# Patient Record
Sex: MALE | Race: WHITE | Employment: OTHER | URBAN - METROPOLITAN AREA
[De-identification: names, ages, dates, MRNs, and addresses within clinical notes are randomized per-mention and may not be internally consistent; named-entity substitution may affect disease eponyms.]

---

## 2018-10-10 LAB — DIABETIC RETINOPATHY: NEGATIVE

## 2019-01-09 LAB
LEFT VENTRICULAR EJECTION FRACTION MODE: NORMAL
LV EF: 35 %

## 2019-01-22 ENCOUNTER — TELEPHONE (OUTPATIENT)
Dept: CARDIOLOGY CLINIC | Age: 69
End: 2019-01-22

## 2019-01-24 RX ORDER — MONTELUKAST SODIUM 10 MG/1
10 TABLET ORAL DAILY
COMMUNITY
End: 2019-09-16 | Stop reason: SDUPTHER

## 2019-01-24 RX ORDER — GLYBURIDE 5 MG/1
5 TABLET ORAL
COMMUNITY
End: 2019-06-14 | Stop reason: SDUPTHER

## 2019-01-24 RX ORDER — IPRATROPIUM BROMIDE AND ALBUTEROL SULFATE 2.5; .5 MG/3ML; MG/3ML
1 SOLUTION RESPIRATORY (INHALATION) EVERY 6 HOURS PRN
COMMUNITY
End: 2019-12-30 | Stop reason: ALTCHOICE

## 2019-01-24 RX ORDER — SPIRONOLACTONE 25 MG/1
25 TABLET ORAL DAILY
COMMUNITY
End: 2019-03-06 | Stop reason: SDUPTHER

## 2019-01-24 RX ORDER — FUROSEMIDE 20 MG/1
20 TABLET ORAL 2 TIMES DAILY
COMMUNITY
End: 2019-02-12 | Stop reason: SDUPTHER

## 2019-01-25 ENCOUNTER — OFFICE VISIT (OUTPATIENT)
Dept: CARDIOLOGY CLINIC | Age: 69
End: 2019-01-25
Payer: MEDICARE

## 2019-01-25 VITALS
OXYGEN SATURATION: 98 % | WEIGHT: 232.6 LBS | BODY MASS INDEX: 33.3 KG/M2 | HEART RATE: 87 BPM | HEIGHT: 70 IN | SYSTOLIC BLOOD PRESSURE: 116 MMHG | RESPIRATION RATE: 22 BRPM | DIASTOLIC BLOOD PRESSURE: 70 MMHG

## 2019-01-25 DIAGNOSIS — D50.8 OTHER IRON DEFICIENCY ANEMIA: ICD-10-CM

## 2019-01-25 DIAGNOSIS — E11.8 CONTROLLED TYPE 2 DIABETES MELLITUS WITH COMPLICATION, WITHOUT LONG-TERM CURRENT USE OF INSULIN (HCC): ICD-10-CM

## 2019-01-25 DIAGNOSIS — I27.20 PULMONARY HTN (HCC): ICD-10-CM

## 2019-01-25 DIAGNOSIS — I34.0 NON-RHEUMATIC MITRAL REGURGITATION: ICD-10-CM

## 2019-01-25 DIAGNOSIS — I42.0 DILATED CARDIOMYOPATHY (HCC): ICD-10-CM

## 2019-01-25 DIAGNOSIS — R06.02 SOBOE (SHORTNESS OF BREATH ON EXERTION): ICD-10-CM

## 2019-01-25 DIAGNOSIS — I50.9 CONGESTIVE HEART FAILURE, UNSPECIFIED HF CHRONICITY, UNSPECIFIED HEART FAILURE TYPE (HCC): Primary | ICD-10-CM

## 2019-01-25 PROBLEM — D64.9 ABSOLUTE ANEMIA: Status: ACTIVE | Noted: 2019-01-25

## 2019-01-25 PROCEDURE — 4040F PNEUMOC VAC/ADMIN/RCVD: CPT | Performed by: INTERNAL MEDICINE

## 2019-01-25 PROCEDURE — G8484 FLU IMMUNIZE NO ADMIN: HCPCS | Performed by: INTERNAL MEDICINE

## 2019-01-25 PROCEDURE — G8417 CALC BMI ABV UP PARAM F/U: HCPCS | Performed by: INTERNAL MEDICINE

## 2019-01-25 PROCEDURE — 1036F TOBACCO NON-USER: CPT | Performed by: INTERNAL MEDICINE

## 2019-01-25 PROCEDURE — 93000 ELECTROCARDIOGRAM COMPLETE: CPT | Performed by: INTERNAL MEDICINE

## 2019-01-25 PROCEDURE — 1123F ACP DISCUSS/DSCN MKR DOCD: CPT | Performed by: INTERNAL MEDICINE

## 2019-01-25 PROCEDURE — 3017F COLORECTAL CA SCREEN DOC REV: CPT | Performed by: INTERNAL MEDICINE

## 2019-01-25 PROCEDURE — 1101F PT FALLS ASSESS-DOCD LE1/YR: CPT | Performed by: INTERNAL MEDICINE

## 2019-01-25 PROCEDURE — G8427 DOCREV CUR MEDS BY ELIG CLIN: HCPCS | Performed by: INTERNAL MEDICINE

## 2019-01-25 PROCEDURE — 2022F DILAT RTA XM EVC RTNOPTHY: CPT | Performed by: INTERNAL MEDICINE

## 2019-01-25 PROCEDURE — 99204 OFFICE O/P NEW MOD 45 MIN: CPT | Performed by: INTERNAL MEDICINE

## 2019-01-25 PROCEDURE — 3046F HEMOGLOBIN A1C LEVEL >9.0%: CPT | Performed by: INTERNAL MEDICINE

## 2019-01-25 RX ORDER — METOPROLOL SUCCINATE 25 MG/1
25 TABLET, EXTENDED RELEASE ORAL DAILY
Qty: 30 TABLET | Refills: 3 | Status: SHIPPED | OUTPATIENT
Start: 2019-01-25 | End: 2019-03-06 | Stop reason: SDUPTHER

## 2019-02-12 ENCOUNTER — OFFICE VISIT (OUTPATIENT)
Dept: CARDIOLOGY CLINIC | Age: 69
End: 2019-02-12
Payer: MEDICARE

## 2019-02-12 VITALS
HEART RATE: 80 BPM | HEIGHT: 70 IN | BODY MASS INDEX: 34.56 KG/M2 | SYSTOLIC BLOOD PRESSURE: 98 MMHG | DIASTOLIC BLOOD PRESSURE: 52 MMHG | OXYGEN SATURATION: 97 % | RESPIRATION RATE: 16 BRPM | WEIGHT: 241.4 LBS

## 2019-02-12 DIAGNOSIS — I38 VHD (VALVULAR HEART DISEASE): ICD-10-CM

## 2019-02-12 DIAGNOSIS — I50.22 CHRONIC SYSTOLIC HEART FAILURE (HCC): Primary | ICD-10-CM

## 2019-02-12 DIAGNOSIS — I42.8 OTHER CARDIOMYOPATHY (HCC): ICD-10-CM

## 2019-02-12 PROCEDURE — 1036F TOBACCO NON-USER: CPT | Performed by: NURSE PRACTITIONER

## 2019-02-12 PROCEDURE — 1123F ACP DISCUSS/DSCN MKR DOCD: CPT | Performed by: NURSE PRACTITIONER

## 2019-02-12 PROCEDURE — G8427 DOCREV CUR MEDS BY ELIG CLIN: HCPCS | Performed by: NURSE PRACTITIONER

## 2019-02-12 PROCEDURE — G8484 FLU IMMUNIZE NO ADMIN: HCPCS | Performed by: NURSE PRACTITIONER

## 2019-02-12 PROCEDURE — 3017F COLORECTAL CA SCREEN DOC REV: CPT | Performed by: NURSE PRACTITIONER

## 2019-02-12 PROCEDURE — 1101F PT FALLS ASSESS-DOCD LE1/YR: CPT | Performed by: NURSE PRACTITIONER

## 2019-02-12 PROCEDURE — G8417 CALC BMI ABV UP PARAM F/U: HCPCS | Performed by: NURSE PRACTITIONER

## 2019-02-12 PROCEDURE — 99214 OFFICE O/P EST MOD 30 MIN: CPT | Performed by: NURSE PRACTITIONER

## 2019-02-12 PROCEDURE — 4040F PNEUMOC VAC/ADMIN/RCVD: CPT | Performed by: NURSE PRACTITIONER

## 2019-02-12 RX ORDER — POTASSIUM CHLORIDE 750 MG/1
10 TABLET, EXTENDED RELEASE ORAL DAILY
COMMUNITY
End: 2019-06-14 | Stop reason: ALTCHOICE

## 2019-02-12 RX ORDER — FUROSEMIDE 40 MG/1
40 TABLET ORAL 2 TIMES DAILY
Qty: 60 TABLET | Refills: 2 | Status: SHIPPED | OUTPATIENT
Start: 2019-02-12 | End: 2019-03-06 | Stop reason: DRUGHIGH

## 2019-02-27 DIAGNOSIS — I50.22 CHRONIC SYSTOLIC HEART FAILURE (HCC): ICD-10-CM

## 2019-02-27 DIAGNOSIS — I42.8 OTHER CARDIOMYOPATHY (HCC): ICD-10-CM

## 2019-03-06 ENCOUNTER — OFFICE VISIT (OUTPATIENT)
Dept: CARDIOLOGY CLINIC | Age: 69
End: 2019-03-06
Payer: MEDICARE

## 2019-03-06 ENCOUNTER — HOSPITAL ENCOUNTER (OUTPATIENT)
Dept: CARDIOLOGY | Age: 69
Discharge: HOME | End: 2019-03-06
Payer: MEDICARE

## 2019-03-06 VITALS
OXYGEN SATURATION: 98 % | RESPIRATION RATE: 18 BRPM | HEIGHT: 70 IN | HEART RATE: 88 BPM | WEIGHT: 218 LBS | BODY MASS INDEX: 31.21 KG/M2 | DIASTOLIC BLOOD PRESSURE: 54 MMHG | SYSTOLIC BLOOD PRESSURE: 100 MMHG

## 2019-03-06 VITALS
DIASTOLIC BLOOD PRESSURE: 60 MMHG | BODY MASS INDEX: 30.06 KG/M2 | SYSTOLIC BLOOD PRESSURE: 84 MMHG | WEIGHT: 210 LBS | HEIGHT: 70 IN | HEART RATE: 82 BPM

## 2019-03-06 DIAGNOSIS — I27.20 PULMONARY HTN (HCC): ICD-10-CM

## 2019-03-06 DIAGNOSIS — R06.02 SOBOE (SHORTNESS OF BREATH ON EXERTION): ICD-10-CM

## 2019-03-06 DIAGNOSIS — I42.8 OTHER CARDIOMYOPATHY (HCC): ICD-10-CM

## 2019-03-06 DIAGNOSIS — I38 VHD (VALVULAR HEART DISEASE): Primary | ICD-10-CM

## 2019-03-06 DIAGNOSIS — I34.0 NON-RHEUMATIC MITRAL REGURGITATION: ICD-10-CM

## 2019-03-06 DIAGNOSIS — I50.22 CHRONIC SYSTOLIC HEART FAILURE (HCC): ICD-10-CM

## 2019-03-06 DIAGNOSIS — I42.0 DILATED CARDIOMYOPATHY (HCC): ICD-10-CM

## 2019-03-06 PROCEDURE — 93018 CV STRESS TEST I&R ONLY: CPT | Performed by: INTERNAL MEDICINE

## 2019-03-06 PROCEDURE — 78452 HT MUSCLE IMAGE SPECT MULT: CPT

## 2019-03-06 PROCEDURE — 1123F ACP DISCUSS/DSCN MKR DOCD: CPT | Performed by: INTERNAL MEDICINE

## 2019-03-06 PROCEDURE — 2580000003 HC RX 258: Performed by: INTERNAL MEDICINE

## 2019-03-06 PROCEDURE — 1101F PT FALLS ASSESS-DOCD LE1/YR: CPT | Performed by: INTERNAL MEDICINE

## 2019-03-06 PROCEDURE — 99214 OFFICE O/P EST MOD 30 MIN: CPT | Performed by: INTERNAL MEDICINE

## 2019-03-06 PROCEDURE — 6360000002 HC RX W HCPCS: Performed by: INTERNAL MEDICINE

## 2019-03-06 PROCEDURE — 1036F TOBACCO NON-USER: CPT | Performed by: INTERNAL MEDICINE

## 2019-03-06 PROCEDURE — G8484 FLU IMMUNIZE NO ADMIN: HCPCS | Performed by: INTERNAL MEDICINE

## 2019-03-06 PROCEDURE — G8427 DOCREV CUR MEDS BY ELIG CLIN: HCPCS | Performed by: INTERNAL MEDICINE

## 2019-03-06 PROCEDURE — 3017F COLORECTAL CA SCREEN DOC REV: CPT | Performed by: INTERNAL MEDICINE

## 2019-03-06 PROCEDURE — 93016 CV STRESS TEST SUPVJ ONLY: CPT | Performed by: INTERNAL MEDICINE

## 2019-03-06 PROCEDURE — G8417 CALC BMI ABV UP PARAM F/U: HCPCS | Performed by: INTERNAL MEDICINE

## 2019-03-06 PROCEDURE — 4040F PNEUMOC VAC/ADMIN/RCVD: CPT | Performed by: INTERNAL MEDICINE

## 2019-03-06 PROCEDURE — 93017 CV STRESS TEST TRACING ONLY: CPT

## 2019-03-06 PROCEDURE — A9502 TC99M TETROFOSMIN: HCPCS | Performed by: INTERNAL MEDICINE

## 2019-03-06 PROCEDURE — 3430000000 HC RX DIAGNOSTIC RADIOPHARMACEUTICAL: Performed by: INTERNAL MEDICINE

## 2019-03-06 RX ORDER — SODIUM CHLORIDE 0.9 % (FLUSH) 0.9 %
10 SYRINGE (ML) INJECTION PRN
Status: DISCONTINUED | OUTPATIENT
Start: 2019-03-06 | End: 2019-03-07 | Stop reason: HOSPADM

## 2019-03-06 RX ORDER — SPIRONOLACTONE 25 MG/1
25 TABLET ORAL DAILY
Qty: 30 TABLET | Refills: 11 | Status: SHIPPED
Start: 2019-03-06 | End: 2020-02-24

## 2019-03-06 RX ORDER — LISINOPRIL 2.5 MG/1
2.5 TABLET ORAL DAILY
Qty: 30 TABLET | Refills: 5 | Status: SHIPPED | OUTPATIENT
Start: 2019-03-06 | End: 2019-08-29 | Stop reason: SDUPTHER

## 2019-03-06 RX ORDER — FUROSEMIDE 40 MG/1
40 TABLET ORAL DAILY
Qty: 30 TABLET | Refills: 6 | Status: SHIPPED | OUTPATIENT
Start: 2019-03-06 | End: 2019-08-28 | Stop reason: SDUPTHER

## 2019-03-06 RX ORDER — METOPROLOL SUCCINATE 25 MG/1
25 TABLET, EXTENDED RELEASE ORAL DAILY
Qty: 30 TABLET | Refills: 11 | Status: SHIPPED
Start: 2019-03-06 | End: 2020-02-26

## 2019-03-06 RX ADMIN — TETROFOSMIN 25.6 MILLICURIE: 0.23 INJECTION, POWDER, LYOPHILIZED, FOR SOLUTION INTRAVENOUS at 10:17

## 2019-03-06 RX ADMIN — TETROFOSMIN 8.5 MILLICURIE: 0.23 INJECTION, POWDER, LYOPHILIZED, FOR SOLUTION INTRAVENOUS at 08:57

## 2019-03-06 RX ADMIN — Medication 10 ML: at 10:17

## 2019-03-06 RX ADMIN — Medication 10 ML: at 08:58

## 2019-03-06 RX ADMIN — REGADENOSON 0.4 MG: 0.08 INJECTION, SOLUTION INTRAVENOUS at 10:17

## 2019-03-06 RX ADMIN — Medication 10 ML: at 10:15

## 2019-03-26 ENCOUNTER — TELEPHONE (OUTPATIENT)
Dept: CARDIOLOGY CLINIC | Age: 69
End: 2019-03-26

## 2019-03-26 DIAGNOSIS — I38 VHD (VALVULAR HEART DISEASE): ICD-10-CM

## 2019-03-26 DIAGNOSIS — I42.8 OTHER CARDIOMYOPATHY (HCC): ICD-10-CM

## 2019-03-26 DIAGNOSIS — I50.22 CHRONIC SYSTOLIC HEART FAILURE (HCC): ICD-10-CM

## 2019-03-26 DIAGNOSIS — I27.20 PULMONARY HTN (HCC): ICD-10-CM

## 2019-03-26 PROCEDURE — 36415 COLL VENOUS BLD VENIPUNCTURE: CPT | Performed by: INTERNAL MEDICINE

## 2019-04-04 ENCOUNTER — OFFICE VISIT (OUTPATIENT)
Dept: CARDIOLOGY CLINIC | Age: 69
End: 2019-04-04
Payer: MEDICARE

## 2019-04-04 VITALS
BODY MASS INDEX: 31.38 KG/M2 | SYSTOLIC BLOOD PRESSURE: 94 MMHG | WEIGHT: 219.2 LBS | HEIGHT: 70 IN | HEART RATE: 73 BPM | OXYGEN SATURATION: 99 % | DIASTOLIC BLOOD PRESSURE: 50 MMHG | RESPIRATION RATE: 18 BRPM

## 2019-04-04 DIAGNOSIS — D50.8 IRON DEFICIENCY ANEMIA SECONDARY TO INADEQUATE DIETARY IRON INTAKE: ICD-10-CM

## 2019-04-04 DIAGNOSIS — I27.20 PULMONARY HTN (HCC): ICD-10-CM

## 2019-04-04 DIAGNOSIS — I42.8 OTHER CARDIOMYOPATHY (HCC): ICD-10-CM

## 2019-04-04 DIAGNOSIS — R06.02 SOBOE (SHORTNESS OF BREATH ON EXERTION): ICD-10-CM

## 2019-04-04 DIAGNOSIS — E11.8 CONTROLLED TYPE 2 DIABETES MELLITUS WITH COMPLICATION, WITHOUT LONG-TERM CURRENT USE OF INSULIN (HCC): ICD-10-CM

## 2019-04-04 DIAGNOSIS — I34.0 NON-RHEUMATIC MITRAL REGURGITATION: Primary | ICD-10-CM

## 2019-04-04 DIAGNOSIS — I42.0 DILATED CARDIOMYOPATHY (HCC): ICD-10-CM

## 2019-04-04 PROCEDURE — 1036F TOBACCO NON-USER: CPT | Performed by: INTERNAL MEDICINE

## 2019-04-04 PROCEDURE — 3046F HEMOGLOBIN A1C LEVEL >9.0%: CPT | Performed by: INTERNAL MEDICINE

## 2019-04-04 PROCEDURE — 2022F DILAT RTA XM EVC RTNOPTHY: CPT | Performed by: INTERNAL MEDICINE

## 2019-04-04 PROCEDURE — 4040F PNEUMOC VAC/ADMIN/RCVD: CPT | Performed by: INTERNAL MEDICINE

## 2019-04-04 PROCEDURE — 93000 ELECTROCARDIOGRAM COMPLETE: CPT | Performed by: INTERNAL MEDICINE

## 2019-04-04 PROCEDURE — 99214 OFFICE O/P EST MOD 30 MIN: CPT | Performed by: INTERNAL MEDICINE

## 2019-04-04 PROCEDURE — 1123F ACP DISCUSS/DSCN MKR DOCD: CPT | Performed by: INTERNAL MEDICINE

## 2019-04-04 PROCEDURE — G8417 CALC BMI ABV UP PARAM F/U: HCPCS | Performed by: INTERNAL MEDICINE

## 2019-04-04 PROCEDURE — G8427 DOCREV CUR MEDS BY ELIG CLIN: HCPCS | Performed by: INTERNAL MEDICINE

## 2019-04-04 PROCEDURE — 3017F COLORECTAL CA SCREEN DOC REV: CPT | Performed by: INTERNAL MEDICINE

## 2019-04-04 NOTE — PATIENT INSTRUCTIONS
Patient Education        A Healthy Heart: Care Instructions  Your Care Instructions    Heart disease occurs when a substance called plaque builds up in the vessels that supply oxygen-rich blood to your heart. This can narrow the blood vessels and reduce blood flow. A heart attack happens when blood flow is completely blocked. A high-fat diet, smoking, and other factors increase the risk of heart disease. Your doctor has found that you have a chance of having heart disease. You can do lots of things to keep your heart healthy. It may not be easy, but you can change your diet, exercise more, and quit smoking. These steps really work to lower your chance of heart disease. Follow-up care is a key part of your treatment and safety. Be sure to make and go to all appointments, and call your doctor if you are having problems. It's also a good idea to know your test results and keep a list of the medicines you take. How can you care for yourself at home? Diet    · Use less salt when you cook and eat. This helps lower your blood pressure. Taste food before salting. Add only a little salt when you think you need it. With time, your taste buds will adjust to less salt.     · Eat fewer snack items, fast foods, canned soups, and other high-salt, high-fat, processed foods.     · Read food labels and try to avoid saturated and trans fats. They increase your risk of heart disease by raising cholesterol levels.     · Limit the amount of solid fat-butter, margarine, and shortening-you eat. Use olive, peanut, or canola oil when you cook. Bake, broil, and steam foods instead of frying them.     · Eating fish can lower your risk for heart disease. Eat at least 2 servings of fish a week. [place], mackerel, herring, sardines, and chunk light tuna are very good choices. These fish contain omega-3 fatty acids.     · Eat a variety of fruit and vegetables every day.  Dark green, deep orange, red, or yellow fruits and vegetables are

## 2019-04-04 NOTE — PROGRESS NOTES
OFFICE VISIT     PRIMARY CARE PHYSICIAN:      Adolfo Hernandez DO       ALLERGIES / SENSITIVITIES:        Allergies   Allergen Reactions    Penicillins Hives    Seasonal      Mold cats dogs ragweed          REVIEWED MEDICATIONS:        Current Outpatient Medications:     furosemide (LASIX) 40 MG tablet, Take 1 tablet by mouth daily, Disp: 30 tablet, Rfl: 6    lisinopril (PRINIVIL;ZESTRIL) 2.5 MG tablet, Take 1 tablet by mouth daily, Disp: 30 tablet, Rfl: 5    metoprolol succinate (TOPROL XL) 25 MG extended release tablet, Take 1 tablet by mouth daily, Disp: 30 tablet, Rfl: 11    spironolactone (ALDACTONE) 25 MG tablet, Take 1 tablet by mouth daily, Disp: 30 tablet, Rfl: 11    potassium chloride (KLOR-CON M) 10 MEQ extended release tablet, Take 10 mEq by mouth daily, Disp: , Rfl:     montelukast (SINGULAIR) 10 MG tablet, Take 10 mg by mouth daily, Disp: , Rfl:     glyBURIDE (DIABETA) 5 MG tablet, Take 5 mg by mouth daily (with breakfast), Disp: , Rfl:     ipratropium-albuterol (DUONEB) 0.5-2.5 (3) MG/3ML SOLN nebulizer solution, Inhale 1 vial into the lungs every 6 hours as needed for Shortness of Breath, Disp: , Rfl:     Carboxymethylcellul-Glycerin (REFRESH OPTIVE) 0.5-0.9 % SOLN, Apply to eye as needed, Disp: , Rfl:       S: REASON FOR VISIT:       Chief Complaint   Patient presents with    Results     here to review stress pt wearing lifevest          History of Present Illness:    Office Visit for follow up of CMP, VHD     No hospitalizations or surgeries since last visit   Given iron infusion a month ago   Wearing Life vest, wanted to return due to cost.   Jesse any exertional chest pain    C/p mild sohort of breath   No palpitations, dizzy or syncope.    Active at home   No orthopnea   Try to watch diet   Compliant with all medications       Past Medical History:   Diagnosis Date    Cardiomyopathy ([address]     [facility] [address]     DM (diabetes mellitus) ([facility] [address]     Enlarged prostate     Thrombocytopenia (HCC)     VHD (valvular heart disease)             Past Surgical History:   Procedure Laterality Date    CYST REMOVAL      from left heel    HAMMER TOE SURGERY      REFRACTIVE SURGERY Bilateral           Family History   Problem Relation Age of Onset    Alcohol Abuse Father     No Known Problems Sister     Diabetes Brother           Social History     Tobacco Use    Smoking status: Former Smoker     Packs/day: 1.00     Years: 20.00     Pack years: 20.00     Types: Cigarettes     Last attempt to quit: 1993     Years since quittin.2    Smokeless tobacco: Former User     Types: Chew     Quit date: 1970   Substance Use Topics    Alcohol use: Yes     Comment: Occ LiveBid         Review of Systems:  HEENT: negative for acute visual symptoms or auditory problems, no dysphagia  Constitutional: negative for fever and chills, or significant weight loss  Respiratory: negative for cough, wheezing, or hemoptysis  Cardiovascular: negative for chest pain, palpitations, and dyspnea  Gastrointestinal: negative for abdominal pain, diarrhea, nausea and vomiting  Endocrine: Negative for polyuria and polydyspsia  Genitourinary:negative for dysuria and hematuria  Derm: negative for rash and skin lesion(s)  Neurological: negative for tingling, numbness, weakness, seizures and tremors  Endocrine: negative for polydipsia and polyuria  Musculoskeletal: negative for pain or tenderness  Psychiatric: negative for anxiety, depression, or suicidal ideations         O:  COMPLETE PHYSICAL EXAM:       BP (!) 94/50   Pulse 73   Resp 18   Ht 5' 10\" (1.778 m)   Wt 219 lb 3.2 oz (99.4 kg)   SpO2 99%   BMI 31.45 kg/m²       General:   Patient alert, comfortable, no distress. Appears stated age. HEENT:    Pupils equal, no icterus, no nasal drainage, tongue moist.   Neck:              No masses, Thyroid not palpable. Chest:   Normal configuration, non tender.    Lungs:   Clear to auscultation bilaterally, few scattered rhonchi. Cardiovascular:  Regular rhythm, 1/6 systolic murmur, No S3, no palpable thrills, No elevated JVD, No carotid bruit. Abdomen:  Soft, Non tender, Bowel sounds normal, no pulsatile abdominal aorta, no palpable masses. Extremities:  + edema. Distal pulses palpable. No cyanosis, no clubbing. Skin:   Good turgor, warm and dry, no cyanosis. Musculoskeletal: No joint swelling or deformity. Neuro:   Cranial nerves grossly intact; No focal neurologic deficit. Psych:   Alert, good mood and effect. REVIEW OF DIAGNOSTIC TESTS:        Electrocardiogram: NSR                A/P:   ASSESSMENT / PLAN:    Romi Vicente was seen today for results. Diagnoses and all orders for this visit:    Non-rheumatic mitral regurgitation  -     EKG 12 Lead  -     Echo limited; Future    Dilated cardiomyopathy ([facility] [address] - EF 35% in Jan 2019 - EF 20% by stress test 3/6/2019 - On Maximal tolearable doses of BB, Aldactone,ACE-I; Monitor renal Fn, Continue  Life vest - Repeat limited Echo  days from 3/6/19 ( last medication titration), if EF <35% EP referral  -     Echo limited; Future    Chronic systolic heart failure (HCC) - Lost 23 lbs since 2/12/19; Decrease Lasix      VHD (valvular heart disease) - MR, TR     Pulmonary HTN (HCC)    Iron deficiency anemia secondary to inadequate dietary iron intake    Preventive Cardiology: Low cholesterol diet, regular exercise as tolerate, and gradual weight loss discussed. Monitor BP and heart rates. D/w his Son-Ok to take Iron pills-He cant afford Live Vest   All questions answered about cardiac diagnoses and cardiac medications. Continue current medications. Compliance with medications and f/u with all physicians discussed. Risk factor modification based on risk profile discussed. Call if any exertional chest pain, short of breath, dizzy or palpitations   Follow up in 3 months or earlier if needed.          [facility]  [address], [address] [facility], [address] [facility]  [phone]

## 2019-05-18 ENCOUNTER — TELEPHONE (OUTPATIENT)
Dept: FAMILY MEDICINE CLINIC | Age: 69
End: 2019-05-18

## 2019-05-18 RX ORDER — DOXYCYCLINE HYCLATE 100 MG/1
CAPSULE ORAL
Refills: 0 | COMMUNITY
Start: 2019-04-30 | End: 2019-06-14 | Stop reason: ALTCHOICE

## 2019-05-18 NOTE — TELEPHONE ENCOUNTER
Pt informed. He took the last dose of Doxy yesterday and he did not think it helped much. Pt thought that Mucinex was helpful previously.      Does pt need a Rx for an antibiotic sent in?

## 2019-05-22 DIAGNOSIS — J32.9 CHRONIC SINUSITIS, UNSPECIFIED LOCATION: Primary | ICD-10-CM

## 2019-06-14 ENCOUNTER — OFFICE VISIT (OUTPATIENT)
Dept: FAMILY MEDICINE CLINIC | Age: 69
End: 2019-06-14
Payer: MEDICARE

## 2019-06-14 DIAGNOSIS — I42.0 DILATED CARDIOMYOPATHY (HCC): Primary | ICD-10-CM

## 2019-06-14 DIAGNOSIS — E11.9 DIABETES MELLITUS WITHOUT COMPLICATION (HCC): ICD-10-CM

## 2019-06-14 DIAGNOSIS — E11.9 TYPE 2 DIABETES MELLITUS WITHOUT COMPLICATION, WITHOUT LONG-TERM CURRENT USE OF INSULIN (HCC): ICD-10-CM

## 2019-06-14 PROCEDURE — 99214 OFFICE O/P EST MOD 30 MIN: CPT | Performed by: INTERNAL MEDICINE

## 2019-06-14 RX ORDER — GLYBURIDE 5 MG/1
5 TABLET ORAL
Qty: 90 TABLET | Refills: 1 | Status: SHIPPED | OUTPATIENT
Start: 2019-06-14 | End: 2019-12-30 | Stop reason: SDUPTHER

## 2019-06-14 ASSESSMENT — PATIENT HEALTH QUESTIONNAIRE - PHQ9
SUM OF ALL RESPONSES TO PHQ QUESTIONS 1-9: 0
1. LITTLE INTEREST OR PLEASURE IN DOING THINGS: 0
SUM OF ALL RESPONSES TO PHQ QUESTIONS 1-9: 0
2. FEELING DOWN, DEPRESSED OR HOPELESS: 0
SUM OF ALL RESPONSES TO PHQ9 QUESTIONS 1 & 2: 0

## 2019-06-14 NOTE — PROGRESS NOTES
2019     Conrad Qureshi (:  1950) is a 70 y.o. male has a follow-up. He is wondering if he should continue the Lasix which has been given to him by cardiology. He did finally see ENT- Dr Fausto Zepeda, yesterday. He is being scheduled for CT and additional laboratory for allergy testing had been performed. He was seen by hematology for the pancytopenia with the last note being March. The pancytopenia had improved and was now just anemia with the use of the Venofer IV. He will be following up in 3 months with her  He has seen cardiology in April. He could not afford the LifeVest.      Prior to Visit Medications    Medication Sig Taking?  Authorizing Provider   glyBURIDE (DIABETA) 5 MG tablet Take 1 tablet by mouth daily (with breakfast) Yes Asher Whelan DO   MUCINEX 600 MG extended release tablet take 5 milliliters by mouth every 6 hours if needed for SINUS Yes Historical Provider, MD   furosemide (LASIX) 40 MG tablet Take 1 tablet by mouth daily Yes Bettye Villatoro MD   lisinopril (PRINIVIL;ZESTRIL) 2.5 MG tablet Take 1 tablet by mouth daily Yes Bettye Villatoro MD   metoprolol succinate (TOPROL XL) 25 MG extended release tablet Take 1 tablet by mouth daily Yes Bettye Villatoro MD   spironolactone (ALDACTONE) 25 MG tablet Take 1 tablet by mouth daily Yes Bettye Villatoro MD   montelukast (SINGULAIR) 10 MG tablet Take 10 mg by mouth daily Yes Historical Provider, MD   ipratropium-albuterol (DUONEB) 0.5-2.5 (3) MG/3ML SOLN nebulizer solution Inhale 1 vial into the lungs every 6 hours as needed for Shortness of Breath Yes Historical Provider, MD   Carboxymethylcellul-Glycerin (REFRESH OPTIVE) 0.5-0.9 % SOLN Apply to eye as needed Yes Historical Provider, MD      Allergies   Allergen Reactions    Penicillins Hives    Seasonal      Mold cats dogs ragweed       Past Medical History:   Diagnosis Date    Cardiomyopathy ([facility] [address]     CHF [facility]congestive heart failure) ([facility] [address]     DM (diabetes mellitus) ([facility] [address]     Enlarged prostate     Thrombocytopenia (HCC)     VHD (valvular heart disease)      Past Surgical History:   Procedure Laterality Date    CYST REMOVAL      from left heel    HAMMER TOE SURGERY      REFRACTIVE SURGERY Bilateral       Social History     Tobacco Use    Smoking status: Former Smoker     Packs/day: 1.00     Years: 20.00     Pack years: 20.00     Types: Cigarettes     Last attempt to quit: 1993     Years since quittin.4    Smokeless tobacco: Former User     Types: Chew     Quit date: 1970   Substance Use Topics    Alcohol use: Yes     Comment: Occ beer        There were no vitals filed for this visit. Estimated body mass index is 31.45 kg/m² as calculated from the following:    Height as of 19: 5' 10\" (1.778 m). Weight as of 19: 219 lb 3.2 oz (99.4 kg). Physical Exam   Heart is regular rate and rhythm without gallops or clicks. Lungs clear to auscultation without wheezes rales rhonchi. Posterior pharynx still shows thick yellow-green type drainage. TMs are bilaterally intact without injection. No cervical lymphadenopathy. No facial edema noted. He is alert and oriented x3. Ambulates in the hallway without difficulty and can go from a seated to standing position and onto the exam table without difficulty.  strength bilaterally strong and intact. No leg edema is noted today bilaterally. Skin is otherwise intact. ASSESSMENT/PLAN:    ICD-10-CM    1. Dilated cardiomyopathy ([facility] [address] I42.0    2. Diabetes mellitus without complication ([facility] [address] K22.0 HEMOGLOBIN A1C     Renal Panel   3. Type 2 diabetes mellitus without complication, without long-term current use of insulin (HCC) E11.9       I have ordered laboratory of a kidney panel and hemoglobin A1c to be performed with the lab work he will have done next week for hematology. His blood sugars are not adequately controlled as he is ranging between 50 and 300.   Refill of medications

## 2019-06-18 LAB
AVERAGE GLUCOSE: NORMAL
HBA1C MFR BLD: 8.5 %

## 2019-06-30 DIAGNOSIS — E11.9 DIABETES MELLITUS WITHOUT COMPLICATION (HCC): ICD-10-CM

## 2019-07-02 ENCOUNTER — TELEPHONE (OUTPATIENT)
Dept: FAMILY MEDICINE CLINIC | Age: 69
End: 2019-07-02

## 2019-07-11 ENCOUNTER — HOSPITAL ENCOUNTER (OUTPATIENT)
Dept: CARDIOLOGY | Age: 69
Discharge: HOME | End: 2019-07-11
Payer: MEDICARE

## 2019-07-11 DIAGNOSIS — I42.8 OTHER CARDIOMYOPATHY (HCC): ICD-10-CM

## 2019-07-11 DIAGNOSIS — I34.0 NON-RHEUMATIC MITRAL REGURGITATION: ICD-10-CM

## 2019-07-11 LAB
LEFT VENTRICULAR EJECTION FRACTION MODE: NORMAL
LV EF: 45 %

## 2019-07-11 PROCEDURE — 93308 TTE F-UP OR LMTD: CPT

## 2019-08-01 ENCOUNTER — TELEPHONE (OUTPATIENT)
Dept: CARDIOLOGY CLINIC | Age: 69
End: 2019-08-01

## 2019-08-01 NOTE — TELEPHONE ENCOUNTER
Patient seen in the office on 4/4/2019, had Echo done 7-     Summary   Limited Echo for LV function.      Left ventricular size is normal.   Apical dyskinesis, mid to distal septal hypokinesis.   Overall ejection fraction mildly decreased, EF estimated about 45%.   No recent study found in the [employer].     We are to call with results, please advise, Frdedie Chaudhary

## 2019-08-28 ENCOUNTER — OFFICE VISIT (OUTPATIENT)
Dept: CARDIOLOGY CLINIC | Age: 69
End: 2019-08-28
Payer: MEDICARE

## 2019-08-28 VITALS
RESPIRATION RATE: 16 BRPM | SYSTOLIC BLOOD PRESSURE: 100 MMHG | BODY MASS INDEX: 31.78 KG/M2 | HEART RATE: 72 BPM | DIASTOLIC BLOOD PRESSURE: 52 MMHG | WEIGHT: 222 LBS | HEIGHT: 70 IN

## 2019-08-28 DIAGNOSIS — I38 VHD (VALVULAR HEART DISEASE): ICD-10-CM

## 2019-08-28 DIAGNOSIS — I50.22 CHRONIC SYSTOLIC HEART FAILURE (HCC): ICD-10-CM

## 2019-08-28 DIAGNOSIS — R06.02 SOB (SHORTNESS OF BREATH): ICD-10-CM

## 2019-08-28 DIAGNOSIS — I42.8 OTHER CARDIOMYOPATHY (HCC): ICD-10-CM

## 2019-08-28 DIAGNOSIS — I42.0 DILATED CARDIOMYOPATHY (HCC): Primary | ICD-10-CM

## 2019-08-28 PROCEDURE — 1036F TOBACCO NON-USER: CPT | Performed by: INTERNAL MEDICINE

## 2019-08-28 PROCEDURE — G8427 DOCREV CUR MEDS BY ELIG CLIN: HCPCS | Performed by: INTERNAL MEDICINE

## 2019-08-28 PROCEDURE — 3017F COLORECTAL CA SCREEN DOC REV: CPT | Performed by: INTERNAL MEDICINE

## 2019-08-28 PROCEDURE — 99214 OFFICE O/P EST MOD 30 MIN: CPT | Performed by: INTERNAL MEDICINE

## 2019-08-28 PROCEDURE — G8417 CALC BMI ABV UP PARAM F/U: HCPCS | Performed by: INTERNAL MEDICINE

## 2019-08-28 PROCEDURE — 4040F PNEUMOC VAC/ADMIN/RCVD: CPT | Performed by: INTERNAL MEDICINE

## 2019-08-28 PROCEDURE — 93000 ELECTROCARDIOGRAM COMPLETE: CPT | Performed by: INTERNAL MEDICINE

## 2019-08-28 PROCEDURE — 1123F ACP DISCUSS/DSCN MKR DOCD: CPT | Performed by: INTERNAL MEDICINE

## 2019-08-28 RX ORDER — FUROSEMIDE 40 MG/1
40 TABLET ORAL DAILY
Qty: 30 TABLET | Refills: 9 | Status: SHIPPED | OUTPATIENT
Start: 2019-08-28 | End: 2019-08-28 | Stop reason: SDUPTHER

## 2019-08-28 RX ORDER — DIPHENHYDRAMINE HCL 25 MG
50 TABLET ORAL NIGHTLY PRN
COMMUNITY
End: 2021-12-07

## 2019-08-28 RX ORDER — FUROSEMIDE 40 MG/1
40 TABLET ORAL DAILY
Qty: 30 TABLET | Refills: 9 | Status: SHIPPED
Start: 2019-08-28 | End: 2020-06-18 | Stop reason: SDUPTHER

## 2019-08-28 RX ORDER — FERROUS SULFATE 325(65) MG
325 TABLET ORAL
COMMUNITY
End: 2021-04-19 | Stop reason: ALTCHOICE

## 2019-08-28 NOTE — PROGRESS NOTES
OFFICE VISIT     PRIMARY CARE PHYSICIAN:      Awais Martins DO       ALLERGIES / SENSITIVITIES:        Allergies   Allergen Reactions    Penicillins Hives    Seasonal      Mold cats dogs ragweed          REVIEWED MEDICATIONS:        Current Outpatient Medications:     ferrous sulfate 325 (65 Fe) MG tablet, Take 325 mg by mouth daily (with breakfast), Disp: , Rfl:     diphenhydrAMINE (ALLERGY RELIEF) 25 MG tablet, Take 50 mg by mouth nightly as needed for Itching, Disp: , Rfl:     furosemide (LASIX) 40 MG tablet, Take 1 tablet by mouth daily, Disp: 30 tablet, Rfl: 9    glyBURIDE (DIABETA) 5 MG tablet, Take 1 tablet by mouth daily (with breakfast), Disp: 90 tablet, Rfl: 1    lisinopril (PRINIVIL;ZESTRIL) 2.5 MG tablet, Take 1 tablet by mouth daily, Disp: 30 tablet, Rfl: 5    metoprolol succinate (TOPROL XL) 25 MG extended release tablet, Take 1 tablet by mouth daily, Disp: 30 tablet, Rfl: 11    spironolactone (ALDACTONE) 25 MG tablet, Take 1 tablet by mouth daily, Disp: 30 tablet, Rfl: 11    montelukast (SINGULAIR) 10 MG tablet, Take 10 mg by mouth daily, Disp: , Rfl:     ipratropium-albuterol (DUONEB) 0.5-2.5 (3) MG/3ML SOLN nebulizer solution, Inhale 1 vial into the lungs every 6 hours as needed for Shortness of Breath, Disp: , Rfl:       S: REASON FOR VISIT:       Chief Complaint   Patient presents with    Results     5 month ov / review results of echo done on 7/11/19; he denies any significant cardiac sx; says he sent back LifeVest couple of mos ago d/t inaffordability; also says he ran out of Lasix about 1 month ago and never refilled script          History of Present Illness:       Office Visit for follow up of CMP and VHD   Echo showed 45% , Life vest returned     No hospitalizations or surgeries since last visit   C/o mild MALLORY-No pND or Orthopnea   Jesse any exertional chest pain   No palpitations, dizzy or syncope.    Active at home   No orthopnea   Try to watch diet   Compliant with all palpitations   Follow up in 6 months or earlier if needed.          [facility]  [address] [place], [address]  [phone]

## 2019-08-29 RX ORDER — LISINOPRIL 2.5 MG/1
2.5 TABLET ORAL DAILY
Qty: 30 TABLET | Refills: 5 | Status: SHIPPED
Start: 2019-08-29 | End: 2020-02-24

## 2019-09-16 ENCOUNTER — OFFICE VISIT (OUTPATIENT)
Dept: FAMILY MEDICINE CLINIC | Age: 69
End: 2019-09-16
Payer: MEDICARE

## 2019-09-16 VITALS
DIASTOLIC BLOOD PRESSURE: 66 MMHG | OXYGEN SATURATION: 98 % | HEART RATE: 79 BPM | TEMPERATURE: 98 F | SYSTOLIC BLOOD PRESSURE: 118 MMHG

## 2019-09-16 DIAGNOSIS — D50.8 IRON DEFICIENCY ANEMIA SECONDARY TO INADEQUATE DIETARY IRON INTAKE: ICD-10-CM

## 2019-09-16 DIAGNOSIS — I25.10 CORONARY ARTERY DISEASE INVOLVING NATIVE HEART WITHOUT ANGINA PECTORIS, UNSPECIFIED VESSEL OR LESION TYPE: ICD-10-CM

## 2019-09-16 DIAGNOSIS — E11.9 DIABETES MELLITUS WITHOUT COMPLICATION (HCC): Primary | ICD-10-CM

## 2019-09-16 PROCEDURE — G8417 CALC BMI ABV UP PARAM F/U: HCPCS | Performed by: INTERNAL MEDICINE

## 2019-09-16 PROCEDURE — 4040F PNEUMOC VAC/ADMIN/RCVD: CPT | Performed by: INTERNAL MEDICINE

## 2019-09-16 PROCEDURE — 2022F DILAT RTA XM EVC RTNOPTHY: CPT | Performed by: INTERNAL MEDICINE

## 2019-09-16 PROCEDURE — G8599 NO ASA/ANTIPLAT THER USE RNG: HCPCS | Performed by: INTERNAL MEDICINE

## 2019-09-16 PROCEDURE — 3017F COLORECTAL CA SCREEN DOC REV: CPT | Performed by: INTERNAL MEDICINE

## 2019-09-16 PROCEDURE — 3045F PR MOST RECENT HEMOGLOBIN A1C LEVEL 7.0-9.0%: CPT | Performed by: INTERNAL MEDICINE

## 2019-09-16 PROCEDURE — 99214 OFFICE O/P EST MOD 30 MIN: CPT | Performed by: INTERNAL MEDICINE

## 2019-09-16 PROCEDURE — 1036F TOBACCO NON-USER: CPT | Performed by: INTERNAL MEDICINE

## 2019-09-16 PROCEDURE — 1123F ACP DISCUSS/DSCN MKR DOCD: CPT | Performed by: INTERNAL MEDICINE

## 2019-09-16 PROCEDURE — G8427 DOCREV CUR MEDS BY ELIG CLIN: HCPCS | Performed by: INTERNAL MEDICINE

## 2019-09-16 RX ORDER — MONTELUKAST SODIUM 10 MG/1
10 TABLET ORAL DAILY
Qty: 90 TABLET | Refills: 1 | Status: SHIPPED | OUTPATIENT
Start: 2019-09-16 | End: 2019-12-30 | Stop reason: SDUPTHER

## 2019-09-16 NOTE — PROGRESS NOTES
HEMOGLOBIN A1C     Lipid Panel     Hepatic Function Panel     TSH   2. Coronary artery disease involving native heart without angina pectoris, unspecified vessel or lesion type I25.10 Lipid Panel     TSH   3. Iron deficiency anemia secondary to inadequate dietary iron intake D50.8 CBC Auto Differential      Ordered additional laboratory to be performed at his next lab draw for hematology which is in a few weeks. No medication changes were made. And I insisted that the patient continue on the current dose of Lasix 40 mg. Just because his edema of the lower extremities has resolved I told him we are not going to discontinue this. No follow-ups on file. An electronic signature was used to authenticate this note.     --Cory Yu,  on 9/18/2019 at 8:47 PM

## 2019-09-17 LAB
CHOLESTEROL, TOTAL: NORMAL
CHOLESTEROL/HDL RATIO: NORMAL
HDLC SERPL-MCNC: NORMAL MG/DL
LDL CHOLESTEROL CALCULATED: NORMAL
TRIGL SERPL-MCNC: NORMAL MG/DL
TSH SERPL DL<=0.05 MIU/L-ACNC: NORMAL M[IU]/L
VLDLC SERPL CALC-MCNC: NORMAL MG/DL

## 2019-09-18 LAB
AVERAGE GLUCOSE: NORMAL
HBA1C MFR BLD: 8.7 %

## 2019-12-30 ENCOUNTER — OFFICE VISIT (OUTPATIENT)
Dept: FAMILY MEDICINE CLINIC | Age: 69
End: 2019-12-30
Payer: MEDICARE

## 2019-12-30 VITALS
HEART RATE: 70 BPM | BODY MASS INDEX: 32.95 KG/M2 | DIASTOLIC BLOOD PRESSURE: 60 MMHG | TEMPERATURE: 97.3 F | WEIGHT: 217.4 LBS | HEIGHT: 68 IN | SYSTOLIC BLOOD PRESSURE: 102 MMHG | OXYGEN SATURATION: 98 %

## 2019-12-30 DIAGNOSIS — I25.10 CORONARY ARTERY DISEASE INVOLVING NATIVE HEART WITHOUT ANGINA PECTORIS, UNSPECIFIED VESSEL OR LESION TYPE: ICD-10-CM

## 2019-12-30 DIAGNOSIS — I42.0 DILATED CARDIOMYOPATHY (HCC): ICD-10-CM

## 2019-12-30 DIAGNOSIS — E11.9 DIABETES MELLITUS WITHOUT COMPLICATION (HCC): Primary | ICD-10-CM

## 2019-12-30 PROCEDURE — G8484 FLU IMMUNIZE NO ADMIN: HCPCS | Performed by: INTERNAL MEDICINE

## 2019-12-30 PROCEDURE — G8417 CALC BMI ABV UP PARAM F/U: HCPCS | Performed by: INTERNAL MEDICINE

## 2019-12-30 PROCEDURE — 3045F PR MOST RECENT HEMOGLOBIN A1C LEVEL 7.0-9.0%: CPT | Performed by: INTERNAL MEDICINE

## 2019-12-30 PROCEDURE — 1036F TOBACCO NON-USER: CPT | Performed by: INTERNAL MEDICINE

## 2019-12-30 PROCEDURE — 3017F COLORECTAL CA SCREEN DOC REV: CPT | Performed by: INTERNAL MEDICINE

## 2019-12-30 PROCEDURE — 4040F PNEUMOC VAC/ADMIN/RCVD: CPT | Performed by: INTERNAL MEDICINE

## 2019-12-30 PROCEDURE — 1123F ACP DISCUSS/DSCN MKR DOCD: CPT | Performed by: INTERNAL MEDICINE

## 2019-12-30 PROCEDURE — G8427 DOCREV CUR MEDS BY ELIG CLIN: HCPCS | Performed by: INTERNAL MEDICINE

## 2019-12-30 PROCEDURE — 99214 OFFICE O/P EST MOD 30 MIN: CPT | Performed by: INTERNAL MEDICINE

## 2019-12-30 PROCEDURE — G8599 NO ASA/ANTIPLAT THER USE RNG: HCPCS | Performed by: INTERNAL MEDICINE

## 2019-12-30 PROCEDURE — 2022F DILAT RTA XM EVC RTNOPTHY: CPT | Performed by: INTERNAL MEDICINE

## 2019-12-30 RX ORDER — GLYBURIDE 5 MG/1
5 TABLET ORAL
Qty: 90 TABLET | Refills: 1 | Status: SHIPPED
Start: 2019-12-30 | End: 2020-06-18 | Stop reason: SDUPTHER

## 2019-12-30 RX ORDER — MONTELUKAST SODIUM 10 MG/1
10 TABLET ORAL DAILY
Qty: 90 TABLET | Refills: 1 | Status: SHIPPED
Start: 2019-12-30 | End: 2020-06-18 | Stop reason: SDUPTHER

## 2019-12-30 RX ORDER — SITAGLIPTIN 50 MG/1
50 TABLET, FILM COATED ORAL DAILY
Qty: 30 TABLET | Refills: 3
Start: 2019-12-30 | End: 2021-04-19

## 2019-12-31 ENCOUNTER — TELEPHONE (OUTPATIENT)
Dept: FAMILY MEDICINE CLINIC | Age: 69
End: 2019-12-31

## 2020-02-24 RX ORDER — SPIRONOLACTONE 25 MG/1
TABLET ORAL
Qty: 90 TABLET | Refills: 3 | Status: SHIPPED
Start: 2020-02-24 | End: 2020-02-25 | Stop reason: SDUPTHER

## 2020-02-24 RX ORDER — LISINOPRIL 2.5 MG/1
TABLET ORAL
Qty: 90 TABLET | Refills: 3 | Status: SHIPPED
Start: 2020-02-24 | End: 2020-02-25 | Stop reason: SDUPTHER

## 2020-02-26 RX ORDER — SPIRONOLACTONE 25 MG/1
25 TABLET ORAL DAILY
Qty: 90 TABLET | Refills: 3 | Status: SHIPPED
Start: 2020-02-26 | End: 2020-12-21 | Stop reason: SDUPTHER

## 2020-02-26 RX ORDER — LISINOPRIL 2.5 MG/1
2.5 TABLET ORAL DAILY
Qty: 90 TABLET | Refills: 3 | Status: SHIPPED
Start: 2020-02-26 | End: 2020-12-21 | Stop reason: SDUPTHER

## 2020-02-26 RX ORDER — METOPROLOL SUCCINATE 25 MG/1
TABLET, EXTENDED RELEASE ORAL
Qty: 30 TABLET | Refills: 11 | Status: SHIPPED
Start: 2020-02-26 | End: 2020-12-21 | Stop reason: SDUPTHER

## 2020-02-26 RX ORDER — METOPROLOL SUCCINATE 25 MG/1
25 TABLET, EXTENDED RELEASE ORAL DAILY
Qty: 90 TABLET | Refills: 3 | Status: SHIPPED
Start: 2020-02-26 | End: 2020-12-21 | Stop reason: SDUPTHER

## 2020-06-18 ENCOUNTER — OFFICE VISIT (OUTPATIENT)
Dept: FAMILY MEDICINE CLINIC | Age: 70
End: 2020-06-18
Payer: MEDICARE

## 2020-06-18 ENCOUNTER — HOSPITAL ENCOUNTER (OUTPATIENT)
Age: 70
Discharge: HOME | End: 2020-06-20
Payer: MEDICARE

## 2020-06-18 VITALS
TEMPERATURE: 97.9 F | HEIGHT: 72 IN | SYSTOLIC BLOOD PRESSURE: 104 MMHG | OXYGEN SATURATION: 98 % | WEIGHT: 220 LBS | HEART RATE: 86 BPM | DIASTOLIC BLOOD PRESSURE: 60 MMHG | BODY MASS INDEX: 29.8 KG/M2

## 2020-06-18 LAB
ALBUMIN SERPL-MCNC: 3.4 G/DL (ref 3.5–5.2)
ALP BLD-CCNC: 114 U/L (ref 40–129)
ALT SERPL-CCNC: 31 U/L (ref 0–40)
ANION GAP SERPL CALCULATED.3IONS-SCNC: 14 MMOL/L (ref 7–16)
AST SERPL-CCNC: 52 U/L (ref 0–39)
BASOPHILS ABSOLUTE: 0.09 E9/L (ref 0–0.2)
BASOPHILS RELATIVE PERCENT: 1.7 % (ref 0–2)
BILIRUB SERPL-MCNC: 1.4 MG/DL (ref 0–1.2)
BILIRUBIN DIRECT: 0.4 MG/DL (ref 0–0.3)
BILIRUBIN, INDIRECT: 1 MG/DL (ref 0–1)
BUN BLDV-MCNC: 16 MG/DL (ref 8–23)
CALCIUM SERPL-MCNC: 9.5 MG/DL (ref 8.6–10.2)
CHLORIDE BLD-SCNC: 101 MMOL/L (ref 98–107)
CHOLESTEROL, TOTAL: 133 MG/DL (ref 0–199)
CO2: 23 MMOL/L (ref 22–29)
CREAT SERPL-MCNC: 1 MG/DL (ref 0.7–1.2)
EOSINOPHILS ABSOLUTE: 0.45 E9/L (ref 0.05–0.5)
EOSINOPHILS RELATIVE PERCENT: 8.4 % (ref 0–6)
GFR AFRICAN AMERICAN: >60
GFR NON-AFRICAN AMERICAN: >60 ML/MIN/1.73
GLUCOSE BLD-MCNC: 157 MG/DL (ref 74–99)
HBA1C MFR BLD: 7.1 % (ref 4–5.6)
HCT VFR BLD CALC: 41.5 % (ref 37–54)
HDLC SERPL-MCNC: 30 MG/DL
HEMOGLOBIN: 14 G/DL (ref 12.5–16.5)
IMMATURE GRANULOCYTES #: 0.03 E9/L
IMMATURE GRANULOCYTES %: 0.6 % (ref 0–5)
IRON SATURATION: 97 % (ref 20–55)
IRON: 206 MCG/DL (ref 59–158)
LDL CHOLESTEROL CALCULATED: 76 MG/DL (ref 0–99)
LYMPHOCYTES ABSOLUTE: 1.95 E9/L (ref 1.5–4)
LYMPHOCYTES RELATIVE PERCENT: 36.5 % (ref 20–42)
MCH RBC QN AUTO: 31.7 PG (ref 26–35)
MCHC RBC AUTO-ENTMCNC: 33.7 % (ref 32–34.5)
MCV RBC AUTO: 93.9 FL (ref 80–99.9)
MONOCYTES ABSOLUTE: 0.61 E9/L (ref 0.1–0.95)
MONOCYTES RELATIVE PERCENT: 11.4 % (ref 2–12)
NEUTROPHILS ABSOLUTE: 2.21 E9/L (ref 1.8–7.3)
NEUTROPHILS RELATIVE PERCENT: 41.4 % (ref 43–80)
PDW BLD-RTO: 14.4 FL (ref 11.5–15)
PHOSPHORUS: 2.8 MG/DL (ref 2.5–4.5)
PLATELET # BLD: 89 E9/L (ref 130–450)
PLATELET CONFIRMATION: NORMAL
PMV BLD AUTO: 12.7 FL (ref 7–12)
POTASSIUM SERPL-SCNC: 4.1 MMOL/L (ref 3.5–5)
RBC # BLD: 4.42 E12/L (ref 3.8–5.8)
SODIUM BLD-SCNC: 138 MMOL/L (ref 132–146)
TOTAL IRON BINDING CAPACITY: 212 MCG/DL (ref 250–450)
TOTAL PROTEIN: 8.3 G/DL (ref 6.4–8.3)
TRIGL SERPL-MCNC: 135 MG/DL (ref 0–149)
TSH SERPL DL<=0.05 MIU/L-ACNC: 2.24 UIU/ML (ref 0.27–4.2)
VLDLC SERPL CALC-MCNC: 27 MG/DL
WBC # BLD: 5.3 E9/L (ref 4.5–11.5)

## 2020-06-18 PROCEDURE — 83540 ASSAY OF IRON: CPT

## 2020-06-18 PROCEDURE — G8417 CALC BMI ABV UP PARAM F/U: HCPCS | Performed by: INTERNAL MEDICINE

## 2020-06-18 PROCEDURE — 84075 ASSAY ALKALINE PHOSPHATASE: CPT

## 2020-06-18 PROCEDURE — G8427 DOCREV CUR MEDS BY ELIG CLIN: HCPCS | Performed by: INTERNAL MEDICINE

## 2020-06-18 PROCEDURE — 82247 BILIRUBIN TOTAL: CPT

## 2020-06-18 PROCEDURE — 36415 COLL VENOUS BLD VENIPUNCTURE: CPT

## 2020-06-18 PROCEDURE — 3046F HEMOGLOBIN A1C LEVEL >9.0%: CPT | Performed by: INTERNAL MEDICINE

## 2020-06-18 PROCEDURE — 85025 COMPLETE CBC W/AUTO DIFF WBC: CPT

## 2020-06-18 PROCEDURE — 84460 ALANINE AMINO (ALT) (SGPT): CPT

## 2020-06-18 PROCEDURE — 82248 BILIRUBIN DIRECT: CPT

## 2020-06-18 PROCEDURE — 99214 OFFICE O/P EST MOD 30 MIN: CPT | Performed by: INTERNAL MEDICINE

## 2020-06-18 PROCEDURE — 1036F TOBACCO NON-USER: CPT | Performed by: INTERNAL MEDICINE

## 2020-06-18 PROCEDURE — 84450 TRANSFERASE (AST) (SGOT): CPT

## 2020-06-18 PROCEDURE — 84155 ASSAY OF PROTEIN SERUM: CPT

## 2020-06-18 PROCEDURE — 80061 LIPID PANEL: CPT

## 2020-06-18 PROCEDURE — 83036 HEMOGLOBIN GLYCOSYLATED A1C: CPT

## 2020-06-18 PROCEDURE — 80069 RENAL FUNCTION PANEL: CPT

## 2020-06-18 PROCEDURE — 83550 IRON BINDING TEST: CPT

## 2020-06-18 PROCEDURE — 84443 ASSAY THYROID STIM HORMONE: CPT

## 2020-06-18 PROCEDURE — 1123F ACP DISCUSS/DSCN MKR DOCD: CPT | Performed by: INTERNAL MEDICINE

## 2020-06-18 PROCEDURE — 4040F PNEUMOC VAC/ADMIN/RCVD: CPT | Performed by: INTERNAL MEDICINE

## 2020-06-18 PROCEDURE — 2022F DILAT RTA XM EVC RTNOPTHY: CPT | Performed by: INTERNAL MEDICINE

## 2020-06-18 PROCEDURE — 3017F COLORECTAL CA SCREEN DOC REV: CPT | Performed by: INTERNAL MEDICINE

## 2020-06-18 RX ORDER — GLYBURIDE 5 MG/1
5 TABLET ORAL
Qty: 90 TABLET | Refills: 1 | Status: SHIPPED
Start: 2020-06-18 | End: 2020-06-24

## 2020-06-18 RX ORDER — AZELASTINE 1 MG/ML
1 SPRAY, METERED NASAL 2 TIMES DAILY
Qty: 1 BOTTLE | Refills: 2 | Status: SHIPPED
Start: 2020-06-18 | End: 2021-07-06

## 2020-06-18 RX ORDER — MONTELUKAST SODIUM 10 MG/1
10 TABLET ORAL DAILY
Qty: 90 TABLET | Refills: 1 | Status: SHIPPED
Start: 2020-06-18 | End: 2021-04-19

## 2020-06-18 RX ORDER — FUROSEMIDE 40 MG/1
40 TABLET ORAL DAILY
Qty: 90 TABLET | Refills: 1 | Status: SHIPPED
Start: 2020-06-18 | End: 2020-12-14

## 2020-06-18 RX ORDER — CETIRIZINE HYDROCHLORIDE 10 MG/1
10 TABLET ORAL DAILY
Qty: 30 TABLET | Refills: 5 | Status: SHIPPED | OUTPATIENT
Start: 2020-06-18 | End: 2020-07-18

## 2020-06-18 NOTE — PROGRESS NOTES
(ZYRTEC) 10 MG tablet; Take 1 tablet by mouth daily  -     azelastine (ASTELIN) 0.1 % nasal spray; 1 spray by Nasal route 2 times daily Use in each nostril as directed    Likely will need to see the patient again in 6 months. He is to keep a log of his blood sugars and bring these with him. I told him fasting sugars should be around 100-110 and his postprandial sugars can be up to 180 and still be normal.  No changes are necessary otherwise in his medicine. He is past due for laboratory but this was postponed when the [employer] hit. The patient has been remaining isolated at home. An electronic signature was used to authenticate this note.     --Wong Ho,  on 6/18/2020 at 2:22 PM

## 2020-06-19 NOTE — RESULT ENCOUNTER NOTE
Called an notified pt of results and that he should stop taking his iron supplements. Pt confirmed to stop taking them until hematology.

## 2020-06-24 RX ORDER — GLYBURIDE 5 MG/1
TABLET ORAL
Qty: 90 TABLET | Refills: 1 | Status: SHIPPED
Start: 2020-06-24 | End: 2021-04-19 | Stop reason: SDUPTHER

## 2020-12-14 RX ORDER — FUROSEMIDE 40 MG/1
TABLET ORAL
Qty: 30 TABLET | Refills: 0 | Status: SHIPPED
Start: 2020-12-14 | End: 2020-12-21 | Stop reason: SDUPTHER

## 2020-12-14 NOTE — TELEPHONE ENCOUNTER
Spoke with pt and he has an appt with Dr. Abundio Scruggs on 1/4/20. He was advised a 30 day supply will be sent and future refills will need to be given by Dr. Abundio Scruggs. 148

## 2020-12-21 ENCOUNTER — OFFICE VISIT (OUTPATIENT)
Dept: CARDIOLOGY CLINIC | Age: 70
End: 2020-12-21
Payer: MEDICARE

## 2020-12-21 VITALS
DIASTOLIC BLOOD PRESSURE: 50 MMHG | HEART RATE: 56 BPM | RESPIRATION RATE: 16 BRPM | SYSTOLIC BLOOD PRESSURE: 98 MMHG | HEIGHT: 70 IN | BODY MASS INDEX: 32.73 KG/M2 | OXYGEN SATURATION: 96 % | WEIGHT: 228.6 LBS

## 2020-12-21 PROCEDURE — 1036F TOBACCO NON-USER: CPT | Performed by: INTERNAL MEDICINE

## 2020-12-21 PROCEDURE — 93000 ELECTROCARDIOGRAM COMPLETE: CPT | Performed by: INTERNAL MEDICINE

## 2020-12-21 PROCEDURE — 1123F ACP DISCUSS/DSCN MKR DOCD: CPT | Performed by: INTERNAL MEDICINE

## 2020-12-21 PROCEDURE — G8484 FLU IMMUNIZE NO ADMIN: HCPCS | Performed by: INTERNAL MEDICINE

## 2020-12-21 PROCEDURE — 4040F PNEUMOC VAC/ADMIN/RCVD: CPT | Performed by: INTERNAL MEDICINE

## 2020-12-21 PROCEDURE — G8427 DOCREV CUR MEDS BY ELIG CLIN: HCPCS | Performed by: INTERNAL MEDICINE

## 2020-12-21 PROCEDURE — G8417 CALC BMI ABV UP PARAM F/U: HCPCS | Performed by: INTERNAL MEDICINE

## 2020-12-21 PROCEDURE — 99214 OFFICE O/P EST MOD 30 MIN: CPT | Performed by: INTERNAL MEDICINE

## 2020-12-21 PROCEDURE — 3017F COLORECTAL CA SCREEN DOC REV: CPT | Performed by: INTERNAL MEDICINE

## 2020-12-21 RX ORDER — METOPROLOL SUCCINATE 25 MG/1
25 TABLET, EXTENDED RELEASE ORAL DAILY
Qty: 90 TABLET | Refills: 3 | Status: SHIPPED
Start: 2020-12-21 | End: 2021-08-20

## 2020-12-21 RX ORDER — MV-MIN/FA/VIT K/LUTEIN/ZEAXANT 200MCG-5MG
1 CAPSULE ORAL 2 TIMES DAILY
COMMUNITY

## 2020-12-21 RX ORDER — LISINOPRIL 2.5 MG/1
2.5 TABLET ORAL DAILY
Qty: 90 TABLET | Refills: 3 | Status: SHIPPED
Start: 2020-12-21 | End: 2021-08-20

## 2020-12-21 RX ORDER — SPIRONOLACTONE 25 MG/1
25 TABLET ORAL DAILY
Qty: 90 TABLET | Refills: 3 | Status: SHIPPED
Start: 2020-12-21 | End: 2021-08-05

## 2020-12-21 RX ORDER — FUROSEMIDE 40 MG/1
40 TABLET ORAL DAILY
Qty: 90 TABLET | Refills: 3 | Status: ON HOLD
Start: 2020-12-21 | End: 2021-08-28 | Stop reason: HOSPADM

## 2020-12-21 SDOH — HEALTH STABILITY: MENTAL HEALTH: HOW OFTEN DO YOU HAVE A DRINK CONTAINING ALCOHOL?: MONTHLY OR LESS

## 2020-12-21 SDOH — HEALTH STABILITY: MENTAL HEALTH: HOW MANY STANDARD DRINKS CONTAINING ALCOHOL DO YOU HAVE ON A TYPICAL DAY?: 1 OR 2

## 2020-12-21 NOTE — PROGRESS NOTES
OFFICE VISIT     PRIMARY CARE PHYSICIAN:      Pilar Peraza DO       ALLERGIES / SENSITIVITIES:        Allergies   Allergen Reactions    Penicillins Hives    Eggs Or Egg-Derived Products     Milk-Related Compounds     Peanut-Containing Drug Products     Seasonal      Mold cats dogs ragweed          REVIEWED MEDICATIONS:        Current Outpatient Medications:     Multiple Vitamins-Minerals (PRESERVISION AREDS 2+MULTI VIT) CAPS, Take 1 capsule by mouth 2 times daily, Disp: , Rfl:     loratadine (RA ALLERGY RELIEF) 10 MG dissolvable tablet, Take 10 mg by mouth daily, Disp: , Rfl:     spironolactone (ALDACTONE) 25 MG tablet, Take 1 tablet by mouth daily, Disp: 90 tablet, Rfl: 3    lisinopril (PRINIVIL;ZESTRIL) 2.5 MG tablet, Take 1 tablet by mouth daily, Disp: 90 tablet, Rfl: 3    metoprolol succinate (TOPROL XL) 25 MG extended release tablet, Take 1 tablet by mouth daily, Disp: 90 tablet, Rfl: 3    furosemide (LASIX) 40 MG tablet, Take 1 tablet by mouth daily, Disp: 90 tablet, Rfl: 3    glyBURIDE (DIABETA) 5 MG tablet, take 1 tablet by mouth once daily with BREAKFAST, Disp: 90 tablet, Rfl: 1    montelukast (SINGULAIR) 10 MG tablet, Take 1 tablet by mouth daily, Disp: 90 tablet, Rfl: 1    diphenhydrAMINE (ALLERGY RELIEF) 25 MG tablet, Take 50 mg by mouth nightly as needed for Itching, Disp: , Rfl:     azelastine (ASTELIN) 0.1 % nasal spray, 1 spray by Nasal route 2 times daily Use in each nostril as directed (Patient not taking: Reported on 12/21/2020), Disp: 1 Bottle, Rfl: 2    JANUVIA 50 MG tablet, Take 1 tablet by mouth daily (Patient not taking: Reported on 6/18/2020), Disp: 30 tablet, Rfl: 3    ferrous sulfate 325 (65 Fe) MG tablet, Take 325 mg by mouth daily (with breakfast), Disp: , Rfl:       S: REASON FOR VISIT:       Chief Complaint   Patient presents with    Congestive Heart Failure 16 month ov. Denies ED/Hosp admits. Labs 20. Is c/o some SOBOE & occ LE edema. No other complaints.  Cardiomyopathy    Cardiac Valve Problem    Shortness of Breath    Edema          History of Present Illness:       Office Visit for follow up of NICMP, VHD   69 yr with history of NICMP, VHD, came for f/u visit with his wife   Have not seen any physician for several months   No hospitalizations or surgeries since last visit   Patient is compliant with all medications   Jesse any exertional chest pain or short of breath   No palpitations, dizzy or syncope.    Active at home   No orthopnea   Try to watch diet          Past Medical History:   Diagnosis Date    Allergic rhinitis     Seasonal    Cardiomyopathy ([address]     CHF (congestive heart failure) (HCC)     [facility]     Enlarged prostate     Thrombocytopenia (HCC)     Type 2 diabetes mellitus without complication ([facility]     VHD (valvular heart disease)             Past Surgical History:   Procedure Laterality Date    CATARACT REMOVAL Bilateral     CYST REMOVAL      from left heel    HAMMER TOE SURGERY Bilateral     REFRACTIVE SURGERY Bilateral           Family History   Problem Relation Age of Onset    Alcohol Abuse Father     No Known Problems Sister     Diabetes Brother           Social History     Tobacco Use    Smoking status: Former Smoker     Packs/day: 1.00     Years: 20.00     Pack years: 20.00     Types: Cigarettes     Quit date: 1993     Years since quittin.9    Smokeless tobacco: Former User     Types: Chew     Quit date: 1970   Substance Use Topics    Alcohol use: Yes     Frequency: Monthly or less     Drinks per session: 1 or 2     Binge frequency: Never     Comment: Occ beer         Review of Systems:  Constitutional: negative for fever and chills, or significant weight loss  HEENT: negative for acute visual symptoms or auditory problems, no dysphagia Respiratory: negative for cough, wheezing, or hemoptysis  Cardiovascular: negative for chest pain, palpitations, and dyspnea  Gastrointestinal: negative for abdominal pain, diarrhea, nausea and vomiting  Endocrine: Negative for polyuria and polydyspsia  Genitourinary:negative for dysuria and hematuria  Derm: negative for rash and skin lesion(s)  Neurological: negative for tingling, numbness, weakness, seizures and tremors  Endocrine: negative for polydipsia and polyuria  Musculoskeletal: negative for pain or tenderness  Psychiatric: negative for anxiety, depression, or suicidal ideations         O:  COMPLETE PHYSICAL EXAM:       BP (!) 98/50 (Site: Right Upper Arm, Position: Sitting, Cuff Size: Medium Adult)   Pulse 56   Resp 16   Ht 5' 10\" (1.778 m)   Wt 228 lb 9.6 oz (103.7 kg)   SpO2 96%   BMI 32.80 kg/m²       General:   Patient alert, comfortable, no distress. Appears stated age. HEENT:    Pupils equal, no icterus, no nasal drainage, tongue moist.   Neck:              No masses, Thyroid not palpable. No elevated JVD, No carotid bruit. Chest:   Normal configuration, non tender. Lungs:   Clear to auscultation bilaterally, few scattered rhonchi. Cardiovascular:  Regular rhythm, 1/6 systolic murmur, No S3, no palpable thrills,    Abdomen:  Soft, Non tender, Bowel sounds normal, no pulsatile abdominal aorta. Extremities:  + edema. Distal pulses palpable. No cyanosis, no clubbing. Skin:   Good turgor, warm and dry, no cyanosis. Musculoskeletal: No joint swelling or deformity. Neuro:   Cranial nerves grossly intact; No focal neurologic deficit. Psych:   Alert, good mood and effect. REVIEW OF DIAGNOSTIC TESTS:        Electrocardiogram: NSR, sinus gena   Labs:  6/20020 - Reviewed                 A/P:   ASSESSMENT / PLAN:    Yusef Lopez was seen today for congestive heart failure, cardiomyopathy, cardiac valve problem, shortness of breath and edema.     Diagnoses and all orders for this visit: Chronic HFrEF (heart failure with reduced ejection fraction) ([facility])  -     EKG 12 Lead     Dilated cardiomyopathy (HCC) - EF 35% in Jan 2019, EF 20% by stress test 3/6/2019 - On Maximal tolearable doses of BB, Aldactone,ACE-I; Monitor renal Fn,  LV EF 45% by Echo 7/1/2019   -     EKG 12 Lead      VHD (valvular heart disease) - MR, TR     Pulmonary HTN (HCC)    Sinus bradycardia - Asymptomatic, monitor HR    Other orders  -     EKG 12 Lead  -     spironolactone (ALDACTONE) 25 MG tablet; Take 1 tablet by mouth daily  -     lisinopril (PRINIVIL;ZESTRIL) 2.5 MG tablet; Take 1 tablet by mouth daily  -     metoprolol succinate (TOPROL XL) 25 MG extended release tablet; Take 1 tablet by mouth daily    Preventive Cardiology: Low cholesterol diet, regular exercise as tolerate, and gradual weight loss discussed. Monitor BP and heart rates. Above recommendations discussed with him and his sister-in-law who works at [address] [employer] him have labs done next month with Dr Mehreen Bey   All questions answered about cardiac diagnoses and cardiac medications. Continue current medications. Compliance with medications and f/u with all physicians discussed. Risk factor modification based on risk profile discussed. Call if any exertional chest pain, short of breath, dizzy or palpitations   Follow up in 6 months or earlier if needed.          [facility]  [address], [place], [address] [facility]  [phone]

## 2021-01-08 ENCOUNTER — TELEPHONE (OUTPATIENT)
Dept: CARDIOLOGY CLINIC | Age: 71
End: 2021-01-08

## 2021-01-08 NOTE — TELEPHONE ENCOUNTER
His after visit summary says you changed Lasix to Lasix 40 mg daily. Says that is what he has been on. So do you want to change or continue on Lasix 40 mg daily. Thanks.   [phone]

## 2021-04-19 ENCOUNTER — OFFICE VISIT (OUTPATIENT)
Dept: FAMILY MEDICINE CLINIC | Age: 71
End: 2021-04-19
Payer: MEDICARE

## 2021-04-19 VITALS
BODY MASS INDEX: 30.62 KG/M2 | HEIGHT: 68 IN | DIASTOLIC BLOOD PRESSURE: 44 MMHG | HEART RATE: 76 BPM | TEMPERATURE: 97 F | WEIGHT: 202 LBS | SYSTOLIC BLOOD PRESSURE: 112 MMHG | OXYGEN SATURATION: 94 %

## 2021-04-19 DIAGNOSIS — R05.9 COUGH: ICD-10-CM

## 2021-04-19 DIAGNOSIS — I42.0 DILATED CARDIOMYOPATHY (HCC): Primary | ICD-10-CM

## 2021-04-19 DIAGNOSIS — I10 ESSENTIAL (PRIMARY) HYPERTENSION: ICD-10-CM

## 2021-04-19 DIAGNOSIS — R06.02 SOBOE (SHORTNESS OF BREATH ON EXERTION): ICD-10-CM

## 2021-04-19 DIAGNOSIS — D50.8 IRON DEFICIENCY ANEMIA SECONDARY TO INADEQUATE DIETARY IRON INTAKE: ICD-10-CM

## 2021-04-19 DIAGNOSIS — D61.818 PANCYTOPENIA (HCC): ICD-10-CM

## 2021-04-19 DIAGNOSIS — E11.8 CONTROLLED TYPE 2 DIABETES MELLITUS WITH COMPLICATION, WITHOUT LONG-TERM CURRENT USE OF INSULIN (HCC): ICD-10-CM

## 2021-04-19 DIAGNOSIS — I27.20 PULMONARY HTN (HCC): ICD-10-CM

## 2021-04-19 PROBLEM — E11.9 DIABETES MELLITUS WITHOUT COMPLICATION (HCC): Status: RESOLVED | Noted: 2019-06-14 | Resolved: 2021-04-19

## 2021-04-19 LAB
ALBUMIN SERPL-MCNC: 2.6 G/DL (ref 3.5–5.2)
ALP BLD-CCNC: 167 U/L (ref 40–129)
ALT SERPL-CCNC: 30 U/L (ref 0–40)
ANION GAP SERPL CALCULATED.3IONS-SCNC: 12 MMOL/L (ref 7–16)
AST SERPL-CCNC: 47 U/L (ref 0–39)
BASOPHILS ABSOLUTE: 0.09 E9/L (ref 0–0.2)
BASOPHILS RELATIVE PERCENT: 1.2 % (ref 0–2)
BILIRUB SERPL-MCNC: 1.6 MG/DL (ref 0–1.2)
BUN BLDV-MCNC: 18 MG/DL (ref 8–23)
CALCIUM SERPL-MCNC: 9.3 MG/DL (ref 8.6–10.2)
CHLORIDE BLD-SCNC: 94 MMOL/L (ref 98–107)
CO2: 24 MMOL/L (ref 22–29)
CREAT SERPL-MCNC: 1.2 MG/DL (ref 0.7–1.2)
EOSINOPHILS ABSOLUTE: 0.23 E9/L (ref 0.05–0.5)
EOSINOPHILS RELATIVE PERCENT: 3 % (ref 0–6)
FERRITIN: 1272 NG/ML
GFR AFRICAN AMERICAN: >60
GFR NON-AFRICAN AMERICAN: 60 ML/MIN/1.73
GLUCOSE BLD-MCNC: 330 MG/DL (ref 74–99)
HBA1C MFR BLD: 9.6 % (ref 4–5.6)
HCT VFR BLD CALC: 40.8 % (ref 37–54)
HEMOGLOBIN: 14.2 G/DL (ref 12.5–16.5)
IMMATURE GRANULOCYTES #: 0.05 E9/L
IMMATURE GRANULOCYTES %: 0.7 % (ref 0–5)
IRON SATURATION: 107 % (ref 20–55)
IRON: 163 MCG/DL (ref 59–158)
LYMPHOCYTES ABSOLUTE: 2.34 E9/L (ref 1.5–4)
LYMPHOCYTES RELATIVE PERCENT: 30.7 % (ref 20–42)
MCH RBC QN AUTO: 32.3 PG (ref 26–35)
MCHC RBC AUTO-ENTMCNC: 34.8 % (ref 32–34.5)
MCV RBC AUTO: 92.9 FL (ref 80–99.9)
MONOCYTES ABSOLUTE: 0.77 E9/L (ref 0.1–0.95)
MONOCYTES RELATIVE PERCENT: 10.1 % (ref 2–12)
NEUTROPHILS ABSOLUTE: 4.14 E9/L (ref 1.8–7.3)
NEUTROPHILS RELATIVE PERCENT: 54.3 % (ref 43–80)
PDW BLD-RTO: 14.5 FL (ref 11.5–15)
PLATELET # BLD: 163 E9/L (ref 130–450)
PMV BLD AUTO: 11.8 FL (ref 7–12)
POTASSIUM SERPL-SCNC: 4.9 MMOL/L (ref 3.5–5)
RBC # BLD: 4.39 E12/L (ref 3.8–5.8)
SODIUM BLD-SCNC: 130 MMOL/L (ref 132–146)
TOTAL IRON BINDING CAPACITY: 152 MCG/DL (ref 250–450)
TOTAL PROTEIN: 7.9 G/DL (ref 6.4–8.3)
WBC # BLD: 7.6 E9/L (ref 4.5–11.5)

## 2021-04-19 PROCEDURE — 4040F PNEUMOC VAC/ADMIN/RCVD: CPT | Performed by: FAMILY MEDICINE

## 2021-04-19 PROCEDURE — 3046F HEMOGLOBIN A1C LEVEL >9.0%: CPT | Performed by: FAMILY MEDICINE

## 2021-04-19 PROCEDURE — G8417 CALC BMI ABV UP PARAM F/U: HCPCS | Performed by: FAMILY MEDICINE

## 2021-04-19 PROCEDURE — G8427 DOCREV CUR MEDS BY ELIG CLIN: HCPCS | Performed by: FAMILY MEDICINE

## 2021-04-19 PROCEDURE — 1036F TOBACCO NON-USER: CPT | Performed by: FAMILY MEDICINE

## 2021-04-19 PROCEDURE — 1123F ACP DISCUSS/DSCN MKR DOCD: CPT | Performed by: FAMILY MEDICINE

## 2021-04-19 PROCEDURE — 2022F DILAT RTA XM EVC RTNOPTHY: CPT | Performed by: FAMILY MEDICINE

## 2021-04-19 PROCEDURE — 99214 OFFICE O/P EST MOD 30 MIN: CPT | Performed by: FAMILY MEDICINE

## 2021-04-19 PROCEDURE — 3017F COLORECTAL CA SCREEN DOC REV: CPT | Performed by: FAMILY MEDICINE

## 2021-04-19 RX ORDER — GLYBURIDE 5 MG/1
TABLET ORAL
Qty: 90 TABLET | Refills: 1 | Status: SHIPPED
Start: 2021-04-19 | End: 2021-08-20

## 2021-04-19 RX ORDER — GLYCERIN/LANOLIN/MINERAL OIL
CREAM (GRAM) TOPICAL DAILY PRN
COMMUNITY
End: 2022-07-05

## 2021-04-19 ASSESSMENT — PATIENT HEALTH QUESTIONNAIRE - PHQ9
SUM OF ALL RESPONSES TO PHQ QUESTIONS 1-9: 0
2. FEELING DOWN, DEPRESSED OR HOPELESS: 0
SUM OF ALL RESPONSES TO PHQ QUESTIONS 1-9: 0
SUM OF ALL RESPONSES TO PHQ9 QUESTIONS 1 & 2: 0

## 2021-04-19 ASSESSMENT — ENCOUNTER SYMPTOMS
NAUSEA: 0
VOMITING: 0

## 2021-04-19 NOTE — PROGRESS NOTES
Nasir presents to the office today for   Chief Complaint   Patient presents with    Diabetes     Cough  Past 3 weeks  No fever  Starting to feel better  He feels typical flare for him    Diabetes  Taking meds as Rx  Due for A1C  Sugars have been high  He ran out of Glyburide 3 months ago    Dilated cardiomyopathy  Seeing Dr. Esther Schafer with his diuretics  Weight is down quit a bit    Pancytopenia  He was supposed to f/u with Dr. Cosme Velazquez but he has declined due to [facility] concerns  He has had transfusion and iron infusions    Review of Systems   Constitutional: Negative for chills and fever. Cardiovascular: Negative for chest pain and palpitations. Gastrointestinal: Negative for nausea and vomiting. Genitourinary: Negative for dysuria, hematuria and urgency. Skin: Negative for rash. Neurological: Negative for dizziness and light-headedness. BP (!) 112/44   Pulse 76   Temp 97 °F (36.1 °C) (Temporal)   Ht 5' 8\" (1.727 m)   Wt 202 lb (91.6 kg)   SpO2 94%   BMI 30.71 kg/m²   Physical Exam  Constitutional:       Appearance: Normal appearance. HENT:      Head: Normocephalic and atraumatic. Eyes:      Extraocular Movements: Extraocular movements intact. Conjunctiva/sclera: Conjunctivae normal.   Cardiovascular:      Rate and Rhythm: Normal rate. Pulmonary:      Effort: Pulmonary effort is normal.      Breath sounds: Normal breath sounds. Skin:     General: Skin is warm. Neurological:      Mental Status: He is alert and oriented to person, place, and time.    Psychiatric:         Mood and Affect: Mood normal.         Behavior: Behavior normal.            Current Outpatient Medications:     Phenylephrine-DM-GG-APAP 5--325 MG/10ML LIQD, , Disp: , Rfl:     glyBURIDE (DIABETA) 5 MG tablet, take 1 tablet by mouth once daily with BREAKFAST, Disp: 90 tablet, Rfl: 1    Multiple Vitamins-Minerals (PRESERVISION AREDS 2+MULTI VIT) CAPS,

## 2021-04-21 DIAGNOSIS — R79.89 ELEVATED LFTS: Primary | ICD-10-CM

## 2021-06-07 DIAGNOSIS — R79.89 ELEVATED LFTS: Primary | ICD-10-CM

## 2021-06-07 DIAGNOSIS — K80.20 GALLSTONES: ICD-10-CM

## 2021-06-07 DIAGNOSIS — K76.6 PORTAL HYPERTENSION (HCC): ICD-10-CM

## 2021-06-22 ENCOUNTER — OFFICE VISIT (OUTPATIENT)
Dept: SURGERY | Age: 71
End: 2021-06-22
Payer: MEDICARE

## 2021-06-22 ENCOUNTER — TELEPHONE (OUTPATIENT)
Dept: HEMATOLOGY | Age: 71
End: 2021-06-22

## 2021-06-22 VITALS
HEART RATE: 76 BPM | TEMPERATURE: 97 F | BODY MASS INDEX: 30.62 KG/M2 | HEIGHT: 68 IN | SYSTOLIC BLOOD PRESSURE: 112 MMHG | DIASTOLIC BLOOD PRESSURE: 44 MMHG | WEIGHT: 202 LBS | OXYGEN SATURATION: 94 %

## 2021-06-22 DIAGNOSIS — K74.60 CIRRHOSIS OF LIVER WITHOUT ASCITES, UNSPECIFIED HEPATIC CIRRHOSIS TYPE (HCC): Primary | ICD-10-CM

## 2021-06-22 DIAGNOSIS — K74.60 CIRRHOSIS OF LIVER WITHOUT ASCITES, UNSPECIFIED HEPATIC CIRRHOSIS TYPE (HCC): ICD-10-CM

## 2021-06-22 DIAGNOSIS — R17 ELEVATED BILIRUBIN: ICD-10-CM

## 2021-06-22 LAB
ALBUMIN SERPL-MCNC: 3 G/DL (ref 3.5–5.2)
ALP BLD-CCNC: 123 U/L (ref 40–129)
ALT SERPL-CCNC: 34 U/L (ref 0–40)
ANION GAP SERPL CALCULATED.3IONS-SCNC: 10 MMOL/L (ref 7–16)
AST SERPL-CCNC: 50 U/L (ref 0–39)
BILIRUB SERPL-MCNC: 0.9 MG/DL (ref 0–1.2)
BILIRUBIN DIRECT: 0.4 MG/DL (ref 0–0.3)
BILIRUBIN, INDIRECT: 0.5 MG/DL (ref 0–1)
BUN BLDV-MCNC: 12 MG/DL (ref 6–23)
CALCIUM SERPL-MCNC: 9.1 MG/DL (ref 8.6–10.2)
CHLORIDE BLD-SCNC: 105 MMOL/L (ref 98–107)
CO2: 21 MMOL/L (ref 22–29)
CREAT SERPL-MCNC: 1 MG/DL (ref 0.7–1.2)
GFR AFRICAN AMERICAN: >60
GFR NON-AFRICAN AMERICAN: >60 ML/MIN/1.73
GLUCOSE BLD-MCNC: 149 MG/DL (ref 74–99)
HCT VFR BLD CALC: 39.4 % (ref 37–54)
HEMOGLOBIN: 13.2 G/DL (ref 12.5–16.5)
INR BLD: 1.4
MCH RBC QN AUTO: 31.9 PG (ref 26–35)
MCHC RBC AUTO-ENTMCNC: 33.5 % (ref 32–34.5)
MCV RBC AUTO: 95.2 FL (ref 80–99.9)
PDW BLD-RTO: 14.1 FL (ref 11.5–15)
PLATELET # BLD: 68 E9/L (ref 130–450)
PLATELET CONFIRMATION: NORMAL
PMV BLD AUTO: 13 FL (ref 7–12)
POTASSIUM SERPL-SCNC: 4.1 MMOL/L (ref 3.5–5)
PROTHROMBIN TIME: 15.1 SEC (ref 9.3–12.4)
RBC # BLD: 4.14 E12/L (ref 3.8–5.8)
SODIUM BLD-SCNC: 136 MMOL/L (ref 132–146)
TOTAL PROTEIN: 7.4 G/DL (ref 6.4–8.3)
WBC # BLD: 5.3 E9/L (ref 4.5–11.5)

## 2021-06-22 PROCEDURE — G8427 DOCREV CUR MEDS BY ELIG CLIN: HCPCS | Performed by: TRANSPLANT SURGERY

## 2021-06-22 PROCEDURE — 1036F TOBACCO NON-USER: CPT | Performed by: TRANSPLANT SURGERY

## 2021-06-22 PROCEDURE — 3017F COLORECTAL CA SCREEN DOC REV: CPT | Performed by: TRANSPLANT SURGERY

## 2021-06-22 PROCEDURE — 99204 OFFICE O/P NEW MOD 45 MIN: CPT | Performed by: TRANSPLANT SURGERY

## 2021-06-22 PROCEDURE — G8417 CALC BMI ABV UP PARAM F/U: HCPCS | Performed by: TRANSPLANT SURGERY

## 2021-06-22 PROCEDURE — 1123F ACP DISCUSS/DSCN MKR DOCD: CPT | Performed by: TRANSPLANT SURGERY

## 2021-06-22 PROCEDURE — 4040F PNEUMOC VAC/ADMIN/RCVD: CPT | Performed by: TRANSPLANT SURGERY

## 2021-06-22 ASSESSMENT — ENCOUNTER SYMPTOMS
ABDOMINAL PAIN: 0
SHORTNESS OF BREATH: 0
DIARRHEA: 0
CONSTIPATION: 0
BACK PAIN: 0
EYE PAIN: 0
VOMITING: 0
NAUSEA: 0
EYE DISCHARGE: 0
BLOOD IN STOOL: 0
PHOTOPHOBIA: 0

## 2021-06-22 NOTE — PROGRESS NOTES
Hepatobiliary and Pancreatic Surgery Attending History and Physical    Patient's Name/Date of Birth: Rodney Leyva /1950 (40 y.o.)    Date: June 22, 2021     CC: abnormal liver function tests    HPI:  Patient is a very pleasant 69year old male whom states that Dr. Sherine Paniagua found elevated liver enzymes. He states that he has had gallstones for years. He denies any abdominal pain. He was found to have Bilirubin is 1.6, Alk phos 167, AST 47, plt count is 163, Albumin 2.6, creatitnine 1.2, Ferritin 1272, Sodium 130 from April. He had an HbA1c = 9.6. He also had had diabetes since 1996. He used to weigh 400lbs and he had an US liver which showed cirrhosis and choelithiasis and portal hypertension. He sees Dr. Zayra Gongora at [place]. He states that his legs are swollen but that is because of his congestive heart failure.       Past Medical History:   Diagnosis Date    Allergic rhinitis     Seasonal    [facility]     CHF (congestive heart failure) (HCC)     DM [facility]diabetes mellitus) [facility]     Enlarged prostate     Thrombocytopenia (HCC)     Type 2 diabetes mellitus without complication ([facility]     VHD (valvular heart disease)        Past Surgical History:   Procedure Laterality Date    CATARACT REMOVAL Bilateral 2017    CYST REMOVAL      from left heel    HAMMER TOE SURGERY Bilateral 1979    REFRACTIVE SURGERY Bilateral        Current Outpatient Medications   Medication Sig Dispense Refill    Zinc Oxide (AQUAPHOR 3 IN 1 DIAPER RASH) 15 % CREA       Phenylephrine-DM-GG-APAP 5--325 MG/10ML LIQD       glyBURIDE (DIABETA) 5 MG tablet take 1 tablet by mouth once daily with BREAKFAST 90 tablet 1    Multiple Vitamins-Minerals (PRESERVISION AREDS 2+MULTI VIT) CAPS Take 1 capsule by mouth 2 times daily      loratadine (RA ALLERGY RELIEF) 10 MG dissolvable tablet Take 10 mg by mouth daily      spironolactone (ALDACTONE) 25 MG tablet Take 1 tablet by mouth daily 90 tablet 3    lisinopril (PRINIVIL;ZESTRIL) 2.5 MG tablet Take 1 tablet by mouth daily 90 tablet 3    metoprolol succinate (TOPROL XL) 25 MG extended release tablet Take 1 tablet by mouth daily 90 tablet 3    furosemide (LASIX) 40 MG tablet Take 1 tablet by mouth daily 90 tablet 3    azelastine (ASTELIN) 0.1 % nasal spray 1 spray by Nasal route 2 times daily Use in each nostril as directed 1 Bottle 2    diphenhydrAMINE (ALLERGY RELIEF) 25 MG tablet Take 50 mg by mouth nightly as needed for Itching       No current facility-administered medications for this visit. Allergies   Allergen Reactions    Penicillins Hives    Eggs Or Egg-Derived Products     Milk-Related Compounds     Peanut-Containing Drug Products     Seasonal      Mold cats dogs ragweed       Family History   Problem Relation Age of Onset    Alcohol Abuse Father     No Known Problems Sister     Diabetes Brother        Social History     Socioeconomic History    Marital status:      Spouse name: Not on file    Number of children: Not on file    Years of education: Not on file    Highest education level: Not on file   Occupational History    Occupation: retired   Tobacco Use    Smoking status: Former Smoker     Packs/day: 1.00     Years: 20.00     Pack years: 20.00     Types: Cigarettes     Quit date: 1993     Years since quittin.4    Smokeless tobacco: Former User     Types: Chew     Quit date: 1970   Vaping Use    Vaping Use: Never used   Substance and Sexual Activity    Alcohol use: Yes     Comment: Occ beer    Drug use: Never    Sexual activity: Not on file   Other Topics Concern    Not on file   Social History Narrative    Drinks decaf coffee & occ pop.       Social Determinants of Health     Financial Resource Strain:     Difficulty of Paying Living Expenses:    Food Insecurity:     Worried About Running Out of Food in the Last Year:     [address] in the Last Year:    Transportation Needs:     Lack of Transportation (Medical):  Lack of Transportation (Non-Medical):    Physical Activity:     Days of Exercise per Week:     Minutes of Exercise per Session:    Stress:     Feeling of Stress :    Social Connections:     Frequency of Communication with Friends and Family:     Frequency of Social Gatherings with Friends and Family:     Attends Muslim Services:     Active Member of Clubs or Organizations:     Attends Club or Organization Meetings:     Marital Status:    Intimate Partner Violence:     Fear of Current or Ex-Partner:     Emotionally Abused:     Physically Abused:     Sexually Abused:        ROS:   Review of Systems   Constitutional: Negative for chills, diaphoresis and fever. HENT: Negative for congestion, ear discharge, ear pain, hearing loss, nosebleeds and tinnitus. Eyes: Negative for photophobia, pain and discharge. Respiratory: Negative for shortness of breath. Cardiovascular: Positive for leg swelling. Negative for palpitations. Gastrointestinal: Negative for abdominal pain, blood in stool, constipation, diarrhea, nausea and vomiting. Endocrine: Negative for polydipsia. Genitourinary: Negative for frequency, hematuria and urgency. Musculoskeletal: Negative for back pain and neck pain. Skin: Negative for rash. Allergic/Immunologic: Negative for environmental allergies. Neurological: Negative for tremors and seizures. Psychiatric/Behavioral: Negative for hallucinations and suicidal ideas. The patient is not nervous/anxious. Physical Exam:  Vitals:    06/22/21 0907   BP: (!) 112/44   Pulse: 76   Temp: 97 °F (36.1 °C)   SpO2: 94%       PSYCH: mood and affect normal, alert and oriented x 3: No apparent distress, comfortable  EYES: Sclera white, pupils equal round and reactive to light  ENMT:  Hearing normal, trachea midline, ears externally intact  LYMPH: no obvious lympadenopathy in neck.    RESP: Respiratory effort was normal with no retractions or use of accessory muscles. CV:  No pedal edema  GI/ Abdomen: Soft, nondistended, nontender, no guarding, no peritoneal signs  MSK: no clubbing/ no cyanosis/ gaitnormal       Assessment/Plan:  Concerns for cirrhosis of the liver with elevated bilirubin and cholelithiasis  - recheck labs today along with hepatitis panel  - will plan for a MRI w/o contrast and MRCP  - will also check a fibroscan    45 Minutes of which greater than 50% was spent counseling or coordinating his care. Thank you for the consultation allowing me to take part in Mr. Prado's care.      Leonides Robles M.D.  6/22/2021  9:15 AM

## 2021-06-22 NOTE — TELEPHONE ENCOUNTER
While the patient was in our office today I scheduled him for his MRI and fibroscan. We also went over all times, dates and locations for the above. I explained to the patient that the fibroscans are only done in [place] which he did confirm. He was also given his follow up appt time and date while in the office.    Electronically signed by Inés Arnold MA on 6/22/2021 at 12:44 PM

## 2021-06-23 LAB
HAV IGM SER IA-ACNC: NORMAL
HEPATITIS B CORE IGM ANTIBODY: NORMAL
HEPATITIS B SURFACE ANTIGEN INTERPRETATION: NORMAL
HEPATITIS C ANTIBODY INTERPRETATION: NORMAL

## 2021-06-29 ENCOUNTER — HOSPITAL ENCOUNTER (OUTPATIENT)
Dept: MRI IMAGING | Age: 71
Discharge: HOME | End: 2021-07-01
Payer: MEDICARE

## 2021-06-29 DIAGNOSIS — K74.60 CIRRHOSIS OF LIVER WITHOUT ASCITES, UNSPECIFIED HEPATIC CIRRHOSIS TYPE (HCC): ICD-10-CM

## 2021-06-29 DIAGNOSIS — R17 ELEVATED BILIRUBIN: ICD-10-CM

## 2021-06-29 PROCEDURE — 74181 MRI ABDOMEN W/O CONTRAST: CPT

## 2021-07-06 ENCOUNTER — OFFICE VISIT (OUTPATIENT)
Dept: CARDIOLOGY CLINIC | Age: 71
End: 2021-07-06
Payer: MEDICARE

## 2021-07-06 VITALS
SYSTOLIC BLOOD PRESSURE: 112 MMHG | WEIGHT: 211 LBS | HEART RATE: 66 BPM | DIASTOLIC BLOOD PRESSURE: 56 MMHG | HEIGHT: 71 IN | RESPIRATION RATE: 18 BRPM | BODY MASS INDEX: 29.54 KG/M2

## 2021-07-06 DIAGNOSIS — I42.0 DILATED CARDIOMYOPATHY (HCC): ICD-10-CM

## 2021-07-06 DIAGNOSIS — R00.1 SINUS BRADYCARDIA: ICD-10-CM

## 2021-07-06 DIAGNOSIS — I50.22 CHRONIC HFREF (HEART FAILURE WITH REDUCED EJECTION FRACTION) (HCC): ICD-10-CM

## 2021-07-06 DIAGNOSIS — I27.20 PULMONARY HTN (HCC): Primary | ICD-10-CM

## 2021-07-06 DIAGNOSIS — I38 VHD (VALVULAR HEART DISEASE): ICD-10-CM

## 2021-07-06 PROCEDURE — 99214 OFFICE O/P EST MOD 30 MIN: CPT | Performed by: INTERNAL MEDICINE

## 2021-07-06 PROCEDURE — G8417 CALC BMI ABV UP PARAM F/U: HCPCS | Performed by: INTERNAL MEDICINE

## 2021-07-06 PROCEDURE — 3017F COLORECTAL CA SCREEN DOC REV: CPT | Performed by: INTERNAL MEDICINE

## 2021-07-06 PROCEDURE — 93000 ELECTROCARDIOGRAM COMPLETE: CPT | Performed by: INTERNAL MEDICINE

## 2021-07-06 PROCEDURE — 1123F ACP DISCUSS/DSCN MKR DOCD: CPT | Performed by: INTERNAL MEDICINE

## 2021-07-06 PROCEDURE — G8427 DOCREV CUR MEDS BY ELIG CLIN: HCPCS | Performed by: INTERNAL MEDICINE

## 2021-07-06 PROCEDURE — 1036F TOBACCO NON-USER: CPT | Performed by: INTERNAL MEDICINE

## 2021-07-06 PROCEDURE — 4040F PNEUMOC VAC/ADMIN/RCVD: CPT | Performed by: INTERNAL MEDICINE

## 2021-07-06 NOTE — PROGRESS NOTES
OFFICE VISIT     PRIMARY CARE PHYSICIAN:      Godwin Pizarro MD       ALLERGIES / SENSITIVITIES:        Allergies   Allergen Reactions    Penicillins Hives    Eggs Or Egg-Derived Products     Milk-Related Compounds     Peanut-Containing Drug Products     Seasonal      Mold cats dogs ragweed          REVIEWED MEDICATIONS:        Current Outpatient Medications:     Zinc Oxide (AQUAPHOR 3 IN 1 DIAPER RASH) 15 % CREA, , Disp: , Rfl:     glyBURIDE (DIABETA) 5 MG tablet, take 1 tablet by mouth once daily with BREAKFAST, Disp: 90 tablet, Rfl: 1    Multiple Vitamins-Minerals (PRESERVISION AREDS 2+MULTI VIT) CAPS, Take 1 capsule by mouth 2 times daily, Disp: , Rfl:     loratadine (RA ALLERGY RELIEF) 10 MG dissolvable tablet, Take 10 mg by mouth daily, Disp: , Rfl:     spironolactone (ALDACTONE) 25 MG tablet, Take 1 tablet by mouth daily, Disp: 90 tablet, Rfl: 3    lisinopril (PRINIVIL;ZESTRIL) 2.5 MG tablet, Take 1 tablet by mouth daily, Disp: 90 tablet, Rfl: 3    metoprolol succinate (TOPROL XL) 25 MG extended release tablet, Take 1 tablet by mouth daily, Disp: 90 tablet, Rfl: 3    furosemide (LASIX) 40 MG tablet, Take 1 tablet by mouth daily, Disp: 90 tablet, Rfl: 3    diphenhydrAMINE (ALLERGY RELIEF) 25 MG tablet, Take 50 mg by mouth nightly as needed for Itching, Disp: , Rfl:       S: REASON FOR VISIT:       Chief Complaint   Patient presents with    6 Month Follow-Up     No cardiac complaints, last OV 12-          History of Present Illness:       Office Visit for follow up of CMP, VHD   69 yr with history of NICMP, VHD, came for f/u visit with his family   No hospitalizations or surgeries since last visit   Patient is compliant with all medications   Jesse any exertional chest pain or short of breath   No palpitations, dizzy or syncope.    Active at home   No orthopnea   Try to watch diet          Past Medical History:   Diagnosis Date    Allergic rhinitis     Seasonal    Cardiomyopathy ([address]  CHF (congestive heart failure) (HCC)     [facility]     Enlarged prostate     Thrombocytopenia (HCC)     Type 2 diabetes mellitus without complication ([facility]     VHD (valvular heart disease)             Past Surgical History:   Procedure Laterality Date    CATARACT REMOVAL Bilateral 2017    CYST REMOVAL      from left heel    HAMMER TOE SURGERY Bilateral     REFRACTIVE SURGERY Bilateral           Family History   Problem Relation Age of Onset    Alcohol Abuse Father     No Known Problems Sister     Diabetes Brother           Social History     Tobacco Use    Smoking status: Former Smoker     Packs/day: 1.00     Years: 20.00     Pack years: 20.00     Types: Cigarettes     Quit date: 1993     Years since quittin.4    Smokeless tobacco: Former User     Types: Chew     Quit date: 1970   Substance Use Topics    Alcohol use: Yes     Comment: Occ beer         Review of Systems:  Constitutional: negative for fever and chills, or significant weight loss  HEENT: negative for acute visual symptoms or auditory problems, no dysphagia  Respiratory: negative for cough, wheezing, or hemoptysis  Cardiovascular: negative for chest pain, palpitations, and dyspnea  Gastrointestinal: negative for abdominal pain, diarrhea, nausea and vomiting  Endocrine: Negative for polyuria and polydyspsia  Genitourinary:negative for dysuria and hematuria  Derm: negative for rash and skin lesion(s)  Neurological: negative for tingling, numbness, weakness, seizures and tremors  Endocrine: negative for polydipsia and polyuria  Musculoskeletal: negative for pain or tenderness  Psychiatric: negative for anxiety, depression, or suicidal ideations         O:  COMPLETE PHYSICAL EXAM:       BP (!) 112/56   Pulse 66   Resp 18   Ht 5' 11\" (1.803 m)   Wt 211 lb (95.7 kg)   BMI 29.43 kg/m²       General:   Patient alert, comfortable, no distress. Appears stated age.    HEENT:    Pupils equal, no icterus, no nasal drainage, tongue moist.   Neck:              No masses, Thyroid not palpable. No elevated JVD, No carotid bruit. Chest:   Normal configuration, non tender. Lungs:   Clear to auscultation bilaterally, few scattered rhonchi. Cardiovascular:  Regular rhythm, 2/6 systolic murmur, No S3, PMI at 5th ICS, no palpable thrills    Abdomen:  Soft, Non tender, Bowel sounds normal, no pulsatile abdominal aorta, no palpable masses. Extremities:  No edema. Distal pulses palpable. No cyanosis, no clubbing. Skin:   Good turgor, warm and dry, no cyanosis. Musculoskeletal: No joint swelling or deformity. Neuro:   Cranial nerves grossly intact; No focal neurologic deficit. Psych:   Alert, good mood and effect. REVIEW OF DIAGNOSTIC TESTS:        Electrocardiogram: Reviewed                A/P:   ASSESSMENT / PLAN:    Chris Ball was seen today for 6 month follow-up. Diagnoses and all orders for this visit:      Chronic HFrEF (heart failure with reduced ejection fraction) ([facility] [address]   -     EKG 12 lead    Dilated cardiomyopathy (HCC) - EF 35% in Jan 2019, EF 20% by stress test 3/6/2019 - On Maximal tolearable doses of BB, Aldactone,ACE-I; Monitor renal Fn,  LV EF 45% by Echo 7/1/2019   -     EKG 12 Lead      VHD (valvular heart disease) - MR, TR     Pulmonary HTN (HCC)     Hx of Sinus bradycardia - Asymptomatic, monitor HR    Preventive Cardiology: Low cholesterol diet, regular exercise as tolerate, and gradual weight loss discussed. Monitor BP and heart rates. Above recommendations discussed with him and his family   All questions answered about cardiac diagnoses and cardiac medications. Continue current medications. Compliance with medications and f/u with all physicians discussed. Risk factor modification based on risk profile discussed. Call if any exertional chest pain, short of breath, dizzy or palpitations   Follow up in 4 months or earlier if needed.          [facility]  [address] [place] [address], [address] [facility]  [phone]

## 2021-07-08 ENCOUNTER — HOSPITAL ENCOUNTER (OUTPATIENT)
Dept: ULTRASOUND IMAGING | Age: 71
Discharge: HOME | End: 2021-07-10
Payer: MEDICARE

## 2021-07-08 DIAGNOSIS — K74.60 CIRRHOSIS OF LIVER WITHOUT ASCITES, UNSPECIFIED HEPATIC CIRRHOSIS TYPE (HCC): ICD-10-CM

## 2021-07-08 PROCEDURE — 91200 LIVER ELASTOGRAPHY: CPT

## 2021-07-12 ENCOUNTER — OFFICE VISIT (OUTPATIENT)
Dept: FAMILY MEDICINE CLINIC | Age: 71
End: 2021-07-12
Payer: MEDICARE

## 2021-07-12 VITALS
DIASTOLIC BLOOD PRESSURE: 60 MMHG | TEMPERATURE: 97 F | HEART RATE: 66 BPM | BODY MASS INDEX: 29.12 KG/M2 | WEIGHT: 208 LBS | SYSTOLIC BLOOD PRESSURE: 104 MMHG | HEIGHT: 71 IN | OXYGEN SATURATION: 98 %

## 2021-07-12 DIAGNOSIS — R79.89 ELEVATED LFTS: Primary | ICD-10-CM

## 2021-07-12 DIAGNOSIS — R93.2 ABNORMAL FINDINGS ON DIAGNOSTIC IMAGING OF LIVER AND BILIARY TRACT: ICD-10-CM

## 2021-07-12 DIAGNOSIS — D61.818 PANCYTOPENIA (HCC): ICD-10-CM

## 2021-07-12 DIAGNOSIS — R79.89 ELEVATED LFTS: ICD-10-CM

## 2021-07-12 DIAGNOSIS — K76.6 PORTAL HYPERTENSION (HCC): ICD-10-CM

## 2021-07-12 DIAGNOSIS — E11.8 CONTROLLED TYPE 2 DIABETES MELLITUS WITH COMPLICATION, WITHOUT LONG-TERM CURRENT USE OF INSULIN (HCC): ICD-10-CM

## 2021-07-12 DIAGNOSIS — I42.0 DILATED CARDIOMYOPATHY (HCC): ICD-10-CM

## 2021-07-12 DIAGNOSIS — I10 ESSENTIAL (PRIMARY) HYPERTENSION: ICD-10-CM

## 2021-07-12 DIAGNOSIS — D68.9 COAGULATION DEFECT (HCC): ICD-10-CM

## 2021-07-12 LAB
ALBUMIN SERPL-MCNC: 3.1 G/DL (ref 3.5–5.2)
ALP BLD-CCNC: 119 U/L (ref 40–129)
ALT SERPL-CCNC: 34 U/L (ref 0–40)
ANION GAP SERPL CALCULATED.3IONS-SCNC: 10 MMOL/L (ref 7–16)
AST SERPL-CCNC: 54 U/L (ref 0–39)
BASOPHILS ABSOLUTE: 0.15 E9/L (ref 0–0.2)
BASOPHILS RELATIVE PERCENT: 2.5 % (ref 0–2)
BILIRUB SERPL-MCNC: 1.5 MG/DL (ref 0–1.2)
BUN BLDV-MCNC: 13 MG/DL (ref 6–23)
CALCIUM SERPL-MCNC: 8.9 MG/DL (ref 8.6–10.2)
CEA: 7 NG/ML (ref 0–5.2)
CHLORIDE BLD-SCNC: 103 MMOL/L (ref 98–107)
CO2: 24 MMOL/L (ref 22–29)
CREAT SERPL-MCNC: 1.2 MG/DL (ref 0.7–1.2)
EOSINOPHILS ABSOLUTE: 1.16 E9/L (ref 0.05–0.5)
EOSINOPHILS RELATIVE PERCENT: 19.6 % (ref 0–6)
GFR AFRICAN AMERICAN: >60
GFR NON-AFRICAN AMERICAN: 60 ML/MIN/1.73
GLUCOSE BLD-MCNC: 102 MG/DL (ref 74–99)
HBA1C MFR BLD: 6.7 % (ref 4–5.6)
HCT VFR BLD CALC: 43 % (ref 37–54)
HEMOGLOBIN: 14.4 G/DL (ref 12.5–16.5)
IMMATURE GRANULOCYTES #: 0.01 E9/L
IMMATURE GRANULOCYTES %: 0.2 % (ref 0–5)
LYMPHOCYTES ABSOLUTE: 2.16 E9/L (ref 1.5–4)
LYMPHOCYTES RELATIVE PERCENT: 36.5 % (ref 20–42)
MCH RBC QN AUTO: 31.6 PG (ref 26–35)
MCHC RBC AUTO-ENTMCNC: 33.5 % (ref 32–34.5)
MCV RBC AUTO: 94.5 FL (ref 80–99.9)
MONOCYTES ABSOLUTE: 0.62 E9/L (ref 0.1–0.95)
MONOCYTES RELATIVE PERCENT: 10.5 % (ref 2–12)
NEUTROPHILS ABSOLUTE: 1.81 E9/L (ref 1.8–7.3)
NEUTROPHILS RELATIVE PERCENT: 30.7 % (ref 43–80)
PDW BLD-RTO: 14.6 FL (ref 11.5–15)
PLATELET # BLD: 84 E9/L (ref 130–450)
PLATELET CONFIRMATION: NORMAL
PMV BLD AUTO: 12.9 FL (ref 7–12)
POTASSIUM SERPL-SCNC: 4.8 MMOL/L (ref 3.5–5)
RBC # BLD: 4.55 E12/L (ref 3.8–5.8)
SODIUM BLD-SCNC: 137 MMOL/L (ref 132–146)
TOTAL PROTEIN: 7.8 G/DL (ref 6.4–8.3)
WBC # BLD: 5.9 E9/L (ref 4.5–11.5)

## 2021-07-12 PROCEDURE — 4040F PNEUMOC VAC/ADMIN/RCVD: CPT | Performed by: FAMILY MEDICINE

## 2021-07-12 PROCEDURE — 2022F DILAT RTA XM EVC RTNOPTHY: CPT | Performed by: FAMILY MEDICINE

## 2021-07-12 PROCEDURE — 99214 OFFICE O/P EST MOD 30 MIN: CPT | Performed by: FAMILY MEDICINE

## 2021-07-12 PROCEDURE — G8417 CALC BMI ABV UP PARAM F/U: HCPCS | Performed by: FAMILY MEDICINE

## 2021-07-12 PROCEDURE — 1123F ACP DISCUSS/DSCN MKR DOCD: CPT | Performed by: FAMILY MEDICINE

## 2021-07-12 PROCEDURE — 3017F COLORECTAL CA SCREEN DOC REV: CPT | Performed by: FAMILY MEDICINE

## 2021-07-12 PROCEDURE — 1036F TOBACCO NON-USER: CPT | Performed by: FAMILY MEDICINE

## 2021-07-12 PROCEDURE — 3046F HEMOGLOBIN A1C LEVEL >9.0%: CPT | Performed by: FAMILY MEDICINE

## 2021-07-12 PROCEDURE — G8427 DOCREV CUR MEDS BY ELIG CLIN: HCPCS | Performed by: FAMILY MEDICINE

## 2021-07-12 NOTE — PROGRESS NOTES
Nasir presents to the office today for   Chief Complaint   Patient presents with    Diabetes     Pancytopenia  Follows with Dr. Alanna Monzon  Needs labs    Diabetes  Taking meds as Rx  He is back on Glyburide  Glucose readings improved  Low 100s in AM     Dilated cardiomyopathy  Seeing Dr. Gudelia Arechiga with his diuretics  Weight is down quit a bit    Elevated LFTs/Cirrhosis  Following with Dr. Isidoro Lemus - appt next week  MRI liver and fibroscan done    Review of Systems   Constitutional: Negative for chills and fever. Genitourinary: Negative for dysuria, hematuria and urgency. Skin: Negative for rash. Neurological: Negative for dizziness and light-headedness. /60   Pulse 66   Temp 97 °F (36.1 °C) (Temporal)   Ht 5' 11\" (1.803 m)   Wt 208 lb (94.3 kg)   SpO2 98%   BMI 29.01 kg/m²   Physical Exam  Constitutional:       Appearance: Normal appearance. HENT:      Head: Normocephalic and atraumatic. Eyes:      Extraocular Movements: Extraocular movements intact. Conjunctiva/sclera: Conjunctivae normal.   Cardiovascular:      Rate and Rhythm: Normal rate. Heart sounds: Normal heart sounds. Pulmonary:      Effort: Pulmonary effort is normal.      Breath sounds: Normal breath sounds. Skin:     General: Skin is warm. Neurological:      Mental Status: He is alert and oriented to person, place, and time.    Psychiatric:         Mood and Affect: Mood normal.         Behavior: Behavior normal.            Current Outpatient Medications:     Zinc Oxide (AQUAPHOR 3 IN 1 DIAPER RASH) 15 % CREA, , Disp: , Rfl:     glyBURIDE (DIABETA) 5 MG tablet, take 1 tablet by mouth once daily with BREAKFAST, Disp: 90 tablet, Rfl: 1    Multiple Vitamins-Minerals (PRESERVISION AREDS 2+MULTI VIT) CAPS, Take 1 capsule by mouth 2 times daily, Disp: , Rfl:     loratadine (RA ALLERGY RELIEF) 10 MG dissolvable tablet, Take 10 mg by mouth daily, Disp: , Rfl:    spironolactone (ALDACTONE) 25 MG tablet, Take 1 tablet by mouth daily, Disp: 90 tablet, Rfl: 3    lisinopril (PRINIVIL;ZESTRIL) 2.5 MG tablet, Take 1 tablet by mouth daily, Disp: 90 tablet, Rfl: 3    metoprolol succinate (TOPROL XL) 25 MG extended release tablet, Take 1 tablet by mouth daily, Disp: 90 tablet, Rfl: 3    furosemide (LASIX) 40 MG tablet, Take 1 tablet by mouth daily, Disp: 90 tablet, Rfl: 3    diphenhydrAMINE (ALLERGY RELIEF) 25 MG tablet, Take 50 mg by mouth nightly as needed for Itching, Disp: , Rfl:      Past Medical History:   Diagnosis Date    Allergic rhinitis     Seasonal    Cardiomyopathy ([facility]     CHF (congestive heart failure) (HCC)     DM (diabetes mellitus) ([facility]     Enlarged prostate     Thrombocytopenia (HCC)     Type 2 diabetes mellitus without complication ([facility]     VHD (valvular heart disease)        Charity Lucero was seen today for diabetes. Diagnoses and all orders for this visit:    Elevated LFTs  -     AFP TUMOR MARKER; Future  -     CEA; Future  -     CANCER ANTIGEN 19-9; Future    Portal hypertension (HCC)  -     AFP TUMOR MARKER; Future  -     CEA; Future  -     CANCER ANTIGEN 19-9; Future  -     CBC Auto Differential; Future    Coagulation defect (HCC)    Abnormal findings on diagnostic imaging of liver and biliary tract   -     AFP TUMOR MARKER; Future    Controlled type 2 diabetes mellitus with complication, without long-term current use of insulin ([facility]  -     Comprehensive Metabolic Panel;  Future  -     Hemoglobin A1C; Future    Dilated cardiomyopathy (HCC)    Essential (primary) hypertension    Pancytopenia (HCC)  -     CBC Auto Differential; Future       F/U with all specialists as planned  Labs today  Recheck 3 months    Thee Gupta MD

## 2021-07-14 LAB — AFP-TUMOR MARKER: 11 NG/ML (ref 0–9)

## 2021-07-15 LAB — CA 19-9: 53 U/ML (ref 0–37)

## 2021-07-19 NOTE — PROGRESS NOTES
Hepatobiliary and Pancreatic Surgery Attending History and Physical    Patient's Name/Date of Birth: Rosa Velasquez /1950 (49 y.o.)    Date: July 20, 2021    CC: abnormal liver function tests    HPI:  Patient is a very pleasant 69year old male whom states that Dr. Sukhdeep Cruz found elevated liver enzymes. He states that he has had gallstones for years. He denies any abdominal pain. He was found to have Bilirubin is 1.6, Alk phos 167, AST 47, plt count is 163, Albumin 2.6, creatitnine 1.2, Ferritin 1272, Sodium 130 from April. He had an HbA1c = 9.6 one month prior 6/21. He also had had diabetes since 1996. He used to weigh 400lbs and he had an US liver which showed cirrhosis and choelithiasis and portal hypertension. He sees Dr. Ezequiel Arauz at [facility]. He states that his legs are swollen but that is because of his congestive heart failure. Ca 19-9 is 53 and CEAis 7 and AFP is 11. His most recent labs 7/21 platelet count is 84 and bilirubin is 15 with albumin 3.1. He had a fibroscan and MRI performed and is seeing me in follow up. He states that he needs a colonoscopy but is getting it next year and does not want to get it sooner. He states that his sugars are better. Physical Exam:  /60   Pulse 66   Temp 97 °F (36.1 °C)   Ht 5' 10\" (1.778 m)   Wt 211 lb (95.7 kg)   SpO2 98%   BMI 30.28 kg/m²     PSYCH: mood and affect normal, alert and oriented x 3: No apparent distress, comfortable  EYES: Sclera white, pupils equal round and reactive to light  ENMT:  Hearing normal, trachea midline, ears externally intact  LYMPH: no obvious lympadenopathy in neck. RESP: Respiratory effort was normal with no retractions or use of accessory muscles.   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 Patricia Paz for an EUS to further evaluate the tail of his pancreas to determine.  - mild elevations in his tumor markers - however there is no identifiable mass lesion, will follow closely    20 Minutes of which greater than 50% was spent counseling or coordinating his care. Thank you for the consultation allowing me to take part in Mr. Prado's care.      Please send a copy of my note to Dr. Etienne Bronson    Electronically signed by Aury Courtney MD on 7/20/2021 at 9:40 AM

## 2021-07-20 ENCOUNTER — OFFICE VISIT (OUTPATIENT)
Dept: SURGERY | Age: 71
End: 2021-07-20
Payer: MEDICARE

## 2021-07-20 VITALS
SYSTOLIC BLOOD PRESSURE: 104 MMHG | HEIGHT: 70 IN | BODY MASS INDEX: 30.21 KG/M2 | WEIGHT: 211 LBS | TEMPERATURE: 97 F | OXYGEN SATURATION: 98 % | HEART RATE: 66 BPM | DIASTOLIC BLOOD PRESSURE: 60 MMHG

## 2021-07-20 DIAGNOSIS — K74.60 CIRRHOSIS OF LIVER WITHOUT ASCITES, UNSPECIFIED HEPATIC CIRRHOSIS TYPE (HCC): ICD-10-CM

## 2021-07-20 DIAGNOSIS — K86.2 PANCREATIC CYST: Primary | ICD-10-CM

## 2021-07-20 DIAGNOSIS — K86.89 DILATED PANCREATIC DUCT: ICD-10-CM

## 2021-07-20 PROCEDURE — 1123F ACP DISCUSS/DSCN MKR DOCD: CPT | Performed by: TRANSPLANT SURGERY

## 2021-07-20 PROCEDURE — G8427 DOCREV CUR MEDS BY ELIG CLIN: HCPCS | Performed by: TRANSPLANT SURGERY

## 2021-07-20 PROCEDURE — 99213 OFFICE O/P EST LOW 20 MIN: CPT | Performed by: TRANSPLANT SURGERY

## 2021-07-20 PROCEDURE — G8417 CALC BMI ABV UP PARAM F/U: HCPCS | Performed by: TRANSPLANT SURGERY

## 2021-07-20 PROCEDURE — 1036F TOBACCO NON-USER: CPT | Performed by: TRANSPLANT SURGERY

## 2021-07-20 PROCEDURE — 3017F COLORECTAL CA SCREEN DOC REV: CPT | Performed by: TRANSPLANT SURGERY

## 2021-07-20 PROCEDURE — 4040F PNEUMOC VAC/ADMIN/RCVD: CPT | Performed by: TRANSPLANT SURGERY

## 2021-07-23 ENCOUNTER — TELEPHONE (OUTPATIENT)
Dept: SURGERY | Age: 71
End: 2021-07-23

## 2021-07-23 NOTE — TELEPHONE ENCOUNTER
Per the order of Dr. Seth Martino, patient has been scheduled for EUS possible biospy on 8.6.2021. Patient provided with verbal instruction over the phone and informed that written information and patient education will also be mailed to him. Patient instructed to please contact our office with any questions. Patient to follow up with Dr. Cass Khalil. Surgery scheduling form faxed to [facility] surgery scheduling and fax confirmation received. Dr. Seth Martino to enter orders.     Electronically signed by Hector Estrella on 7/23/21 at 10:36 AM EDT

## 2021-07-23 NOTE — TELEPHONE ENCOUNTER
Prior Authorization Form:      DEMOGRAPHICS:                     Patient Name:  Gay Mayen  Patient :  1950            Insurance:  Payor: MEDICARE / Plan: MEDICARE PART A AND B / Product Type: *No Product type* /   Insurance ID Number:    Payor/Plan Subscr  Sex Relation Sub. Ins. ID Effective Group Num   1.  [address] Blake Cornejo  1950 Male Self [id number]                                    [address]          DIAGNOSIS & PROCEDURE:                       Procedure/Operation: EUS            CPT Code: 33322    Diagnosis:  Pancreatic Cyst    ICD10 Code: K86.2    Location:  [place] [address]    Surgeon:  Augusto Chance    SCHEDULING INFORMATION:                          Date: 2021    Time: TBD              Anesthesia:  MAC/TIVA                                                       Status:  Outpatient        Special Comments:         Electronically signed by Renetta Amin on 2021 at 10:36 AM

## 2021-07-29 NOTE — PROGRESS NOTES
Patient agreed to COVID test on 8-2 at the  [address] [place] [address] between the hours of 6 am- 10 am located at  [address] [place] [address]. Patient instructed to bring ID. Patient instructed to self isolate until day of surgery.

## 2021-08-02 ENCOUNTER — HOSPITAL ENCOUNTER (OUTPATIENT)
Age: 71
Discharge: HOME | End: 2021-08-04
Payer: MEDICARE

## 2021-08-02 DIAGNOSIS — U07.1 COVID-19: ICD-10-CM

## 2021-08-02 PROCEDURE — U0003 INFECTIOUS AGENT DETECTION BY NUCLEIC ACID (DNA OR RNA); SEVERE ACUTE RESPIRATORY SYNDROME CORONAVIRUS 2 (SARS-COV-2) (CORONAVIRUS DISEASE [COVID-19]), AMPLIFIED PROBE TECHNIQUE, MAKING USE OF HIGH THROUGHPUT TECHNOLOGIES AS DESCRIBED BY CMS-2020-01-R: HCPCS

## 2021-08-02 PROCEDURE — U0005 INFEC AGEN DETEC AMPLI PROBE: HCPCS

## 2021-08-03 LAB — SARS-COV-2, PCR: NOT DETECTED

## 2021-08-05 RX ORDER — SPIRONOLACTONE 25 MG/1
25 TABLET ORAL NIGHTLY
COMMUNITY
End: 2022-03-07

## 2021-08-05 RX ORDER — CETIRIZINE HYDROCHLORIDE 10 MG/1
10 TABLET ORAL DAILY
COMMUNITY

## 2021-08-05 NOTE — PROGRESS NOTES
[address] PRE-ADMISSION TESTING GENERAL INSTRUCTIONS- [facility]-phone number:[phone]    GENERAL INSTRUCTIONS  [x] Antibacterial Soap shower Night before and/or AM of Surgery    [x] Nothing by mouth after midnight, including gum, candy, mints, or water. [x] You may brush your teeth, gargle, but do NOT swallow water. [x]No smoking, chewing tobacco, illegal drugs, or alcohol within 24 hours of your surgery. [x] Jewelry, valuables or body piercing's should not be brought to the hospital. All body and/or tongue piercing's must be removed prior to arriving to hospital.  ALL hair pins must be removed. [x] Do not wear makeup, lotions, powders, deodorant. Nail polish as directed by the nurse. [x] Arrange transportation with a responsible adult  to and from the hospital. If you do not have a responsible adult  to transport you, you will need to make arrangements with a medical transportation company (i.e. [employer]. A Uber/taxi/bus is not appropriate unless you are accompanied by a responsible adult ). Arrange for someone to be with you for the remainder of the day and for 24 hours after your procedure due to having had anesthesia. Who will be your  for transportation? Josselin Connelly, sister-in-law   Who will be staying with you for 24 hrs after your procedure? Gerson Guthrie, son  [x] [employer] card and photo ID. [x] Bring copy of living will or healthcare power of  papers to be placed in your electronic record. PARKING INSTRUCTIONS:   [x] Arrival Time: 9 am, you and your  will need to wear a mask. · [x] Parking lot '\"I\"  is located on [facility] (the corner of [facility] and [facility]). To enter, press the button and the gate will lift. A free token will be provided to exit the lot. One car per patient is allowed to park in this lot. All other cars are to park on [address] [place] [address] either in the parking garage or the handicap lot. Walk up the front walk to the [facility], the door will be locked an employee will greet you and let you in. EDUCATION INSTRUCTIONS:          [x]Pain: Post-op pain is normal and to be expected. You will be asked to rate your pain from 0-10 (a zero is not acceptable-education is needed). Your post-op pain goal is:   [x] Ask your nurse for your pain medication. MEDICATION INSTRUCTIONS:  [x]Bring a complete list of your medications, please write the last time you took the medicine, give this list to the nurse.  [] Take the following medications the morning of surgery with 1-2 ounces of water:   None  [x] Stop herbal supplements, ibuprofen (Nsaids) and vitamins 5 days before your surgery. [x] DO NOT take any diabetic medicine the morning of surgery. Follow instructions for insulin the day before surgery. Patient is not currently prescribed insulin. [x] If you are diabetic and your blood sugar is low or you feel symptomatic, you may drink 1-2 ounces of apple juice or take a glucose tablet. The morning of your procedure, you may call the pre-op area if you have concerns about your blood sugar [phone]. [x] Follow physician instructions regarding any blood thinners you may be taking. WHAT TO EXPECT:  [x] The day of surgery you will be greeted and checked in by the [employer]. Please bring your photo ID and insurance card. A nurse will greet you in accordance to the time you are needed in the pre-op area to prepare you for surgery. Please do not be discouraged if you are not greeted in the order you arrive as there are many variables that are involved in patient preparation. Your patience is greatly appreciated as you wait for your nurse. Please bring in items such as: books, magazines, newspapers, electronics, or any other items  to occupy your time in the waiting area.     [x]  Delays may occur with surgery and staff will make a sincere effort to keep you informed of delays. If any delays occur with your procedure, we apologize ahead of time for your inconvenience as we recognize the value of your time.

## 2021-08-06 ENCOUNTER — ANESTHESIA (OUTPATIENT)
Dept: ENDOSCOPY | Age: 71
End: 2021-08-06
Payer: MEDICARE

## 2021-08-06 ENCOUNTER — ANESTHESIA EVENT (OUTPATIENT)
Dept: ENDOSCOPY | Age: 71
End: 2021-08-06
Payer: MEDICARE

## 2021-08-06 ENCOUNTER — HOSPITAL ENCOUNTER (OUTPATIENT)
Age: 71
Setting detail: OUTPATIENT SURGERY
Discharge: HOME | End: 2021-08-06
Attending: SURGERY | Admitting: SURGERY
Payer: MEDICARE

## 2021-08-06 VITALS
HEIGHT: 70 IN | DIASTOLIC BLOOD PRESSURE: 56 MMHG | TEMPERATURE: 97.3 F | SYSTOLIC BLOOD PRESSURE: 109 MMHG | HEART RATE: 85 BPM | BODY MASS INDEX: 30.21 KG/M2 | RESPIRATION RATE: 16 BRPM | OXYGEN SATURATION: 98 % | WEIGHT: 211 LBS

## 2021-08-06 VITALS — SYSTOLIC BLOOD PRESSURE: 111 MMHG | OXYGEN SATURATION: 100 % | DIASTOLIC BLOOD PRESSURE: 59 MMHG

## 2021-08-06 DIAGNOSIS — U07.1 COVID-19: Primary | ICD-10-CM

## 2021-08-06 DIAGNOSIS — Z01.818 PRE-OP TESTING: ICD-10-CM

## 2021-08-06 LAB — METER GLUCOSE: 163 MG/DL (ref 74–99)

## 2021-08-06 PROCEDURE — 2720000010 HC SURG SUPPLY STERILE: Performed by: SURGERY

## 2021-08-06 PROCEDURE — 88305 TISSUE EXAM BY PATHOLOGIST: CPT

## 2021-08-06 PROCEDURE — 3700000001 HC ADD 15 MINUTES (ANESTHESIA): Performed by: SURGERY

## 2021-08-06 PROCEDURE — 7100000011 HC PHASE II RECOVERY - ADDTL 15 MIN: Performed by: SURGERY

## 2021-08-06 PROCEDURE — 43242 EGD US FINE NEEDLE BX/ASPIR: CPT | Performed by: SURGERY

## 2021-08-06 PROCEDURE — C1753 CATH, INTRAVAS ULTRASOUND: HCPCS | Performed by: SURGERY

## 2021-08-06 PROCEDURE — 3609020800 HC EGD W/EUS FNA: Performed by: SURGERY

## 2021-08-06 PROCEDURE — 3700000000 HC ANESTHESIA ATTENDED CARE: Performed by: SURGERY

## 2021-08-06 PROCEDURE — 88173 CYTOPATH EVAL FNA REPORT: CPT

## 2021-08-06 PROCEDURE — 6360000002 HC RX W HCPCS

## 2021-08-06 PROCEDURE — 2580000003 HC RX 258: Performed by: SURGERY

## 2021-08-06 PROCEDURE — 2709999900 HC NON-CHARGEABLE SUPPLY: Performed by: SURGERY

## 2021-08-06 PROCEDURE — 2580000003 HC RX 258

## 2021-08-06 PROCEDURE — 7100000010 HC PHASE II RECOVERY - FIRST 15 MIN: Performed by: SURGERY

## 2021-08-06 PROCEDURE — 82962 GLUCOSE BLOOD TEST: CPT

## 2021-08-06 PROCEDURE — 99213 OFFICE O/P EST LOW 20 MIN: CPT | Performed by: SURGERY

## 2021-08-06 RX ORDER — SODIUM CHLORIDE 0.9 % (FLUSH) 0.9 %
5-40 SYRINGE (ML) INJECTION PRN
Status: DISCONTINUED | OUTPATIENT
Start: 2021-08-06 | End: 2021-08-06 | Stop reason: HOSPADM

## 2021-08-06 RX ORDER — PROPOFOL 10 MG/ML
INJECTION, EMULSION INTRAVENOUS PRN
Status: DISCONTINUED | OUTPATIENT
Start: 2021-08-06 | End: 2021-08-06 | Stop reason: SDUPTHER

## 2021-08-06 RX ORDER — SODIUM CHLORIDE 0.9 % (FLUSH) 0.9 %
5-40 SYRINGE (ML) INJECTION EVERY 12 HOURS SCHEDULED
Status: DISCONTINUED | OUTPATIENT
Start: 2021-08-06 | End: 2021-08-06 | Stop reason: HOSPADM

## 2021-08-06 RX ORDER — SODIUM CHLORIDE 9 MG/ML
INJECTION, SOLUTION INTRAVENOUS CONTINUOUS PRN
Status: DISCONTINUED | OUTPATIENT
Start: 2021-08-06 | End: 2021-08-06 | Stop reason: SDUPTHER

## 2021-08-06 RX ORDER — SODIUM CHLORIDE 9 MG/ML
25 INJECTION, SOLUTION INTRAVENOUS PRN
Status: DISCONTINUED | OUTPATIENT
Start: 2021-08-06 | End: 2021-08-06 | Stop reason: HOSPADM

## 2021-08-06 RX ORDER — LIDOCAINE HYDROCHLORIDE 20 MG/ML
INJECTION, SOLUTION INTRAVENOUS PRN
Status: DISCONTINUED | OUTPATIENT
Start: 2021-08-06 | End: 2021-08-06 | Stop reason: SDUPTHER

## 2021-08-06 RX ADMIN — PROPOFOL 250 MG: 10 INJECTION, EMULSION INTRAVENOUS at 10:18

## 2021-08-06 RX ADMIN — SODIUM CHLORIDE: 9 INJECTION, SOLUTION INTRAVENOUS at 10:13

## 2021-08-06 RX ADMIN — LIDOCAINE HYDROCHLORIDE 20 MG: 20 INJECTION, SOLUTION INTRAVENOUS at 10:18

## 2021-08-06 RX ADMIN — SODIUM CHLORIDE 25 ML: 9 INJECTION, SOLUTION INTRAVENOUS at 10:05

## 2021-08-06 ASSESSMENT — PULMONARY FUNCTION TESTS
PIF_VALUE: 1
PIF_VALUE: 0
PIF_VALUE: 1

## 2021-08-06 ASSESSMENT — ENCOUNTER SYMPTOMS: SHORTNESS OF BREATH: 1

## 2021-08-06 ASSESSMENT — PAIN - FUNCTIONAL ASSESSMENT: PAIN_FUNCTIONAL_ASSESSMENT: 0-10

## 2021-08-06 NOTE — PROGRESS NOTES
Notified  (Dr. Micaela Henderson) regarding patient's continuous cough. Patient reports this is his \"norm\" from allergies. Warm tea given. Starting to ease up some.

## 2021-08-06 NOTE — PROGRESS NOTES
Covid Test done: 08/02/2021    Results: Not detected    Self-quarantine guidelines followed since tested? Yes    Any unusual S/S or concerns expressed or observed?  No

## 2021-08-06 NOTE — ANESTHESIA PRE PROCEDURE
PRN Asif Milton MD        0.9 % sodium chloride infusion  25 mL Intravenous PRN Asif Milton MD           Allergies:     Allergies   Allergen Reactions    Penicillins Hives    Eggs Or Egg-Derived Products     Grass Pollen(K-O-R-T-Swt Oscar)     Milk-Related Compounds     Mixed Ragweed     Peanut-Containing Drug Products     Seasonal      Mold cats dogs ragweed       Problem List:    Patient Active Problem List   Diagnosis Code    Dilated cardiomyopathy ([facility] I42.0    Non-rheumatic mitral regurgitation I34.0    Absolute anemia D64.9    SOBOE (shortness of breath on exertion) R06.02    Pulmonary HTN (HCC) I27.20    Controlled type 2 diabetes mellitus with complication, without long-term current use of insulin (HCC) E11.8    Essential (primary) hypertension I10    Pancytopenia (HCC) D61.818    Coagulation defect ([facility] D68.9       Past Medical History:        Diagnosis Date    Allergic rhinitis     Seasonal    Cardiomyopathy ([facility]     CHF (congestive heart failure) (HCC)     DM (diabetes mellitus) (HCC)     Enlarged prostate     Thrombocytopenia (HCC)     Type 2 diabetes mellitus without complication (HCC)     VHD (valvular heart disease)        Past Surgical History:        Procedure Laterality Date    CATARACT REMOVAL Bilateral 2017    CYST REMOVAL      from left heel    HAMMER TOE SURGERY Bilateral     REFRACTIVE SURGERY Bilateral        Social History:    Social History     Tobacco Use    Smoking status: Former Smoker     Packs/day: 1.00     Years: 20.00     Pack years: 20.00     Types: Cigarettes     Quit date: 1993     Years since quittin.5    Smokeless tobacco: Former User     Types: Chew     Quit date: 1970   Substance Use Topics    Alcohol use: Yes     Comment: Occ beer                                Counseling given: Not Answered      Vital Signs (Current):   Vitals:    21 1353 21 0930   BP:  124/60   Pulse:  79   Resp:  18   Temp:  97.1 °F (36.2 °C)   TempSrc:  Temporal   SpO2:  99%   Weight: 211 lb (95.7 kg) 211 lb (95.7 kg)   Height: 5' 10\" (1.778 m) 5' 10\" (1.778 m)                                              BP Readings from Last 3 Encounters:   08/06/21 124/60   07/20/21 104/60   07/12/21 104/60       NPO Status: Time of last liquid consumption: 2300                        Time of last solid consumption: 2100                        Date of last liquid consumption: 08/11/21                        Date of last solid food consumption: 08/05/21    BMI:   Wt Readings from Last 3 Encounters:   08/06/21 211 lb (95.7 kg)   07/20/21 211 lb (95.7 kg)   07/12/21 208 lb (94.3 kg)     Body mass index is 30.28 kg/m². CBC:   Lab Results   Component Value Date    WBC 5.9 07/12/2021    RBC 4.55 07/12/2021    HGB 14.4 07/12/2021    HCT 43.0 07/12/2021    MCV 94.5 07/12/2021    RDW 14.6 07/12/2021    PLT 84 07/12/2021       CMP:   Lab Results   Component Value Date     07/12/2021    K 4.8 07/12/2021     07/12/2021    CO2 24 07/12/2021    BUN 13 07/12/2021    CREATININE 1.2 07/12/2021    GFRAA >60 07/12/2021    LABGLOM 60 07/12/2021    GLUCOSE 102 07/12/2021    GLUCOSE 157 04/27/2011    PROT 7.8 07/12/2021    CALCIUM 8.9 07/12/2021    BILITOT 1.5 07/12/2021    ALKPHOS 119 07/12/2021    AST 54 07/12/2021    ALT 34 07/12/2021       POC Tests: No results for input(s): POCGLU, POCNA, POCK, POCCL, POCBUN, POCHEMO, POCHCT in the last 72 hours.     Coags:   Lab Results   Component Value Date    PROTIME 15.1 06/22/2021    INR 1.4 06/22/2021       HCG (If Applicable): No results found for: PREGTESTUR, PREGSERUM, HCG, HCGQUANT     ABGs: No results found for: PHART, PO2ART, HPW9CNP, EEF8CPH, BEART, [id number]     Type & Screen (If Applicable):  No results found for: LABABO, LABRH    Drug/Infectious Status (If Applicable):  No results found for: HIV, HEPCAB    COVID-19 Screening (If Applicable):   Lab Results   Component Value Date    COVID19 Not Detected benefits with patient/legal guardian and family as available. Concerns and questions addressed. Consent verbalized to proceed.   Anesthesia plan, options and intraoperative/postoperative concerns discussed with care team.    Kendra Hernandez MD, MD  8/6/2021  9:49 AM          Kendra Hernandez MD   8/6/2021

## 2021-08-06 NOTE — OP NOTE
Endoscopic Ultrasound Procedure Note    Date of Procedure: 8/6/2021    Pre-procedure Diagnosis: Pancreatic cyst    Post-procedure Diagnosis: Pancreatic tail cystic mass    Physician: Augusto Chance MD    Assistant: None    Estimated Blood Loss: Estimated amount of blood loss is 2ml. Anesthesia: LMAC     Complications: None    Indications and History:  The patient is a 69 y.o. male. The risks, benefits, complications, treatment options and expected outcomes were discussed with the patient. The possibilities of reaction to medication, pulmonary aspiration, perforation of the gastrointestinal tract, bleeding requiring transfusion or operation, respiratory failure requiring placement on a ventilator and failure to diagnose a condition were discussed with the patient who freely signed the consent. Description of Procedure: The patient was taken to the endoscopy suite, identified as Gay Mayen and the procedure verified as Endoscopic Ultrasound (EUS). A Time Out was held and the above information confirmed. The patient was positioned in the left lateral position with an oral bite block and anesthesia was provided for sedation and comfort. The echoendoscope was passed to the duodenal bulb. EGD/EUS findings:   Esophagus: normal   Stomach: normal   Duodenum: normal   Pancreas: 22 mm complex cystic lesion in the pancreatic tail adjacent to the pancreatic duct. The cystic lesion is abutting the splenic vein and artery. The cystic mass was biopsied using a 22-gauge FNA needle making 2 passes. There was no associated dilation of the adjacent pancreatic duct. Smaller cystic lesion seen parallel to the pancreatic duct consistent with IPMN's. Bile Duct: normal   Gallbladder: normal      Specimens:  1. FNA pancreatic tail cyst    The Patient was taken to the Endoscopy Recovery area in satisfactory condition.       Electronically signed by Augusto Chance MD on 8/6/2021 at 12:50 PM

## 2021-08-06 NOTE — H&P
General Surgery History and Physical  [facility]    Patient's Name/Date of Birth: Rosa Velasquez / 1950    Date: August 6, 2021     Surgeon: Rafael Jain MD    PCP: Irais Valle MD     Chief Complaint: pancreatic cust    HPI:   Rosa Velasquez is a 69 y.o. male who presents for evaluation of pancreatic cyst. He had MRI performed to evaluate elevated LFTs in setting of cirrhosis. This revealed pancreatic cyst with duct dilation. He has no history of pancreatic cancer. He denies nausea, vomiting, constipation, diarrhea, headache, chest pain, shortness of breath, fevers, chills. Patient Active Problem List   Diagnosis    Dilated cardiomyopathy ([address]    Non-rheumatic mitral regurgitation    Absolute anemia    SOBOE (shortness of breath on exertion)    Pulmonary HTN (HCC)    Controlled type 2 diabetes mellitus with complication, without long-term current use of insulin ([address]    Essential (primary) hypertension    Pancytopenia ([address]    Coagulation defect ([address]       Past Medical History:   Diagnosis Date    Allergic rhinitis     Seasonal    Cardiomyopathy ([address]     CHF (congestive heart failure) (HCC)     DM (diabetes mellitus) ([address]     Enlarged prostate     Thrombocytopenia (HCC)     Type 2 diabetes mellitus without complication ([address]     VHD (valvular heart disease)        Past Surgical History:   Procedure Laterality Date    CATARACT REMOVAL Bilateral 2017    CYST REMOVAL      from left heel    HAMMER TOE SURGERY Bilateral 1979    REFRACTIVE SURGERY Bilateral        Allergies   Allergen Reactions    Penicillins Hives    Eggs Or Egg-Derived Products     Grass Pollen(K-O-R-T-Swt Oscar)     Milk-Related Compounds     Mixed Ragweed     Peanut-Containing Drug Products     Seasonal      Mold cats dogs ragweed       The patient has a family history that is negative for severe cardiovascular or respiratory issues, negative for reaction to anesthesia.      Time spent reviewing past medical, surgical, social and family history, vitals, nursing assessment and images. No changes from above documented history. Social History     Socioeconomic History    Marital status:      Spouse name: Not on file    Number of children: Not on file    Years of education: Not on file    Highest education level: Not on file   Occupational History    Occupation: retired   Tobacco Use    Smoking status: Former Smoker     Packs/day: 1.00     Years: 20.00     Pack years: 20.00     Types: Cigarettes     Quit date: 1993     Years since quittin.5    Smokeless tobacco: Former User     Types: Chew     Quit date: 1970   Vaping Use    Vaping Use: Never used   Substance and Sexual Activity    Alcohol use: Yes     Comment: Occ beer    Drug use: Never    Sexual activity: Not on file   Other Topics Concern    Not on file   Social History Narrative    Drinks decaf coffee & occ pop. Social Determinants of Health     Financial Resource Strain:     Difficulty of Paying Living Expenses:    Food Insecurity:     Worried About Running Out of Food in the Last Year:     [address] in the Last Year:    Transportation Needs:     Lack of Transportation (Medical):      Lack of Transportation (Non-Medical):    Physical Activity:     Days of Exercise per Week:     Minutes of Exercise per Session:    Stress:     Feeling of Stress :    Social Connections:     Frequency of Communication with Friends and Family:     Frequency of Social Gatherings with Friends and Family:     Attends [employer] Services:     Active Member of Clubs or Organizations:     Attends Club or Organization Meetings:     Marital Status:    Intimate Partner Violence:     Fear of Current or Ex-Partner:     Emotionally Abused:     Physically Abused:     Sexually Abused:        I have reviewed relevant labs from this admission and interpretation is included in my assessment and plan    Review of Systems    A complete 10 system review was performed and are otherwise negative unless mentioned in the above HPI. Specific negatives are listed below but may not include all those reviewed. General ROS: negative obtundation, AMS  ENT ROS: negative rhinorrhea, epistaxis  Allergy and Immunology ROS: negative itchy/watery eyes or nasal congestion  Hematological and Lymphatic ROS: negative spontaneous bleeding or bruising  Endocrine ROS: negative  lethargy, mood swings, palpitations or polydipsia/polyuria  Respiratory ROS: negative sputum changes, stridor, tachypnea or wheezing  Cardiovascular ROS: negative for - loss of consciousness, murmur or orthopnea  Gastrointestinal ROS: negative for - hematochezia or hematemesis  Genito-Urinary ROS: negative for -  genital discharge or hematuria  Musculoskeletal ROS: negative for - focal weakness, gangrene  Psych/Neuro ROS: negative for - visual or auditory hallucinations, suicidal ideation    Physical exam:   /60   Pulse 79   Temp 97.1 °F (36.2 °C) (Temporal)   Resp 18   Ht 5' 10\" (1.778 m)   Wt 211 lb (95.7 kg)   SpO2 99%   BMI 30.28 kg/m²   General appearance:  NAD, appears stated age  Head: NCAT, PERRLA, EOMI, red conjunctiva  Neck: supple, no masses, trachea midline  Lungs: Equal chest rise bilateral, no retractions, no wheezing  Heart: Reg rate  Abdomen: soft, nondistended  Skin; warm and dry, no cyanosis  Gu: no cva tenderness  Extremities: atraumatic, no focal motor deficits, no open wounds  Psych: No tremor, visual hallucinations      Radiology: I reviewed relevant abdominal imaging from this admission and that available in the EMR including MRI abdomen from 6/29/21.  My assessment is pancreatic cyst    Assessment:  Amira Saldivar is a 69 y.o. male with pancreatic cyst  Patient Active Problem List   Diagnosis    Dilated [facility]    Non-rheumatic mitral regurgitation    Absolute anemia    SOBOE (shortness of breath on exertion)    Pulmonary HTN ([address]    Controlled type 2 diabetes mellitus with complication, without long-term current use of insulin ([address]    Essential (primary) hypertension    Pancytopenia (HCC)    Coagulation defect ([address]         Plan:  EUS with possible biopsy  -The procedure, risks, benefits and alternatives were discussed with patient. he   agrees to proceed.           Little Mancia MD  9:42 AM  8/6/2021

## 2021-08-19 NOTE — ANESTHESIA POSTPROCEDURE EVALUATION
Department of Anesthesiology  Postprocedure Note    Patient: Marya Opitz  MRN: [id number]  YOB: 1950  Date of evaluation: 8/19/2021  Time:  9:20 AM     Procedure Summary     Date: 08/06/21 Room / Location: [facility] / [facility]    Anesthesia Start: 1013 Anesthesia Stop: 6070    Procedure: EGD W/EUS FNA (N/A ) Diagnosis: (PANCREATIC CYST)    Surgeons: Margie Lennon MD Responsible Provider: Kiran Mota MD    Anesthesia Type: MAC ASA Status: 3          Anesthesia Type: MAC    Zaida Phase I: Zaida Score: 10    Zaida Phase II: Zaida Score: 10    Last vitals: Reviewed and per EMR flowsheets.        Anesthesia Post Evaluation    Patient location during evaluation: PACU  Patient participation: complete - patient participated  Level of consciousness: awake and alert  Airway patency: patent  Nausea & Vomiting: no nausea and no vomiting  Complications: no  Cardiovascular status: hemodynamically stable and blood pressure returned to baseline  Respiratory status: acceptable  Hydration status: euvolemic

## 2021-08-20 ENCOUNTER — APPOINTMENT (OUTPATIENT)
Dept: CT IMAGING | Age: 71
DRG: 177 | End: 2021-08-20
Payer: MEDICARE

## 2021-08-20 ENCOUNTER — APPOINTMENT (OUTPATIENT)
Dept: GENERAL RADIOLOGY | Age: 71
DRG: 177 | End: 2021-08-20
Payer: MEDICARE

## 2021-08-20 ENCOUNTER — HOSPITAL ENCOUNTER (INPATIENT)
Age: 71
LOS: 9 days | Discharge: HOME | DRG: 177 | End: 2021-08-29
Attending: EMERGENCY MEDICINE | Admitting: INTERNAL MEDICINE
Payer: MEDICARE

## 2021-08-20 DIAGNOSIS — R77.8 ELEVATED TROPONIN: ICD-10-CM

## 2021-08-20 DIAGNOSIS — J18.0 BRONCHOPNEUMONIA: Primary | ICD-10-CM

## 2021-08-20 PROBLEM — J15.9 COMMUNITY ACQUIRED BACTERIAL PNEUMONIA: Status: ACTIVE | Noted: 2021-08-20

## 2021-08-20 LAB
ADENOVIRUS BY PCR: NOT DETECTED
ANION GAP SERPL CALCULATED.3IONS-SCNC: 8 MMOL/L (ref 7–16)
ANISOCYTOSIS: ABNORMAL
ATYPICAL LYMPHOCYTE RELATIVE PERCENT: 0.9 % (ref 0–4)
BASOPHILS ABSOLUTE: 0.09 E9/L (ref 0–0.2)
BASOPHILS RELATIVE PERCENT: 1.7 % (ref 0–2)
BORDETELLA PARAPERTUSSIS BY PCR: NOT DETECTED
BORDETELLA PERTUSSIS BY PCR: NOT DETECTED
BUN BLDV-MCNC: 17 MG/DL (ref 6–23)
CALCIUM SERPL-MCNC: 8.5 MG/DL (ref 8.6–10.2)
CHLAMYDOPHILIA PNEUMONIAE BY PCR: NOT DETECTED
CHLORIDE BLD-SCNC: 103 MMOL/L (ref 98–107)
CO2: 23 MMOL/L (ref 22–29)
CORONAVIRUS 229E BY PCR: NOT DETECTED
CORONAVIRUS HKU1 BY PCR: NOT DETECTED
CORONAVIRUS NL63 BY PCR: NOT DETECTED
CORONAVIRUS OC43 BY PCR: NOT DETECTED
CREAT SERPL-MCNC: 1.1 MG/DL (ref 0.7–1.2)
EKG ATRIAL RATE: 81 BPM
EKG P AXIS: 43 DEGREES
EKG P-R INTERVAL: 170 MS
EKG Q-T INTERVAL: 414 MS
EKG QRS DURATION: 112 MS
EKG QTC CALCULATION (BAZETT): 480 MS
EKG R AXIS: -25 DEGREES
EKG T AXIS: 100 DEGREES
EKG VENTRICULAR RATE: 81 BPM
EOSINOPHILS ABSOLUTE: 0.48 E9/L (ref 0.05–0.5)
EOSINOPHILS RELATIVE PERCENT: 8.7 % (ref 0–6)
GFR AFRICAN AMERICAN: >60
GFR NON-AFRICAN AMERICAN: >60 ML/MIN/1.73
GLUCOSE BLD-MCNC: 224 MG/DL (ref 74–99)
HCT VFR BLD CALC: 39.2 % (ref 37–54)
HEMOGLOBIN: 13.8 G/DL (ref 12.5–16.5)
HUMAN METAPNEUMOVIRUS BY PCR: NOT DETECTED
HUMAN RHINOVIRUS/ENTEROVIRUS BY PCR: NOT DETECTED
INFLUENZA A BY PCR: NOT DETECTED
INFLUENZA B BY PCR: NOT DETECTED
LYMPHOCYTES ABSOLUTE: 0.94 E9/L (ref 1.5–4)
LYMPHOCYTES RELATIVE PERCENT: 15.7 % (ref 20–42)
MCH RBC QN AUTO: 31.6 PG (ref 26–35)
MCHC RBC AUTO-ENTMCNC: 35.2 % (ref 32–34.5)
MCV RBC AUTO: 89.7 FL (ref 80–99.9)
METER GLUCOSE: 254 MG/DL (ref 74–99)
MONOCYTES ABSOLUTE: 0.38 E9/L (ref 0.1–0.95)
MONOCYTES RELATIVE PERCENT: 7 % (ref 2–12)
MYCOPLASMA PNEUMONIAE BY PCR: NOT DETECTED
MYELOCYTE PERCENT: 0.9 % (ref 0–0)
NEUTROPHILS ABSOLUTE: 3.63 E9/L (ref 1.8–7.3)
NEUTROPHILS RELATIVE PERCENT: 65.2 % (ref 43–80)
PARAINFLUENZA VIRUS 1 BY PCR: NOT DETECTED
PARAINFLUENZA VIRUS 2 BY PCR: NOT DETECTED
PARAINFLUENZA VIRUS 3 BY PCR: NOT DETECTED
PARAINFLUENZA VIRUS 4 BY PCR: NOT DETECTED
PDW BLD-RTO: 14.6 FL (ref 11.5–15)
PLATELET # BLD: 68 E9/L (ref 130–450)
PLATELET CONFIRMATION: NORMAL
PMV BLD AUTO: 11.9 FL (ref 7–12)
POIKILOCYTES: ABNORMAL
POTASSIUM REFLEX MAGNESIUM: 4.2 MMOL/L (ref 3.5–5)
PRO-BNP: 537 PG/ML (ref 0–125)
PROCALCITONIN: 0.07 NG/ML (ref 0–0.08)
RBC # BLD: 4.37 E12/L (ref 3.8–5.8)
RESPIRATORY SYNCYTIAL VIRUS BY PCR: NOT DETECTED
SARS-COV-2, NAAT: NOT DETECTED
SARS-COV-2, PCR: NOT DETECTED
SODIUM BLD-SCNC: 134 MMOL/L (ref 132–146)
SPHEROCYTES: ABNORMAL
TROPONIN, HIGH SENSITIVITY: 128 NG/L (ref 0–11)
TROPONIN, HIGH SENSITIVITY: 198 NG/L (ref 0–11)
TROPONIN, HIGH SENSITIVITY: 353 NG/L (ref 0–11)
WBC # BLD: 5.5 E9/L (ref 4.5–11.5)

## 2021-08-20 PROCEDURE — 84145 PROCALCITONIN (PCT): CPT

## 2021-08-20 PROCEDURE — 6370000000 HC RX 637 (ALT 250 FOR IP): Performed by: EMERGENCY MEDICINE

## 2021-08-20 PROCEDURE — 85025 COMPLETE CBC W/AUTO DIFF WBC: CPT

## 2021-08-20 PROCEDURE — 83880 ASSAY OF NATRIURETIC PEPTIDE: CPT

## 2021-08-20 PROCEDURE — 87635 SARS-COV-2 COVID-19 AMP PRB: CPT

## 2021-08-20 PROCEDURE — 6370000000 HC RX 637 (ALT 250 FOR IP): Performed by: INTERNAL MEDICINE

## 2021-08-20 PROCEDURE — 6360000004 HC RX CONTRAST MEDICATION: Performed by: RADIOLOGY

## 2021-08-20 PROCEDURE — 6360000002 HC RX W HCPCS: Performed by: EMERGENCY MEDICINE

## 2021-08-20 PROCEDURE — 2580000003 HC RX 258: Performed by: INTERNAL MEDICINE

## 2021-08-20 PROCEDURE — 94640 AIRWAY INHALATION TREATMENT: CPT

## 2021-08-20 PROCEDURE — 93010 ELECTROCARDIOGRAM REPORT: CPT | Performed by: INTERNAL MEDICINE

## 2021-08-20 PROCEDURE — 71275 CT ANGIOGRAPHY CHEST: CPT

## 2021-08-20 PROCEDURE — 71045 X-RAY EXAM CHEST 1 VIEW: CPT

## 2021-08-20 PROCEDURE — 93005 ELECTROCARDIOGRAM TRACING: CPT | Performed by: EMERGENCY MEDICINE

## 2021-08-20 PROCEDURE — 6360000002 HC RX W HCPCS: Performed by: INTERNAL MEDICINE

## 2021-08-20 PROCEDURE — 84484 ASSAY OF TROPONIN QUANT: CPT

## 2021-08-20 PROCEDURE — 2060000000 HC ICU INTERMEDIATE R&B

## 2021-08-20 PROCEDURE — 0202U NFCT DS 22 TRGT SARS-COV-2: CPT

## 2021-08-20 PROCEDURE — 94664 DEMO&/EVAL PT USE INHALER: CPT

## 2021-08-20 PROCEDURE — 82962 GLUCOSE BLOOD TEST: CPT

## 2021-08-20 PROCEDURE — 80048 BASIC METABOLIC PNL TOTAL CA: CPT

## 2021-08-20 PROCEDURE — 99285 EMERGENCY DEPT VISIT HI MDM: CPT

## 2021-08-20 PROCEDURE — 2580000003 HC RX 258: Performed by: EMERGENCY MEDICINE

## 2021-08-20 RX ORDER — ONDANSETRON 4 MG/1
4 TABLET, ORALLY DISINTEGRATING ORAL EVERY 8 HOURS PRN
Status: DISCONTINUED | OUTPATIENT
Start: 2021-08-20 | End: 2021-08-29 | Stop reason: HOSPADM

## 2021-08-20 RX ORDER — METOPROLOL SUCCINATE 25 MG/1
25 TABLET, EXTENDED RELEASE ORAL NIGHTLY
COMMUNITY
End: 2022-01-28

## 2021-08-20 RX ORDER — DIPHENHYDRAMINE HCL 25 MG
50 TABLET ORAL NIGHTLY PRN
Status: DISCONTINUED | OUTPATIENT
Start: 2021-08-20 | End: 2021-08-29 | Stop reason: HOSPADM

## 2021-08-20 RX ORDER — LISINOPRIL 5 MG/1
2.5 TABLET ORAL NIGHTLY
Status: DISCONTINUED | OUTPATIENT
Start: 2021-08-21 | End: 2021-08-29 | Stop reason: HOSPADM

## 2021-08-20 RX ORDER — POLYETHYLENE GLYCOL 3350 17 G/17G
17 POWDER, FOR SOLUTION ORAL DAILY PRN
Status: DISCONTINUED | OUTPATIENT
Start: 2021-08-20 | End: 2021-08-29 | Stop reason: HOSPADM

## 2021-08-20 RX ORDER — ACETAMINOPHEN 325 MG/1
650 TABLET ORAL EVERY 6 HOURS PRN
Status: DISCONTINUED | OUTPATIENT
Start: 2021-08-20 | End: 2021-08-29 | Stop reason: HOSPADM

## 2021-08-20 RX ORDER — SODIUM CHLORIDE 9 MG/ML
25 INJECTION, SOLUTION INTRAVENOUS PRN
Status: DISCONTINUED | OUTPATIENT
Start: 2021-08-20 | End: 2021-08-20 | Stop reason: SDUPTHER

## 2021-08-20 RX ORDER — SODIUM CHLORIDE 0.9 % (FLUSH) 0.9 %
5-40 SYRINGE (ML) INJECTION PRN
Status: DISCONTINUED | OUTPATIENT
Start: 2021-08-20 | End: 2021-08-29 | Stop reason: HOSPADM

## 2021-08-20 RX ORDER — ACETAMINOPHEN 650 MG/1
650 SUPPOSITORY RECTAL EVERY 6 HOURS PRN
Status: DISCONTINUED | OUTPATIENT
Start: 2021-08-20 | End: 2021-08-29 | Stop reason: HOSPADM

## 2021-08-20 RX ORDER — IPRATROPIUM BROMIDE AND ALBUTEROL SULFATE 2.5; .5 MG/3ML; MG/3ML
1 SOLUTION RESPIRATORY (INHALATION) ONCE
Status: COMPLETED | OUTPATIENT
Start: 2021-08-20 | End: 2021-08-20

## 2021-08-20 RX ORDER — SODIUM CHLORIDE 0.9 % (FLUSH) 0.9 %
10 SYRINGE (ML) INJECTION PRN
Status: DISCONTINUED | OUTPATIENT
Start: 2021-08-20 | End: 2021-08-20 | Stop reason: SDUPTHER

## 2021-08-20 RX ORDER — AZITHROMYCIN 250 MG/1
500 TABLET, FILM COATED ORAL DAILY
Status: DISCONTINUED | OUTPATIENT
Start: 2021-08-21 | End: 2021-08-23

## 2021-08-20 RX ORDER — FUROSEMIDE 20 MG/1
40 TABLET ORAL DAILY
Status: DISCONTINUED | OUTPATIENT
Start: 2021-08-20 | End: 2021-08-27

## 2021-08-20 RX ORDER — SODIUM CHLORIDE 9 MG/ML
25 INJECTION, SOLUTION INTRAVENOUS PRN
Status: DISCONTINUED | OUTPATIENT
Start: 2021-08-20 | End: 2021-08-29 | Stop reason: HOSPADM

## 2021-08-20 RX ORDER — CETIRIZINE HYDROCHLORIDE 10 MG/1
10 TABLET ORAL DAILY
Status: DISCONTINUED | OUTPATIENT
Start: 2021-08-21 | End: 2021-08-29 | Stop reason: HOSPADM

## 2021-08-20 RX ORDER — LISINOPRIL 2.5 MG/1
2.5 TABLET ORAL NIGHTLY
COMMUNITY
End: 2022-03-07

## 2021-08-20 RX ORDER — METOPROLOL SUCCINATE 25 MG/1
25 TABLET, EXTENDED RELEASE ORAL NIGHTLY
Status: DISCONTINUED | OUTPATIENT
Start: 2021-08-20 | End: 2021-08-29 | Stop reason: HOSPADM

## 2021-08-20 RX ORDER — GLYBURIDE 5 MG/1
5 TABLET ORAL
COMMUNITY
End: 2021-10-08

## 2021-08-20 RX ORDER — SODIUM CHLORIDE 0.9 % (FLUSH) 0.9 %
5-40 SYRINGE (ML) INJECTION EVERY 12 HOURS SCHEDULED
Status: DISCONTINUED | OUTPATIENT
Start: 2021-08-20 | End: 2021-08-29 | Stop reason: HOSPADM

## 2021-08-20 RX ORDER — SPIRONOLACTONE 25 MG/1
25 TABLET ORAL NIGHTLY
Status: DISCONTINUED | OUTPATIENT
Start: 2021-08-20 | End: 2021-08-29 | Stop reason: HOSPADM

## 2021-08-20 RX ORDER — ASPIRIN 81 MG/1
324 TABLET, CHEWABLE ORAL ONCE
Status: COMPLETED | OUTPATIENT
Start: 2021-08-20 | End: 2021-08-20

## 2021-08-20 RX ORDER — ONDANSETRON 2 MG/ML
4 INJECTION INTRAMUSCULAR; INTRAVENOUS EVERY 6 HOURS PRN
Status: DISCONTINUED | OUTPATIENT
Start: 2021-08-20 | End: 2021-08-29 | Stop reason: HOSPADM

## 2021-08-20 RX ADMIN — SPIRONOLACTONE 25 MG: 25 TABLET ORAL at 21:12

## 2021-08-20 RX ADMIN — INSULIN LISPRO 2 UNITS: 100 INJECTION, SOLUTION INTRAVENOUS; SUBCUTANEOUS at 21:11

## 2021-08-20 RX ADMIN — METOPROLOL SUCCINATE 25 MG: 25 TABLET, EXTENDED RELEASE ORAL at 21:12

## 2021-08-20 RX ADMIN — IPRATROPIUM BROMIDE AND ALBUTEROL SULFATE 1 AMPULE: .5; 2.5 SOLUTION RESPIRATORY (INHALATION) at 11:50

## 2021-08-20 RX ADMIN — IOPAMIDOL 75 ML: 755 INJECTION, SOLUTION INTRAVENOUS at 14:10

## 2021-08-20 RX ADMIN — ASPIRIN 324 MG: 81 TABLET, CHEWABLE ORAL at 17:16

## 2021-08-20 RX ADMIN — AZITHROMYCIN MONOHYDRATE 500 MG: 500 INJECTION, POWDER, LYOPHILIZED, FOR SOLUTION INTRAVENOUS at 18:26

## 2021-08-20 RX ADMIN — ENOXAPARIN SODIUM 40 MG: 40 INJECTION SUBCUTANEOUS at 21:12

## 2021-08-20 RX ADMIN — CEFTRIAXONE 1000 MG: 1 INJECTION, POWDER, FOR SOLUTION INTRAMUSCULAR; INTRAVENOUS at 17:15

## 2021-08-20 RX ADMIN — Medication 10 ML: at 21:16

## 2021-08-20 ASSESSMENT — PAIN SCALES - GENERAL: PAINLEVEL_OUTOF10: 0

## 2021-08-20 NOTE — ED PROVIDER NOTES
Department of Emergency Medicine   ED  Provider Note  Admit Date/RoomTime: 8/20/2021 10:15 AM  ED Room: [address] [place] [address]          History of Present Illness:  8/20/21, Time: 10:16 AM EDT  Chief Complaint   Patient presents with    Shortness of Breath     SOB intermittant x1 week                Jace Martines is a 69 y.o. male presenting to the ED for shortness of breath, beginning 7 years ago. The complaint has been intermittent, mild in severity, and worsened by nothing. Patient states that over the past 7 years he has been having intermittent short of breath with congestion. He states that today he felt more short of breath and summoned EMS. EMS states that patient was 90% on room air and placed on nasal cannula. He had some wheezing on lung sounds and was given albuterol. Patient denies any history of COPD, does have cardiac history. However he denies any chest pain or discomfort. No recent fevers, patient has had a cough. He denies any abdominal pain, body aches, nausea/vomiting/diarrhea. Review of Systems:   Pertinent positives and negatives are stated within HPI, all other systems reviewed and are negative.        --------------------------------------------- PAST HISTORY ---------------------------------------------  Past Medical History:  has a past medical history of Allergic rhinitis, Cardiomyopathy ([facility] CHF (congestive heart failure) ([facility] DM (diabetes mellitus) ([facility] Enlarged prostate, Thrombocytopenia ([facility] Type 2 diabetes mellitus without complication ([facility] and VHD (valvular heart disease). Past Surgical History:  has a past surgical history that includes Refractive surgery (Bilateral); Hammer toe surgery (Bilateral, 1979); cyst removal; Cataract removal (Bilateral, 2017); and Upper gastrointestinal endoscopy (N/A, 8/6/2021). Social History:  reports that he quit smoking about 28 years ago. His smoking use included cigarettes. He has a 20.00 pack-year smoking history.  He quit smokeless tobacco use about 51 years ago. His smokeless tobacco use included chew. He reports current alcohol use. He reports that he does not use drugs. Family History: family history includes Alcohol Abuse in his father; Diabetes in his brother; No Known Problems in his sister. . Unless otherwise noted, family history is non contributory    The patients home medications have been reviewed. Allergies: Penicillins, Eggs or egg-derived products, Grass pollen(k-o-r-t-swt arielle), Milk-related compounds, Mixed ragweed, Peanut-containing drug products, and Seasonal        ---------------------------------------------------PHYSICAL EXAM--------------------------------------    Constitutional/General: Alert and oriented x3  Head: Normocephalic and atraumatic  Eyes: PERRL, EOMI, sclera non icteric  Mouth: Oropharynx clear, handling secretions, no trismus, no asymmetry of the posterior oropharynx or uvular edema  Neck: Supple, full ROM, no stridor, no meningeal signs  Respiratory: Lungs clear to auscultation bilaterally, no wheezes, rales, or rhonchi. Not in respiratory distress  Cardiovascular:  Regular rate. Regular rhythm. No murmurs, no aortic murmurs, no gallops, or rubs. 2+ distal pulses. Equal extremity pulses. Chest: No chest wall tenderness  GI:  Abdomen Soft, Non tender, Non distended. No rebound, guarding, or rigidity. No pulsatile masses. Musculoskeletal: Moves all extremities x 4. Warm and well perfused, no clubbing, cyanosis, or edema. Capillary refill <3 seconds  Integument: skin warm and dry. No rashes. Neurologic: GCS 15, no focal deficits, symmetric strength 5/5 in the upper and lower extremities bilaterally  Psychiatric: Normal Affect          -------------------------------------------------- RESULTS -------------------------------------------------  I have personally reviewed all laboratory and imaging results for this patient. Results are listed below.      LABS: (Lab results interpreted by me)  Results for orders placed or performed during the hospital encounter of 08/20/21   COVID-19, Rapid    Specimen: Nasopharyngeal Swab   Result Value Ref Range    SARS-CoV-2, NAAT Not Detected Not Detected   CBC Auto Differential   Result Value Ref Range    WBC 5.5 4.5 - 11.5 E9/L    RBC 4.37 3.80 - 5.80 E12/L    Hemoglobin 13.8 12.5 - 16.5 g/dL    Hematocrit 39.2 37.0 - 54.0 %    MCV 89.7 80.0 - 99.9 fL    MCH 31.6 26.0 - 35.0 pg    MCHC 35.2 (H) 32.0 - 34.5 %    RDW 14.6 11.5 - 15.0 fL    Platelets 68 (L) 636 - 450 E9/L    MPV 11.9 7.0 - 12.0 fL    Neutrophils % 65.2 43.0 - 80.0 %    Lymphocytes % 15.7 (L) 20.0 - 42.0 %    Monocytes % 7.0 2.0 - 12.0 %    Eosinophils % 8.7 (H) 0.0 - 6.0 %    Basophils % 1.7 0.0 - 2.0 %    Neutrophils Absolute 3.63 1.80 - 7.30 E9/L    Lymphocytes Absolute 0.94 (L) 1.50 - 4.00 E9/L    Monocytes Absolute 0.38 0.10 - 0.95 E9/L    Eosinophils Absolute 0.48 0.05 - 0.50 E9/L    Basophils Absolute 0.09 0.00 - 0.20 E9/L    Atypical Lymphocytes Relative 0.9 0.0 - 4.0 %    Myelocyte Percent 0.9 0 - 0 %    Anisocytosis 1+     Poikilocytes 1+     Spherocytes 1+    Basic Metabolic Panel w/ Reflex to MG   Result Value Ref Range    Sodium 134 132 - 146 mmol/L    Potassium reflex Magnesium 4.2 3.5 - 5.0 mmol/L    Chloride 103 98 - 107 mmol/L    CO2 23 22 - 29 mmol/L    Anion Gap 8 7 - 16 mmol/L    Glucose 224 (H) 74 - 99 mg/dL    BUN 17 6 - 23 mg/dL    CREATININE 1.1 0.7 - 1.2 mg/dL    GFR Non-African American >60 >=60 mL/min/1.73    GFR African American >60     Calcium 8.5 (L) 8.6 - 10.2 mg/dL   Troponin   Result Value Ref Range    Troponin, High Sensitivity 128 (H) 0 - 11 ng/L   Brain Natriuretic Peptide   Result Value Ref Range    Pro- (H) 0 - 125 pg/mL   Platelet Confirmation   Result Value Ref Range    Platelet Confirmation CONFIRMED    Troponin   Result Value Ref Range    Troponin, High Sensitivity 198 (H) 0 - 11 ng/L   EKG 12 Lead   Result Value Ref Range    Ventricular Rate 81 BPM Atrial Rate 81 BPM    P-R Interval 170 ms    QRS Duration 112 ms    Q-T Interval 414 ms    QTc Calculation (Bazett) 480 ms    P Axis 43 degrees    R Axis -25 degrees    T Axis 100 degrees   ,       RADIOLOGY:  Interpreted by Radiologist unless otherwise specified  CTA PULMONARY W CONTRAST   Final Result   1. No indication for acute pulmonary emboli. 2.  Extensive endobronchial process in the bronchial system both lower lobes   with the retained secretions or aspiration anterior with the discrete patchy   bronchopneumonia in the left lower lobe, minimal in the right lower lobe. 3.  No aneurysm formation or dissection of the thoracic aorta. 4.  Calcifications of all coronary arteries. Please correlate clinically. 5.  Findings for chronic calcified pancreatitis. There is a fullness in the   tail of the pancreas which requires multiphase IV contrast MRI in better   advantage than CT for proper characterization. XR CHEST PORTABLE   Final Result   No acute process               EKG Interpretation  Interpreted by emergency department physician, Dr. Mary Anne Rhodes    Date of EK21  Time: 1029    Rhythm: normal sinus   Rate: 81  Axis: normal  Conduction: normal  ST Segments: flattening in  lateral leads  T Waves: inversion in  lateral leads    Clinical Impression: T wave inversion in the lateral leads with no change in morphology compared to previous EKGs  Comparison to prior EKG: stable as compared to patient's most recent EKG      ------------------------- NURSING NOTES AND VITALS REVIEWED ---------------------------   The nursing notes within the ED encounter and vital signs as below have been reviewed by myself  BP (!) 94/56   Pulse 77   Temp 96.6 °F (35.9 °C)   Resp 18   Ht 5' 10\" (1.778 m)   Wt 211 lb (95.7 kg)   SpO2 95%   BMI 30.28 kg/m²     Oxygen Saturation Interpretation: Normal    The patients available past medical records and past encounters were reviewed. ------------------------------ ED COURSE/MEDICAL DECISION MAKING----------------------  Medications   sodium chloride flush 0.9 % injection 10 mL (has no administration in time range)   0.9 % sodium chloride infusion (has no administration in time range)   azithromycin (ZITHROMAX) tablet 500 mg (has no administration in time range)   cefTRIAXone (ROCEPHIN) 1,000 mg in sterile water 10 mL IV syringe (has no administration in time range)   cetirizine (ZYRTEC) tablet 10 mg (has no administration in time range)   diphenhydrAMINE (BENADRYL) tablet 50 mg (has no administration in time range)   furosemide (LASIX) tablet 40 mg (has no administration in time range)   lisinopril (PRINIVIL;ZESTRIL) tablet 2.5 mg (has no administration in time range)   metoprolol succinate (TOPROL XL) extended release tablet 25 mg (has no administration in time range)   spironolactone (ALDACTONE) tablet 25 mg (has no administration in time range)   ipratropium-albuterol (DUONEB) nebulizer solution 1 ampule (1 ampule Inhalation Given 8/20/21 1150)   iopamidol (ISOVUE-370) 76 % injection 60 mL (75 mLs Intravenous Given 8/20/21 1410)   cefTRIAXone (ROCEPHIN) 1,000 mg in sterile water 10 mL IV syringe (0 mg Intravenous Stopped 8/20/21 1717)   azithromycin (ZITHROMAX) 500 mg in D5W 250ml Vial Mate (500 mg Intravenous New Bag 8/20/21 1826)   aspirin chewable tablet 324 mg (324 mg Oral Given 8/20/21 1716)           The cardiac monitor revealed sinus rhythm with a heart rate in the 80s as interpreted by me. The cardiac monitor was ordered secondary to the patient's chest pain and to monitor for patient for dysrhythmia. [facility] [id number]           Medical Decision Making:     I, Dr. Shanda Malin, am the primary provider of record    41-year-old male presenting with shortness of breath and congestion that he has had episodically over the past several years. He does have history of CHF.   He arrives with no increased work of breathing or hypoxia, ---------------------------------    IMPRESSION  1. Bronchopneumonia    2. Elevated troponin        DISPOSITION  Disposition: Admit to telemetry  Patient condition is stable        NOTE: This report was transcribed using voice recognition software.  Every effort was made to ensure accuracy; however, inadvertent computerized transcription errors may be present        Chad Marshall DO  08/20/21 1944

## 2021-08-20 NOTE — PROGRESS NOTES
Cardiology consult placed via perfect serve to Dr Anisha Smith.   Dr Morales Pen taking new consults

## 2021-08-20 NOTE — H&P
Hospital Medicine History & Physical      PCP: Homero Scales MD    Date of Admission: 8/20/2021    Date of Service: Pt seen/examined on 8/20/21 and Admitted to Inpatient with expected LOS greater than two midnights due to medical therapy. Chief Complaint:  Shortness of breath     History Of Present Illness:     69 y.o. male who presented to [facility] with cough and shortness of breath. He has a hx of DM2 for which he is on oral meds, valvular heart disease, dilated cardiomyopathy (followed with Dr. Mary Ellen Corona), liver cirrhosis, pancytopenia, recent FNA of the pancreatic tail cyst who presented to the ED for shortness of breath and cough. Cough of think secretions has been going on for 7 years but have worsened over the last several months but pts shortness of breath was more significant today so EMS was called. It is associated with congestion. EMS report pt was hypoxic to 90% but in the ED he was not found to be hypoxic. Sputum was productive of thick purulent secretions. CTA pulmonary suggest bronchopulmonary pneumonia so pt was given azithromycin and rocephin. Pt also reported chest pain that was sharp in the center of his chest with b/l arm pain this am; EKG did not show acute changes but pt initial troponin was 128 and it increased to 198 - so he was given aspirin. Admitted to our service for further evaluation and treatment.      Past Medical History:          Diagnosis Date    Allergic rhinitis     Seasonal    Cardiomyopathy ([address]     CHF (congestive heart failure) (HCC)     DM (diabetes mellitus) ([address]     Enlarged prostate     Thrombocytopenia (HCC)     Type 2 diabetes mellitus without complication ([address]     VHD (valvular heart disease)        Past Surgical History:          Procedure Laterality Date    CATARACT REMOVAL Bilateral 2017    CYST REMOVAL      from left heel    HAMMER TOE SURGERY Bilateral 1979    REFRACTIVE SURGERY Bilateral     UPPER GASTROINTESTINAL ENDOSCOPY N/A 8/6/2021    EGD W/EUS FNA performed by Margie Lennon MD at [facility]       Medications Prior to Admission:      Prior to Admission medications    Medication Sig Start Date End Date Taking? Authorizing Provider   glyBURIDE (DIABETA) 5 MG tablet Take 5 mg by mouth daily (with breakfast)   Yes Historical Provider, MD   lisinopril (PRINIVIL;ZESTRIL) 2.5 MG tablet Take 2.5 mg by mouth nightly   Yes Historical Provider, MD   metoprolol succinate (TOPROL XL) 25 MG extended release tablet Take 25 mg by mouth nightly   Yes Historical Provider, MD   cetirizine (ZYRTEC ALLERGY) 10 MG tablet Take 10 mg by mouth daily   Yes Historical Provider, MD   spironolactone (ALDACTONE) 25 MG tablet Take 25 mg by mouth nightly   Yes Historical Provider, MD   Zinc Oxide (AQUAPHOR 3 IN 1 DIAPER RASH) 15 % CREA Apply topically daily as needed (apply to both bilateral lower legs)    Yes Historical Provider, MD   Multiple Vitamins-Minerals (PRESERVISION AREDS 2+MULTI VIT) CAPS Take 1 capsule by mouth 2 times daily   Yes Historical Provider, MD   furosemide (LASIX) 40 MG tablet Take 1 tablet by mouth daily 12/21/20  Yes Nolan Musa MD   diphenhydrAMINE (ALLERGY RELIEF) 25 MG tablet Take 50 mg by mouth nightly as needed for Itching   Yes Historical Provider, MD       Allergies:  Penicillins, Eggs or egg-derived products, Grass pollen(k-o-r-t-swt arielle), Milk-related compounds, Mixed ragweed, Peanut-containing drug products, and Seasonal    Social History:      The patient currently lives at home alone. He does all ADL's independently. No recent falls. TOBACCO:   reports that he quit smoking about 28 years ago. His smoking use included cigarettes. He has a 20.00 pack-year smoking history. He quit smokeless tobacco use about 51 years ago. His smokeless tobacco use included chew. ETOH:   reports current alcohol use. Family History:      Reviewed in detail and negative for DM, CAD, Cancer, CVA.  Positive as follows:        Problem Relation Age of Onset    Alcohol Abuse Father     No Known Problems Sister     Diabetes Brother        REVIEW OF SYSTEMS:   Pertinent positives as noted in the HPI. All other systems reviewed and negative. PHYSICAL EXAM:    BP (!) 95/57   Pulse 87   Temp 96.6 °F (35.9 °C)   Resp 16   Ht 5' 10\" (1.778 m)   Wt 211 lb (95.7 kg)   SpO2 98%   BMI 30.28 kg/m²     General appearance:  No apparent distress, appears stated age and cooperative. HEENT:  Normal cephalic, atraumatic without obvious deformity. Pupils equal, round, and reactive to light. Extra ocular muscles intact. Conjunctivae/corneas clear. Neck: Supple, with full range of motion. No jugular venous distention. Trachea midline. Respiratory:  Normal respiratory effort. Clear to auscultation, bilaterally without Rales/Wheezes/Rhonchi. Cardiovascular:  Regular rate and rhythm with normal S1/S2 without murmurs, rubs or gallops. Abdomen: Soft, non-tender, non-distended with normal bowel sounds. Musculoskeletal:  No clubbing, cyanosis or edema bilaterally. Full range of motion without deformity. Skin: Skin color, texture, turgor normal.  No rashes or lesions. Neurologic:  No focal deficits   Psychiatric:  Alert and oriented, thought content appropriate, normal insight    CTA PULMONARY W CONTRAST   Final Result   1. No indication for acute pulmonary emboli. 2.  Extensive endobronchial process in the bronchial system both lower lobes   with the retained secretions or aspiration anterior with the discrete patchy   bronchopneumonia in the left lower lobe, minimal in the right lower lobe. 3.  No aneurysm formation or dissection of the thoracic aorta. 4.  Calcifications of all coronary arteries. Please correlate clinically. 5.  Findings for chronic calcified pancreatitis.   There is a fullness in the   tail of the pancreas which requires multiphase IV contrast MRI in better   advantage than CT for proper characterization. XR CHEST PORTABLE   Final Result   No acute process               Labs:     Recent Labs     08/20/21  1038   WBC 5.5   HGB 13.8   HCT 39.2   PLT 68*     Recent Labs     08/20/21  1038      K 4.2      CO2 23   BUN 17   CREATININE 1.1   CALCIUM 8.5*     No results for input(s): AST, ALT, BILIDIR, BILITOT, ALKPHOS in the last 72 hours. No results for input(s): INR in the last 72 hours. No results for input(s): Reyne Italian in the last 72 hours. Urinalysis:    No results found for: NITRU, [address], BACTERIA, RBCUA, BLOODU, SPECGRAV, GLUCOSEU      ASSESSMENT:  Community acquired bronchopneumonia  Elevated troponin with atypical chest pain   Dilated cardiomyopathy  DM2  Liver cirrhosis  Pancytopenia    PLAN:  Oxygen support to maintain saturation > 90%  Cardiology consult given no recent ischemic workup and elevated troponin, will repeat level now  Strict I/O, monitor renal function  Continue rocephin, azithromycin; respiratory culture ordered   Glycemic control - ssi ordered  Repeat labs in am  Continue home meds as ordered    DVT Prophylaxis: lovenox  Diet: No diet orders on file  Code Status: Prior    PT/OT Eval Status: ordered    Dispo - home w [facility] at [facility]    Thank you Kaylee Dong MD for the opportunity to be involved in this patient's care. If you have any questions or concerns please feel free to contact me through [facility].

## 2021-08-21 LAB
ALBUMIN SERPL-MCNC: 2.8 G/DL (ref 3.5–5.2)
ALP BLD-CCNC: 110 U/L (ref 40–129)
ALT SERPL-CCNC: 28 U/L (ref 0–40)
ANION GAP SERPL CALCULATED.3IONS-SCNC: 6 MMOL/L (ref 7–16)
APTT: 41.1 SEC (ref 24.5–35.1)
APTT: 65.9 SEC (ref 24.5–35.1)
AST SERPL-CCNC: 43 U/L (ref 0–39)
BILIRUB SERPL-MCNC: 1.6 MG/DL (ref 0–1.2)
BILIRUBIN DIRECT: 0.6 MG/DL (ref 0–0.3)
BILIRUBIN, INDIRECT: 1 MG/DL (ref 0–1)
BUN BLDV-MCNC: 18 MG/DL (ref 6–23)
CALCIUM SERPL-MCNC: 8.2 MG/DL (ref 8.6–10.2)
CHLORIDE BLD-SCNC: 104 MMOL/L (ref 98–107)
CHOLESTEROL, TOTAL: 142 MG/DL (ref 0–199)
CO2: 25 MMOL/L (ref 22–29)
CREAT SERPL-MCNC: 1 MG/DL (ref 0.7–1.2)
GFR AFRICAN AMERICAN: >60
GFR NON-AFRICAN AMERICAN: >60 ML/MIN/1.73
GLUCOSE BLD-MCNC: 102 MG/DL (ref 74–99)
HCT VFR BLD CALC: 37.1 % (ref 37–54)
HCT VFR BLD CALC: 38.6 % (ref 37–54)
HDLC SERPL-MCNC: 41 MG/DL
HEMOGLOBIN: 13.2 G/DL (ref 12.5–16.5)
HEMOGLOBIN: 13.7 G/DL (ref 12.5–16.5)
LDL CHOLESTEROL CALCULATED: 84 MG/DL (ref 0–99)
MCH RBC QN AUTO: 31.4 PG (ref 26–35)
MCH RBC QN AUTO: 31.6 PG (ref 26–35)
MCHC RBC AUTO-ENTMCNC: 35.5 % (ref 32–34.5)
MCHC RBC AUTO-ENTMCNC: 35.6 % (ref 32–34.5)
MCV RBC AUTO: 88.3 FL (ref 80–99.9)
MCV RBC AUTO: 88.8 FL (ref 80–99.9)
METER GLUCOSE: 108 MG/DL (ref 74–99)
METER GLUCOSE: 119 MG/DL (ref 74–99)
METER GLUCOSE: 213 MG/DL (ref 74–99)
METER GLUCOSE: 219 MG/DL (ref 74–99)
PDW BLD-RTO: 14.5 FL (ref 11.5–15)
PDW BLD-RTO: 14.6 FL (ref 11.5–15)
PLATELET # BLD: 73 E9/L (ref 130–450)
PLATELET # BLD: 78 E9/L (ref 130–450)
PLATELET CONFIRMATION: NORMAL
PLATELET CONFIRMATION: NORMAL
PMV BLD AUTO: 11.8 FL (ref 7–12)
PMV BLD AUTO: 12.4 FL (ref 7–12)
POTASSIUM REFLEX MAGNESIUM: 4.2 MMOL/L (ref 3.5–5)
RBC # BLD: 4.18 E12/L (ref 3.8–5.8)
RBC # BLD: 4.37 E12/L (ref 3.8–5.8)
SODIUM BLD-SCNC: 135 MMOL/L (ref 132–146)
TOTAL PROTEIN: 7.2 G/DL (ref 6.4–8.3)
TRIGL SERPL-MCNC: 84 MG/DL (ref 0–149)
VLDLC SERPL CALC-MCNC: 17 MG/DL
WBC # BLD: 6.8 E9/L (ref 4.5–11.5)
WBC # BLD: 7 E9/L (ref 4.5–11.5)

## 2021-08-21 PROCEDURE — 2580000003 HC RX 258: Performed by: INTERNAL MEDICINE

## 2021-08-21 PROCEDURE — 82962 GLUCOSE BLOOD TEST: CPT

## 2021-08-21 PROCEDURE — 99222 1ST HOSP IP/OBS MODERATE 55: CPT | Performed by: TRANSPLANT SURGERY

## 2021-08-21 PROCEDURE — 36415 COLL VENOUS BLD VENIPUNCTURE: CPT

## 2021-08-21 PROCEDURE — 2060000000 HC ICU INTERMEDIATE R&B

## 2021-08-21 PROCEDURE — 6360000002 HC RX W HCPCS: Performed by: NURSE PRACTITIONER

## 2021-08-21 PROCEDURE — 6370000000 HC RX 637 (ALT 250 FOR IP): Performed by: NURSE PRACTITIONER

## 2021-08-21 PROCEDURE — 80076 HEPATIC FUNCTION PANEL: CPT

## 2021-08-21 PROCEDURE — 94640 AIRWAY INHALATION TREATMENT: CPT

## 2021-08-21 PROCEDURE — 6370000000 HC RX 637 (ALT 250 FOR IP): Performed by: INTERNAL MEDICINE

## 2021-08-21 PROCEDURE — 85027 COMPLETE CBC AUTOMATED: CPT

## 2021-08-21 PROCEDURE — 80048 BASIC METABOLIC PNL TOTAL CA: CPT

## 2021-08-21 PROCEDURE — 6360000002 HC RX W HCPCS: Performed by: INTERNAL MEDICINE

## 2021-08-21 PROCEDURE — 85730 THROMBOPLASTIN TIME PARTIAL: CPT

## 2021-08-21 PROCEDURE — 80061 LIPID PANEL: CPT

## 2021-08-21 PROCEDURE — 99223 1ST HOSP IP/OBS HIGH 75: CPT | Performed by: INTERNAL MEDICINE

## 2021-08-21 RX ORDER — IPRATROPIUM BROMIDE AND ALBUTEROL SULFATE 2.5; .5 MG/3ML; MG/3ML
1 SOLUTION RESPIRATORY (INHALATION) EVERY 4 HOURS
Status: DISCONTINUED | OUTPATIENT
Start: 2021-08-21 | End: 2021-08-29 | Stop reason: HOSPADM

## 2021-08-21 RX ORDER — PREDNISONE 20 MG/1
40 TABLET ORAL DAILY
Status: COMPLETED | OUTPATIENT
Start: 2021-08-21 | End: 2021-08-25

## 2021-08-21 RX ORDER — HEPARIN SODIUM 1000 [USP'U]/ML
4000 INJECTION, SOLUTION INTRAVENOUS; SUBCUTANEOUS PRN
Status: DISCONTINUED | OUTPATIENT
Start: 2021-08-21 | End: 2021-08-25

## 2021-08-21 RX ORDER — HEPARIN SODIUM 1000 [USP'U]/ML
2000 INJECTION, SOLUTION INTRAVENOUS; SUBCUTANEOUS PRN
Status: DISCONTINUED | OUTPATIENT
Start: 2021-08-21 | End: 2021-08-25

## 2021-08-21 RX ORDER — ASPIRIN 81 MG/1
81 TABLET ORAL DAILY
Status: DISCONTINUED | OUTPATIENT
Start: 2021-08-21 | End: 2021-08-29 | Stop reason: HOSPADM

## 2021-08-21 RX ORDER — HEPARIN SODIUM 1000 [USP'U]/ML
4000 INJECTION, SOLUTION INTRAVENOUS; SUBCUTANEOUS ONCE
Status: COMPLETED | OUTPATIENT
Start: 2021-08-21 | End: 2021-08-21

## 2021-08-21 RX ORDER — HEPARIN SODIUM 10000 [USP'U]/100ML
5-30 INJECTION, SOLUTION INTRAVENOUS CONTINUOUS
Status: DISCONTINUED | OUTPATIENT
Start: 2021-08-21 | End: 2021-08-23

## 2021-08-21 RX ORDER — ATORVASTATIN CALCIUM 40 MG/1
40 TABLET, FILM COATED ORAL NIGHTLY
Status: DISCONTINUED | OUTPATIENT
Start: 2021-08-21 | End: 2021-08-29 | Stop reason: HOSPADM

## 2021-08-21 RX ADMIN — INSULIN LISPRO 1 UNITS: 100 INJECTION, SOLUTION INTRAVENOUS; SUBCUTANEOUS at 22:16

## 2021-08-21 RX ADMIN — INSULIN LISPRO 2 UNITS: 100 INJECTION, SOLUTION INTRAVENOUS; SUBCUTANEOUS at 11:43

## 2021-08-21 RX ADMIN — Medication 10 ML: at 09:01

## 2021-08-21 RX ADMIN — LISINOPRIL 2.5 MG: 5 TABLET ORAL at 22:04

## 2021-08-21 RX ADMIN — CETIRIZINE HYDROCHLORIDE 10 MG: 10 TABLET, FILM COATED ORAL at 09:00

## 2021-08-21 RX ADMIN — WATER 1000 MG: 1 INJECTION INTRAMUSCULAR; INTRAVENOUS; SUBCUTANEOUS at 09:00

## 2021-08-21 RX ADMIN — AZITHROMYCIN 500 MG: 250 TABLET, FILM COATED ORAL at 08:59

## 2021-08-21 RX ADMIN — ATORVASTATIN CALCIUM 40 MG: 40 TABLET, FILM COATED ORAL at 22:04

## 2021-08-21 RX ADMIN — IPRATROPIUM BROMIDE AND ALBUTEROL SULFATE 1 AMPULE: .5; 2.5 SOLUTION RESPIRATORY (INHALATION) at 16:52

## 2021-08-21 RX ADMIN — HEPARIN SODIUM 4000 UNITS: 1000 INJECTION INTRAVENOUS; SUBCUTANEOUS at 11:43

## 2021-08-21 RX ADMIN — FUROSEMIDE 40 MG: 20 TABLET ORAL at 09:00

## 2021-08-21 RX ADMIN — HEPARIN SODIUM 10 UNITS/KG/HR: 10000 INJECTION, SOLUTION INTRAVENOUS at 11:43

## 2021-08-21 RX ADMIN — ENOXAPARIN SODIUM 40 MG: 40 INJECTION SUBCUTANEOUS at 09:00

## 2021-08-21 RX ADMIN — Medication 10 ML: at 22:06

## 2021-08-21 RX ADMIN — PREDNISONE 40 MG: 20 TABLET ORAL at 18:26

## 2021-08-21 RX ADMIN — METOPROLOL SUCCINATE 25 MG: 25 TABLET, EXTENDED RELEASE ORAL at 22:14

## 2021-08-21 RX ADMIN — ASPIRIN 81 MG: 81 TABLET, COATED ORAL at 11:31

## 2021-08-21 RX ADMIN — SPIRONOLACTONE 25 MG: 25 TABLET ORAL at 22:04

## 2021-08-21 ASSESSMENT — PAIN SCALES - GENERAL
PAINLEVEL_OUTOF10: 0

## 2021-08-21 NOTE — CONSULTS
Inpatient Cardiology Consultation      Reason for Consult: Elevated troponin    Consulting Physician: Dr. Chana Carrasco    Requesting Physician:  Dr. Melanie Esqueda    Date of Consultation: 8/21/2021    HISTORY OF PRESENT ILLNESS:     This 34-year-old male is known to [facility] cardiology and is followed by Aggie Garcia. He has a history of dilated cardiomyopathy, congestive heart failure, valvular heart disease, pulmonary hypertension and sinus bradycardia. He was evaluated as an outpatient in our office on July 6 of this year and was stable from a cardiac standpoint. On August 6 he underwent endoscopy/ultrasound for pancreatic cyst.  A biopsy was done. On August 19 he underwent EGD with EUS FNA. He received his second Covid vaccine last week. He has chronic \"mucus in his throat \"and it got worse over the last few days. He had a cough but no fever. He denies chest pain but got discomfort in the bilateral arms that lasted about 15 minutes yesterday. He was diaphoretic. He suddenly became short of breath and called 911. Paramedics found his O2 saturation to be 90% on room air and provided nasal cannula. He was wheezing and given albuterol. He expectorated a small blood clot. Blood pressure on admission was 101/62 and he was afebrile and O2 saturation 98% on nasal cannula. Blood pressure has dropped to 94/57.     Potassium was 4.2 with a BUN of 17 and a creatinine of 1.1,  and troponin I  and now 353, WBCs 5.5 with a hemoglobin 13.8 and hematocrit 39.2 but platelets are 68 ,, negative for Covid,     Chest x-ray showed no acute process and CTA of the chest no indication for acute PE but they were extensive Endo bronchial process in the bronchial system with retained secretions or aspiration anterior with discrete patchy bronchopneumonia left lower lobe but no aneurysm formation or dissection of the thoracic aorta and there was calcification of all the coronary artery and chronic calcified pancreatitis. EKG showed sinus rhythm with low QRS voltage and nonspecific ST-T wave changes, lateral ischemia    He was treated with antibiotics and given aspirin and DuoNeb. Past medical history  1. Obesity  2. Former smoker with a 20-pack-year history and quit in 1993 and a history of chewing tobacco  3. Diabetes since 1996, non-insulin-requiring  4. Enlarged prostate  5. Allergic rhinitis  6. Cirrhosis per ultrasound of the liver secondary to fatty liver disease with pancreatic body cyst and dilated pancreatic duct, portal hypertension, status post endoscopy/ultrasound with a biopsy done on August 6, 2021, and on August 19 EGD with EUS FNA  7. Gallstones  8. Cataract removal  9. Valvular heart disease with mitral regurgitation and tricuspid regurgitation and pulmonary hypertension, not well documented  10. Dilated cardiomyopathy and congestive heart failure with an EF of 45 by 2D echocardiogram January 9, 2019  11. Sinus bradycardia  12. Lexiscan stress test March 6, 2019  Electrocardiographically normal regadenoson infusion with a   clinically non-ischemic response   2. Myocardial perfusion imaging showed large apical, inferior,   inferolateral, inferoseptal defects. 3. Overall left ventricular systolic function was reduced at 20%   with severe global hypokinesis, severe inferior hypokinesis. 4.   Intermediate risk general pharmacologic stress test.     13. 2D echocardiogram July 17, 2019, limited study EF 45%  14. Thrombocytopenia platelet count 84 in July 2021        Medications Prior to admit:  Prior to Admission medications    Medication Sig Start Date End Date Taking?  Authorizing Provider   glyBURIDE (DIABETA) 5 MG tablet Take 5 mg by mouth daily (with breakfast)   Yes Historical Provider, MD   lisinopril (PRINIVIL;ZESTRIL) 2.5 MG tablet Take 2.5 mg by mouth nightly   Yes Historical Provider, MD   metoprolol succinate (TOPROL XL) 25 MG extended release tablet Take 25 mg by mouth nightly   Yes Historical Provider, MD   cetirizine (ZYRTEC ALLERGY) 10 MG tablet Take 10 mg by mouth daily   Yes Historical Provider, MD   spironolactone (ALDACTONE) 25 MG tablet Take 25 mg by mouth nightly   Yes Historical Provider, MD   Zinc Oxide (AQUAPHOR 3 IN 1 DIAPER RASH) 15 % CREA Apply topically daily as needed (apply to both bilateral lower legs)    Yes Historical Provider, MD   Multiple Vitamins-Minerals (PRESERVISION AREDS 2+MULTI VIT) CAPS Take 1 capsule by mouth 2 times daily   Yes Historical Provider, MD   furosemide (LASIX) 40 MG tablet Take 1 tablet by mouth daily 12/21/20  Yes Cyndi Lin MD   diphenhydrAMINE (ALLERGY RELIEF) 25 MG tablet Take 50 mg by mouth nightly as needed for Itching   Yes Historical Provider, MD       Current Medications:    Current Facility-Administered Medications: sodium chloride flush 0.9 % injection 5-40 mL, 5-40 mL, Intravenous, 2 times per day  sodium chloride flush 0.9 % injection 5-40 mL, 5-40 mL, Intravenous, PRN  0.9 % sodium chloride infusion, 25 mL, Intravenous, PRN  enoxaparin (LOVENOX) injection 40 mg, 40 mg, Subcutaneous, Daily  ondansetron (ZOFRAN-ODT) disintegrating tablet 4 mg, 4 mg, Oral, Q8H PRN **OR** ondansetron (ZOFRAN) injection 4 mg, 4 mg, Intravenous, Q6H PRN  polyethylene glycol (GLYCOLAX) packet 17 g, 17 g, Oral, Daily PRN  acetaminophen (TYLENOL) tablet 650 mg, 650 mg, Oral, Q6H PRN **OR** acetaminophen (TYLENOL) suppository 650 mg, 650 mg, Rectal, Q6H PRN  insulin lispro (HUMALOG) injection vial 0-6 Units, 0-6 Units, Subcutaneous, TID WC  insulin lispro (HUMALOG) injection vial 0-3 Units, 0-3 Units, Subcutaneous, Nightly  azithromycin (ZITHROMAX) tablet 500 mg, 500 mg, Oral, Daily  cefTRIAXone (ROCEPHIN) 1,000 mg in sterile water 10 mL IV syringe, 1,000 mg, Intravenous, Q24H  cetirizine (ZYRTEC) tablet 10 mg, 10 mg, Oral, Daily  diphenhydrAMINE (BENADRYL) tablet 50 mg, 50 mg, Oral, Nightly PRN  furosemide (LASIX) tablet 40 mg, 40 mg, Oral, Daily  lisinopril (PRINIVIL;ZESTRIL) tablet 2.5 mg, 2.5 mg, Oral, Nightly  metoprolol succinate (TOPROL XL) extended release tablet 25 mg, 25 mg, Oral, Nightly  spironolactone (ALDACTONE) tablet 25 mg, 25 mg, Oral, Nightly    Allergies:  Penicillins, Eggs or egg-derived products, Grass pollen(k-o-r-t-swt arielle), Milk-related compounds, Mixed ragweed, Peanut-containing drug products, and Seasonal    Social History:        Family History:   Family History   Problem Relation Age of Onset    Alcohol Abuse Father     No Known Problems Sister     Diabetes Brother        REVIEW OF SYSTEMS:     · Constitutional: Denies fatigue, fevers, chills or night sweats  · Eyes: Denies visual changes or drainage  · ENT: Denies headaches or hearing loss. No mouth sores or sore throat. No epistaxis   · Cardiovascular: Denies chest pain, pressure or palpitations. No lower extremity swelling. · Respiratory: ABOVE  · Gastrointestinal: Denies hematemesis or anorexia. No hematochezia or melena    · Genitourinary: Denies urgency, dysuria or hematuria. · Musculoskeletal: Denies gait disturbance, weakness or joint complaints  · Integumentary: Denies rash, hives or pruritis   · Neurological: Denies dizziness, headaches or seizures. No numbness or tingling  · Psychiatric: Denies anxiety or depression. · Endocrine: Denies temperature intolerance. No recent weight change. .  · Hematologic/Lymphatic: Denies abnormal bruising or bleeding. No swollen lymph nodes    PHYSICAL EXAM:   BP (!) 98/56   Pulse 75   Temp 96.9 °F (36.1 °C) (Temporal)   Resp 18   Ht 5' 10\" (1.778 m)   Wt 211 lb (95.7 kg)   SpO2 92%   BMI 30.28 kg/m²   CONST:  Well developed, well nourished who appears of stated age. Awake, alert and cooperative. No apparent distress. HEENT:   Head- Normocephalic, atraumatic   Eyes- Conjunctivae pink, anicteric  Throat- Oral mucosa pink and moist  Neck-  No stridor, trachea midline, no jugular venous distention. No carotid bruit. CHEST: Chest symmetrical and non-tender to palpation. No accessory muscle use or intercostal retractions  RESPIRATORY: Lung sounds - clear throughout fields but is wheezing  CARDIOVASCULAR:     Heart Inspection- shows no noted pulsations  Heart Palpation- no heaves or thrills; PMI is non-displaced   Heart Ausculation- Regular rate and rhythm, no murmur. No s3, s4 or rub   PV: No lower extremity edema. No varicosities. Pedal pulses palpable, no clubbing or cyanosis   ABDOMEN: Soft, non-tender to light palpation. Bowel sounds present. No palpable masses no organomegaly; no abdominal bruit  MS: Good muscle strength and tone. No atrophy or abnormal movements. : Deferred  SKIN: Warm and dry no statis dermatitis or ulcers   NEURO / PSYCH: Oriented to person, place and time. Speech clear and appropriate. Follows all commands. Pleasant affect     DATA:    ECG / Tele strips:  sinus rhythm  Diagnostic:      Intake/Output Summary (Last 24 hours) at 8/21/2021 0714  Last data filed at 8/21/2021 0544  Gross per 24 hour   Intake 0 ml   Output 400 ml   Net -400 ml       Labs:   CBC:   Recent Labs     08/20/21  1038   WBC 5.5   HGB 13.8   HCT 39.2   PLT 68*     BMP:   Recent Labs     08/20/21  1038      K 4.2   CO2 23   BUN 17   CREATININE 1.1   LABGLOM >60   CALCIUM 8.5*     Mag: No results for input(s): MG in the last 72 hours. Phos: No results for input(s): PHOS in the last 72 hours. TFT:   Lab Results   Component Value Date    TSH 2.240 06/18/2020      HgA1c:   Lab Results   Component Value Date    LABA1C 6.7 (H) 07/12/2021     No results found for: EAG  proBNP:   Recent Labs     08/20/21  1038   PROBNP 537*     PT/INR: No results for input(s): PROTIME, INR in the last 72 hours. APTT:No results for input(s): APTT in the last 72 hours.   CARDIAC ENZYMES:  Recent Labs     08/20/21  1038 08/20/21  1129 08/20/21  1831   TROPHS 128* 198* 353*     FASTING LIPID PANEL:  Lab Results   Component Value Date    CHOL 133 06/18/2020    HDL 30 06/18/2020    LDLCALC 76 06/18/2020    TRIG 135 06/18/2020     LIVER PROFILE:No results for input(s): AST, ALT, LABALBU in the last 72 hours.     Electronically signed by PIERCE Alvarez CNP on 8/21/2021 at 7:14 AM

## 2021-08-21 NOTE — CONSULTS
[facility] SURGERY  CONSULT NOTE  8/21/2021    Physician Consulted: Dr. Vasiliy Marshall   Reason for Consult: Recommendations for antiplatelet therapy in setting of cirrhosis if any GI intervention planned   Referring Physician: Dr. Rosalba Awad     JORJE Bustos is a 69 y.o. 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 Patient was seen by Dr. Vasiliy Marshall 7/20 for evaluation of elevated liver enzymes and imaging revealed had a pancreatic tail cystic masses, biopsy was performed 8/6 with Dr. Nayan Smith and results are still pending. This admission patient came for SOB and atypical chest pain, cardiology completed a perfusion scan which noted large perfusion defects and plans for cardiac catheterization Monday or Tuesday and consulted us to enquire about further GI interventions and anti-coagulation. Patient is comfortable with no abdominal pain. States he had no complications from his biopsy. Denies black or bloody stools, weight loss, acid reflux, and history of GIB. He has never had a colonoscopy and does not take blood thinners. He has been hypotensive, non-tachycardic. Hbg 13.7, platelets 78. Abdomen was soft nontender and obese. Rectal exam showed no masses or lesions, FOBT (-).      Past Medical History:   Diagnosis Date    Allergic rhinitis     Seasonal    Cardiomyopathy ([address]     CHF (congestive heart failure) (HCC)     DM (diabetes mellitus) (HCC)     Enlarged prostate     Thrombocytopenia (HCC)     Type 2 diabetes mellitus without complication ([address]     VHD (valvular heart disease)        Past Surgical History:   Procedure Laterality Date    CATARACT REMOVAL Bilateral 2017    CYST REMOVAL      from left heel    HAMMER TOE SURGERY Bilateral 1979    REFRACTIVE SURGERY Bilateral     UPPER GASTROINTESTINAL ENDOSCOPY N/A 8/6/2021    EGD W/EUS FNA performed by Terrance Londono MD at [address] [place] [address]       Medications Prior to Admission:    Prior to Admission medications Medication Sig Start Date End Date Taking?  Authorizing Provider   glyBURIDE (DIABETA) 5 MG tablet Take 5 mg by mouth daily (with breakfast)   Yes Historical Provider, MD   lisinopril (PRINIVIL;ZESTRIL) 2.5 MG tablet Take 2.5 mg by mouth nightly   Yes Historical Provider, MD   metoprolol succinate (TOPROL XL) 25 MG extended release tablet Take 25 mg by mouth nightly   Yes Historical Provider, MD   cetirizine (ZYRTEC ALLERGY) 10 MG tablet Take 10 mg by mouth daily   Yes Historical Provider, MD   spironolactone (ALDACTONE) 25 MG tablet Take 25 mg by mouth nightly   Yes Historical Provider, MD   Zinc Oxide (AQUAPHOR 3 IN 1 DIAPER RASH) 15 % CREA Apply topically daily as needed (apply to both bilateral lower legs)    Yes Historical Provider, MD   Multiple Vitamins-Minerals (PRESERVISION AREDS 2+MULTI VIT) CAPS Take 1 capsule by mouth 2 times daily   Yes Historical Provider, MD   furosemide (LASIX) 40 MG tablet Take 1 tablet by mouth daily 20  Yes Luis Hackett MD   diphenhydrAMINE (ALLERGY RELIEF) 25 MG tablet Take 50 mg by mouth nightly as needed for Itching   Yes Historical Provider, MD       Allergies   Allergen Reactions    Penicillins Hives    Eggs Or Egg-Derived Products     Grass Pollen(K-O-R-T-Swt Oscar)     Milk-Related Compounds     Mixed Ragweed     Peanut-Containing Drug Products     Seasonal      Mold cats dogs ragweed       Family History   Problem Relation Age of Onset    Alcohol Abuse Father     No Known Problems Sister     Diabetes Brother        Social History     Tobacco Use    Smoking status: Former Smoker     Packs/day: 1.00     Years: 20.00     Pack years: 20.00     Types: Cigarettes     Quit date: 1993     Years since quittin.5    Smokeless tobacco: Former User     Types: Chew     Quit date: 1970   Vaping Use    Vaping Use: Never used   Substance Use Topics    Alcohol use: Yes     Comment: Occ beer    Drug use: Never         Review of Systems   General ROS: most likely secondary to the airway disease, represent bronchial pneumonia more distally. This is mild in the right lower lobe. Minimal ground-glass densities seen in the upper lobes. Discrete areas of atelectasis retraction are seen in the superior apical segment of the left upper lobe. Areas of paraseptal emphysema are seen both upper lungs the apical areas. There is no pleural effusions. Upper abdominal structures demonstrate an enlarged spleen measuring 15 by 6.3 cm. There is a prominence of the caudate lobe. There is recanalization of the umbilical vein which indicates portal collateral circulation. This study is done on early arterial phase and there is no enhancement of the portal venous system in these IV contrast face. The main portal vein measures 15 mm which is mildly enlarged. There are multifocal calcifications in the pancreas particular in the area of the tail of the pancreas. There is some fullness in the region of the pancreas. Further evaluation with a CT or MRI in better advantage with multiphase contrast enhancement the protocol is recommended the.  Upper abdominal structures not fully covered on this study. Adrenal glands not enlarged. Upper aspect of the kidneys appear unremarkable. 1.  No indication for acute pulmonary emboli. 2.  Extensive endobronchial process in the bronchial system both lower lobes with the retained secretions or aspiration anterior with the discrete patchy bronchopneumonia in the left lower lobe, minimal in the right lower lobe. 3.  No aneurysm formation or dissection of the thoracic aorta. 4.  Calcifications of all coronary arteries. Please correlate clinically. 5.  Findings for chronic calcified pancreatitis. There is a fullness in the tail of the pancreas which requires multiphase IV contrast MRI in better advantage than CT for proper characterization.          ASSESSMENT:  69 y.o. male with NSTEMI seen recently by Dr. Keily Livingston for biopsy of pancreatic mass with pathology negative for malignant cells. PLAN:  - Patient has no external signs of bleeding and (-) FOBT, therefore unlikely that he is having any acute bleeding at this time. Given his history of cirrohsis and platlet count of 78 it is appropriate for him to be on heparin at this time for cardiac catheterization. Following catheterization anticoagulant management per Cardiology is appropriate from our perspective.      Electronically signed by Rachel Aaron MD on 8/22/2021 at 11:34 AM

## 2021-08-21 NOTE — CONSULTS
I independently interviewed and examined the patient. I have reviewed the above documentation completed by the SARA. Please see my additional contributions to the HPI, physical exam, and assessment / medical decision making. Reason for consult: Elevated troponin    He was previously known to Dr. Antunez Screws service at [facility]) cardiology. HPI, pH, FH, SH and ROS were reviewed and agree with above. Mr. 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, former smoker with a 20-pack-year history of smoking quit in 1993, history of type 2 diabetes since 1996, history of pancreatic cyst status post recent ultrasound-guided FNA (results are pending) history of gallstones, VHD with mitral regurgitation tricuspid regurgitation and pulmonary pretension, history of dilated cardiomyopathy secondary to ischemic cardiomyopathy with improved ejection fraction, recent LVEF 45% based on echo done in January 2019, no history of hypertension, hyperlipidemia presented to the emergency room with complaints of progressively increasing dyspnea which got worse yesterday. He is also complaining of cough with expectoration of mucoid sputum for the past few days. Yesterday he developed bilateral arm pains along with dyspnea diaphoresis that lasted about 15 minutes. He called EMS service and was noted to be hypoxic with a pulse ox in the 90s. He denied any orthopnea or PND. EKG showed normal sinus rhythm with inferior and anterolateral MI, age undetermined, lateral wall ischemia. Patient was evaluated in the emergency room and was noted to have elevated troponin of 128 and subsequently increased to 353. Creatinine 1.1. CTA chest was negative for PE but was diagnosed with a pneumonia and was initiated on IV antibiotics.   Currently he is feeling better still has cough with expectoration mucoid sputum denies any more chest pain or arm pains. No prior history of MIs. He had a Lexiscan nuclear stress test in 2019 which showed an EF of 20% without any ischemia. Echocardiogram done in July 2019 showed an EF of 45% with history of MR and TR. Stress test in March 2019 showed large inferior and apical perfusion defect without any reversibility. Review of Systems:  Cardiac: As per HPI  General: No fever, chills  Pulmonary: As per HPI  GI: No nausea, vomiting  Musculoskeletal: ARIAS x 4, no focal motor deficits  Skin: Intact, no rashes  Neuro/Psych: No headache or seizures    Physical Exam:  BP (!) 102/53   Pulse 66   Temp 97 °F (36.1 °C) (Temporal)   Resp 18   Ht 5' 10\" (1.778 m)   Wt 211 lb (95.7 kg)   SpO2 92%   BMI 30.28 kg/m²   Appearance: Awake, alert, no acute respiratory distress  Skin: Intact, no rash  Head: Normocephalic, atraumatic  ENMT: MMM, no rhinorrhea  Neck: Supple, no carotid bruits  Lungs: Clear to auscultation bilaterally. No wheezes, rales, or rhonchi. Cardiac: Regular rate and rhythm, +S1S2, no murmurs apparent  Abdomen: Soft, +bowel sounds  Extremities: Moves all extremities x 4, no lower extremity edema  Neurologic: No focal motor deficits apparent, normal mood and affect    Telemetry findings reviewed: SR at rate 60s, no new tachy/bradyarrhythmias overnight    EKG: Normal sinus rhythm, inferior and anterior MI age undetermined, ST-T changes suggestive lateral wall ischemia. Vitals and labs were reviewed: Blood pressure 102/53 heart rate 66 sats 92% on room air. Labs-BMP normal, proBNP 537, high-sensitivity troponin I 28>> 198>> 353. CBC normal except for platelet of 77>> 73    CTA chest: No acute PE, extensive endobronchial process with retained secretions with discrete patchy bronchopneumonia in the left lower lobe minimal in the right lower lobe. Coronary artery calcification. Chronic calcified pancreatitis    1.  Lexiscan stress test March 6, 2019     Electrocardiographically you for any further recommendations. Siria Richards MD, Sheyla Triplett.   [facility] ([facility]) Cardiology

## 2021-08-21 NOTE — PROGRESS NOTES
Hospitalist Progress Note      Synopsis: Patient admitted on 8/20/2021    69 y.o. male who presented to [facility] with cough and shortness of breath. He has a hx of DM2 for which he is on oral meds, valvular heart disease, dilated cardiomyopathy (followed with Dr. Nayan Lam), liver cirrhosis, pancytopenia, recent FNA of the pancreatic tail cyst who presented to the ED for shortness of breath and cough. Cough of think secretions has been going on for 7 years but have worsened over the last several months but pts shortness of breath was more significant today so EMS was called. It is associated with congestion. EMS report pt was hypoxic to 90% but in the ED he was not found to be hypoxic. Sputum was productive of thick purulent secretions. CTA pulmonary suggest bronchopulmonary pneumonia so pt was given azithromycin and rocephin. Pt also reported chest pain that was sharp in the center of his chest with b/l arm pain this am; EKG did not show acute changes but pt initial troponin was 128 and it increased to 198 - so he was given aspirin. Admitted to our service for further evaluation and treatment. Subjective  Pt reports he continues to have productive cough, he reports having this cough for years. Used to be heavy smoker. Felt better with using inhalers   On heparin gtt for NSTEMI  Denies chest pain but having some shortness of breath    Exam:  BP (!) 103/51   Pulse 65   Temp 98.3 °F (36.8 °C) (Temporal)   Resp 18   Ht 5' 10\" (1.778 m)   Wt 211 lb (95.7 kg)   SpO2 96%   BMI 30.28 kg/m²   General appearance: No apparent distress   HEENT: Pupils equal, round, and reactive to light. Conjunctivae/corneas clear. Neck: Supple   Respiratory:  Normal respiratory effort. Clear to auscultation, bilaterally without Rales/Wheezes/Rhonchi. Cardiovascular: Regular rate and rhythm without murmurs, rubs or gallops.   Abdomen: Soft, no tenderness, no distention, no organomegaly   Musculoskeletal: No clubbing, cyanosis or edema bilaterally. Skin: Skin color, texture, turgor normal.  No rashes or lesions. Neurologic:  nonfocal   Psychiatric: Alert and oriented, thought content appropriate, normal insight      Medications:  Reviewed    Infusion Medications    heparin (PORCINE) Infusion 10 Units/kg/hr (08/21/21 1143)    sodium chloride       Scheduled Medications    atorvastatin  40 mg Oral Nightly    aspirin  81 mg Oral Daily    sodium chloride flush  5-40 mL Intravenous 2 times per day    insulin lispro  0-6 Units Subcutaneous TID WC    insulin lispro  0-3 Units Subcutaneous Nightly    azithromycin  500 mg Oral Daily    cefTRIAXone (ROCEPHIN) IV  1,000 mg Intravenous Q24H    cetirizine  10 mg Oral Daily    furosemide  40 mg Oral Daily    lisinopril  2.5 mg Oral Nightly    metoprolol succinate  25 mg Oral Nightly    spironolactone  25 mg Oral Nightly     PRN Meds: heparin (porcine), heparin (porcine), perflutren lipid microspheres, sodium chloride flush, sodium chloride, ondansetron **OR** ondansetron, polyethylene glycol, acetaminophen **OR** acetaminophen, diphenhydrAMINE    I/O    Intake/Output Summary (Last 24 hours) at 8/21/2021 1631  Last data filed at 8/21/2021 1339  Gross per 24 hour   Intake 240 ml   Output 400 ml   Net -160 ml       Labs:   Recent Labs     08/20/21  1038 08/21/21  0629 08/21/21  1006   WBC 5.5 7.0 6.8   HGB 13.8 13.2 13.7   HCT 39.2 37.1 38.6   PLT 68* 73* 78*       Recent Labs     08/20/21  1038 08/21/21  0629    135   K 4.2 4.2    104   CO2 23 25   BUN 17 18   CREATININE 1.1 1.0   CALCIUM 8.5* 8.2*       Recent Labs     08/21/21  1006   PROT 7.2   ALKPHOS 110   ALT 28   AST 43*   BILITOT 1.6*       No results for input(s): INR in the last 72 hours. No results for input(s): Latoya Greer in the last 72 hours.     Chronic labs:  Lab Results   Component Value Date    CHOL 142 08/21/2021    TRIG 84 08/21/2021    HDL 41 08/21/2021    LDLCALC 84 08/21/2021    TSH 2.240 06/18/2020    INR 1.4 06/22/2021    LABA1C 6.7 (H) 07/12/2021       Radiology:  Imaging studies reviewed today. ASSESSMENT:  NSTEMI, likely type II  Ischemic cardiomyopathy   Community acquired bronchopneumonia  DM2  Liver cirrhosis  Thrombocytopenia 2/2 liver cirrhosis      PLAN:    Off oxygen, continue to monitor breathing, I suspect underlying COPD disease given smoking hx and chronic productive cough, he will need outpatient PFT testing and referral to pulm clinic  Will start breathing treatments, and cough meds, he is already on Azithromycin, will continue, will add prednisone 40 mg daily x5 days   Cardiology consulted, appreciate recs, started ASA and heparin gtt, consulted Dr. Marquez Gardner if ok with antiplatelets, then cath on Monday  Strict I/O, monitor renal function  Continue rocephin, azithromycin; respiratory culture   Glycemic control - ssi ordered  Repeat labs in am  Continue home meds as ordered      Diet: Diet NPO Exceptions are: Sips of Water with Meds  ADULT DIET; Regular; 4 carb choices (60 gm/meal)  Code Status: Full Code  PT/OT Eval Status:     DVT Prophylaxis:     Recommended disposition at discharge:      +++++++++++++++++++++++++++++++++++++++++++++++++  Bisi Jean MD   [facility].  +++++++++++++++++++++++++++++++++++++++++++++++++  NOTE: This report was transcribed using voice recognition software. Every effort was made to ensure accuracy; however, inadvertent computerized transcription errors may be present.

## 2021-08-21 NOTE — PROGRESS NOTES
Dr. Chris Narayanan notified of new consult via perfect [facility]     General surgery residents green team notified of new consult as well

## 2021-08-22 LAB
APTT: 56.6 SEC (ref 24.5–35.1)
METER GLUCOSE: 262 MG/DL (ref 74–99)
METER GLUCOSE: 285 MG/DL (ref 74–99)
METER GLUCOSE: 329 MG/DL (ref 74–99)
METER GLUCOSE: 372 MG/DL (ref 74–99)

## 2021-08-22 PROCEDURE — 2060000000 HC ICU INTERMEDIATE R&B

## 2021-08-22 PROCEDURE — 6360000002 HC RX W HCPCS: Performed by: INTERNAL MEDICINE

## 2021-08-22 PROCEDURE — 82962 GLUCOSE BLOOD TEST: CPT

## 2021-08-22 PROCEDURE — 2580000003 HC RX 258: Performed by: INTERNAL MEDICINE

## 2021-08-22 PROCEDURE — 94640 AIRWAY INHALATION TREATMENT: CPT

## 2021-08-22 PROCEDURE — 85730 THROMBOPLASTIN TIME PARTIAL: CPT

## 2021-08-22 PROCEDURE — 36415 COLL VENOUS BLD VENIPUNCTURE: CPT

## 2021-08-22 PROCEDURE — 87449 NOS EACH ORGANISM AG IA: CPT

## 2021-08-22 PROCEDURE — 6370000000 HC RX 637 (ALT 250 FOR IP): Performed by: INTERNAL MEDICINE

## 2021-08-22 PROCEDURE — 6370000000 HC RX 637 (ALT 250 FOR IP): Performed by: NURSE PRACTITIONER

## 2021-08-22 PROCEDURE — 6360000002 HC RX W HCPCS: Performed by: NURSE PRACTITIONER

## 2021-08-22 PROCEDURE — 99233 SBSQ HOSP IP/OBS HIGH 50: CPT | Performed by: INTERNAL MEDICINE

## 2021-08-22 RX ORDER — DEXTROSE MONOHYDRATE 50 MG/ML
100 INJECTION, SOLUTION INTRAVENOUS PRN
Status: DISCONTINUED | OUTPATIENT
Start: 2021-08-22 | End: 2021-08-29 | Stop reason: HOSPADM

## 2021-08-22 RX ORDER — DEXTROSE MONOHYDRATE 25 G/50ML
12.5 INJECTION, SOLUTION INTRAVENOUS PRN
Status: DISCONTINUED | OUTPATIENT
Start: 2021-08-22 | End: 2021-08-29 | Stop reason: HOSPADM

## 2021-08-22 RX ORDER — NICOTINE POLACRILEX 4 MG
15 LOZENGE BUCCAL PRN
Status: DISCONTINUED | OUTPATIENT
Start: 2021-08-22 | End: 2021-08-29 | Stop reason: HOSPADM

## 2021-08-22 RX ADMIN — FUROSEMIDE 40 MG: 20 TABLET ORAL at 07:54

## 2021-08-22 RX ADMIN — PREDNISONE 40 MG: 20 TABLET ORAL at 07:55

## 2021-08-22 RX ADMIN — IPRATROPIUM BROMIDE AND ALBUTEROL SULFATE 1 AMPULE: .5; 2.5 SOLUTION RESPIRATORY (INHALATION) at 12:44

## 2021-08-22 RX ADMIN — LISINOPRIL 2.5 MG: 5 TABLET ORAL at 22:07

## 2021-08-22 RX ADMIN — IPRATROPIUM BROMIDE AND ALBUTEROL SULFATE 1 AMPULE: .5; 2.5 SOLUTION RESPIRATORY (INHALATION) at 16:42

## 2021-08-22 RX ADMIN — IPRATROPIUM BROMIDE AND ALBUTEROL SULFATE 1 AMPULE: .5; 2.5 SOLUTION RESPIRATORY (INHALATION) at 01:37

## 2021-08-22 RX ADMIN — INSULIN LISPRO 3 UNITS: 100 INJECTION, SOLUTION INTRAVENOUS; SUBCUTANEOUS at 22:05

## 2021-08-22 RX ADMIN — IPRATROPIUM BROMIDE AND ALBUTEROL SULFATE 1 AMPULE: .5; 2.5 SOLUTION RESPIRATORY (INHALATION) at 21:27

## 2021-08-22 RX ADMIN — CETIRIZINE HYDROCHLORIDE 10 MG: 10 TABLET, FILM COATED ORAL at 07:55

## 2021-08-22 RX ADMIN — METOPROLOL SUCCINATE 25 MG: 25 TABLET, EXTENDED RELEASE ORAL at 22:06

## 2021-08-22 RX ADMIN — Medication 10 ML: at 07:55

## 2021-08-22 RX ADMIN — ATORVASTATIN CALCIUM 40 MG: 40 TABLET, FILM COATED ORAL at 22:08

## 2021-08-22 RX ADMIN — SPIRONOLACTONE 25 MG: 25 TABLET ORAL at 22:07

## 2021-08-22 RX ADMIN — INSULIN LISPRO 8 UNITS: 100 INJECTION, SOLUTION INTRAVENOUS; SUBCUTANEOUS at 16:13

## 2021-08-22 RX ADMIN — IPRATROPIUM BROMIDE AND ALBUTEROL SULFATE 1 AMPULE: .5; 2.5 SOLUTION RESPIRATORY (INHALATION) at 05:51

## 2021-08-22 RX ADMIN — INSULIN LISPRO 5 UNITS: 100 INJECTION, SOLUTION INTRAVENOUS; SUBCUTANEOUS at 11:19

## 2021-08-22 RX ADMIN — HEPARIN SODIUM 10 UNITS/KG/HR: 10000 INJECTION, SOLUTION INTRAVENOUS at 14:42

## 2021-08-22 RX ADMIN — AZITHROMYCIN 500 MG: 250 TABLET, FILM COATED ORAL at 07:55

## 2021-08-22 RX ADMIN — IPRATROPIUM BROMIDE AND ALBUTEROL SULFATE 1 AMPULE: .5; 2.5 SOLUTION RESPIRATORY (INHALATION) at 08:45

## 2021-08-22 RX ADMIN — ASPIRIN 81 MG: 81 TABLET, COATED ORAL at 07:55

## 2021-08-22 RX ADMIN — INSULIN LISPRO 3 UNITS: 100 INJECTION, SOLUTION INTRAVENOUS; SUBCUTANEOUS at 06:17

## 2021-08-22 RX ADMIN — WATER 1000 MG: 1 INJECTION INTRAMUSCULAR; INTRAVENOUS; SUBCUTANEOUS at 07:54

## 2021-08-22 ASSESSMENT — PAIN SCALES - GENERAL
PAINLEVEL_OUTOF10: 0

## 2021-08-22 NOTE — PLAN OF CARE
Problem: Infection:  Goal: Will remain free from infection  Description: Will remain free from infection  Outcome: Met This Shift     Problem: Daily Care:  Goal: Daily care needs are met  Description: Daily care needs are met  Outcome: Met This Shift     Problem: Discharge Planning:  Goal: Patients continuum of care needs are met  Description: Patients continuum of care needs are met  Outcome: Met This Shift     Problem: Breathing Pattern - Ineffective:  Goal: Ability to achieve and maintain a regular respiratory rate will improve  Description: Ability to achieve and maintain a regular respiratory rate will improve  Outcome: Met This Shift

## 2021-08-22 NOTE — PROGRESS NOTES
Hospitalist Progress Note      Synopsis: Patient admitted on 8/20/2021    69 y.o. male who presented to [facility] [address] with cough and shortness of breath. He has a hx of DM2 for which he is on oral meds, valvular heart disease, dilated cardiomyopathy (followed with Dr. Claudia Delatorre), liver cirrhosis, pancytopenia, recent FNA of the pancreatic tail cyst who presented to the ED for shortness of breath and cough. Cough of think secretions has been going on for 7 years but have worsened over the last several months but pts shortness of breath was more significant today so EMS was called. It is associated with congestion. EMS report pt was hypoxic to 90% but in the ED he was not found to be hypoxic. Sputum was productive of thick purulent secretions. CTA pulmonary suggest bronchopulmonary pneumonia so pt was given azithromycin and rocephin. Pt also reported chest pain that was sharp in the center of his chest with b/l arm pain this am; EKG did not show acute changes but pt initial troponin was 128 and it increased to 198 - so he was given aspirin. Admitted to our service for further evaluation and treatment. Subjective  Pt feeling much better today, he has less cough and shortness of breath, he is getting breathing treatments. Denies any chest pain. No other issues overnight     Exam:  BP (!) 102/54   Pulse 98   Temp 98.3 °F (36.8 °C) (Temporal)   Resp 14   Ht 5' 10\" (1.778 m)   Wt 211 lb (95.7 kg)   SpO2 98%   BMI 30.28 kg/m²   General appearance: No apparent distress   HEENT: Pupils equal, round, and reactive to light. Conjunctivae/corneas clear. Neck: Supple   Respiratory:  Normal respiratory effort. Mild expiratory wheezing   Cardiovascular: Regular rate and rhythm without murmurs, rubs or gallops. Abdomen: Soft, no tenderness, no distention, no organomegaly   Musculoskeletal: No clubbing, cyanosis or edema bilaterally. Skin: Skin color, texture, turgor normal.  No rashes or lesions. Neurologic:  nonfocal   Psychiatric: Alert and oriented, thought content appropriate, normal insight      Medications:  Reviewed    Infusion Medications    heparin (PORCINE) Infusion 10 Units/kg/hr (08/22/21 1146)    sodium chloride       Scheduled Medications    atorvastatin  40 mg Oral Nightly    aspirin  81 mg Oral Daily    predniSONE  40 mg Oral Daily    ipratropium-albuterol  1 ampule Inhalation Q4H    sodium chloride flush  5-40 mL Intravenous 2 times per day    insulin lispro  0-6 Units Subcutaneous TID WC    insulin lispro  0-3 Units Subcutaneous Nightly    azithromycin  500 mg Oral Daily    cefTRIAXone (ROCEPHIN) IV  1,000 mg Intravenous Q24H    cetirizine  10 mg Oral Daily    furosemide  40 mg Oral Daily    lisinopril  2.5 mg Oral Nightly    metoprolol succinate  25 mg Oral Nightly    spironolactone  25 mg Oral Nightly     PRN Meds: heparin (porcine), heparin (porcine), perflutren lipid microspheres, sodium chloride flush, sodium chloride, ondansetron **OR** ondansetron, polyethylene glycol, acetaminophen **OR** acetaminophen, diphenhydrAMINE    I/O    Intake/Output Summary (Last 24 hours) at 8/22/2021 1235  Last data filed at 8/21/2021 2215  Gross per 24 hour   Intake 240 ml   Output 900 ml   Net -660 ml       Labs:   Recent Labs     08/20/21  1038 08/21/21  0629 08/21/21  1006   WBC 5.5 7.0 6.8   HGB 13.8 13.2 13.7   HCT 39.2 37.1 38.6   PLT 68* 73* 78*       Recent Labs     08/20/21  1038 08/21/21  0629    135   K 4.2 4.2    104   CO2 23 25   BUN 17 18   CREATININE 1.1 1.0   CALCIUM 8.5* 8.2*       Recent Labs     08/21/21  1006   PROT 7.2   ALKPHOS 110   ALT 28   AST 43*   BILITOT 1.6*       No results for input(s): INR in the last 72 hours. No results for input(s): Marisol Jessica in the last 72 hours.     Chronic labs:  Lab Results   Component Value Date    CHOL 142 08/21/2021    TRIG 84 08/21/2021    HDL 41 08/21/2021    LDLCALC 84 08/21/2021    TSH 2.240 06/18/2020    INR 1.4 06/22/2021    LABA1C 6.7 (H) 07/12/2021       Radiology:  Imaging studies reviewed today. ASSESSMENT:  NSTEMI, likely type II  Ischemic cardiomyopathy   Community acquired bronchopneumonia  DM2  Liver cirrhosis  Thrombocytopenia 2/2 liver cirrhosis      PLAN:    Off oxygen, continue to monitor breathing, I suspect underlying COPD disease given smoking hx and chronic productive cough, he will need outpatient PFT testing and referral to pulm clinic  Continue breathing treatments, and cough meds, he is already on Azithromycin, will continue, added prednisone 40 mg daily x5 days   Cardiology consulted, appreciate recs, started ASA and heparin gtt for 48 hours, consulted Dr. Renate Darby and ok with antiplatelets, plan now for stress vs cath after recovering from pneumonia  Strict I/O, monitor renal function  Continue rocephin, azithromycin; respiratory culture   Glycemic control - ssi increased (steroids increased his sugars)  Repeat labs in am  Continue home meds as ordered      Diet: Diet NPO Exceptions are: Sips of Water with Meds  ADULT DIET; Regular; 4 carb choices (60 gm/meal)  Code Status: Full Code  PT/OT Eval Status:     DVT Prophylaxis:     Recommended disposition at discharge:      +++++++++++++++++++++++++++++++++++++++++++++++++  Sundeep Stovall MD   [facility].  +++++++++++++++++++++++++++++++++++++++++++++++++  NOTE: This report was transcribed using voice recognition software. Every effort was made to ensure accuracy; however, inadvertent computerized transcription errors may be present.

## 2021-08-22 NOTE — PROGRESS NOTES
GENERAL SURGERY  DAILY PROGRESS NOTE  8/22/2021    Subjective:  Patient reports continued shortness of breath. He feels that his respiratory symptoms have improved since yesterday. No chest pain. Passing flatus    Objective:  BP (!) 102/54   Pulse 98   Temp 98.3 °F (36.8 °C) (Temporal)   Resp 14   Ht 5' 10\" (1.778 m)   Wt 211 lb (95.7 kg)   SpO2 98%   BMI 30.28 kg/m²     GENERAL:  No acute distress. Alert and interactive. Oriented x3. LUNGS:  No cough. Nonlabored breathing on room air  CARDIOVASC:  Normal rate, no cyanosis. Hypotensive  ABDOMEN:  Soft, non distended, non tender. No guarding / rigidity / rebound. EXTREMITIES: Moves all extremities. No swelling or edema. Assessment/Plan:  69 y.o. male with NSTEMI seen recently by Dr. Saray Barclay for biopsy of pancreatic mass with pathology negative for malignant cells. - okay for anticoagulation from surgical perspective  - No acute surgical intervention indicated at this time.   - pain and nausea control per primary    Plan discussed with Dr. Saray Barclay    Electronically signed by Colin De Anda DO on 8/22/2021 at 10:21 AM

## 2021-08-22 NOTE — PROGRESS NOTES
Intravenous Continuous Annemarie Pantelis, APRN - CNP 9.6 mL/hr at 08/21/21 1143 10 Units/kg/hr at 08/21/21 1143    atorvastatin (LIPITOR) tablet 40 mg  40 mg Oral Nightly Annemarie Pantelis, APRN - CNP   40 mg at 08/21/21 2204    perflutren lipid microspheres (DEFINITY) injection 1.65 mg  1.5 mL Intravenous ONCE PRN Annemarie Pantelis, APRN - CNP        aspirin EC tablet 81 mg  81 mg Oral Daily Annemarie Pantelis, APRN - CNP   81 mg at 08/22/21 0755    predniSONE (DELTASONE) tablet 40 mg  40 mg Oral Daily Isidro Sterling MD   40 mg at 08/22/21 0755    ipratropium-albuterol (DUONEB) nebulizer solution 1 ampule  1 ampule Inhalation Q4H Isidro Sterling MD   1 ampule at 08/22/21 0845    sodium chloride flush 0.9 % injection 5-40 mL  5-40 mL Intravenous 2 times per day Nadia Jacobs MD   10 mL at 08/22/21 0755    sodium chloride flush 0.9 % injection 5-40 mL  5-40 mL Intravenous PRN Nadia Jacobs MD        0.9 % sodium chloride infusion  25 mL Intravenous PRN Nadia Jacobs MD        ondansetron (ZOFRAN-ODT) disintegrating tablet 4 mg  4 mg Oral Q8H PRN Nadia Jacobs MD        Or    ondansetron (ZOFRAN) injection 4 mg  4 mg Intravenous Q6H PRN Nadia Jacobs MD        polyethylene glycol (GLYCOLAX) packet 17 g  17 g Oral Daily PRN Nadia Jacobs MD        acetaminophen (TYLENOL) tablet 650 mg  650 mg Oral Q6H PRN Nadia Jacobs MD        Or    acetaminophen (TYLENOL) suppository 650 mg  650 mg Rectal Q6H PRN Nadia Jacobs MD        insulin lispro (HUMALOG) injection vial 0-6 Units  0-6 Units Subcutaneous TID WC Nadia Jacobs MD   3 Units at 08/22/21 0617    insulin lispro (HUMALOG) injection vial 0-3 Units  0-3 Units Subcutaneous Nightly Nadia Jacobs MD   1 Units at 08/21/21 2216    azithromycin (ZITHROMAX) tablet 500 mg  500 mg Oral Daily Nadia Jacobs MD   500 mg at 08/22/21 0755    cefTRIAXone (ROCEPHIN) 1,000 mg in sterile water 10 mL IV syringe  1,000 mg Intravenous Q24H Nadia Jacobs MD   1,000 mg at 08/22/21 0754    cetirizine (ZYRTEC) tablet 10 mg  10 mg Oral Daily Nadia Jacobs MD   10 mg at 08/22/21 0755    diphenhydrAMINE (BENADRYL) tablet 50 mg  50 mg Oral Nightly PRN Nadia Jacobs MD        furosemide (LASIX) tablet 40 mg  40 mg Oral Daily Nadia Jacobs MD   40 mg at 08/22/21 0754    lisinopril (PRINIVIL;ZESTRIL) tablet 2.5 mg  2.5 mg Oral Nightly Nadia Jacobs MD   2.5 mg at 08/21/21 2204    metoprolol succinate (TOPROL XL) extended release tablet 25 mg  25 mg Oral Nightly Nadia Jacobs MD   25 mg at 08/21/21 2214    spironolactone (ALDACTONE) tablet 25 mg  25 mg Oral Nightly Nadia Jacobs MD   25 mg at 08/21/21 2204      heparin (PORCINE) Infusion 10 Units/kg/hr (08/21/21 1143)    sodium chloride         Physical Exam:  BP (!) 102/54   Pulse 98   Temp 98.3 °F (36.8 °C) (Temporal)   Resp 14   Ht 5' 10\" (1.778 m)   Wt 211 lb (95.7 kg)   SpO2 98%   BMI 30.28 kg/m²   Wt Readings from Last 3 Encounters:   08/20/21 211 lb (95.7 kg)   08/06/21 211 lb (95.7 kg)   07/20/21 211 lb (95.7 kg)     Appearance: Awake, alert, no acute respiratory distress  Skin: Intact, no rash  Head: Normocephalic, atraumatic  Eyes: EOMI, no conjunctival erythema  ENMT: No pharyngeal erythema, MMM, no rhinorrhea  Neck: Supple, no elevated JVP, no carotid bruits  Lungs: Clear to auscultation bilaterally. No wheezes, rales, or rhonchi. Cardiac: Regular rate and rhythm, +S1S2, no murmurs apparent  Abdomen: Soft, nontender, +bowel sounds  Extremities: Moves all extremities x 4, no lower extremity edema  Neurologic: No focal motor deficits apparent, normal mood and affect  Peripheral Pulses: Intact posterior tibial pulses bilaterally    Intake/Output:    Intake/Output Summary (Last 24 hours) at 8/22/2021 0855  Last data filed at 8/21/2021 2215  Gross per 24 hour   Intake 240 ml   Output 900 ml   Net -660 ml     No intake/output data recorded.     Laboratory Tests:  Recent Labs     08/20/21  1038 08/21/21  0629    135   K 4.2 4.2    104   CO2 23 25 BUN 17 18   CREATININE 1.1 1.0   GLUCOSE 224* 102*   CALCIUM 8.5* 8.2*     No results found for: MG  Recent Labs     08/21/21  1006   ALKPHOS 110   ALT 28   AST 43*   PROT 7.2   BILITOT 1.6*   BILIDIR 0.6*   LABALBU 2.8*     Recent Labs     08/20/21  1038 08/21/21  0629 08/21/21  1006   WBC 5.5 7.0 6.8   RBC 4.37 4.18 4.37   HGB 13.8 13.2 13.7   HCT 39.2 37.1 38.6   MCV 89.7 88.8 88.3   MCH 31.6 31.6 31.4   MCHC 35.2* 35.6* 35.5*   RDW 14.6 14.5 14.6   PLT 68* 73* 78*   MPV 11.9 12.4* 11.8     No results found for: CKTOTAL, CKMB, CKMBINDEX, TROPONINI  Lab Results   Component Value Date    INR 1.4 06/22/2021    PROTIME 15.1 (H) 06/22/2021     Lab Results   Component Value Date    TSH 2.240 06/18/2020     Lab Results   Component Value Date    LABA1C 6.7 (H) 07/12/2021     No results found for: EAG  Lab Results   Component Value Date    CHOL 142 08/21/2021    CHOL 133 06/18/2020    CHOL 166 04/27/2011     Lab Results   Component Value Date    TRIG 84 08/21/2021    TRIG 135 06/18/2020    TRIG 150 (H) 04/27/2011     Lab Results   Component Value Date    HDL 41 08/21/2021    HDL 30 06/18/2020    HDL 34.5 (A) 04/27/2011     Lab Results   Component Value Date    LDLCALC 84 08/21/2021    LDLCALC 76 06/18/2020    LDLCALC 102 (H) 04/27/2011     Lab Results   Component Value Date    LABVLDL 17 08/21/2021    LABVLDL 27 06/18/2020     No results found for: CHOLHDLRATIO    Cardiac Tests:  Telemetry findings reviewed: SR at rate 80s, no new tachy/bradyarrhythmias overnight     EKG: Normal sinus rhythm, inferior and anterior MI age undetermined, ST-T changes suggestive lateral wall ischemia.     Vitals and labs were reviewed: Blood pressure 102/54 heart rate 98sats 98% on room air.     Labs-BMP normal, proBNP 537, high-sensitivity troponin I 28>> 198>> 353. CBC normal except for platelet of 21>> 73.  No labs for today.      CTA chest: No acute PE, extensive endobronchial process with retained secretions with discrete patchy bronchopneumonia in the left lower lobe minimal in the right lower lobe. Coronary artery calcification. Chronic calcified pancreatitis     1. Lexiscan stress test March 6, 2019     Electrocardiographically normal regadenoson infusion with a   clinically non-ischemic response   2. Myocardial perfusion imaging showed large apical, inferior,   inferolateral, inferoseptal defects. 3. Overall left ventricular systolic function was reduced at 20%   with severe global hypokinesis, severe inferior hypokinesis. 4.   Intermediate risk general pharmacologic stress test.       2D echocardiogram July 17, 2019, limited study EF 45%     Assessment:  · Elevated troponin with bilateral arm pains and abnormal EKG suggestive of non-ST elevation MI. There is a small possibility of demand ischemia. Stable without any further arm or chest pains  · Evidence of coronary artery disease based on coronary calcification  · Ischemic cardiomyopathy with an EF of 45%. 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 · History of morbid obesity with significant weight loss  · Former smoker with 20-pack-year history of smoking quit in 1993  · Type 2 diabetes  · History of VHD with mitral and tricuspid regurgitation and pulmonary hypertension WHO group 2  · Bilateral pneumonia        Plan:   · Continue aspirin and  low-dose heparin per protocol for NSTEMI. Stop heparin after 24 hours ( total 48 hours of therapy). · Echocardiogram to assess LV function is pending. · Liver functions and lipid panel reviewed, LDL at goal level. AST 43, ALT 28  · Continue on atorvastatin 40 mg p.o. daily  · IV antibiotics for pneumonia as per primary service  · Continue current dose of Toprol-XL and lisinopril for chronic HFrEF, hemodynamically stable.    · GI/GI surgery consult with Dr. Aldo Shook regarding recommendations for antiplatelet therapy in the setting of cirrhosis and any planned GI interventions  >> Input from Dr. Aldo Shook was noted and appreciated. · Will do ischemic work up once his pneumonia is better,  Plans for stress test vs. Cath, will decide based on echo results and pending clinical course,  Prefer cath over stress test.   · Continue rest of the medications including Lasix and spironolactone        Julianna Jain MD., Carin Alarcon.   [facility]) Cardiology

## 2021-08-23 LAB
ABO/RH: NORMAL
ANION GAP SERPL CALCULATED.3IONS-SCNC: 8 MMOL/L (ref 7–16)
ANISOCYTOSIS: ABNORMAL
ANTIBODY SCREEN: NORMAL
APTT: 62.5 SEC (ref 24.5–35.1)
BASOPHILS ABSOLUTE: 0.39 E9/L (ref 0–0.2)
BASOPHILS RELATIVE PERCENT: 3.5 % (ref 0–2)
BUN BLDV-MCNC: 29 MG/DL (ref 6–23)
CALCIUM SERPL-MCNC: 8.9 MG/DL (ref 8.6–10.2)
CHLORIDE BLD-SCNC: 100 MMOL/L (ref 98–107)
CO2: 26 MMOL/L (ref 22–29)
CREAT SERPL-MCNC: 1.2 MG/DL (ref 0.7–1.2)
EOSINOPHILS ABSOLUTE: 0.39 E9/L (ref 0.05–0.5)
EOSINOPHILS RELATIVE PERCENT: 3.5 % (ref 0–6)
GFR AFRICAN AMERICAN: >60
GFR NON-AFRICAN AMERICAN: 60 ML/MIN/1.73
GLUCOSE BLD-MCNC: 146 MG/DL (ref 74–99)
HCT VFR BLD CALC: 38 % (ref 37–54)
HEMOGLOBIN: 13.5 G/DL (ref 12.5–16.5)
L. PNEUMOPHILA SEROGP 1 UR AG: NORMAL
LYMPHOCYTES ABSOLUTE: 1.12 E9/L (ref 1.5–4)
LYMPHOCYTES RELATIVE PERCENT: 10.4 % (ref 20–42)
MCH RBC QN AUTO: 31.2 PG (ref 26–35)
MCHC RBC AUTO-ENTMCNC: 35.5 % (ref 32–34.5)
MCV RBC AUTO: 87.8 FL (ref 80–99.9)
METAMYELOCYTES RELATIVE PERCENT: 1.7 % (ref 0–1)
METER GLUCOSE: 162 MG/DL (ref 74–99)
METER GLUCOSE: 186 MG/DL (ref 74–99)
METER GLUCOSE: 287 MG/DL (ref 74–99)
METER GLUCOSE: 298 MG/DL (ref 74–99)
MONOCYTES ABSOLUTE: 0.56 E9/L (ref 0.1–0.95)
MONOCYTES RELATIVE PERCENT: 5.2 % (ref 2–12)
NEUTROPHILS ABSOLUTE: 8.62 E9/L (ref 1.8–7.3)
NEUTROPHILS RELATIVE PERCENT: 75.7 % (ref 43–80)
PDW BLD-RTO: 14.5 FL (ref 11.5–15)
PLATELET # BLD: 86 E9/L (ref 130–450)
PLATELET CONFIRMATION: NORMAL
PMV BLD AUTO: 12.7 FL (ref 7–12)
POTASSIUM SERPL-SCNC: 4.4 MMOL/L (ref 3.5–5)
RBC # BLD: 4.33 E12/L (ref 3.8–5.8)
SODIUM BLD-SCNC: 134 MMOL/L (ref 132–146)
STREP PNEUMONIAE ANTIGEN, URINE: NORMAL
WBC # BLD: 11.2 E9/L (ref 4.5–11.5)

## 2021-08-23 PROCEDURE — 6370000000 HC RX 637 (ALT 250 FOR IP): Performed by: INTERNAL MEDICINE

## 2021-08-23 PROCEDURE — 86900 BLOOD TYPING SEROLOGIC ABO: CPT

## 2021-08-23 PROCEDURE — 85025 COMPLETE CBC W/AUTO DIFF WBC: CPT

## 2021-08-23 PROCEDURE — 2580000003 HC RX 258: Performed by: INTERNAL MEDICINE

## 2021-08-23 PROCEDURE — 6360000002 HC RX W HCPCS: Performed by: INTERNAL MEDICINE

## 2021-08-23 PROCEDURE — 86850 RBC ANTIBODY SCREEN: CPT

## 2021-08-23 PROCEDURE — 2060000000 HC ICU INTERMEDIATE R&B

## 2021-08-23 PROCEDURE — 99223 1ST HOSP IP/OBS HIGH 75: CPT | Performed by: INTERNAL MEDICINE

## 2021-08-23 PROCEDURE — 94640 AIRWAY INHALATION TREATMENT: CPT

## 2021-08-23 PROCEDURE — 99233 SBSQ HOSP IP/OBS HIGH 50: CPT | Performed by: INTERNAL MEDICINE

## 2021-08-23 PROCEDURE — 6370000000 HC RX 637 (ALT 250 FOR IP): Performed by: NURSE PRACTITIONER

## 2021-08-23 PROCEDURE — 36415 COLL VENOUS BLD VENIPUNCTURE: CPT

## 2021-08-23 PROCEDURE — 80048 BASIC METABOLIC PNL TOTAL CA: CPT

## 2021-08-23 PROCEDURE — 86901 BLOOD TYPING SEROLOGIC RH(D): CPT

## 2021-08-23 PROCEDURE — 94726 PLETHYSMOGRAPHY LUNG VOLUMES: CPT

## 2021-08-23 PROCEDURE — 85730 THROMBOPLASTIN TIME PARTIAL: CPT

## 2021-08-23 PROCEDURE — 94729 DIFFUSING CAPACITY: CPT

## 2021-08-23 PROCEDURE — 82962 GLUCOSE BLOOD TEST: CPT

## 2021-08-23 PROCEDURE — 94060 EVALUATION OF WHEEZING: CPT

## 2021-08-23 RX ORDER — 0.9 % SODIUM CHLORIDE 0.9 %
500 INTRAVENOUS SOLUTION INTRAVENOUS ONCE
Status: COMPLETED | OUTPATIENT
Start: 2021-08-23 | End: 2021-08-23

## 2021-08-23 RX ORDER — FLUTICASONE PROPIONATE 50 MCG
1 SPRAY, SUSPENSION (ML) NASAL DAILY
Status: DISCONTINUED | OUTPATIENT
Start: 2021-08-23 | End: 2021-08-29 | Stop reason: HOSPADM

## 2021-08-23 RX ORDER — GUAIFENESIN 400 MG/1
400 TABLET ORAL 4 TIMES DAILY PRN
Status: DISCONTINUED | OUTPATIENT
Start: 2021-08-23 | End: 2021-08-29 | Stop reason: HOSPADM

## 2021-08-23 RX ORDER — SODIUM CHLORIDE FOR INHALATION 3 %
4 VIAL, NEBULIZER (ML) INHALATION PRN
Status: DISCONTINUED | OUTPATIENT
Start: 2021-08-23 | End: 2021-08-29 | Stop reason: HOSPADM

## 2021-08-23 RX ORDER — AZITHROMYCIN 250 MG/1
250 TABLET, FILM COATED ORAL DAILY
Status: DISCONTINUED | OUTPATIENT
Start: 2021-08-24 | End: 2021-08-29 | Stop reason: HOSPADM

## 2021-08-23 RX ORDER — MONTELUKAST SODIUM 10 MG/1
10 TABLET ORAL NIGHTLY
Status: DISCONTINUED | OUTPATIENT
Start: 2021-08-23 | End: 2021-08-29 | Stop reason: HOSPADM

## 2021-08-23 RX ADMIN — FLUTICASONE PROPIONATE 1 SPRAY: 50 SPRAY, METERED NASAL at 13:37

## 2021-08-23 RX ADMIN — ATORVASTATIN CALCIUM 40 MG: 40 TABLET, FILM COATED ORAL at 21:38

## 2021-08-23 RX ADMIN — AZITHROMYCIN 500 MG: 250 TABLET, FILM COATED ORAL at 09:33

## 2021-08-23 RX ADMIN — INSULIN LISPRO 2 UNITS: 100 INJECTION, SOLUTION INTRAVENOUS; SUBCUTANEOUS at 11:02

## 2021-08-23 RX ADMIN — IPRATROPIUM BROMIDE AND ALBUTEROL SULFATE 1 AMPULE: .5; 2.5 SOLUTION RESPIRATORY (INHALATION) at 12:45

## 2021-08-23 RX ADMIN — Medication 10 ML: at 21:39

## 2021-08-23 RX ADMIN — MONTELUKAST SODIUM 10 MG: 10 TABLET ORAL at 21:38

## 2021-08-23 RX ADMIN — PREDNISONE 40 MG: 20 TABLET ORAL at 09:33

## 2021-08-23 RX ADMIN — INSULIN LISPRO 2 UNITS: 100 INJECTION, SOLUTION INTRAVENOUS; SUBCUTANEOUS at 06:35

## 2021-08-23 RX ADMIN — IPRATROPIUM BROMIDE AND ALBUTEROL SULFATE 1 AMPULE: .5; 2.5 SOLUTION RESPIRATORY (INHALATION) at 15:00

## 2021-08-23 RX ADMIN — SODIUM CHLORIDE 500 ML: 9 INJECTION, SOLUTION INTRAVENOUS at 15:55

## 2021-08-23 RX ADMIN — CETIRIZINE HYDROCHLORIDE 10 MG: 10 TABLET, FILM COATED ORAL at 09:33

## 2021-08-23 RX ADMIN — ASPIRIN 81 MG: 81 TABLET, COATED ORAL at 09:33

## 2021-08-23 RX ADMIN — IPRATROPIUM BROMIDE AND ALBUTEROL SULFATE 1 AMPULE: .5; 2.5 SOLUTION RESPIRATORY (INHALATION) at 20:24

## 2021-08-23 RX ADMIN — INSULIN LISPRO 3 UNITS: 100 INJECTION, SOLUTION INTRAVENOUS; SUBCUTANEOUS at 21:39

## 2021-08-23 RX ADMIN — FUROSEMIDE 40 MG: 20 TABLET ORAL at 09:33

## 2021-08-23 RX ADMIN — SPIRONOLACTONE 25 MG: 25 TABLET ORAL at 21:38

## 2021-08-23 RX ADMIN — WATER 1000 MG: 1 INJECTION INTRAMUSCULAR; INTRAVENOUS; SUBCUTANEOUS at 09:28

## 2021-08-23 RX ADMIN — IPRATROPIUM BROMIDE AND ALBUTEROL SULFATE 1 AMPULE: .5; 2.5 SOLUTION RESPIRATORY (INHALATION) at 04:43

## 2021-08-23 RX ADMIN — INSULIN LISPRO 6 UNITS: 100 INJECTION, SOLUTION INTRAVENOUS; SUBCUTANEOUS at 16:57

## 2021-08-23 ASSESSMENT — PAIN SCALES - GENERAL
PAINLEVEL_OUTOF10: 0

## 2021-08-23 NOTE — PROGRESS NOTES
Hospitalist Progress Note      Synopsis: Patient admitted on 8/20/2021    69 y.o. male who presented to [facility] [address] with cough and shortness of breath. He has a hx of DM2 for which he is on oral meds, valvular heart disease, dilated cardiomyopathy (followed with Dr. Arlin Olmedo), liver cirrhosis, pancytopenia, recent FNA of the pancreatic tail cyst who presented to the ED for shortness of breath and cough. Cough of think secretions has been going on for 7 years but have worsened over the last several months but pts shortness of breath was more significant today so EMS was called. It is associated with congestion. EMS report pt was hypoxic to 90% but in the ED he was not found to be hypoxic. Sputum was productive of thick purulent secretions. CTA pulmonary suggest bronchopulmonary pneumonia so pt was given azithromycin and rocephin. Pt also reported chest pain that was sharp in the center of his chest with b/l arm pain this am; EKG did not show acute changes but pt initial troponin was 128 and it increased to 198 - so he was given aspirin. Admitted to our service for further evaluation and treatment. Cardiology consulted and at this time cath has been cancelled, echo ordered and to be done on 8/24. Pulmonary consulted for chronic cough with hx of bronchiectasis - ruling out aspiration pneumonia vs immunodeficiency     Subjective  Pt feeling much better today, he has less cough and shortness of breath, he is getting breathing treatments. Denies any chest pain. No other issues overnight   Reports continued cough but it is less thick  Afebrile    Exam:  BP (!) 83/49   Pulse 81   Temp 97.1 °F (36.2 °C) (Temporal)   Resp 17   Ht 5' 10\" (1.778 m)   Wt 211 lb (95.7 kg)   SpO2 99%   BMI 30.28 kg/m²   General appearance: No apparent distress   HEENT: Pupils equal, round, and reactive to light. Conjunctivae/corneas clear. Neck: Supple   Respiratory:  Normal respiratory effort.  Mild expiratory wheezing   Cardiovascular: Regular rate and rhythm without murmurs, rubs or gallops. Abdomen: Soft, no tenderness, no distention, no organomegaly   Musculoskeletal: No clubbing, cyanosis or edema bilaterally. Skin: Skin color, texture, turgor normal.  No rashes or lesions.     Neurologic:  nonfocal   Psychiatric: Alert and oriented, thought content appropriate, normal insight      Medications:  Reviewed    Infusion Medications    dextrose      sodium chloride       Scheduled Medications    montelukast  10 mg Oral Nightly    fluticasone  1 spray Each Nostril Daily    [START ON 8/24/2021] enoxaparin  30 mg Subcutaneous Daily    [START ON 8/24/2021] azithromycin  250 mg Oral Daily    insulin lispro  0-12 Units Subcutaneous TID WC    insulin lispro  0-6 Units Subcutaneous Nightly    atorvastatin  40 mg Oral Nightly    aspirin  81 mg Oral Daily    predniSONE  40 mg Oral Daily    ipratropium-albuterol  1 ampule Inhalation Q4H    sodium chloride flush  5-40 mL Intravenous 2 times per day    cetirizine  10 mg Oral Daily    furosemide  40 mg Oral Daily    lisinopril  2.5 mg Oral Nightly    metoprolol succinate  25 mg Oral Nightly    spironolactone  25 mg Oral Nightly     PRN Meds: sodium chloride (Inhalant), guaiFENesin, glucose, dextrose, glucagon (rDNA), dextrose, heparin (porcine), heparin (porcine), perflutren lipid microspheres, sodium chloride flush, sodium chloride, ondansetron **OR** ondansetron, polyethylene glycol, acetaminophen **OR** acetaminophen, diphenhydrAMINE    I/O    Intake/Output Summary (Last 24 hours) at 8/23/2021 1838  Last data filed at 8/23/2021 1400  Gross per 24 hour   Intake 180 ml   Output 3100 ml   Net -2920 ml       Labs:   Recent Labs     08/21/21  0629 08/21/21  1006 08/23/21  0806   WBC 7.0 6.8 11.2   HGB 13.2 13.7 13.5   HCT 37.1 38.6 38.0   PLT 73* 78* 86*       Recent Labs     08/21/21  0629 08/23/21  0806    134   K 4.2 4.4    100   CO2 25 26   BUN 18 29*   CREATININE 1.0 1.2   CALCIUM 8.2* 8.9       Recent Labs     08/21/21  1006   PROT 7.2   ALKPHOS 110   ALT 28   AST 43*   BILITOT 1.6*       No results for input(s): INR in the last 72 hours. No results for input(s): Earlis Randolph in the last 72 hours. Chronic labs:  Lab Results   Component Value Date    CHOL 142 08/21/2021    TRIG 84 08/21/2021    HDL 41 08/21/2021    LDLCALC 84 08/21/2021    TSH 2.240 06/18/2020    INR 1.4 06/22/2021    LABA1C 6.7 (H) 07/12/2021       Radiology:  Imaging studies reviewed today. ASSESSMENT:  NSTEMI, likely type II  Ischemic cardiomyopathy   Community acquired bronchopneumonia  DM2  Liver cirrhosis  Thrombocytopenia 2/2 liver cirrhosis      PLAN:    Continue breathing treatments, and cough meds, he is already on Azithromycin, will continue, added prednisone 40 mg daily x5 days   Cardiology consulted, appreciate recs, initially scheduled cath, but now for echo instead   Strict I/O, monitor renal function  Continue rocephin, azithromycin; respiratory culture   Appreciate pulmonary recs; await PFT results, started on aggressive pulmonary toilet; if symptoms don't improve with azith for one month will get bronch scheduled outpatient   Glycemic control - ssi increased (steroids increased his sugars)  Repeat labs in am  Continue home meds as ordered      Diet: ADULT DIET; Regular; 3 carb choices (45 gm/meal); Low Sodium (2 gm)  Code Status: Full Code  PT/OT Eval Status:   As needed   DVT Prophylaxis:   lovenox  Recommended disposition at discharge:  home at discharge     +++++++++++++++++++++++++++++++++++++++++++++++++  Jazlyn Guthrie MD   [facility].  +++++++++++++++++++++++++++++++++++++++++++++++++  NOTE: This report was transcribed using voice recognition software. Every effort was made to ensure accuracy; however, inadvertent computerized transcription errors may be present.

## 2021-08-23 NOTE — PROGRESS NOTES
APTT this morning 62.5, per EMAR no bolus, no change to infusion rate. APTT ordered for tomorrow morning.

## 2021-08-23 NOTE — CONSULTS
Pulmonary [facility]                             Pulmonary Consult/Progress Note :          Patient: Slime Heck  MRN: [id number]  : 1950      Date of Admission: .2021 10:15 AM    Consulting Physician: dr. Renetta Haq     Reason for Consultation: Shortness of breath, cough   CC : Shortness of breath   HPI:   Slime Heck is a 69y.o. year old with PMH of Type 2 diabetes mellitus, valvular heart disease, dilated cardiomyopathy, liver cirrhosis presented to the emergency department with complaints of shortness of breath and cough. Patient stated he has had productive cough for over 7 years but it worsened for the few months with new onset shortness of breath. Sputum was noted to have brown, thick and purulent. CTA pulmonary was performed and it was concerning for bronchopulmonary pneumonia. Patient was started on azithromycin and Rocephin and was admitted to the floors for further evaluation. PAST MEDICAL HISTORY:   Past Medical History:   Diagnosis Date    Allergic rhinitis     Seasonal    Cardiomyopathy [facility]     CHF (congestive heart failure) (HCC)     DM (diabetes mellitus) ([facility]     Enlarged prostate     Thrombocytopenia (HCC)     Type 2 diabetes mellitus without complication (HCC)     VHD (valvular heart disease)        PAST SURGICAL HISTORY:   Past Surgical History:   Procedure Laterality Date    CATARACT REMOVAL Bilateral     CYST REMOVAL      from left heel    HAMMER TOE SURGERY Bilateral     REFRACTIVE SURGERY Bilateral     UPPER GASTROINTESTINAL ENDOSCOPY N/A 2021    EGD W/EUS FNA performed by Asif Milton MD at [facility] ENDOSCOPY       FAMILY HISTORY:   Family History   Problem Relation Age of Onset    Alcohol Abuse Father     No Known Problems Sister     Diabetes Brother        SOCIAL HISTORY:   Social History     Socioeconomic History    Marital status:       Spouse name: Not on file    Number of children: Not on file    Years of education: Not on file    Highest education level: Not on file   Occupational History    Occupation: retired   Tobacco Use    Smoking status: Former Smoker     Packs/day: 1.00     Years: 20.00     Pack years: 20.00     Types: Cigarettes     Quit date: 1993     Years since quittin.5    Smokeless tobacco: Former User     Types: Chew     Quit date: 1970   Vaping Use    Vaping Use: Never used   Substance and Sexual Activity    Alcohol use: Yes     Comment: Occ beer    Drug use: Never    Sexual activity: Not on file   Other Topics Concern    Not on file   Social History Narrative    Drinks decaf coffee & occ pop. Social Determinants of Health     Financial Resource Strain:     Difficulty of Paying Living Expenses:    Food Insecurity:     Worried About Running Out of Food in the Last Year:     [address] in the Last Year:    Transportation Needs:     Lack of Transportation (Medical):      Lack of Transportation (Non-Medical):    Physical Activity:     Days of Exercise per Week:     Minutes of Exercise per Session:    Stress:     Feeling of Stress :    Social Connections:     Frequency of Communication with Friends and Family:     Frequency of Social Gatherings with Friends and Family:     Attends Yarsani Services:     Active Member of Clubs or Organizations:     Attends Club or Organization Meetings:     Marital Status:    Intimate Partner Violence:     Fear of Current or Ex-Partner:     Emotionally Abused:     Physically Abused:     Sexually Abused:      Social History     Tobacco Use   Smoking Status Former Smoker    Packs/day: 1.00    Years: 20.00    Pack years: 20.00    Types: Cigarettes    Quit date: 1993    Years since quittin.5   Smokeless Tobacco Former User    Types: Nirmal Mcclainallyson Quit date: 1970     Social History     Substance and Sexual Activity   Alcohol Use Yes    Comment: Occ beer     Social History Substance and Sexual Activity   Drug Use Never     HOME MEDICATIONS:  Prior to Admission medications    Medication Sig Start Date End Date Taking?  Authorizing Provider   glyBURIDE (DIABETA) 5 MG tablet Take 5 mg by mouth daily (with breakfast)   Yes Historical Provider, MD   lisinopril (PRINIVIL;ZESTRIL) 2.5 MG tablet Take 2.5 mg by mouth nightly   Yes Historical Provider, MD   metoprolol succinate (TOPROL XL) 25 MG extended release tablet Take 25 mg by mouth nightly   Yes Historical Provider, MD   cetirizine (ZYRTEC ALLERGY) 10 MG tablet Take 10 mg by mouth daily   Yes Historical Provider, MD   spironolactone (ALDACTONE) 25 MG tablet Take 25 mg by mouth nightly   Yes Historical Provider, MD   Zinc Oxide (AQUAPHOR 3 IN 1 DIAPER RASH) 15 % CREA Apply topically daily as needed (apply to both bilateral lower legs)    Yes Historical Provider, MD   Multiple Vitamins-Minerals (PRESERVISION AREDS 2+MULTI VIT) CAPS Take 1 capsule by mouth 2 times daily   Yes Historical Provider, MD   furosemide (LASIX) 40 MG tablet Take 1 tablet by mouth daily 12/21/20  Yes Yesica Reese MD   diphenhydrAMINE (ALLERGY RELIEF) 25 MG tablet Take 50 mg by mouth nightly as needed for Itching   Yes Historical Provider, MD       CURRENT MEDICATIONS:  Current Facility-Administered Medications: montelukast (SINGULAIR) tablet 10 mg, 10 mg, Oral, Nightly  fluticasone (FLONASE) 50 MCG/ACT nasal spray 1 spray, 1 spray, Each Nostril, Daily  [START ON 8/24/2021] enoxaparin (LOVENOX) injection 30 mg, 30 mg, Subcutaneous, Daily  insulin lispro (HUMALOG) injection vial 0-12 Units, 0-12 Units, Subcutaneous, TID WC  insulin lispro (HUMALOG) injection vial 0-6 Units, 0-6 Units, Subcutaneous, Nightly  glucose (GLUTOSE) 40 % oral gel 15 g, 15 g, Oral, PRN  dextrose 50 % IV solution, 12.5 g, Intravenous, PRN  glucagon (rDNA) injection 1 mg, 1 mg, Intramuscular, PRN  dextrose 5 % solution, 100 mL/hr, Intravenous, PRN  heparin (porcine) injection 4,000 Units, 4,000 Units, Intravenous, PRN  heparin (porcine) injection 2,000 Units, 2,000 Units, Intravenous, PRN  atorvastatin (LIPITOR) tablet 40 mg, 40 mg, Oral, Nightly  perflutren lipid microspheres (DEFINITY) injection 1.65 mg, 1.5 mL, Intravenous, ONCE PRN  aspirin EC tablet 81 mg, 81 mg, Oral, Daily  predniSONE (DELTASONE) tablet 40 mg, 40 mg, Oral, Daily  ipratropium-albuterol (DUONEB) nebulizer solution 1 ampule, 1 ampule, Inhalation, Q4H  sodium chloride flush 0.9 % injection 5-40 mL, 5-40 mL, Intravenous, 2 times per day  sodium chloride flush 0.9 % injection 5-40 mL, 5-40 mL, Intravenous, PRN  0.9 % sodium chloride infusion, 25 mL, Intravenous, PRN  ondansetron (ZOFRAN-ODT) disintegrating tablet 4 mg, 4 mg, Oral, Q8H PRN **OR** ondansetron (ZOFRAN) injection 4 mg, 4 mg, Intravenous, Q6H PRN  polyethylene glycol (GLYCOLAX) packet 17 g, 17 g, Oral, Daily PRN  acetaminophen (TYLENOL) tablet 650 mg, 650 mg, Oral, Q6H PRN **OR** acetaminophen (TYLENOL) suppository 650 mg, 650 mg, Rectal, Q6H PRN  azithromycin (ZITHROMAX) tablet 500 mg, 500 mg, Oral, Daily  cefTRIAXone (ROCEPHIN) 1,000 mg in sterile water 10 mL IV syringe, 1,000 mg, Intravenous, Q24H  cetirizine (ZYRTEC) tablet 10 mg, 10 mg, Oral, Daily  diphenhydrAMINE (BENADRYL) tablet 50 mg, 50 mg, Oral, Nightly PRN  furosemide (LASIX) tablet 40 mg, 40 mg, Oral, Daily  lisinopril (PRINIVIL;ZESTRIL) tablet 2.5 mg, 2.5 mg, Oral, Nightly  metoprolol succinate (TOPROL XL) extended release tablet 25 mg, 25 mg, Oral, Nightly  spironolactone (ALDACTONE) tablet 25 mg, 25 mg, Oral, Nightly    IV MEDICATIONS:   dextrose      sodium chloride         ALLERGIES:  Allergies   Allergen Reactions    Penicillins Hives    Eggs Or Egg-Derived Products     Grass Pollen(K-O-R-T-Swt Oscar)     Milk-Related Compounds     Mixed Ragweed     Peanut-Containing Drug Products     Seasonal      Mold cats dogs ragweed       REVIEW OF SYSTEMS:  General ROS:  No weight loss ,no fatigue ENT ROS:   No Sore throat ,no lymphoadenopathy,no nasal stuffiness     Hematological and Lymphatic ROS:   No ecchymosis ,no tendency to bleed  Respiratory ROS:    shortness of breath   Cardiovascular ROS:   No CP,No Palpitation   Gastrointestinal ROS:   No Gi bleed,no nausea or vomiting      - Musculoskeletal ROS:      - no joint swelling ,no joint pain   Neurological ROS:     -no weakness or numbness    Dermatological ROS:   No skin rash ,no urticaria     PHYSICAL EXAMINATION:     VITAL SIGNS:  BP (!) 91/59   Pulse 78   Temp 97 °F (36.1 °C) (Temporal)   Resp 16   Ht 5' 10\" (1.778 m)   Wt 211 lb (95.7 kg)   SpO2 92%   BMI 30.28 kg/m²   Wt Readings from Last 3 Encounters:   08/20/21 211 lb (95.7 kg)   08/06/21 211 lb (95.7 kg)   07/20/21 211 lb (95.7 kg)     Temp Readings from Last 3 Encounters:   08/23/21 97 °F (36.1 °C) (Temporal)   08/06/21 97.3 °F (36.3 °C) (Temporal)   07/20/21 97 °F (36.1 °C)     TMAX:  BP Readings from Last 3 Encounters:   08/23/21 (!) 91/59   08/06/21 (!) 109/56   08/06/21 (!) 111/59     Pulse Readings from Last 3 Encounters:   08/23/21 78   08/06/21 85   07/20/21 66           INTAKE/OUTPUTS:  I/O last 3 completed shifts:   In: 600 [P.O.:600]  Out: 700 [Urine:700]    Intake/Output Summary (Last 24 hours) at 8/23/2021 1318  Last data filed at 8/23/2021 1306  Gross per 24 hour   Intake 480 ml   Output 2100 ml   Net -1620 ml       General Appearance: alert and oriented to person, place and time, well-developed and   well-nourished, in no acute distress   Eyes: pupils equal, round, and reactive to light, extraocular eye movements intact, conjunctivae normal and sclera anicteric   Neck: neck supple and non tender without mass, no thyromegaly, no thyroid nodules and no cervical adenopathy   Pulmonary/Chest:Clear to auscultation b/l  Cardiovascular: normal rate, regular rhythm, normal S1 and S2, no murmurs, rubs, clicks or gallops, distal pulses intact, no carotid bruits, no murmurs, no gallops, no carotid bruits and no JVD   Abdomen: obese, soft, non-tender, non-distended, normal bowel sounds, no masses or organomegaly   Extremities: no cyanosis, trace edema  Musculoskeletal: normal range of motion, no joint swelling, deformity or tenderness   Neurologic: reflexes normal and symmetric, no cranial nerve deficit noted    LABS/IMAGING:    CBC:  Lab Results   Component Value Date    WBC 11.2 08/23/2021    HGB 13.5 08/23/2021    HCT 38.0 08/23/2021    MCV 87.8 08/23/2021    PLT 86 (L) 08/23/2021    LYMPHOPCT 10.4 (L) 08/23/2021    RBC 4.33 08/23/2021    MCH 31.2 08/23/2021    MCHC 35.5 (H) 08/23/2021    RDW 14.5 08/23/2021    NEUTOPHILPCT 75.7 08/23/2021    MONOPCT 5.2 08/23/2021    BASOPCT 3.5 (H) 08/23/2021    NEUTROABS 8.62 (H) 08/23/2021    LYMPHSABS 1.12 (L) 08/23/2021    MONOSABS 0.56 08/23/2021    EOSABS 0.39 08/23/2021    BASOSABS 0.39 (H) 08/23/2021       Recent Labs     08/23/21  0806 08/21/21  1006 08/21/21  0629   WBC 11.2 6.8 7.0   HGB 13.5 13.7 13.2   HCT 38.0 38.6 37.1   MCV 87.8 88.3 88.8   PLT 86* 78* 73*       BMP:   Recent Labs     08/21/21  0629 08/23/21  0806    134   K 4.2 4.4    100   CO2 25 26   BUN 18 29*   CREATININE 1.0 1.2       MG: No results found for: MG  Ca/Phos:   Lab Results   Component Value Date    CALCIUM 8.9 08/23/2021    PHOS 2.8 06/18/2020     Amylase: No results found for: AMYLASE  Lipase: No results found for: LIPASE  LIVER PROFILE:   Recent Labs     08/21/21  1006   AST 43*   ALT 28   BILIDIR 0.6*   BILITOT 1.6*   ALKPHOS 110       PT/INR: No results for input(s): PROTIME, INR in the last 72 hours.   APTT:   Recent Labs     08/21/21  1952 08/22/21  0954 08/23/21  0806   APTT 65.9* 56.6* 62.5*       Cardiac Enzymes:  No results found for: CKTOTAL, CKMB, CKMBINDEX, TROPONINI    CXR:  8/20/21- significant for peribronchial thickening, no infiltrates or effusions     PFTs:  None on the system, ordered one today     2D Echocardiogram:7/11/2019- Left ventricular size is normal.   Apical dyskinesis, mid to distal septal hypokinesis. Overall ejection fraction mildly decreased, EF estimated about 45%. CT Chest:  8/20/21 -     Impression   1.  No indication for acute pulmonary emboli.       2.  Extensive endobronchial process in the bronchial system both lower lobes   with the retained secretions or aspiration anterior with the discrete patchy   bronchopneumonia in the left lower lobe, minimal in the right lower lobe.       3.  No aneurysm formation or dissection of the thoracic aorta.       4.  Calcifications of all coronary arteries.  Please correlate clinically.       5.  Findings for chronic calcified pancreatitis. [place] is a fullness in the   tail of the pancreas which requires multiphase IV contrast MRI in better   advantage than CT for proper characterization.               PROBLEM LIST:  Patient Active Problem List   Diagnosis    Dilated cardiomyopathy ([address]    Non-rheumatic mitral regurgitation    Absolute anemia    SOBOE (shortness of breath on exertion)    Pulmonary HTN ([address]    Controlled type 2 diabetes mellitus with complication, without long-term current use of insulin ([address]    Essential (primary) hypertension    Pancytopenia ([address]    Coagulation defect ([address]    Pancreatic cyst    Community acquired bacterial pneumonia                   ASSESSMENT:  1.) Chronic Cough  2.) Aspiration pneumonia   3.) Ischemic cardiomyopathy with reduced EF of 45%  4.) Pulmonary HTN type 2   5.) 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 Need to rule out chronic aspiration pneumonia vs common variable immunodeficiency. Will Check IgG level, follow respiratory cultures, follow video swallow study. Start patient on Azithromycin 250 mg, continue for 1 month.  If no improvement seen, we will plan a bronchoscopy to obtain samples. *- PFT   *- Aggressive pulmonary toilet. 3% nebulization, chest vest, PEP/flutter, breathing treatments     Thank you very much for allowing me to participate in the care of this pleasant patient , should you have any questions ,please do not hesitate to contact me    Bull Navarrete MD,[facility]  Pulmonary&Critical Care Medicine   Director of [address] [place] [address] Director of [address] [place] [address]    Maribel Ramirez    NOTE: This report was transcribed using voice recognition software. Every effort was made to ensure accuracy; however, inadvertent computerized transcription errors may be present.

## 2021-08-23 NOTE — PROGRESS NOTES
INPATIENT CARDIOLOGY FOLLOW-UP    Name: Neftali Beard    Age: 69 y.o. Date of Admission: 8/20/2021 10:15 AM    Date of Service: 8/23/2021    Chief Complaint: Follow-up for coronary atherosclerosis, ischemic cardiomyopathy, chronic systolic heart failure, non-ST elevation myocardial infarction, pneumonia, hypertension, cirrhosis with associated thrombocytopenia    Interim History: The patient presently denies active cardiovascular symptomatology with no additional chest discomfort or ischemic equivalents. A persistent nonproductive cough is present with no fever and a mild leukocytosis. Review of Systems: The remainder of a complete multisystem review including consitutional, central nervous, respiratory, circulatory, gastrointestinal, genitourinary, endocrinologic, hematologic, musculoskeletal and psychiatric are negative. Problem List:  Patient Active Problem List   Diagnosis    Dilated cardiomyopathy ([address]    Non-rheumatic mitral regurgitation    Absolute anemia    SOBOE (shortness of breath on exertion)    Pulmonary HTN (HCC)    Controlled type 2 diabetes mellitus with complication, without long-term current use of insulin ([address]    Essential (primary) hypertension    Pancytopenia ([address]    Coagulation defect ([address]    Pancreatic cyst    Community acquired bacterial pneumonia       Allergies:   Allergies   Allergen Reactions    Penicillins Hives    Eggs Or Egg-Derived Products     Grass Pollen(K-O-R-T-Swt Oscar)     Milk-Related Compounds     Mixed Ragweed     Peanut-Containing Drug Products     Seasonal      Mold cats dogs ragweed       Current Medications:  Current Facility-Administered Medications   Medication Dose Route Frequency Provider Last Rate Last Admin    montelukast (SINGULAIR) tablet 10 mg  10 mg Oral Nightly Nadia Jacobs MD        fluticasone (FLONASE) 50 MCG/ACT nasal spray 1 spray  1 spray Each Nostril Daily Nadia Jacobs MD        insulin lispro (HUMALOG) injection vial 0-12 Units  0-12 Units Subcutaneous TID WC Koby Shepard MD   2 Units at 08/23/21 1102    insulin lispro (HUMALOG) injection vial 0-6 Units  0-6 Units Subcutaneous Nightly Koby Shepard MD   3 Units at 08/22/21 2205    glucose (GLUTOSE) 40 % oral gel 15 g  15 g Oral PRN Koby hSepard MD        dextrose 50 % IV solution  12.5 g Intravenous PRN Koby Shepard MD        glucagon (rDNA) injection 1 mg  1 mg Intramuscular PRN Koby Shepard MD        dextrose 5 % solution  100 mL/hr Intravenous PRN Koby Shepard MD        heparin (porcine) injection 4,000 Units  4,000 Units Intravenous PRN Annemarie Pantelis, APRN - CNP        heparin (porcine) injection 2,000 Units  2,000 Units Intravenous PRN Annemarie Pantelis, APRN - CNP        heparin 25,000 units in dextrose 5% 250 mL (premix) infusion  5-30 Units/kg/hr Intravenous Continuous Annemarie Pantelis, APRN - CNP 9.6 mL/hr at 08/22/21 1442 10 Units/kg/hr at 08/22/21 1442    atorvastatin (LIPITOR) tablet 40 mg  40 mg Oral Nightly Annemarie Pantelis, APRN - CNP   40 mg at 08/22/21 2208    perflutren lipid microspheres (DEFINITY) injection 1.65 mg  1.5 mL Intravenous ONCE PRN Annemarie Pantelis, APRN - CNP        aspirin EC tablet 81 mg  81 mg Oral Daily Annemarie Pantelis, APRN - CNP   81 mg at 08/23/21 0933    predniSONE (DELTASONE) tablet 40 mg  40 mg Oral Daily Koby Shepard MD   40 mg at 08/23/21 0933    ipratropium-albuterol (DUONEB) nebulizer solution 1 ampule  1 ampule Inhalation Q4H Koby Shepard MD   1 ampule at 08/23/21 1245    sodium chloride flush 0.9 % injection 5-40 mL  5-40 mL Intravenous 2 times per day Nadia Jacobs MD   10 mL at 08/22/21 0755    sodium chloride flush 0.9 % injection 5-40 mL  5-40 mL Intravenous PRN Nadia Jacobs MD        0.9 % sodium chloride infusion  25 mL Intravenous PRN Nadia Jacobs MD        ondansetron (ZOFRAN-ODT) disintegrating tablet 4 mg  4 mg Oral Q8H PRN Nadia Jacobs MD        Or    ondansetron (ZOFRAN) injection 4 mg  4 mg Intravenous Q6H PRN Nadia Jacobs MD        polyethylene glycol (GLYCOLAX) packet 17 g  17 g Oral Daily PRN Nadia Jacobs MD        acetaminophen (TYLENOL) tablet 650 mg  650 mg Oral Q6H PRN Nadia Jacobs MD        Or    acetaminophen (TYLENOL) suppository 650 mg  650 mg Rectal Q6H PRN Nadia Jacobs MD        azithromycin (ZITHROMAX) tablet 500 mg  500 mg Oral Daily Nadia Jacobs MD   500 mg at 08/23/21 0933    cefTRIAXone (ROCEPHIN) 1,000 mg in sterile water 10 mL IV syringe  1,000 mg Intravenous Q24H Nadia Jacobs MD   1,000 mg at 08/23/21 2779    cetirizine (ZYRTEC) tablet 10 mg  10 mg Oral Daily Nadia Jacobs MD   10 mg at 08/23/21 0933    diphenhydrAMINE (BENADRYL) tablet 50 mg  50 mg Oral Nightly PRN Nadia Jacobs MD        furosemide (LASIX) tablet 40 mg  40 mg Oral Daily Nadia Jacobs MD   40 mg at 08/23/21 0933    lisinopril (PRINIVIL;ZESTRIL) tablet 2.5 mg  2.5 mg Oral Nightly Nadia Jacobs MD   2.5 mg at 08/22/21 2207    metoprolol succinate (TOPROL XL) extended release tablet 25 mg  25 mg Oral Nightly Nadia Jacobs MD   25 mg at 08/22/21 2206    spironolactone (ALDACTONE) tablet 25 mg  25 mg Oral Nightly Nadia Jacobs MD   25 mg at 08/22/21 2207      dextrose      heparin (PORCINE) Infusion 10 Units/kg/hr (08/22/21 1442)    sodium chloride         Physical Exam:  BP (!) 91/59   Pulse 78   Temp 97 °F (36.1 °C) (Temporal)   Resp 16   Ht 5' 10\" (1.778 m)   Wt 211 lb (95.7 kg)   SpO2 92%   BMI 30.28 kg/m²   Weight change: Wt Readings from Last 3 Encounters:   08/20/21 211 lb (95.7 kg)   08/06/21 211 lb (95.7 kg)   07/20/21 211 lb (95.7 kg)     The patient is awake, alert and in no discomfort or distress. No gross musculoskeletal deformity is present. No significant skin or nail changes are present. Gross examination of head, eyes, nose and throat are negative. Jugular venous pressure is normal and no carotid bruits are present.  Normal respiratory effort is noted with no accessory muscle usage present. Lung fields are clear to ascultation. Cardiac examination is notable for a regular rate and rhythm with no palpable thrill. No gallop rhythm or cardiac murmur are identified. A benign abdominal examination is present with no masses or organomegaly. Intact pulses are present throughout all extremities and no peripheral edema is present. No focal neurologic deficits are present. Intake/Output:    Intake/Output Summary (Last 24 hours) at 8/23/2021 1248  Last data filed at 8/23/2021 0118  Gross per 24 hour   Intake 480 ml   Output 700 ml   Net -220 ml     No intake/output data recorded. Laboratory Tests:  Lab Results   Component Value Date    CREATININE 1.2 08/23/2021    BUN 29 (H) 08/23/2021     08/23/2021    K 4.4 08/23/2021     08/23/2021    CO2 26 08/23/2021     No results for input(s): CKTOTAL, CKMB in the last 72 hours.     Invalid input(s): TROPONONI  No results found for: BNP  Lab Results   Component Value Date    WBC 11.2 08/23/2021    RBC 4.33 08/23/2021    HGB 13.5 08/23/2021    HCT 38.0 08/23/2021    MCV 87.8 08/23/2021    MCH 31.2 08/23/2021    MCHC 35.5 08/23/2021    RDW 14.5 08/23/2021    PLT 86 08/23/2021    MPV 12.7 08/23/2021     Recent Labs     08/21/21  1006   ALKPHOS 110   ALT 28   AST 43*   PROT 7.2   BILITOT 1.6*   BILIDIR 0.6*   LABALBU 2.8*     No results found for: MG  Lab Results   Component Value Date    PROTIME 15.1 06/22/2021    INR 1.4 06/22/2021     Lab Results   Component Value Date    TSH 2.240 06/18/2020     No components found for: CHLPL  Lab Results   Component Value Date    TRIG 84 08/21/2021    TRIG 135 06/18/2020    TRIG 150 (H) 04/27/2011     Lab Results   Component Value Date    HDL 41 08/21/2021    HDL 30 06/18/2020    HDL 34.5 (A) 04/27/2011     Lab Results   Component Value Date    LDLCALC 84 08/21/2021    LDLCALC 76 06/18/2020    LDLCALC 102 (H) 04/27/2011       Cardiac Tests:  Telemetry findings reviewed: sinus rhythm, no new tachy/bradyarrhythmias overnight      ASSESSMENT / PLAN: Clinical basis, the patient presently appears compensated from a cardiovascular standpoint with no additional active cardiovascular symptoms in the face of cardiac biomarkers consistent with that of a non-ST elevation myocardial infarction in addition to symptomatology. Ongoing medical management will be maintained pending the review of his echocardiogram to assist in determination of optimal ischemic assessment and optimal medical management. Continued management of his pneumonia will be deferred to primary care with need of careful monitoring of his volume status as well as that of renal function and electrolytes, especially with potential plans of coronary intervention. Ongoing aggressive risk factor modification of blood pressure, diabetes and serum lipids will remain essential to reducing risk of future atherosclerotic development. Note: This report was completed utilizing computer voice recognition software. Every effort has been made to ensure accuracy, however; inadvertent computerized transcription errors may be present. King Epley.  Yariel Avila, [address] [facility]

## 2021-08-23 NOTE — ACP (ADVANCE CARE PLANNING)
Advance Care Planning   Healthcare Decision Maker:    Primary Decision Maker: Sarika Knight - Child - [phone]    Secondary Decision Maker: Deborah Castillo - Child - [phone]     Met with pt at bedside; reviewed emergency contacts, and healthcare decision makers. Click here to complete Healthcare Decision Makers including selection of the Healthcare Decision Maker Relationship (ie \"Primary\").

## 2021-08-23 NOTE — PLAN OF CARE
Problem: Daily Care:  Goal: Daily care needs are met  Description: Daily care needs are met  Outcome: Met This Shift     Problem: Breathing Pattern - Ineffective:  Goal: Ability to achieve and maintain a regular respiratory rate will improve  Description: Ability to achieve and maintain a regular respiratory rate will improve  Outcome: Met This Shift

## 2021-08-24 ENCOUNTER — APPOINTMENT (OUTPATIENT)
Dept: GENERAL RADIOLOGY | Age: 71
DRG: 177 | End: 2021-08-24
Payer: MEDICARE

## 2021-08-24 LAB
ANION GAP SERPL CALCULATED.3IONS-SCNC: 8 MMOL/L (ref 7–16)
BUN BLDV-MCNC: 33 MG/DL (ref 6–23)
CALCIUM SERPL-MCNC: 8.8 MG/DL (ref 8.6–10.2)
CHLORIDE BLD-SCNC: 98 MMOL/L (ref 98–107)
CO2: 26 MMOL/L (ref 22–29)
CREAT SERPL-MCNC: 1.2 MG/DL (ref 0.7–1.2)
GFR AFRICAN AMERICAN: >60
GFR NON-AFRICAN AMERICAN: 60 ML/MIN/1.73
GLUCOSE BLD-MCNC: 173 MG/DL (ref 74–99)
IGA: 911 MG/DL (ref 70–400)
IGG: 2236 MG/DL (ref 700–1600)
IGM: 399 MG/DL (ref 40–230)
LV EF: 25 %
LVEF MODALITY: NORMAL
METER GLUCOSE: 158 MG/DL (ref 74–99)
METER GLUCOSE: 270 MG/DL (ref 74–99)
METER GLUCOSE: 318 MG/DL (ref 74–99)
METER GLUCOSE: 375 MG/DL (ref 74–99)
POTASSIUM SERPL-SCNC: 4.2 MMOL/L (ref 3.5–5)
SODIUM BLD-SCNC: 132 MMOL/L (ref 132–146)

## 2021-08-24 PROCEDURE — 74230 X-RAY XM SWLNG FUNCJ C+: CPT

## 2021-08-24 PROCEDURE — 94667 MNPJ CHEST WALL 1ST: CPT

## 2021-08-24 PROCEDURE — 6370000000 HC RX 637 (ALT 250 FOR IP): Performed by: NURSE PRACTITIONER

## 2021-08-24 PROCEDURE — 80048 BASIC METABOLIC PNL TOTAL CA: CPT

## 2021-08-24 PROCEDURE — 6370000000 HC RX 637 (ALT 250 FOR IP): Performed by: INTERNAL MEDICINE

## 2021-08-24 PROCEDURE — 6360000004 HC RX CONTRAST MEDICATION: Performed by: NURSE PRACTITIONER

## 2021-08-24 PROCEDURE — 82784 ASSAY IGA/IGD/IGG/IGM EACH: CPT

## 2021-08-24 PROCEDURE — 94640 AIRWAY INHALATION TREATMENT: CPT

## 2021-08-24 PROCEDURE — 36415 COLL VENOUS BLD VENIPUNCTURE: CPT

## 2021-08-24 PROCEDURE — 6360000002 HC RX W HCPCS: Performed by: INTERNAL MEDICINE

## 2021-08-24 PROCEDURE — 93306 TTE W/DOPPLER COMPLETE: CPT

## 2021-08-24 PROCEDURE — 82962 GLUCOSE BLOOD TEST: CPT

## 2021-08-24 PROCEDURE — 92611 MOTION FLUOROSCOPY/SWALLOW: CPT

## 2021-08-24 PROCEDURE — 2580000003 HC RX 258: Performed by: INTERNAL MEDICINE

## 2021-08-24 PROCEDURE — 92526 ORAL FUNCTION THERAPY: CPT

## 2021-08-24 PROCEDURE — 2060000000 HC ICU INTERMEDIATE R&B

## 2021-08-24 PROCEDURE — 2500000003 HC RX 250 WO HCPCS: Performed by: FAMILY MEDICINE

## 2021-08-24 PROCEDURE — 99233 SBSQ HOSP IP/OBS HIGH 50: CPT | Performed by: INTERNAL MEDICINE

## 2021-08-24 PROCEDURE — 6370000000 HC RX 637 (ALT 250 FOR IP): Performed by: FAMILY MEDICINE

## 2021-08-24 RX ORDER — GLIPIZIDE 5 MG/1
5 TABLET ORAL
Status: DISCONTINUED | OUTPATIENT
Start: 2021-08-24 | End: 2021-08-29 | Stop reason: HOSPADM

## 2021-08-24 RX ADMIN — GLIPIZIDE 5 MG: 5 TABLET ORAL at 16:40

## 2021-08-24 RX ADMIN — IPRATROPIUM BROMIDE AND ALBUTEROL SULFATE 1 AMPULE: .5; 2.5 SOLUTION RESPIRATORY (INHALATION) at 16:13

## 2021-08-24 RX ADMIN — IPRATROPIUM BROMIDE AND ALBUTEROL SULFATE 1 AMPULE: .5; 2.5 SOLUTION RESPIRATORY (INHALATION) at 20:15

## 2021-08-24 RX ADMIN — ATORVASTATIN CALCIUM 40 MG: 40 TABLET, FILM COATED ORAL at 21:34

## 2021-08-24 RX ADMIN — IPRATROPIUM BROMIDE AND ALBUTEROL SULFATE 1 AMPULE: .5; 2.5 SOLUTION RESPIRATORY (INHALATION) at 04:55

## 2021-08-24 RX ADMIN — FLUTICASONE PROPIONATE 1 SPRAY: 50 SPRAY, METERED NASAL at 08:56

## 2021-08-24 RX ADMIN — BARIUM SULFATE 15 ML: 400 PASTE ORAL at 13:37

## 2021-08-24 RX ADMIN — INSULIN LISPRO 3 UNITS: 100 INJECTION, SOLUTION INTRAVENOUS; SUBCUTANEOUS at 21:35

## 2021-08-24 RX ADMIN — INSULIN LISPRO 2 UNITS: 100 INJECTION, SOLUTION INTRAVENOUS; SUBCUTANEOUS at 06:33

## 2021-08-24 RX ADMIN — PREDNISONE 40 MG: 20 TABLET ORAL at 08:56

## 2021-08-24 RX ADMIN — PERFLUTREN 1.65 MG: 6.52 INJECTION, SUSPENSION INTRAVENOUS at 10:41

## 2021-08-24 RX ADMIN — ASPIRIN 81 MG: 81 TABLET, COATED ORAL at 08:56

## 2021-08-24 RX ADMIN — INSULIN LISPRO 10 UNITS: 100 INJECTION, SOLUTION INTRAVENOUS; SUBCUTANEOUS at 16:40

## 2021-08-24 RX ADMIN — IPRATROPIUM BROMIDE AND ALBUTEROL SULFATE 1 AMPULE: .5; 2.5 SOLUTION RESPIRATORY (INHALATION) at 11:48

## 2021-08-24 RX ADMIN — ENOXAPARIN SODIUM 30 MG: 30 INJECTION SUBCUTANEOUS at 08:57

## 2021-08-24 RX ADMIN — METOPROLOL SUCCINATE 25 MG: 25 TABLET, EXTENDED RELEASE ORAL at 21:34

## 2021-08-24 RX ADMIN — Medication 10 ML: at 08:57

## 2021-08-24 RX ADMIN — IPRATROPIUM BROMIDE AND ALBUTEROL SULFATE 1 AMPULE: .5; 2.5 SOLUTION RESPIRATORY (INHALATION) at 09:16

## 2021-08-24 RX ADMIN — BARIUM SULFATE 15 ML: 400 SUSPENSION ORAL at 13:37

## 2021-08-24 RX ADMIN — LISINOPRIL 2.5 MG: 5 TABLET ORAL at 21:34

## 2021-08-24 RX ADMIN — Medication 10 ML: at 21:33

## 2021-08-24 RX ADMIN — BARIUM SULFATE 15 ML: 0.81 POWDER, FOR SUSPENSION ORAL at 13:37

## 2021-08-24 RX ADMIN — MONTELUKAST SODIUM 10 MG: 10 TABLET ORAL at 21:34

## 2021-08-24 RX ADMIN — AZITHROMYCIN 250 MG: 250 TABLET, FILM COATED ORAL at 09:03

## 2021-08-24 RX ADMIN — CETIRIZINE HYDROCHLORIDE 10 MG: 10 TABLET, FILM COATED ORAL at 08:56

## 2021-08-24 ASSESSMENT — PAIN SCALES - GENERAL
PAINLEVEL_OUTOF10: 0

## 2021-08-24 NOTE — PROGRESS NOTES
SPEECH/LANGUAGE PATHOLOGY  VIDEOFLUOROSCOPIC STUDY OF SWALLOWING (MBS)   and PLAN OF CARE    PATIENT NAME:  Raymond Mann  (male)     MRN:  [id number]    :  1950  (69 y.o.)  STATUS:  Inpatient: Room 7417/7417-A    TODAY'S DATE:  2021  REFERRING PROVIDER:   Dr. Camarena Mew: [place] modified barium swallow with video  Date of order:  21   REASON FOR REFERRAL: dysphagia   EVALUATING THERAPIST: JAZ Page      RESULTS:      DYSPHAGIA DIAGNOSIS:  mild  oropharyngeal phase dysphagia      DIET RECOMMENDATIONS:  Soft and bite size consistency solids (dysphagia 3) with  thin liquids    FEEDING RECOMMENDATIONS:    Assistance level:  No assistance needed     Compensatory strategies recommended: Double swallow     Discussed recommendations with nursing and/or faxed report to referring provider: No secondary to no diet/liquid change recommended     SPEECH THERAPY  PLAN OF CARE   The dysphagia POC is established based on physician order and dysphagia diagnosis    Dysphagia therapy is not recommended       Conditions Requiring Skilled Therapeutic Intervention for dysphagia:    not applicable    SPECIFIC DYSPHAGIA INTERVENTIONS TO INCLUDE:     Not applicable    Specific instructions for next treatment:  not applicable   Treatment Goals:    Short Term Goals:  Not applicable no therapy warranted     Long Term Goals:   Not applicable no therapy warranted      Patient/family Goal:    not applicable                    ADMITTING DIAGNOSIS: Bronchopneumonia [J18.0]  Elevated troponin [R77.8]  Community acquired bacterial pneumonia [J15.9]     VISIT DIAGNOSIS:   Visit Diagnoses       Codes    Bronchopneumonia    -  Primary J18.0    Elevated troponin     R77.8              PATIENT REPORT/COMPLAINT: none reported    PRIOR LEVEL OF SWALLOW FUNCTION:    Past History of Dysphagia?:  none reported    Home diet: Soft and bite size consistency solids (dysphagia 3) with  thin liquids    PROCEDURE:  Consistencies Administered During the Evaluation   Liquids: thin liquid and nectar thick liquid   Solids:  pureed foods and solid foods      Method of Intake:   cup, straw, spoon  Self fed, Fed by clinician      Position:   Seated, upright, Lateral plane    INSTRUMENTAL ASSESSMENT:    ORAL PREPARATION PHASE:    Slow Mastication    ORAL PHASE:   Impaired Mastication    PHARYNGEAL PHASE:     ONSET TIME       Onset time of the pharyngeal swallow was adequate       PHARYNGEAL RESIDUALS        Vallecula/Pharyngeal Wall           Reduced tongue base retraction resulting in residuals in vallecula and/or posterior pharyngeal wall for pureed foods and solid foods which mostly cleared with spontaneous double swallow       Pyriform Sinuses      Residuals in the pyriform sinuses were noted due to pharyngeal weakness for pureed foods and solid foods  which  mostly cleared with spontaneous double swallow      LARYNGEAL PENETRATION   Laryngeal penetration was not present during this evaluation    ASPIRATION  Aspiration was not present during this evaluation    PENETRATION-ASPIRATION SCALE (PAS):  THIN 1 = Material does not enter the airway  MILDLY THICK 1 = Material does not enter the airway  MODERATELY THICK item not administered  PUREE 1 = Material does not enter the airway  HARD SOLID 1 = Material does not enter the airway       COMPENSATORY STRATEGIES    Compensatory strategies that were beneficial included Double swallow      STRUCTURAL/FUNCTIONAL ANOMALIES   No structural/functional anomalies were noted    CERVICAL ESOPHAGEAL STAGE :     The cervical esophagus appeared adequate          ___________    Cognition:   Within functional limits for this exam    Oral Peripheral Examination   Adequate lingual/labial strength     Current Respiratory Status   room air     Parameters of Speech Production  Respiration:  Adequate for speech production  Quality:   Within functional limits  Intensity: Within functional limits    Pain: No pain reported. EDUCATION:   The Speech Language Pathologist (SLP) completed education regarding results of evaluation and that intervention is not warranted at this time. Learner: Patient  Education: Reviewed results and recommendations of this evaluation  Evaluation of Education:  Deborah Torre understanding    This plan may be re-evaluated and revised as warranted. Evaluation Time includes thorough review of current medical information, gathering information on past medical history/social history and prior level of function, completion of standardized testing/informal observation of tasks, assessment of data and education on plan of care and goals. [x]The admitting diagnosis and active problem list, have been reviewed prior to initiation of this evaluation.     CPT Code: 91557  dysphagia study    ACTIVE PROBLEM LIST:   Patient Active Problem List   Diagnosis    Dilated cardiomyopathy ([address]    Non-rheumatic mitral regurgitation    Absolute anemia    SOBOE (shortness of breath on exertion)    Pulmonary HTN ([address]    Controlled type 2 diabetes mellitus with complication, without long-term current use of insulin ([address]    Essential (primary) hypertension    Pancytopenia (HCC)    Coagulation defect ([address]    Pancreatic cyst    Community acquired bacterial pneumonia

## 2021-08-24 NOTE — PROGRESS NOTES
RT and patient discussed Vest therapy. He is willing to perform the therapy in the morning with his morning treatment.

## 2021-08-24 NOTE — PLAN OF CARE
Problem: Infection:  Goal: Will remain free from infection  Description: Will remain free from infection  Outcome: Met This Shift     Problem: Discharge Planning:  Goal: Patients continuum of care needs are met  Description: Patients continuum of care needs are met  Outcome: Met This Shift     Problem: Breathing Pattern - Ineffective:  Goal: Ability to achieve and maintain a regular respiratory rate will improve  Description: Ability to achieve and maintain a regular respiratory rate will improve  Outcome: Met This Shift

## 2021-08-24 NOTE — PROGRESS NOTES
INPATIENT CARDIOLOGY FOLLOW-UP    Name: Vic Beltran    Age: 69 y.o. Date of Admission: 8/20/2021 10:15 AM    Date of Service: 8/24/2021    Chief Complaint: Follow-up for coronary atherosclerosis, ischemic cardiomyopathy, chronic systolic heart failure, non-ST elevation myocardial infarction, pneumonia, hypertension, cirrhosis with associated thrombocytopenia    Interim History: The patient presently denies active cardiovascular symptoms with most recent laboratory assessment demonstrating a progressive prerenal azotemia potentially in part related to steroid administration in view of concomitant relative hypotension with holding of diuretics at least over the next 24 hours. Review of Systems: The remainder of a complete multisystem review including consitutional, central nervous, respiratory, circulatory, gastrointestinal, genitourinary, endocrinologic, hematologic, musculoskeletal and psychiatric are negative. Problem List:  Patient Active Problem List   Diagnosis    Dilated cardiomyopathy ([address]    Non-rheumatic mitral regurgitation    Absolute anemia    SOBOE (shortness of breath on exertion)    Pulmonary HTN (HCC)    Controlled type 2 diabetes mellitus with complication, without long-term current use of insulin ([address]    Essential (primary) hypertension    Pancytopenia ([address]    Coagulation defect ([address]    Pancreatic cyst    Community acquired bacterial pneumonia       Allergies:   Allergies   Allergen Reactions    Penicillins Hives    Eggs Or Egg-Derived Products     Grass Pollen(K-O-R-T-Swt Oscar)     Milk-Related Compounds     Mixed Ragweed     Peanut-Containing Drug Products     Seasonal      Mold cats dogs ragweed       Current Medications:  Current Facility-Administered Medications   Medication Dose Route Frequency Provider Last Rate Last Admin    montelukast (SINGULAIR) tablet 10 mg  10 mg Oral Nightly Nadia Jacobs MD   10 mg at 08/23/21 2138    fluticasone (FLONASE) 50 MCG/ACT nasal spray 1 spray  1 spray Each Nostril Daily Nadia Jacobs MD   1 spray at 08/23/21 1337    enoxaparin (LOVENOX) injection 30 mg  30 mg Subcutaneous Daily Marixa Hoyos MD        sodium chloride (Inhalant) 3 % nebulizer solution 4 mL  4 mL Nebulization PRN Vasile Bowen MD        guaiFENesin tablet 400 mg  400 mg Oral 4x Daily PRN Vasile Bowen MD        azithromycin (ZITHROMAX) tablet 250 mg  250 mg Oral Daily Vasile Bowen MD        insulin lispro (HUMALOG) injection vial 0-12 Units  0-12 Units Subcutaneous TID WC Joana Bird MD   2 Units at 08/24/21 2027    insulin lispro (HUMALOG) injection vial 0-6 Units  0-6 Units Subcutaneous Nightly Joana Bird MD   3 Units at 08/23/21 2139    glucose (GLUTOSE) 40 % oral gel 15 g  15 g Oral PRN Joana Bird MD        dextrose 50 % IV solution  12.5 g Intravenous PRN Joana Bird MD        glucagon (rDNA) injection 1 mg  1 mg Intramuscular PRN Joana Bird MD        dextrose 5 % solution  100 mL/hr Intravenous PRN Joana Bird MD        heparin (porcine) injection 4,000 Units  4,000 Units Intravenous PRN Annemarie Pantelis, APRN - CNP        heparin (porcine) injection 2,000 Units  2,000 Units Intravenous PRN Annemarie Pantelis, APRN - CNP        atorvastatin (LIPITOR) tablet 40 mg  40 mg Oral Nightly Annemarie Pantelis, APRN - CNP   40 mg at 08/23/21 2138    perflutren lipid microspheres (DEFINITY) injection 1.65 mg  1.5 mL Intravenous ONCE PRN Annemarie Pantelis, APRN - CNP        aspirin EC tablet 81 mg  81 mg Oral Daily Annemarie Pantelis, APRN - CNP   81 mg at 08/23/21 0933    predniSONE (DELTASONE) tablet 40 mg  40 mg Oral Daily Joana Bird MD   40 mg at 08/23/21 0933    ipratropium-albuterol (DUONEB) nebulizer solution 1 ampule  1 ampule Inhalation Q4H Joana Bird MD   1 ampule at 08/24/21 0455    sodium chloride flush 0.9 % injection 5-40 mL  5-40 mL Intravenous 2 times per day Nadia Jacobs MD   10 mL at 08/23/21 3363    sodium chloride flush 0.9 % injection 5-40 mL  5-40 mL Intravenous PRN Nadia Jacobs MD        0.9 % sodium chloride infusion  25 mL Intravenous PRN Nadia Jacobs MD        ondansetron (ZOFRAN-ODT) disintegrating tablet 4 mg  4 mg Oral Q8H PRN Nadia Jacobs MD        Or    ondansetron (ZOFRAN) injection 4 mg  4 mg Intravenous Q6H PRN Nadia Jacobs MD        polyethylene glycol (GLYCOLAX) packet 17 g  17 g Oral Daily PRN Nadia Jacobs MD        acetaminophen (TYLENOL) tablet 650 mg  650 mg Oral Q6H PRN Nadia Jacobs MD        Or    acetaminophen (TYLENOL) suppository 650 mg  650 mg Rectal Q6H PRN Nadia Jacobs MD        cetirizine (ZYRTEC) tablet 10 mg  10 mg Oral Daily Nadia Jacobs MD   10 mg at 08/23/21 0933    diphenhydrAMINE (BENADRYL) tablet 50 mg  50 mg Oral Nightly PRN Nadia Jacobs MD        furosemide (LASIX) tablet 40 mg  40 mg Oral Daily Nadia Jacobs MD   40 mg at 08/23/21 0933    lisinopril (PRINIVIL;ZESTRIL) tablet 2.5 mg  2.5 mg Oral Nightly Eric Shine MD   2.5 mg at 08/22/21 2207    metoprolol succinate (TOPROL XL) extended release tablet 25 mg  25 mg Oral Nightly Eric Shine MD   25 mg at 08/22/21 2206    spironolactone (ALDACTONE) tablet 25 mg  25 mg Oral Nightly Nadia Jacobs MD   25 mg at 08/23/21 2138      dextrose      sodium chloride         Physical Exam:  BP (!) 86/42   Pulse 70   Temp 97 °F (36.1 °C) (Temporal)   Resp 18   Ht 5' 10\" (1.778 m)   Wt 211 lb (95.7 kg)   SpO2 96%   BMI 30.28 kg/m²   Weight change: Wt Readings from Last 3 Encounters:   08/20/21 211 lb (95.7 kg)   08/06/21 211 lb (95.7 kg)   07/20/21 211 lb (95.7 kg)     The patient is awake, alert and in no discomfort or distress. No gross musculoskeletal deformity is present. No significant skin or nail changes are present. Gross examination of head, eyes, nose and throat are negative the exception of the patient being edentulous. Jugular venous pressure is normal and no carotid bruits are present.  Normal respiratory effort is noted with no accessory muscle usage present. Lung fields are clear to ascultation. Cardiac examination is notable for a regular rate and rhythm with no palpable thrill. No gallop rhythm or cardiac murmur are identified. A benign abdominal examination is present with no masses or organomegaly. Intact pulses are present throughout all extremities and no peripheral edema is present. No focal neurologic deficits are present. Intake/Output:    Intake/Output Summary (Last 24 hours) at 8/24/2021 0752  Last data filed at 8/23/2021 1400  Gross per 24 hour   Intake 180 ml   Output 2400 ml   Net -2220 ml     No intake/output data recorded. Laboratory Tests:  Lab Results   Component Value Date    CREATININE 1.2 08/24/2021    BUN 33 (H) 08/24/2021     08/24/2021    K 4.2 08/24/2021    CL 98 08/24/2021    CO2 26 08/24/2021     No results for input(s): CKTOTAL, CKMB in the last 72 hours.     Invalid input(s): TROPONONI  No results found for: BNP  Lab Results   Component Value Date    WBC 11.2 08/23/2021    RBC 4.33 08/23/2021    HGB 13.5 08/23/2021    HCT 38.0 08/23/2021    MCV 87.8 08/23/2021    MCH 31.2 08/23/2021    MCHC 35.5 08/23/2021    RDW 14.5 08/23/2021    PLT 86 08/23/2021    MPV 12.7 08/23/2021     Recent Labs     08/21/21  1006   ALKPHOS 110   ALT 28   AST 43*   PROT 7.2   BILITOT 1.6*   BILIDIR 0.6*   LABALBU 2.8*     No results found for: MG  Lab Results   Component Value Date    PROTIME 15.1 06/22/2021    INR 1.4 06/22/2021     Lab Results   Component Value Date    TSH 2.240 06/18/2020     No components found for: CHLPL  Lab Results   Component Value Date    TRIG 84 08/21/2021    TRIG 135 06/18/2020    TRIG 150 (H) 04/27/2011     Lab Results   Component Value Date    HDL 41 08/21/2021    HDL 30 06/18/2020    HDL 34.5 (A) 04/27/2011     Lab Results   Component Value Date    LDLCALC 84 08/21/2021    LDLCALC 76 06/18/2020    LDLCALC 102 (H) 04/27/2011       Cardiac Tests:  Telemetry findings reviewed: sinus

## 2021-08-24 NOTE — PROGRESS NOTES
Hospitalist Progress Note      Synopsis: Patient admitted on 8/20/2021    69 y.o. male who presented to [facility] with cough and shortness of breath. He has a hx of DM2 for which he is on oral meds, valvular heart disease, dilated cardiomyopathy (followed with Dr. Edyta Carlson), liver cirrhosis, pancytopenia, recent FNA of the pancreatic tail cyst who presented to the ED for shortness of breath and cough. Cough of think secretions has been going on for 7 years but have worsened over the last several months but pts shortness of breath was more significant today so EMS was called. It is associated with congestion. EMS report pt was hypoxic to 90% but in the ED he was not found to be hypoxic. Sputum was productive of thick purulent secretions. CTA pulmonary suggest bronchopulmonary pneumonia so pt was given azithromycin and rocephin. Pt also reported chest pain that was sharp in the center of his chest with b/l arm pain this am; EKG did not show acute changes but pt initial troponin was 128 and it increased to 198 - so he was given aspirin. Admitted to our service for further evaluation and treatment. Cardiology consulted and at this time cath has been cancelled, echo ordered and to be done on 8/24. Pulmonary consulted for chronic cough with hx of bronchiectasis - ruling out aspiration pneumonia vs immunodeficiency     Subjective    No acute events overnight    Exam:  BP (!) 97/56   Pulse 80   Temp 96.5 °F (35.8 °C) (Temporal)   Resp 20   Ht 5' 10\" (1.778 m)   Wt 211 lb (95.7 kg)   SpO2 95%   BMI 30.28 kg/m²   General appearance: No apparent distress   HEENT: Pupils equal, round, and reactive to light. Conjunctivae/corneas clear. Neck: Supple   Respiratory:  Normal respiratory effort. Mild expiratory wheezing   Cardiovascular: Regular rate and rhythm without murmurs, rubs or gallops.   Abdomen: Soft, no tenderness, no distention, no organomegaly   Musculoskeletal: No clubbing, cyanosis or edema bilaterally. Skin: Skin color, texture, turgor normal.  No rashes or lesions. Neurologic:  nonfocal   Psychiatric: Alert and oriented, thought content appropriate, normal insight      Medications:  Reviewed    Infusion Medications    dextrose      sodium chloride       Scheduled Medications    montelukast  10 mg Oral Nightly    fluticasone  1 spray Each Nostril Daily    enoxaparin  30 mg Subcutaneous Daily    azithromycin  250 mg Oral Daily    insulin lispro  0-12 Units Subcutaneous TID WC    insulin lispro  0-6 Units Subcutaneous Nightly    atorvastatin  40 mg Oral Nightly    aspirin  81 mg Oral Daily    predniSONE  40 mg Oral Daily    ipratropium-albuterol  1 ampule Inhalation Q4H    sodium chloride flush  5-40 mL Intravenous 2 times per day    cetirizine  10 mg Oral Daily    [Held by provider] furosemide  40 mg Oral Daily    lisinopril  2.5 mg Oral Nightly    metoprolol succinate  25 mg Oral Nightly    [Held by provider] spironolactone  25 mg Oral Nightly     PRN Meds: sodium chloride (Inhalant), guaiFENesin, glucose, dextrose, glucagon (rDNA), dextrose, heparin (porcine), heparin (porcine), perflutren lipid microspheres, sodium chloride flush, sodium chloride, ondansetron **OR** ondansetron, polyethylene glycol, acetaminophen **OR** acetaminophen, diphenhydrAMINE    I/O    Intake/Output Summary (Last 24 hours) at 8/24/2021 1020  Last data filed at 8/24/2021 0908  Gross per 24 hour   Intake 180 ml   Output 3100 ml   Net -2920 ml       Labs:   Recent Labs     08/23/21  0806   WBC 11.2   HGB 13.5   HCT 38.0   PLT 86*       Recent Labs     08/23/21  0806 08/24/21  0616    132   K 4.4 4.2    98   CO2 26 26   BUN 29* 33*   CREATININE 1.2 1.2   CALCIUM 8.9 8.8       No results for input(s): PROT, ALB, ALKPHOS, ALT, AST, BILITOT, AMYLASE, LIPASE in the last 72 hours. No results for input(s): INR in the last 72 hours.     No results for input(s): Latoya Greer in the last 72 hours. Chronic labs:  Lab Results   Component Value Date    CHOL 142 08/21/2021    TRIG 84 08/21/2021    HDL 41 08/21/2021    LDLCALC 84 08/21/2021    TSH 2.240 06/18/2020    INR 1.4 06/22/2021    LABA1C 6.7 (H) 07/12/2021       Radiology:  Imaging studies reviewed today. ASSESSMENT:  NSTEMI, likely type II  Ischemic cardiomyopathy   CAP vs aspiration pneumonia  DM2  Liver cirrhosis  Thrombocytopenia 2/2 liver cirrhosis      PLAN:  Pulmonology following  Cardiology following  Continue azithromycin and prednisone  Echocardiogram pending  Swallow study today  Breathing treatments  If symptoms don't improve with azith for one month will get outpatient bronchoscopy  Continue home meds as ordered      Diet: ADULT DIET; Regular; 3 carb choices (45 gm/meal); Low Sodium (2 gm)  Code Status: Full Code  PT/OT Eval Status:   As needed   DVT Prophylaxis:   lovenox  Recommended disposition at discharge:  home at discharge     +++++++++++++++++++++++++++++++++++++++++++++++++  Jacob Clause, [address]  +++++++++++++++++++++++++++++++++++++++++++++++++  NOTE: This report was transcribed using voice recognition software. Every effort was made to ensure accuracy; however, inadvertent computerized transcription errors may be present.

## 2021-08-25 LAB
ANION GAP SERPL CALCULATED.3IONS-SCNC: 12 MMOL/L (ref 7–16)
BUN BLDV-MCNC: 25 MG/DL (ref 6–23)
CALCIUM SERPL-MCNC: 8.7 MG/DL (ref 8.6–10.2)
CHLORIDE BLD-SCNC: 100 MMOL/L (ref 98–107)
CO2: 23 MMOL/L (ref 22–29)
CREAT SERPL-MCNC: 1.2 MG/DL (ref 0.7–1.2)
GFR AFRICAN AMERICAN: >60
GFR NON-AFRICAN AMERICAN: 60 ML/MIN/1.73
GLUCOSE BLD-MCNC: 119 MG/DL (ref 74–99)
HCT VFR BLD CALC: 33.9 % (ref 37–54)
HEMOGLOBIN: 12.2 G/DL (ref 12.5–16.5)
MCH RBC QN AUTO: 31.6 PG (ref 26–35)
MCHC RBC AUTO-ENTMCNC: 36 % (ref 32–34.5)
MCV RBC AUTO: 87.8 FL (ref 80–99.9)
METER GLUCOSE: 117 MG/DL (ref 74–99)
METER GLUCOSE: 151 MG/DL (ref 74–99)
METER GLUCOSE: 186 MG/DL (ref 74–99)
METER GLUCOSE: 226 MG/DL (ref 74–99)
PDW BLD-RTO: 14.8 FL (ref 11.5–15)
PLATELET # BLD: 75 E9/L (ref 130–450)
PLATELET CONFIRMATION: NORMAL
PMV BLD AUTO: 12.4 FL (ref 7–12)
POTASSIUM SERPL-SCNC: 4.3 MMOL/L (ref 3.5–5)
RBC # BLD: 3.86 E12/L (ref 3.8–5.8)
SODIUM BLD-SCNC: 135 MMOL/L (ref 132–146)
WBC # BLD: 7.6 E9/L (ref 4.5–11.5)

## 2021-08-25 PROCEDURE — 87206 SMEAR FLUORESCENT/ACID STAI: CPT

## 2021-08-25 PROCEDURE — 6370000000 HC RX 637 (ALT 250 FOR IP): Performed by: INTERNAL MEDICINE

## 2021-08-25 PROCEDURE — 85027 COMPLETE CBC AUTOMATED: CPT

## 2021-08-25 PROCEDURE — 6370000000 HC RX 637 (ALT 250 FOR IP): Performed by: FAMILY MEDICINE

## 2021-08-25 PROCEDURE — 87070 CULTURE OTHR SPECIMN AEROBIC: CPT

## 2021-08-25 PROCEDURE — 94640 AIRWAY INHALATION TREATMENT: CPT

## 2021-08-25 PROCEDURE — 2060000000 HC ICU INTERMEDIATE R&B

## 2021-08-25 PROCEDURE — 82962 GLUCOSE BLOOD TEST: CPT

## 2021-08-25 PROCEDURE — 36415 COLL VENOUS BLD VENIPUNCTURE: CPT

## 2021-08-25 PROCEDURE — 99233 SBSQ HOSP IP/OBS HIGH 50: CPT | Performed by: INTERNAL MEDICINE

## 2021-08-25 PROCEDURE — 6360000002 HC RX W HCPCS: Performed by: INTERNAL MEDICINE

## 2021-08-25 PROCEDURE — 94669 MECHANICAL CHEST WALL OSCILL: CPT

## 2021-08-25 PROCEDURE — 80048 BASIC METABOLIC PNL TOTAL CA: CPT

## 2021-08-25 PROCEDURE — 6370000000 HC RX 637 (ALT 250 FOR IP): Performed by: NURSE PRACTITIONER

## 2021-08-25 PROCEDURE — 2580000003 HC RX 258: Performed by: INTERNAL MEDICINE

## 2021-08-25 RX ORDER — SODIUM CHLORIDE 9 MG/ML
INJECTION, SOLUTION INTRAVENOUS CONTINUOUS
Status: DISCONTINUED | OUTPATIENT
Start: 2021-08-26 | End: 2021-08-26

## 2021-08-25 RX ADMIN — IPRATROPIUM BROMIDE AND ALBUTEROL SULFATE 1 AMPULE: .5; 2.5 SOLUTION RESPIRATORY (INHALATION) at 08:43

## 2021-08-25 RX ADMIN — IPRATROPIUM BROMIDE AND ALBUTEROL SULFATE 1 AMPULE: .5; 2.5 SOLUTION RESPIRATORY (INHALATION) at 20:03

## 2021-08-25 RX ADMIN — Medication 10 ML: at 08:25

## 2021-08-25 RX ADMIN — ENOXAPARIN SODIUM 30 MG: 30 INJECTION SUBCUTANEOUS at 08:25

## 2021-08-25 RX ADMIN — MONTELUKAST SODIUM 10 MG: 10 TABLET ORAL at 21:17

## 2021-08-25 RX ADMIN — INSULIN LISPRO 1 UNITS: 100 INJECTION, SOLUTION INTRAVENOUS; SUBCUTANEOUS at 21:17

## 2021-08-25 RX ADMIN — AZITHROMYCIN 250 MG: 250 TABLET, FILM COATED ORAL at 08:31

## 2021-08-25 RX ADMIN — PREDNISONE 40 MG: 20 TABLET ORAL at 08:25

## 2021-08-25 RX ADMIN — ATORVASTATIN CALCIUM 40 MG: 40 TABLET, FILM COATED ORAL at 21:22

## 2021-08-25 RX ADMIN — ASPIRIN 81 MG: 81 TABLET, COATED ORAL at 08:26

## 2021-08-25 RX ADMIN — LISINOPRIL 2.5 MG: 5 TABLET ORAL at 21:17

## 2021-08-25 RX ADMIN — METOPROLOL SUCCINATE 25 MG: 25 TABLET, EXTENDED RELEASE ORAL at 21:17

## 2021-08-25 RX ADMIN — IPRATROPIUM BROMIDE AND ALBUTEROL SULFATE 1 AMPULE: .5; 2.5 SOLUTION RESPIRATORY (INHALATION) at 13:09

## 2021-08-25 RX ADMIN — GLIPIZIDE 5 MG: 5 TABLET ORAL at 08:25

## 2021-08-25 RX ADMIN — INSULIN LISPRO 4 UNITS: 100 INJECTION, SOLUTION INTRAVENOUS; SUBCUTANEOUS at 16:39

## 2021-08-25 RX ADMIN — INSULIN LISPRO 2 UNITS: 100 INJECTION, SOLUTION INTRAVENOUS; SUBCUTANEOUS at 11:31

## 2021-08-25 RX ADMIN — IPRATROPIUM BROMIDE AND ALBUTEROL SULFATE 1 AMPULE: .5; 2.5 SOLUTION RESPIRATORY (INHALATION) at 16:04

## 2021-08-25 RX ADMIN — Medication 10 ML: at 21:20

## 2021-08-25 RX ADMIN — CETIRIZINE HYDROCHLORIDE 10 MG: 10 TABLET, FILM COATED ORAL at 08:25

## 2021-08-25 RX ADMIN — FLUTICASONE PROPIONATE 1 SPRAY: 50 SPRAY, METERED NASAL at 08:26

## 2021-08-25 ASSESSMENT — PAIN SCALES - GENERAL
PAINLEVEL_OUTOF10: 0
PAINLEVEL_OUTOF10: 0

## 2021-08-25 NOTE — PROGRESS NOTES
Last Rate Last Admin    glipiZIDE (GLUCOTROL) tablet 5 mg  5 mg Oral QAM AC Africa Ligas, DO   5 mg at 08/24/21 1640    montelukast (SINGULAIR) tablet 10 mg  10 mg Oral Nightly Nadia Jacobs MD   10 mg at 08/24/21 2134    fluticasone (FLONASE) 50 MCG/ACT nasal spray 1 spray  1 spray Each Nostril Daily Nadia Jacobs MD   1 spray at 08/24/21 0856    enoxaparin (LOVENOX) injection 30 mg  30 mg Subcutaneous Daily Melanie Garcia MD   30 mg at 08/24/21 0857    sodium chloride (Inhalant) 3 % nebulizer solution 4 mL  4 mL Nebulization PRN True Gallegos MD        guaiFENesin tablet 400 mg  400 mg Oral 4x Daily PRN True Gallegos MD        azithromycin (ZITHROMAX) tablet 250 mg  250 mg Oral Daily Ture Gallegos MD   250 mg at 08/24/21 0903    insulin lispro (HUMALOG) injection vial 0-12 Units  0-12 Units Subcutaneous TID WC Gary Gusman MD   10 Units at 08/24/21 1640    insulin lispro (HUMALOG) injection vial 0-6 Units  0-6 Units Subcutaneous Nightly Gary Gusman MD   3 Units at 08/24/21 2135    glucose (GLUTOSE) 40 % oral gel 15 g  15 g Oral PRN Gary Gusman MD        dextrose 50 % IV solution  12.5 g IntraVENous PRN Gary Gusman MD        glucagon (rDNA) injection 1 mg  1 mg Intramuscular PRN Gary Gusman MD        dextrose 5 % solution  100 mL/hr IntraVENous PRN Gary Gusman MD        heparin (porcine) injection 4,000 Units  4,000 Units IntraVENous PRN Annemarie Pantelis, APRN - CNP        heparin (porcine) injection 2,000 Units  2,000 Units IntraVENous PRN Annemarie Pantelis, APRN - CNP        atorvastatin (LIPITOR) tablet 40 mg  40 mg Oral Nightly Annemarie Pantelis, APRN - CNP   40 mg at 08/24/21 2134    aspirin EC tablet 81 mg  81 mg Oral Daily Annemarie Pantelis, APRN - CNP   81 mg at 08/24/21 0856    predniSONE (DELTASONE) tablet 40 mg  40 mg Oral Daily Gary Gusman MD   40 mg at 08/24/21 0856    ipratropium-albuterol (DUONEB) nebulizer solution 1 ampule  1 ampule Inhalation Q4H Gary Gusman MD   1 ampule at 08/24/21 2015    sodium chloride flush 0.9 % injection 5-40 mL  5-40 mL IntraVENous 2 times per day Nadia Jacobs MD   10 mL at 08/24/21 2133    sodium chloride flush 0.9 % injection 5-40 mL  5-40 mL IntraVENous PRN Nadia Jacobs MD        0.9 % sodium chloride infusion  25 mL IntraVENous PRN Nadia Jacobs MD        ondansetron (ZOFRAN-ODT) disintegrating tablet 4 mg  4 mg Oral Q8H PRN Nadia Jacobs MD        Or    ondansetron (ZOFRAN) injection 4 mg  4 mg IntraVENous Q6H PRN Nadia Jacobs MD        polyethylene glycol (GLYCOLAX) packet 17 g  17 g Oral Daily PRN Nadia Jacobs MD        acetaminophen (TYLENOL) tablet 650 mg  650 mg Oral Q6H PRN Nadia Jacobs MD        Or    acetaminophen (TYLENOL) suppository 650 mg  650 mg Rectal Q6H PRN Nadia Jacobs MD        cetirizine (ZYRTEC) tablet 10 mg  10 mg Oral Daily Nadia Jacobs MD   10 mg at 08/24/21 0856    diphenhydrAMINE (BENADRYL) tablet 50 mg  50 mg Oral Nightly PRN Nadia Jacobs MD        [Held by provider] furosemide (LASIX) tablet 40 mg  40 mg Oral Daily Nadia Jacobs MD   40 mg at 08/23/21 0933    lisinopril (PRINIVIL;ZESTRIL) tablet 2.5 mg  2.5 mg Oral Nightly Julieth Roberts MD   2.5 mg at 08/24/21 2134    metoprolol succinate (TOPROL XL) extended release tablet 25 mg  25 mg Oral Nightly Julieth Roberts MD   25 mg at 08/24/21 2134    [Held by provider] spironolactone (ALDACTONE) tablet 25 mg  25 mg Oral Nightly Nadia Jacobs MD   25 mg at 08/23/21 2138      dextrose      sodium chloride         Physical Exam:  BP (!) 94/55   Pulse 73   Temp 97.8 °F (36.6 °C) (Temporal)   Resp 18   Ht 5' 10\" (1.778 m)   Wt 208 lb 3.2 oz (94.4 kg)   SpO2 98%   BMI 29.87 kg/m²   Weight change: Wt Readings from Last 3 Encounters:   08/24/21 208 lb 3.2 oz (94.4 kg)   08/06/21 211 lb (95.7 kg)   07/20/21 211 lb (95.7 kg)     The patient is awake, alert and in no discomfort or distress. No gross musculoskeletal deformity is present.  No significant skin or nail changes are present. Gross examination of head, eyes, nose and throat are negative. Jugular venous pressure is normal and no carotid bruits are present. Normal respiratory effort is noted with no accessory muscle usage present. Lung fields demonstrate slightly coarse breath sounds in the left lung base to ascultation. Cardiac examination is notable for a regular rate and rhythm with no palpable thrill. No gallop rhythm or cardiac murmur are identified. A benign abdominal examination is present with no masses or organomegaly. Intact pulses are present throughout all extremities and no peripheral edema is present. No focal neurologic deficits are present. Intake/Output:    Intake/Output Summary (Last 24 hours) at 8/25/2021 0749  Last data filed at 8/25/2021 5020  Gross per 24 hour   Intake 1140 ml   Output 3075 ml   Net -1935 ml     No intake/output data recorded. Laboratory Tests:  Lab Results   Component Value Date    CREATININE 1.2 08/24/2021    BUN 33 (H) 08/24/2021     08/24/2021    K 4.2 08/24/2021    CL 98 08/24/2021    CO2 26 08/24/2021     No results for input(s): CKTOTAL, CKMB in the last 72 hours. Invalid input(s): TROPONONI  No results found for: BNP  Lab Results   Component Value Date    WBC 7.6 08/25/2021    RBC 3.86 08/25/2021    HGB 12.2 08/25/2021    HCT 33.9 08/25/2021    MCV 87.8 08/25/2021    MCH 31.6 08/25/2021    MCHC 36.0 08/25/2021    RDW 14.8 08/25/2021    PLT 75 08/25/2021    MPV 12.4 08/25/2021     No results for input(s): ALKPHOS, ALT, AST, PROT, BILITOT, BILIDIR, LABALBU in the last 72 hours.   No results found for: MG  Lab Results   Component Value Date    PROTIME 15.1 06/22/2021    INR 1.4 06/22/2021     Lab Results   Component Value Date    TSH 2.240 06/18/2020     No components found for: CHLPL  Lab Results   Component Value Date    TRIG 84 08/21/2021    TRIG 135 06/18/2020    TRIG 150 (H) 04/27/2011     Lab Results   Component Value Date    HDL 41 08/21/2021    HDL 30 06/18/2020    HDL 34.5 (A) 04/27/2011     Lab Results   Component Value Date    LDLCALC 84 08/21/2021    [address] 06/18/2020    LDLCALC 102 (H) 04/27/2011       Cardiac Tests:  Telemetry findings reviewed: sinus rhythm, no new tachy/bradyarrhythmias overnight  Last Echocardiogram: An echocardiogram demonstrates evidence of a borderline dilated left ventricular chamber with extensive regional wall motion abnormalities inclusive of akinesis of mid and distal segment of the anterior, anteroseptal, anterolateral, apical and inferoapical segments as well as that of the basilar inferior segment and hypokinesis of remaining inferolateral lateral segments with severe left ventricular systolic dysfunction estimated ejection fraction of approximately 25% and associated right ventricular dysfunction. Left atrial enlargement is present with moderate centrally directed mitral regurgitation and mild pulmonary hypertension. ASSESSMENT / PLAN: On a clinical basis, the patient appears subjectively compensated from a cardiovascular standpoint with the findings of his swallowing study reviewed and no evidence of aspiration contributing to his pneumonia. His echocardiogram demonstrates extensive regional wall motion abnormalities of multiple coronary distributions with severe left ventricular systolic dysfunction suggestive of an ischemic cardiomyopathy. On this basis an extensive discussion has been held regarding recommendations of coronary angiography for further assessment and to provide additional assessment regarding management with potential needs of viability assessment, particularly if surgical intervention is indicated.   Following extensive discussion inclusive of the proposed benefits, risks and alternatives of the procedure, he is agreeable in proceeding with plans of withholding of his afterload reduction on a temporary basis to reduce risk of contrast nephropathy as well as reassessment of diuretics following resolution of his relative hypotension on the basis of renal function and electrolytes. In addition, extensive discussion regarding the risk of sudden cardiac death and recommendations of an external cardioverter defibrillator at the time of hospital discharge were performed with this previously utilized and based on its inconvenience and economics declined by the patient the time of present assessment. Ongoing medical management will presently be maintained accompanied by aggressive risk factor modification of blood pressure and serum lipids and attempt to reduce risk of future atherosclerotic development. SCAI AUC(9), indication 4; score 9      Note: This report was completed utilizing computer voice recognition software. Every effort has been made to ensure accuracy, however; inadvertent computerized transcription errors may be present. Kiera Patrick.  [facility], [address] [place] [address] Cardiology

## 2021-08-25 NOTE — PLAN OF CARE
Problem: Breathing Pattern - Ineffective:  Goal: Ability to achieve and maintain a regular respiratory rate will improve  Description: Ability to achieve and maintain a regular respiratory rate will improve  8/24/2021 2235 by Paula Nobles RN  Outcome: Met This Shift

## 2021-08-25 NOTE — PROGRESS NOTES
Hospitalist Progress Note      Synopsis: Patient admitted on 8/20/2021    69 y.o. male who presented to [facility] with cough and shortness of breath. He has a hx of DM2 for which he is on oral meds, valvular heart disease, dilated cardiomyopathy (followed with Dr. Penelope Lombardo), liver cirrhosis, pancytopenia, recent FNA of the pancreatic tail cyst who presented to the ED for shortness of breath and cough. Cough of think secretions has been going on for 7 years but have worsened over the last several months but pts shortness of breath was more significant today so EMS was called. It is associated with congestion. EMS report pt was hypoxic to 90% but in the ED he was not found to be hypoxic. Sputum was productive of thick purulent secretions. CTA pulmonary suggest bronchopulmonary pneumonia so pt was given azithromycin and rocephin. Pt also reported chest pain that was sharp in the center of his chest with b/l arm pain this am; EKG did not show acute changes but pt initial troponin was 128 and it increased to 198 - so he was given aspirin. Admitted to our service for further evaluation and treatment. Cardiology consulted and at this time cath has been cancelled, echo ordered and to be done on 8/24. Pulmonary consulted for chronic cough with hx of bronchiectasis - ruling out aspiration pneumonia vs immunodeficiency     Subjective    No acute events overnight    Exam:  BP (!) 94/55   Pulse 73   Temp 97.8 °F (36.6 °C) (Temporal)   Resp 18   Ht 5' 10\" (1.778 m)   Wt 208 lb 3.2 oz (94.4 kg)   SpO2 97%   BMI 29.87 kg/m²   General appearance: No apparent distress   HEENT: Pupils equal, round, and reactive to light. Conjunctivae/corneas clear. Neck: Supple   Respiratory:  Normal respiratory effort. Mild expiratory wheezing   Cardiovascular: Regular rate and rhythm without murmurs, rubs or gallops.   Abdomen: Soft, no tenderness, no distention, no organomegaly   Musculoskeletal: No clubbing, cyanosis or edema bilaterally. Skin: Skin color, texture, turgor normal.  No rashes or lesions. Neurologic:  nonfocal   Psychiatric: Alert and oriented, thought content appropriate, normal insight      Medications:  Reviewed    Infusion Medications    dextrose      sodium chloride       Scheduled Medications    glipiZIDE  5 mg Oral QAM AC    montelukast  10 mg Oral Nightly    fluticasone  1 spray Each Nostril Daily    enoxaparin  30 mg Subcutaneous Daily    azithromycin  250 mg Oral Daily    insulin lispro  0-12 Units Subcutaneous TID WC    insulin lispro  0-6 Units Subcutaneous Nightly    atorvastatin  40 mg Oral Nightly    aspirin  81 mg Oral Daily    ipratropium-albuterol  1 ampule Inhalation Q4H    sodium chloride flush  5-40 mL IntraVENous 2 times per day    cetirizine  10 mg Oral Daily    [Held by provider] furosemide  40 mg Oral Daily    lisinopril  2.5 mg Oral Nightly    metoprolol succinate  25 mg Oral Nightly    [Held by provider] spironolactone  25 mg Oral Nightly     PRN Meds: sodium chloride (Inhalant), guaiFENesin, glucose, dextrose, glucagon (rDNA), dextrose, heparin (porcine), heparin (porcine), sodium chloride flush, sodium chloride, ondansetron **OR** ondansetron, polyethylene glycol, acetaminophen **OR** acetaminophen, diphenhydrAMINE    I/O    Intake/Output Summary (Last 24 hours) at 8/25/2021 0929  Last data filed at 8/25/2021 6828  Gross per 24 hour   Intake 960 ml   Output 2275 ml   Net -1315 ml       Labs:   Recent Labs     08/23/21  0806 08/25/21  0542   WBC 11.2 7.6   HGB 13.5 12.2*   HCT 38.0 33.9*   PLT 86* 75*       Recent Labs     08/23/21  0806 08/24/21  0616 08/25/21  0542    132 135   K 4.4 4.2 4.3    98 100   CO2 26 26 23   BUN 29* 33* 25*   CREATININE 1.2 1.2 1.2   CALCIUM 8.9 8.8 8.7       No results for input(s): PROT, ALB, ALKPHOS, ALT, AST, BILITOT, AMYLASE, LIPASE in the last 72 hours. No results for input(s): INR in the last 72 hours.     No results for input(s): Delshirejazmyne Chavira in the last 72 hours. Chronic labs:  Lab Results   Component Value Date    CHOL 142 08/21/2021    TRIG 84 08/21/2021    HDL 41 08/21/2021    LDLCALC 84 08/21/2021    TSH 2.240 06/18/2020    INR 1.4 06/22/2021    LABA1C 6.7 (H) 07/12/2021       Radiology:  Imaging studies reviewed today. ASSESSMENT:  NSTEMI, likely type II  Ischemic cardiomyopathy   CAP vs aspiration pneumonia  DM2  Liver cirrhosis  Thrombocytopenia 2/2 liver cirrhosis      PLAN:  Pulmonology following  Cardiology following  Cardiac cath tomorrow  Continue azithromycin and prednisone  Breathing treatments  If symptoms don't improve with azith for one month will get outpatient bronchoscopy  Continue home meds as ordered      Diet: ADULT DIET; Dysphagia - Soft and Bite Sized; 3 carb choices (45 gm/meal); Low Sodium (2 gm)  Code Status: Full Code  PT/OT Eval Status:   As needed   DVT Prophylaxis:   lovenox  Recommended disposition at discharge:  home at discharge     +++++++++++++++++++++++++++++++++++++++++++++++++  Pervis Roz, [address]  +++++++++++++++++++++++++++++++++++++++++++++++++  NOTE: This report was transcribed using voice recognition software. Every effort was made to ensure accuracy; however, inadvertent computerized transcription errors may be present.

## 2021-08-26 LAB
ANION GAP SERPL CALCULATED.3IONS-SCNC: 9 MMOL/L (ref 7–16)
BASOPHILS ABSOLUTE: 0.08 E9/L (ref 0–0.2)
BASOPHILS RELATIVE PERCENT: 1 % (ref 0–2)
BUN BLDV-MCNC: 22 MG/DL (ref 6–23)
CALCIUM SERPL-MCNC: 8.9 MG/DL (ref 8.6–10.2)
CHLORIDE BLD-SCNC: 101 MMOL/L (ref 98–107)
CO2: 23 MMOL/L (ref 22–29)
CREAT SERPL-MCNC: 1.1 MG/DL (ref 0.7–1.2)
EOSINOPHILS ABSOLUTE: 0.3 E9/L (ref 0.05–0.5)
EOSINOPHILS RELATIVE PERCENT: 3.6 % (ref 0–6)
GFR AFRICAN AMERICAN: >60
GFR NON-AFRICAN AMERICAN: >60 ML/MIN/1.73
GLUCOSE BLD-MCNC: 81 MG/DL (ref 74–99)
HCT VFR BLD CALC: 39.4 % (ref 37–54)
HEMOGLOBIN: 14 G/DL (ref 12.5–16.5)
IMMATURE GRANULOCYTES #: 0.04 E9/L
IMMATURE GRANULOCYTES %: 0.5 % (ref 0–5)
LYMPHOCYTES ABSOLUTE: 2.9 E9/L (ref 1.5–4)
LYMPHOCYTES RELATIVE PERCENT: 35.2 % (ref 20–42)
MCH RBC QN AUTO: 31 PG (ref 26–35)
MCHC RBC AUTO-ENTMCNC: 35.5 % (ref 32–34.5)
MCV RBC AUTO: 87.4 FL (ref 80–99.9)
METER GLUCOSE: 122 MG/DL (ref 74–99)
METER GLUCOSE: 125 MG/DL (ref 74–99)
METER GLUCOSE: 77 MG/DL (ref 74–99)
METER GLUCOSE: 87 MG/DL (ref 74–99)
MONOCYTES ABSOLUTE: 0.91 E9/L (ref 0.1–0.95)
MONOCYTES RELATIVE PERCENT: 11 % (ref 2–12)
NEUTROPHILS ABSOLUTE: 4.01 E9/L (ref 1.8–7.3)
NEUTROPHILS RELATIVE PERCENT: 48.7 % (ref 43–80)
PDW BLD-RTO: 14.9 FL (ref 11.5–15)
PLATELET # BLD: 83 E9/L (ref 130–450)
PLATELET CONFIRMATION: NORMAL
PMV BLD AUTO: 12.3 FL (ref 7–12)
POTASSIUM SERPL-SCNC: 4.4 MMOL/L (ref 3.5–5)
RBC # BLD: 4.51 E12/L (ref 3.8–5.8)
SODIUM BLD-SCNC: 133 MMOL/L (ref 132–146)
WBC # BLD: 8.2 E9/L (ref 4.5–11.5)

## 2021-08-26 PROCEDURE — 6370000000 HC RX 637 (ALT 250 FOR IP): Performed by: INTERNAL MEDICINE

## 2021-08-26 PROCEDURE — 80048 BASIC METABOLIC PNL TOTAL CA: CPT

## 2021-08-26 PROCEDURE — 2709999900 HC NON-CHARGEABLE SUPPLY

## 2021-08-26 PROCEDURE — 2580000003 HC RX 258: Performed by: INTERNAL MEDICINE

## 2021-08-26 PROCEDURE — 36415 COLL VENOUS BLD VENIPUNCTURE: CPT

## 2021-08-26 PROCEDURE — C1894 INTRO/SHEATH, NON-LASER: HCPCS

## 2021-08-26 PROCEDURE — 6370000000 HC RX 637 (ALT 250 FOR IP): Performed by: NURSE PRACTITIONER

## 2021-08-26 PROCEDURE — 6360000002 HC RX W HCPCS: Performed by: INTERNAL MEDICINE

## 2021-08-26 PROCEDURE — 99024 POSTOP FOLLOW-UP VISIT: CPT | Performed by: INTERNAL MEDICINE

## 2021-08-26 PROCEDURE — 99232 SBSQ HOSP IP/OBS MODERATE 35: CPT | Performed by: INTERNAL MEDICINE

## 2021-08-26 PROCEDURE — 93458 L HRT ARTERY/VENTRICLE ANGIO: CPT | Performed by: INTERNAL MEDICINE

## 2021-08-26 PROCEDURE — 82962 GLUCOSE BLOOD TEST: CPT

## 2021-08-26 PROCEDURE — 2500000003 HC RX 250 WO HCPCS

## 2021-08-26 PROCEDURE — 94640 AIRWAY INHALATION TREATMENT: CPT

## 2021-08-26 PROCEDURE — B2111ZZ FLUOROSCOPY OF MULTIPLE CORONARY ARTERIES USING LOW OSMOLAR CONTRAST: ICD-10-PCS | Performed by: INTERNAL MEDICINE

## 2021-08-26 PROCEDURE — 2060000000 HC ICU INTERMEDIATE R&B

## 2021-08-26 PROCEDURE — 85025 COMPLETE CBC W/AUTO DIFF WBC: CPT

## 2021-08-26 PROCEDURE — 6360000002 HC RX W HCPCS

## 2021-08-26 PROCEDURE — C1769 GUIDE WIRE: HCPCS

## 2021-08-26 PROCEDURE — 94669 MECHANICAL CHEST WALL OSCILL: CPT

## 2021-08-26 PROCEDURE — 4A023N7 MEASUREMENT OF CARDIAC SAMPLING AND PRESSURE, LEFT HEART, PERCUTANEOUS APPROACH: ICD-10-PCS | Performed by: INTERNAL MEDICINE

## 2021-08-26 RX ADMIN — AZITHROMYCIN 250 MG: 250 TABLET, FILM COATED ORAL at 10:43

## 2021-08-26 RX ADMIN — ASPIRIN 81 MG: 81 TABLET, COATED ORAL at 10:42

## 2021-08-26 RX ADMIN — Medication 10 ML: at 21:23

## 2021-08-26 RX ADMIN — ATORVASTATIN CALCIUM 40 MG: 40 TABLET, FILM COATED ORAL at 21:22

## 2021-08-26 RX ADMIN — FLUTICASONE PROPIONATE 1 SPRAY: 50 SPRAY, METERED NASAL at 10:43

## 2021-08-26 RX ADMIN — IPRATROPIUM BROMIDE AND ALBUTEROL SULFATE 1 AMPULE: .5; 2.5 SOLUTION RESPIRATORY (INHALATION) at 20:29

## 2021-08-26 RX ADMIN — IPRATROPIUM BROMIDE AND ALBUTEROL SULFATE 1 AMPULE: .5; 2.5 SOLUTION RESPIRATORY (INHALATION) at 15:53

## 2021-08-26 RX ADMIN — ASPIRIN 81 MG: 81 TABLET, COATED ORAL at 08:52

## 2021-08-26 RX ADMIN — METOPROLOL SUCCINATE 25 MG: 25 TABLET, EXTENDED RELEASE ORAL at 21:23

## 2021-08-26 RX ADMIN — Medication 10 ML: at 10:43

## 2021-08-26 RX ADMIN — ENOXAPARIN SODIUM 30 MG: 30 INJECTION SUBCUTANEOUS at 10:43

## 2021-08-26 RX ADMIN — SODIUM CHLORIDE: 9 INJECTION, SOLUTION INTRAVENOUS at 06:09

## 2021-08-26 RX ADMIN — IPRATROPIUM BROMIDE AND ALBUTEROL SULFATE 1 AMPULE: .5; 2.5 SOLUTION RESPIRATORY (INHALATION) at 11:43

## 2021-08-26 RX ADMIN — LISINOPRIL 2.5 MG: 5 TABLET ORAL at 21:23

## 2021-08-26 RX ADMIN — MONTELUKAST SODIUM 10 MG: 10 TABLET ORAL at 21:22

## 2021-08-26 RX ADMIN — CETIRIZINE HYDROCHLORIDE 10 MG: 10 TABLET, FILM COATED ORAL at 10:42

## 2021-08-26 ASSESSMENT — PAIN SCALES - GENERAL
PAINLEVEL_OUTOF10: 0

## 2021-08-26 NOTE — PROGRESS NOTES
Hospitalist Progress Note      Synopsis: Patient admitted on 8/20/2021    69 y.o. male who presented to [facility] with cough and shortness of breath. He has a hx of DM2 for which he is on oral meds, valvular heart disease, dilated cardiomyopathy (followed with Dr. Bari Arias), liver cirrhosis, pancytopenia, recent FNA of the pancreatic tail cyst who presented to the ED for shortness of breath and cough. Cough of think secretions has been going on for 7 years but have worsened over the last several months but pts shortness of breath was more significant today so EMS was called. It is associated with congestion. EMS report pt was hypoxic to 90% but in the ED he was not found to be hypoxic. Sputum was productive of thick purulent secretions. CTA pulmonary suggest bronchopulmonary pneumonia so pt was given azithromycin and rocephin. Pt also reported chest pain that was sharp in the center of his chest with b/l arm pain this am; EKG did not show acute changes but pt initial troponin was 128 and it increased to 198 - so he was given aspirin. Admitted to our service for further evaluation and treatment. Cardiology consulted and at this time cath has been cancelled, echo ordered and to be done on 8/24. Pulmonary consulted for chronic cough with hx of bronchiectasis - ruling out aspiration pneumonia vs immunodeficiency     Subjective    No acute events overnight    Exam:  BP (!) 102/55   Pulse 80   Temp 96.6 °F (35.9 °C) (Temporal)   Resp 18   Ht 5' 10\" (1.778 m)   Wt 208 lb 3.2 oz (94.4 kg)   SpO2 97%   BMI 29.87 kg/m²   General appearance: No apparent distress   HEENT: Pupils equal, round, and reactive to light. Conjunctivae/corneas clear. Neck: Supple   Respiratory:  Normal respiratory effort. Mild expiratory wheezing   Cardiovascular: Regular rate and rhythm without murmurs, rubs or gallops.   Abdomen: Soft, no tenderness, no distention, no organomegaly   Musculoskeletal: No clubbing, cyanosis or edema bilaterally. Skin: Skin color, texture, turgor normal.  No rashes or lesions. Neurologic:  nonfocal       Medications:  Reviewed    Infusion Medications    sodium chloride 50 mL/hr at 08/26/21 8106    dextrose      sodium chloride       Scheduled Medications    glipiZIDE  5 mg Oral QAM AC    montelukast  10 mg Oral Nightly    fluticasone  1 spray Each Nostril Daily    enoxaparin  30 mg Subcutaneous Daily    azithromycin  250 mg Oral Daily    insulin lispro  0-12 Units Subcutaneous TID WC    insulin lispro  0-6 Units Subcutaneous Nightly    atorvastatin  40 mg Oral Nightly    aspirin  81 mg Oral Daily    ipratropium-albuterol  1 ampule Inhalation Q4H    sodium chloride flush  5-40 mL IntraVENous 2 times per day    cetirizine  10 mg Oral Daily    [Held by provider] furosemide  40 mg Oral Daily    lisinopril  2.5 mg Oral Nightly    metoprolol succinate  25 mg Oral Nightly    [Held by provider] spironolactone  25 mg Oral Nightly     PRN Meds: sodium chloride (Inhalant), guaiFENesin, glucose, dextrose, glucagon (rDNA), dextrose, sodium chloride flush, sodium chloride, ondansetron **OR** ondansetron, polyethylene glycol, acetaminophen **OR** acetaminophen, diphenhydrAMINE    I/O    Intake/Output Summary (Last 24 hours) at 8/26/2021 0945  Last data filed at 8/26/2021 0901  Gross per 24 hour   Intake 420 ml   Output 1900 ml   Net -1480 ml       Labs:   Recent Labs     08/25/21  0542   WBC 7.6   HGB 12.2*   HCT 33.9*   PLT 75*       Recent Labs     08/24/21  0616 08/25/21  0542 08/26/21  0645    135 133   K 4.2 4.3 4.4   CL 98 100 101   CO2 26 23 23   BUN 33* 25* 22   CREATININE 1.2 1.2 1.1   CALCIUM 8.8 8.7 8.9       No results for input(s): PROT, ALB, ALKPHOS, ALT, AST, BILITOT, AMYLASE, LIPASE in the last 72 hours. No results for input(s): INR in the last 72 hours. No results for input(s): Catherine Stratford in the last 72 hours.     Chronic labs:  Lab Results Component Value Date    CHOL 142 08/21/2021    TRIG 84 08/21/2021    HDL 41 08/21/2021    LDLCALC 84 08/21/2021    TSH 2.240 06/18/2020    INR 1.4 06/22/2021    LABA1C 6.7 (H) 07/12/2021       Radiology:  Imaging studies reviewed today. ASSESSMENT:  NSTEMI, likely type II  Ischemic cardiomyopathy   CAP vs aspiration pneumonia  DM2  Liver cirrhosis  Thrombocytopenia 2/2 liver cirrhosis      PLAN:  Pulmonology following  Cardiology following  Cardiac cath today  Consult CTS  Continue azithromycin and prednisone  Breathing treatments  If symptoms don't improve with azith for one month will get outpatient bronchoscopy  Continue home meds as ordered      Diet: Diet NPO Exceptions are: Sips of Water with Meds  Code Status: Full Code  PT/OT Eval Status:   As needed   DVT Prophylaxis:   lovenox  Recommended disposition at discharge:  home at discharge     +++++++++++++++++++++++++++++++++++++++++++++++++  Chemo Chrisch, [address]  +++++++++++++++++++++++++++++++++++++++++++++++++  NOTE: This report was transcribed using voice recognition software. Every effort was made to ensure accuracy; however, inadvertent computerized transcription errors may be present.

## 2021-08-26 NOTE — CARE COORDINATION
Cardiac cath today, cardio following, pulm following. Met with pt and niece at bedside, discharge plan remains the same, home with family to transport. Per both pt and niece a lot of family and friend support. No needs identified. Daniel Boyer, MSW, LSW
Met with pt and daughter in-law at bedside to discuss discharge / transition of care plan. Pt reports from home alone; son has connecting property; independent of all ADL; pt owns nebulizer; denies further DME or needs; verified PCP, and pharmacy; discharge plan is to return home with one of his sons or daughter in-law to provide transportation at that time. Chart reviewed, cardiology following for elevated troponins, ischemic w/u once his pneumonia is better, stress test vs heart cath, decision to be based on echo results, with preference for cath over stress, per cardiology's note. Pt on RA with O2 sat of 92%, on IV Rocephin Q 24 hr, consult for pulmonology.
Reviewed chart, swallow test diet recommendations is soft and bite size consistency with thin liquids. Plan is home at discharge, son to transport. No needs identified. Anticipate discharge within 24hrs. Ash Denise, MSW, LSW
Home

## 2021-08-26 NOTE — PROCEDURES
[address]                  [address] [id number],  [facility]                            CARDIAC CATHETERIZATION    PATIENT NAME: Erich Waldron                  :        1950  MED REC NO:   [id number]                            ROOM:       [id number]  ACCOUNT NO:   [de-identified]                           ADMIT DATE: 2021  PROVIDER:     Diana Conley MD    DATE OF PROCEDURE:  2021    PROCEDURES:  Left heart catheterization and selective coronary  angiography. The procedure was done through right radial approach using ultrasound  guidance. The patient received intravenous Versed and intravenous fentanyl for  sedation. INDICATION:  Non-ST-elevation myocardial infarction. LV systolic  dysfunction. PRESSURES:  1. Aorta 64/35 with a mean of 46.  2.  Left ventricular systolic pressure 66, left ventricular  end-diastolic pressure 7.  3.  There was no significant gradient across the aortic valve. CORONARY ANGIOGRAPHY:  LEFT MAIN:  The left main artery has around 20% luminal narrowing. LAD:  The left anterior descending artery is occluded at its origin. The distal LAD filled faintly from right-to-left collaterals. INTERMEDIATE RAMUS:  This is a moderate-sized vessel. It divides  shortly after its origin into multiple subdivisions. It is small  caliber measuring around 1 mm in diameter. It had a 90% to 95% ostial  stenosis. LCX:  This is a large, but nondominant vessel. It has a 90% to 95%  ostial narrowing followed by heavily calcified 80% narrowing which  involves the origin of the first marginal branch and the mid left  circumflex. The mid circumflex has a 95% lesion before the AV groove  branch and a small terminal lateral branch: This is a bifurcation  lesion that starts at the origin of the large marginal branch. The  large marginal branch has an eccentric 95% stenosis in its proximal  segment.     RCA:  The right

## 2021-08-26 NOTE — PROGRESS NOTES
cyanosis, trace edema  Musculoskeletal: normal range of motion, no joint swelling, deformity or tenderness   Neurologic: reflexes normal and symmetric, no cranial nerve deficit noted    LABS/IMAGING:    CBC:  Lab Results   Component Value Date    WBC 7.6 08/25/2021    HGB 12.2 (L) 08/25/2021    HCT 33.9 (L) 08/25/2021    MCV 87.8 08/25/2021    PLT 75 (L) 08/25/2021    LYMPHOPCT 10.4 (L) 08/23/2021    RBC 3.86 08/25/2021    MCH 31.6 08/25/2021    MCHC 36.0 (H) 08/25/2021    RDW 14.8 08/25/2021    NEUTOPHILPCT 75.7 08/23/2021    MONOPCT 5.2 08/23/2021    BASOPCT 3.5 (H) 08/23/2021    NEUTROABS 8.62 (H) 08/23/2021    LYMPHSABS 1.12 (L) 08/23/2021    MONOSABS 0.56 08/23/2021    EOSABS 0.39 08/23/2021    BASOSABS 0.39 (H) 08/23/2021       Recent Labs     08/25/21  0542 08/23/21  0806 08/21/21  1006   WBC 7.6 11.2 6.8   HGB 12.2* 13.5 13.7   HCT 33.9* 38.0 38.6   MCV 87.8 87.8 88.3   PLT 75* 86* 78*       BMP:   Recent Labs     08/23/21  0806 08/24/21  0616 08/25/21  0542    132 135   K 4.4 4.2 4.3    98 100   CO2 26 26 23   BUN 29* 33* 25*   CREATININE 1.2 1.2 1.2       MG: No results found for: MG  Ca/Phos:   Lab Results   Component Value Date    CALCIUM 8.7 08/25/2021    PHOS 2.8 06/18/2020     Amylase: No results found for: AMYLASE  Lipase: No results found for: LIPASE  LIVER PROFILE:   No results for input(s): AST, ALT, LIPASE, BILIDIR, BILITOT, ALKPHOS in the last 72 hours. Invalid input(s): AMYLASE,  ALB    PT/INR: No results for input(s): PROTIME, INR in the last 72 hours. APTT:   Recent Labs     08/23/21  0806   APTT 62.5*       Cardiac Enzymes:  No results found for: CKTOTAL, CKMB, CKMBINDEX, TROPONINI    CXR:  8/20/21- significant for peribronchial thickening, no infiltrates or effusions     PFTs:  None on the system, ordered one today     2D Echocardiogram:7/11/2019-         Left ventricular size is normal.   Apical dyskinesis, mid to distal septal hypokinesis.    Overall ejection fraction mildly decreased, EF estimated about 45%. CT Chest:  8/20/21 -     Impression   1.  No indication for acute pulmonary emboli.       2.  Extensive endobronchial process in the bronchial system both lower lobes   with the retained secretions or aspiration anterior with the discrete patchy   bronchopneumonia in the left lower lobe, minimal in the right lower lobe.       3.  No aneurysm formation or dissection of the thoracic aorta.       4.  Calcifications of all coronary arteries.  Please correlate clinically.       5.  Findings for chronic calcified pancreatitis. Lucetta Record is a fullness in the   tail of the pancreas which requires multiphase IV contrast MRI in better   advantage than CT for proper characterization.               PROBLEM LIST:  Patient Active Problem List   Diagnosis    Dilated cardiomyopathy ([address]    Non-rheumatic mitral regurgitation    Absolute anemia    SOBOE (shortness of breath on exertion)    Pulmonary HTN ([address]    Controlled type 2 diabetes mellitus with complication, without long-term current use of insulin ([address]    Essential (primary) hypertension    Pancytopenia ([address]    Coagulation defect ([address]    Pancreatic cyst    Community acquired bacterial pneumonia                   ASSESSMENT:  1.) Chronic Cough  2.) Aspiration pneumonia   3.) Ischemic cardiomyopathy with reduced EF of 45%  4.) Pulmonary HTN type 2   5.) 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 Need to rule out chronic aspiration pneumonia vs common variable immunodeficiency. Normal IgG level, follow respiratory cultures,     follow video swallow study. on Azithromycin 250 mg, continue for 1 month. If no improvement seen, we will plan a bronchoscopy to obtain samples. *- PFT   *- Aggressive pulmonary toilet.  3% nebulization, chest vest, PEP/flutter, breathing treatments     =    Bull Navarrete MD,[facility]  Pulmonary&Critical Care Medicine   Director of [address] [place] [address] Director of [address] [place] [address]    Beverley Field    NOTE: This report was transcribed using voice recognition software. Every effort was made to ensure accuracy; however, inadvertent computerized transcription errors may be present.

## 2021-08-26 NOTE — PROGRESS NOTES
INPATIENT CARDIOLOGY FOLLOW-UP    Name: Benito Gaspar    Age: 69 y.o. Date of Admission: 8/20/2021 10:15 AM    Date of Service: 8/26/2021    Chief Complaint: Follow-up for coronary atherosclerosis, ischemic cardiomyopathy, chronic systolic heart failure, non-ST elevation myocardial infarction, resolving pneumonia, hypertension, cirrhosis with associated thrombocytopenia    Interim History: The patient presently remains compensated from a cardiovascular standpoint with no active symptomatology. He is presently awaiting his coronary angiography. Review of Systems: The remainder of a complete multisystem review including consitutional, central nervous, respiratory, circulatory, gastrointestinal, genitourinary, endocrinologic, hematologic, musculoskeletal and psychiatric are negative. Problem List:  Patient Active Problem List   Diagnosis    Dilated cardiomyopathy ([address]    Non-rheumatic mitral regurgitation    Absolute anemia    SOBOE (shortness of breath on exertion)    Pulmonary HTN (HCC)    Controlled type 2 diabetes mellitus with complication, without long-term current use of insulin ([address]    Essential (primary) hypertension    Pancytopenia ([address]    Coagulation defect ([address]    Pancreatic cyst    Community acquired bacterial pneumonia       Allergies:   Allergies   Allergen Reactions    Penicillins Hives    Eggs Or Egg-Derived Products     Grass Pollen(K-O-R-T-Swt Oscar)     Milk-Related Compounds     Mixed Ragweed     Peanut-Containing Drug Products     Seasonal      Mold cats dogs ragweed       Current Medications:  Current Facility-Administered Medications   Medication Dose Route Frequency Provider Last Rate Last Admin    0.9 % sodium chloride infusion   IntraVENous Continuous Zoe Peter MD 50 mL/hr at 08/26/21 0609 New Bag at 08/26/21 0609    glipiZIDE (GLUCOTROL) tablet 5 mg  5 mg Oral QAM AC Len Son DO   5 mg at 08/25/21 0825    montelukast (SINGULAIR) tablet 10 mg  10 mg Oral Nightly Nadia Jacobs MD   10 mg at 08/25/21 2117    fluticasone (FLONASE) 50 MCG/ACT nasal spray 1 spray  1 spray Each Nostril Daily Nadia Jacobs MD   1 spray at 08/25/21 0826    enoxaparin (LOVENOX) injection 30 mg  30 mg Subcutaneous Daily Fortino Gilliland MD   30 mg at 08/25/21 0825    sodium chloride (Inhalant) 3 % nebulizer solution 4 mL  4 mL Nebulization PRN Maida Haines MD        guaiFENesin tablet 400 mg  400 mg Oral 4x Daily PRN Maida Haines MD        azithromycin (ZITHROMAX) tablet 250 mg  250 mg Oral Daily Maida Haines MD   250 mg at 08/25/21 0831    insulin lispro (HUMALOG) injection vial 0-12 Units  0-12 Units Subcutaneous TID WC Sudha Chacon MD   4 Units at 08/25/21 1639    insulin lispro (HUMALOG) injection vial 0-6 Units  0-6 Units Subcutaneous Nightly Sudha Chacon MD   1 Units at 08/25/21 2117    glucose (GLUTOSE) 40 % oral gel 15 g  15 g Oral PRN Sudha Chacon MD        dextrose 50 % IV solution  12.5 g IntraVENous PRN Sudha Chacon MD        glucagon (rDNA) injection 1 mg  1 mg Intramuscular PRN Sudha Chacon MD        dextrose 5 % solution  100 mL/hr IntraVENous PRN Sudha Chacon MD        atorvastatin (LIPITOR) tablet 40 mg  40 mg Oral Nightly Annemarie Pantelis, APRN - CNP   40 mg at 08/25/21 2122    aspirin EC tablet 81 mg  81 mg Oral Daily Annemarie Pantelis, APRN - CNP   81 mg at 08/25/21 0826    ipratropium-albuterol (DUONEB) nebulizer solution 1 ampule  1 ampule Inhalation Q4H Sudha Chacon MD   1 ampule at 08/25/21 2003    sodium chloride flush 0.9 % injection 5-40 mL  5-40 mL IntraVENous 2 times per day Nadia Jacobs MD   10 mL at 08/25/21 2120    sodium chloride flush 0.9 % injection 5-40 mL  5-40 mL IntraVENous PRN Nadia Jacobs MD        0.9 % sodium chloride infusion  25 mL IntraVENous PRN Nadia Jacobs MD        ondansetron (ZOFRAN-ODT) disintegrating tablet 4 mg  4 mg Oral Q8H PRN Nadia Jacobs MD        Or    ondansetron (ZOFRAN) injection 4 mg  4 mg IntraVENous Q6H PRN Nadia Jacobs MD        polyethylene glycol (GLYCOLAX) packet 17 g  17 g Oral Daily PRN Nadia Jacobs MD        acetaminophen (TYLENOL) tablet 650 mg  650 mg Oral Q6H PRN Nadia Jacobs MD        Or    acetaminophen (TYLENOL) suppository 650 mg  650 mg Rectal Q6H PRN Nadia Jacobs MD        cetirizine (ZYRTEC) tablet 10 mg  10 mg Oral Daily Nadia Jacobs MD   10 mg at 08/25/21 0825    diphenhydrAMINE (BENADRYL) tablet 50 mg  50 mg Oral Nightly PRN Nadia Jacobs MD        [Held by provider] furosemide (LASIX) tablet 40 mg  40 mg Oral Daily Nadia Jacobs MD   40 mg at 08/23/21 0933    lisinopril (PRINIVIL;ZESTRIL) tablet 2.5 mg  2.5 mg Oral Nightly Artemio Lovell MD   2.5 mg at 08/25/21 2117    metoprolol succinate (TOPROL XL) extended release tablet 25 mg  25 mg Oral Nightly Artemio Lovell MD   25 mg at 08/25/21 2117    [Held by provider] spironolactone (ALDACTONE) tablet 25 mg  25 mg Oral Nightly Nadia Jacobs MD   25 mg at 08/23/21 2138      sodium chloride 50 mL/hr at 08/26/21 0609    dextrose      sodium chloride         Physical Exam:  BP (!) 102/55   Pulse 80   Temp 96.6 °F (35.9 °C) (Temporal)   Resp 18   Ht 5' 10\" (1.778 m)   Wt 208 lb 3.2 oz (94.4 kg)   SpO2 97%   BMI 29.87 kg/m²   Weight change: Wt Readings from Last 3 Encounters:   08/24/21 208 lb 3.2 oz (94.4 kg)   08/06/21 211 lb (95.7 kg)   07/20/21 211 lb (95.7 kg)     The patient is awake, alert and in no discomfort or distress. No gross musculoskeletal deformity is present. No significant skin or nail changes are present. Gross examination of head, eyes, nose and throat are negative with the exception of the patient remaining edentulous. Jugular venous pressure is normal and no carotid bruits are present. Normal respiratory effort is noted with no accessory muscle usage present. Lung fields are clear to ascultation. Cardiac examination is notable for a regular rate and rhythm with no palpable thrill.  No gallop rhythm or cardiac murmur are identified. A benign abdominal examination is present with no masses or organomegaly. Intact pulses are present throughout all extremities and no peripheral edema is present. No focal neurologic deficits are present. Intake/Output:    Intake/Output Summary (Last 24 hours) at 8/26/2021 0757  Last data filed at 8/26/2021 1621  Gross per 24 hour   Intake 420 ml   Output 2900 ml   Net -2480 ml     No intake/output data recorded. Laboratory Tests:  Lab Results   Component Value Date    CREATININE 1.1 08/26/2021    BUN 22 08/26/2021     08/26/2021    K 4.4 08/26/2021     08/26/2021    CO2 23 08/26/2021     No results for input(s): CKTOTAL, CKMB in the last 72 hours. Invalid input(s): TROPONONI  No results found for: BNP  Lab Results   Component Value Date    WBC 7.6 08/25/2021    RBC 3.86 08/25/2021    HGB 12.2 08/25/2021    HCT 33.9 08/25/2021    MCV 87.8 08/25/2021    MCH 31.6 08/25/2021    MCHC 36.0 08/25/2021    RDW 14.8 08/25/2021    PLT 75 08/25/2021    MPV 12.4 08/25/2021     No results for input(s): ALKPHOS, ALT, AST, PROT, BILITOT, BILIDIR, LABALBU in the last 72 hours.   No results found for: MG  Lab Results   Component Value Date    PROTIME 15.1 06/22/2021    INR 1.4 06/22/2021     Lab Results   Component Value Date    TSH 2.240 06/18/2020     No components found for: CHLPL  Lab Results   Component Value Date    TRIG 84 08/21/2021    TRIG 135 06/18/2020    TRIG 150 (H) 04/27/2011     Lab Results   Component Value Date    HDL 41 08/21/2021    HDL 30 06/18/2020    HDL 34.5 (A) 04/27/2011     Lab Results   Component Value Date    LDLCALC 84 08/21/2021    LDLCALC 76 06/18/2020    LDLCALC 102 (H) 04/27/2011       Cardiac Tests:  Telemetry findings reviewed: sinus rhythm, no new tachy/bradyarrhythmias overnight      ASSESSMENT / PLAN: On a clinical basis, the patient appears compensated from a cardiovascular standpoint with pending coronary angiography for assessment of his coronary circulation to guide additional management recommendations. He is angiotensin-converting enzyme inhibitor and diuretics have been withheld in preparation for angiography and based on the absence of clinical volume overload and relative hypotension, potential dose modification of his medical regimen will be necessitated upon resumption. Continued aggressive risk factor modification of blood pressure and serum lipids will remain essential to reducing risk of future atherosclerotic development. Note: This report was completed utilizing computer voice recognition software. Every effort has been made to ensure accuracy, however; inadvertent computerized transcription errors may be present. Sparkle Rouse.  Barney Noss, [address] [facility]

## 2021-08-26 NOTE — OP NOTE
Operative Note      Patient: Jace Martines  YOB: 1950  MRN: [id number]    Date of Procedure: 8/26/21    Indication:  1. NSTEMI  2. AUC score: 9  3. AUC indication: 4    Procedure: Left Heart Catheterization, coronary angiography    Anesthesia: Versed, Fentanyl, Benadryl   Time sedation was administered: 09:36. I was present in the room when sedation was administered. Procedure end time: 09:50  Time spent with face to face monitoring of moderate sedation: 24 minutes    LHC performed via right radial approach using a 6 F sheath. 2.5mg of diluted Verapamil and 200mcg of nitroglycerine administered through the sheath. 5000 U heparin administered IV. Findings:  Left main: 20%  stenosis  LAD: 100 %  Stenosis  IR: 95% stenosis  Circumflex: 95 %   stenosis  RCA: Dominant. 70 %  stenosis  LV angio: not performed    Hemodynamics:  LV: 66 mmHg. EDP: 7 No gradient across AV. Ao: 64/35 ( 46 ). Sheath removed and TR band applied. There was good hemostasis achieved and the distal pulses were intact.      Complication: None   Estimated blood loss: 10 cc  Contrast use: 45 cc    Post op diagnosis:  MV CAD    PLAN:  CT surgery opinion      Electronically signed by Monserrat Castellanos MD on 8/26/2021 at 10:06 AM

## 2021-08-26 NOTE — PROGRESS NOTES
CTS consult placed via [facility] serve to Dr Steffen Bains.   Farrah GONZALEZ taking messages

## 2021-08-27 LAB
ANION GAP SERPL CALCULATED.3IONS-SCNC: 6 MMOL/L (ref 7–16)
BUN BLDV-MCNC: 22 MG/DL (ref 6–23)
CALCIUM SERPL-MCNC: 8.5 MG/DL (ref 8.6–10.2)
CHLORIDE BLD-SCNC: 100 MMOL/L (ref 98–107)
CO2: 25 MMOL/L (ref 22–29)
CREAT SERPL-MCNC: 1.2 MG/DL (ref 0.7–1.2)
CULTURE, RESPIRATORY: NORMAL
GFR AFRICAN AMERICAN: >60
GFR NON-AFRICAN AMERICAN: 60 ML/MIN/1.73
GLUCOSE BLD-MCNC: 101 MG/DL (ref 74–99)
HCT VFR BLD CALC: 36 % (ref 37–54)
HEMOGLOBIN: 12.6 G/DL (ref 12.5–16.5)
MCH RBC QN AUTO: 31.3 PG (ref 26–35)
MCHC RBC AUTO-ENTMCNC: 35 % (ref 32–34.5)
MCV RBC AUTO: 89.3 FL (ref 80–99.9)
METER GLUCOSE: 101 MG/DL (ref 74–99)
METER GLUCOSE: 107 MG/DL (ref 74–99)
METER GLUCOSE: 67 MG/DL (ref 74–99)
METER GLUCOSE: 89 MG/DL (ref 74–99)
METER GLUCOSE: 90 MG/DL (ref 74–99)
PDW BLD-RTO: 15.1 FL (ref 11.5–15)
PLATELET # BLD: 69 E9/L (ref 130–450)
PLATELET CONFIRMATION: NORMAL
PMV BLD AUTO: 12.3 FL (ref 7–12)
POTASSIUM REFLEX MAGNESIUM: 4.5 MMOL/L (ref 3.5–5)
POTASSIUM SERPL-SCNC: 4.5 MMOL/L (ref 3.5–5)
RBC # BLD: 4.03 E12/L (ref 3.8–5.8)
SMEAR, RESPIRATORY: NORMAL
SODIUM BLD-SCNC: 131 MMOL/L (ref 132–146)
WBC # BLD: 4.5 E9/L (ref 4.5–11.5)

## 2021-08-27 PROCEDURE — 6370000000 HC RX 637 (ALT 250 FOR IP): Performed by: INTERNAL MEDICINE

## 2021-08-27 PROCEDURE — 2580000003 HC RX 258: Performed by: INTERNAL MEDICINE

## 2021-08-27 PROCEDURE — 94669 MECHANICAL CHEST WALL OSCILL: CPT

## 2021-08-27 PROCEDURE — 99222 1ST HOSP IP/OBS MODERATE 55: CPT | Performed by: THORACIC SURGERY (CARDIOTHORACIC VASCULAR SURGERY)

## 2021-08-27 PROCEDURE — 36415 COLL VENOUS BLD VENIPUNCTURE: CPT

## 2021-08-27 PROCEDURE — 6360000002 HC RX W HCPCS: Performed by: INTERNAL MEDICINE

## 2021-08-27 PROCEDURE — 99233 SBSQ HOSP IP/OBS HIGH 50: CPT | Performed by: INTERNAL MEDICINE

## 2021-08-27 PROCEDURE — 82962 GLUCOSE BLOOD TEST: CPT

## 2021-08-27 PROCEDURE — 80048 BASIC METABOLIC PNL TOTAL CA: CPT

## 2021-08-27 PROCEDURE — 2060000000 HC ICU INTERMEDIATE R&B

## 2021-08-27 PROCEDURE — 85027 COMPLETE CBC AUTOMATED: CPT

## 2021-08-27 PROCEDURE — 94640 AIRWAY INHALATION TREATMENT: CPT

## 2021-08-27 RX ORDER — FUROSEMIDE 20 MG/1
20 TABLET ORAL DAILY
Status: DISCONTINUED | OUTPATIENT
Start: 2021-08-27 | End: 2021-08-29 | Stop reason: HOSPADM

## 2021-08-27 RX ADMIN — IPRATROPIUM BROMIDE AND ALBUTEROL SULFATE 1 AMPULE: .5; 2.5 SOLUTION RESPIRATORY (INHALATION) at 16:38

## 2021-08-27 RX ADMIN — IPRATROPIUM BROMIDE AND ALBUTEROL SULFATE 1 AMPULE: .5; 2.5 SOLUTION RESPIRATORY (INHALATION) at 05:23

## 2021-08-27 RX ADMIN — IPRATROPIUM BROMIDE AND ALBUTEROL SULFATE 1 AMPULE: .5; 2.5 SOLUTION RESPIRATORY (INHALATION) at 13:24

## 2021-08-27 RX ADMIN — Medication 10 ML: at 21:19

## 2021-08-27 RX ADMIN — FLUTICASONE PROPIONATE 1 SPRAY: 50 SPRAY, METERED NASAL at 09:04

## 2021-08-27 RX ADMIN — SPIRONOLACTONE 25 MG: 25 TABLET ORAL at 21:01

## 2021-08-27 RX ADMIN — LISINOPRIL 2.5 MG: 5 TABLET ORAL at 21:01

## 2021-08-27 RX ADMIN — ENOXAPARIN SODIUM 30 MG: 30 INJECTION SUBCUTANEOUS at 09:06

## 2021-08-27 RX ADMIN — FUROSEMIDE 20 MG: 20 TABLET ORAL at 09:04

## 2021-08-27 RX ADMIN — IPRATROPIUM BROMIDE AND ALBUTEROL SULFATE 1 AMPULE: .5; 2.5 SOLUTION RESPIRATORY (INHALATION) at 20:29

## 2021-08-27 RX ADMIN — AZITHROMYCIN 250 MG: 250 TABLET, FILM COATED ORAL at 09:04

## 2021-08-27 RX ADMIN — METOPROLOL SUCCINATE 25 MG: 25 TABLET, EXTENDED RELEASE ORAL at 21:01

## 2021-08-27 RX ADMIN — ATORVASTATIN CALCIUM 40 MG: 40 TABLET, FILM COATED ORAL at 21:01

## 2021-08-27 RX ADMIN — GLIPIZIDE 5 MG: 5 TABLET ORAL at 06:44

## 2021-08-27 RX ADMIN — ASPIRIN 81 MG: 81 TABLET, COATED ORAL at 09:04

## 2021-08-27 RX ADMIN — Medication 10 ML: at 09:04

## 2021-08-27 RX ADMIN — CETIRIZINE HYDROCHLORIDE 10 MG: 10 TABLET, FILM COATED ORAL at 09:04

## 2021-08-27 RX ADMIN — MONTELUKAST SODIUM 10 MG: 10 TABLET ORAL at 21:01

## 2021-08-27 RX ADMIN — IPRATROPIUM BROMIDE AND ALBUTEROL SULFATE 1 AMPULE: .5; 2.5 SOLUTION RESPIRATORY (INHALATION) at 09:27

## 2021-08-27 ASSESSMENT — PAIN SCALES - GENERAL
PAINLEVEL_OUTOF10: 0

## 2021-08-27 NOTE — PROGRESS NOTES
Comprehensive Nutrition Assessment    Type and Reason for Visit:  Initial (LOS)    Nutrition Recommendations/Plan: Recommend and start Glucerna supplement BD to help meet nutritional needs. Nutrition Assessment:  Patients po intake has been a little sporadic, averaging 50-75% of meals served ; adm w/ bronchopneumonia and NSTEMI ; s/p cardiac cath on 8/26 ; s/p EGD w/ EUS on 8/6 ; hx of DM/CHF/cirrhosis ; noted mild oropharyngeal phase dysphagia per speech who is recommending soft and bite size consistency solids (dysphagia 3) with thin liquids ; will start ONS    Malnutrition Assessment:  Malnutrition Status: At risk for malnutrition (Comment)    Context:  Acute Illness     Findings of the 6 clinical characteristics of malnutrition:  Energy Intake:  1 - 75% or less of estimated energy requirements for 7 or more days  Weight Loss:  Unable to assess (d/t possible fluid shifts ; hx of CHF)     Body Fat Loss:  Unable to assess     Muscle Mass Loss:  Unable to assess    Fluid Accumulation:  No significant fluid accumulation     Strength:  Not Performed    Estimated Daily Nutrient Needs:  Energy (kcal):  6008-7867 (1707 x 1.1 SF); Weight Used for Energy Requirements:  Current     Protein (g):   (1.2-1.4g/kg IBW); Weight Used for Protein Requirements:  Ideal        Fluid (ml/day):  9319-1836; Method Used for Fluid Requirements:  1 ml/kcal      Nutrition Related Findings:  -I&Os (-10.4 L), trace edema, active BS, edentulous, A&O x 4, [place], puncture site, SOB      Wounds:  None       Current Nutrition Therapies:    ADULT DIET; Regular; 3 carb choices (45 gm/meal);  Low Fat/Low Chol/High Fiber/DOMINGO    Anthropometric Measures:  · Height: 5' 10\" (177.8 cm)  · Current Body Weight: 208 lb (94.3 kg) (8/24, standing scale)   · Admission Body Weight: 211 lb (95.7 kg) (8/20, no method)    · Usual Body Weight:  (EMR shows past weights of 202# no method on 4/19/21 and 228# actual on 12/21/20)     · Ideal Body Weight: 166 lbs; % Ideal Body Weight 125.3 %   · BMI: 29.8  · BMI Categories: Overweight (BMI 25.0-29. 9)       Nutrition Diagnosis:   · Inadequate oral intake related to inadequate protein-energy intake (2/2 SOB) as evidenced by poor intake prior to admission, intake 51-75%      Nutrition Interventions:   Food and/or Nutrient Delivery:  Continue Current Diet, Start Oral Nutrition Supplement  Nutrition Education/Counseling:  Education not indicated   Coordination of Nutrition Care:  Continue to monitor while inpatient, Speech Therapy, Swallow Evaluation    Goals:  Pt will consume ~75% of meals served       Nutrition Monitoring and Evaluation:   Behavioral-Environmental Outcomes:  None Identified   Food/Nutrient Intake Outcomes:  Food and Nutrient Intake, Supplement Intake  Physical Signs/Symptoms Outcomes:  Biochemical Data, Chewing or Swallowing, GI Status, Fluid Status or Edema, Hemodynamic Status, Meal Time Behavior, Nutrition Focused Physical Findings, Skin, Weight     Discharge Planning:     Too soon to determine     Electronically signed by Millicent Whittington RD, LD on 8/27/21 at 11:30 AM EDT    Contact: [phone]

## 2021-08-27 NOTE — CONSULTS
CTS Consult    Patient name: Arabella Momin    Reason for consult:  MVCAD     Primary Care Physician: Biipn Frey MD    Date of service: 8/27/2021    Chief Complaint:  SOB    HPI:  70 yo male with hx of DM, dilated cardiomyopathy, liver cirrhosis, splenomegaly, chronic thrombocytopenia, who presented to the emergency department with complaints of shortness of breath. He was noted to have a mild troponin elevation and underwent LHC. This was significant for MVCAD. Allergies:    Allergies   Allergen Reactions    Penicillins Hives    Eggs Or Egg-Derived Products     Grass Pollen(K-O-R-T-Swt Oscar)     Milk-Related Compounds     Mixed Ragweed     Peanut-Containing Drug Products     Seasonal      Mold cats dogs ragweed       Home medications:    Current Facility-Administered Medications   Medication Dose Route Frequency Provider Last Rate Last Admin    furosemide (LASIX) tablet 20 mg  20 mg Oral Daily Lissette David MD   20 mg at 08/27/21 0904    glipiZIDE (GLUCOTROL) tablet 5 mg  5 mg Oral QAM AC Angela Leyva MD   5 mg at 08/27/21 0644    montelukast (SINGULAIR) tablet 10 mg  10 mg Oral Nightly Angela Leyva MD   10 mg at 08/26/21 2122    fluticasone (FLONASE) 50 MCG/ACT nasal spray 1 spray  1 spray Each Nostril Daily Angela Leyva MD   1 spray at 08/27/21 0904    enoxaparin (LOVENOX) injection 30 mg  30 mg Subcutaneous Daily Angela Leyva MD   30 mg at 08/27/21 0906    sodium chloride (Inhalant) 3 % nebulizer solution 4 mL  4 mL Nebulization PRN Angela Leyva MD        guaiFENesin tablet 400 mg  400 mg Oral 4x Daily PRN Angela Leyva MD        [facility]) tablet 250 mg  250 mg Oral Daily Angela Leyva MD   250 mg at 08/27/21 0904    insulin lispro (HUMALOG) injection vial 0-12 Units  0-12 Units Subcutaneous TID WC Angela Leyva MD   4 Units at 08/25/21 1639    insulin lispro (HUMALOG) injection vial 0-6 Units  0-6 Units Subcutaneous Nightly Angela Leyva MD   1 Units at 08/25/21 2117    glucose (GLUTOSE) 40 % oral gel 15 g  15 g Oral PRN Zayra Barnhart MD        dextrose 50 % IV solution  12.5 g IntraVENous PRN Zayra Barnhart MD        glucagon (rDNA) injection 1 mg  1 mg IntraMUSCular PRN Zayra Barnhart MD        dextrose 5 % solution  100 mL/hr IntraVENous PRN Zayra Barnhart MD        atorvastatin (LIPITOR) tablet 40 mg  40 mg Oral Nightly Zayra Barnhart MD   40 mg at 08/26/21 2122    aspirin EC tablet 81 mg  81 mg Oral Daily Zayra Barnhart MD   81 mg at 08/27/21 0904    ipratropium-albuterol (DUONEB) nebulizer solution 1 ampule  1 ampule Inhalation Q4H Zayra Barnhart MD   1 ampule at 08/27/21 0138    sodium chloride flush 0.9 % injection 5-40 mL  5-40 mL IntraVENous 2 times per day Zayra Barnhart MD   10 mL at 08/27/21 0904    sodium chloride flush 0.9 % injection 5-40 mL  5-40 mL IntraVENous PRN Zayra Barnhart MD        0.9 % sodium chloride infusion  25 mL IntraVENous PRN Zayra Barnhart MD        ondansetron (ZOFRAN-ODT) disintegrating tablet 4 mg  4 mg Oral Q8H PRN Zayra Barnhart MD        Or    ondansetron [facility]) injection 4 mg  4 mg IntraVENous Q6H PRN Zayra Barnhart MD        polyethylene glycol (GLYCOLAX) packet 17 g  17 g Oral Daily PRN Zayra Barnhart MD        acetaminophen (TYLENOL) tablet 650 mg  650 mg Oral Q6H PRN Zayra Barnhart MD        Or    acetaminophen (TYLENOL) suppository 650 mg  650 mg Rectal Q6H PRN Zayra Barnhart MD        cetirizine (ZYRTEC) tablet 10 mg  10 mg Oral Daily Zayra Barnhart MD   10 mg at 08/27/21 0904    diphenhydrAMINE (BENADRYL) tablet 50 mg  50 mg Oral Nightly PRN Zayra Barnhart MD        lisinopril (PRINIVIL;ZESTRIL) tablet 2.5 mg  2.5 mg Oral Nightly Zayra Barnhart MD   2.5 mg at 08/26/21 2123    metoprolol succinate (TOPROL XL) extended release tablet 25 mg  25 mg Oral Nightly Zayra Barnhart MD   25 mg at 08/26/21 2123    spironolactone (ALDACTONE) tablet 25 mg  25 mg Oral Nightly Zayra Barnhart MD   15 mg at 21       Past Medical History:  Past Medical History:   Diagnosis Date    Allergic rhinitis     Seasonal    Cardiomyopathy ([address]     CHF (congestive heart failure) (HCC)     DM (diabetes mellitus) ([address]     Enlarged prostate     Thrombocytopenia (HCC)     Type 2 diabetes mellitus without complication ([address]     VHD (valvular heart disease)        Past Surgical History:  Past Surgical History:   Procedure Laterality Date    CATARACT REMOVAL Bilateral 2017    CYST REMOVAL      from left heel    HAMMER TOE SURGERY Bilateral     REFRACTIVE SURGERY Bilateral     UPPER GASTROINTESTINAL ENDOSCOPY N/A 2021    EGD W/EUS FNA performed by [facility] History:  Social History     Socioeconomic History    Marital status:      Spouse name: Not on file    Number of children: Not on file    Years of education: Not on file    Highest education level: Not on file   Occupational History    Occupation: retired   Tobacco Use    Smoking status: Former Smoker     Packs/day: 1.00     Years: 20.00     Pack years: 20.00     Types: Cigarettes     Quit date: 1993     Years since quittin.6    Smokeless tobacco: Former User     Types: Chew     Quit date: 1970   Vaping Use    Vaping Use: Never used   Substance and Sexual Activity    Alcohol use: Yes     Comment: Occ beer    Drug use: Never    Sexual activity: Not on file   Other Topics Concern    Not on file   Social History Narrative    Drinks decaf coffee & occ pop. Social Determinants of Health     Financial Resource Strain:     Difficulty of Paying Living Expenses:    Food Insecurity:     Worried About Running Out of Food in the Last Year:     [address] in the Last Year:    Transportation Needs:     Lack of Transportation (Medical):      Lack of Transportation (Non-Medical):    Physical Activity:     Days of Exercise per Week:     Minutes of Exercise per Session: Stress:     Feeling of Stress :    Social Connections:     Frequency of Communication with Friends and Family:     Frequency of Social Gatherings with Friends and Family:     Attends Buddhist Services:     Active Member of Clubs or Organizations:     Attends Club or Organization Meetings:     Marital Status:    Intimate Partner Violence:     Fear of Current or Ex-Partner:     Emotionally Abused:     Physically Abused:     Sexually Abused:        Family History:  Family History   Problem Relation Age of Onset    Alcohol Abuse Father     No Known Problems Sister     Diabetes Brother        Review of Systems:  Constitutional: Denies fevers, chills, or weight loss. HEENT: Denies visual changes or hearing loss. Heart: As per HPI. Lungs: Denies shortness of breath, cough, or wheezing. Gastrointestinal: Denies nausea, vomiting, constipation, or diarrhea. Genitourinary: dysuria or hematuria. Psychiatric: Patient denies anxiety or depression. Neurologic: Patient denies weakness of the extremities, dizziness, or headaches. All other ROS checked and found to be negative. Labs:  Recent Labs     08/25/21  0542 08/26/21  0850 08/27/21  0533   WBC 7.6 8.2 4.5   HGB 12.2* 14.0 12.6   HCT 33.9* 39.4 36.0*   PLT 75* 83* 69*      Recent Labs     08/25/21  0542 08/26/21  0645 08/27/21  0533   BUN 25* 22 22   CREATININE 1.2 1.1 1.2       Objective:  Vitals BP (!) 97/55   Pulse 73   Temp 96.7 °F (35.9 °C) (Temporal)   Resp 16   Ht 5' 10\" (1.778 m)   Wt 208 lb 3.2 oz (94.4 kg)   SpO2 96%   BMI 29.87 kg/m²   General Appearance: Pleasant 69y.o. year old male who appears stated age. Communicates well, no acute distress. HEENT: Head is normocephalic, atraumatic. EOMs intact, PERRL. Trachea midline. Lungs: Normal respiratory rate and normal effort. He is not in respiratory distress. Breath sounds clear to auscultation. No wheezes. Heart: Normal rate. Regular rhythm.  S1 normal and S2 normal. Chest: Symmetric chest wall expansion. Extremities: Normal range of motion. Neurological: Patient is alert and oriented to person, place and time. Patient has normal reflexes. Skin: Warm and dry. Abdomen: Abdomen is soft and non-distended. Bowel sounds are normal. There is no abdominal tenderness tenderness. There is no guarding. There is no mass. Pulses: Distal pulses are intact. Skin: Warm and dry without lesions. CORONARY ANGIOGRAPHY:  LEFT MAIN:  The left main artery has around 20% luminal narrowing.     LAD:  The left anterior descending artery is occluded at its origin. The distal LAD filled faintly from right-to-left collaterals.     INTERMEDIATE RAMUS:  This is a moderate-sized vessel. It divides  shortly after its origin into multiple subdivisions. It is small  caliber measuring around 1 mm in diameter. It had a 90% to 95% ostial  stenosis.     LCX:  This is a large, but nondominant vessel. It has a 90% to 95%  ostial narrowing followed by heavily calcified 80% narrowing which  involves the origin of the first marginal branch and the mid left  circumflex. The mid circumflex has a 95% lesion before the AV groove  branch and a small terminal lateral branch: This is a bifurcation  lesion that starts at the origin of the large marginal branch. The  large marginal branch has an eccentric 95% stenosis in its proximal  segment.     RCA:  The right coronary artery is heavily calcified along its course. There is a somewhat hazy 60% to 70% stenosis in the proximal vessel. There is around 50% to 60% disease in the mid vessel.   There appeared to  be around 70% to 80% ostial stenosis of the posterolateral branch and  also around 60% to 70% stenosis of the ostium of the posterior  descending artery branch.     The right radial arterial sheath was removed at the end of the procedure  and a TR band was applied with adequate hemostasis and with preservation  of pulse.     The patient tolerated the procedure well and left the cardiac  catheterization laboratory in a stable condition.     CONCLUSION:  Severe multivessel coronary artery disease as described  above. Assessment/Plan  68 yo male admitted with NSTEMI, found to have severe MVCAD.    Would ask for GI to evaluate him and comment on the severity of his liver disease/cirrhosis to help risk stratify   In general, with cirrhosis, splenomegaly, and chronic thrombocytopenia, it is doubtful that he will be a candidate for surgery       Electronically signed by Rut Noe DO on 8/27/2021 at 12:46 PM

## 2021-08-27 NOTE — PROGRESS NOTES
INPATIENT CARDIOLOGY FOLLOW-UP    Name: Star Todd    Age: 69 y.o. Date of Admission: 8/20/2021 10:15 AM    Date of Service: 8/27/2021    Chief Complaint: Follow-up for coronary atherosclerosis, ischemic cardiomyopathy, chronic systolic heart failure, non-ST elevation myocardial infarction, resolving pneumonia, hypertension, cirrhosis with associated chronic thrombocytopenia    Interim History: The patient present remains compensated from a cardiovascular standpoint with no complications following his coronary angiography and and intact catheterization site. Cardiothoracic surgical assessment is pending in the face of his extensive multivessel disease and left ventricular systolic dysfunction. Renal function and electrolytes remain stable following contrast administration. Review of Systems: The remainder of a complete multisystem review including consitutional, central nervous, respiratory, circulatory, gastrointestinal, genitourinary, endocrinologic, hematologic, musculoskeletal and psychiatric are negative. Problem List:  Patient Active Problem List   Diagnosis    Dilated cardiomyopathy ([address]    Non-rheumatic mitral regurgitation    Absolute anemia    SOBOE (shortness of breath on exertion)    Pulmonary HTN (HCC)    Controlled type 2 diabetes mellitus with complication, without long-term current use of insulin ([address]    Essential (primary) hypertension    Pancytopenia ([address]    Coagulation defect ([address]    Pancreatic cyst    Community acquired bacterial pneumonia    Bronchopneumonia       Allergies:   Allergies   Allergen Reactions    Penicillins Hives    Eggs Or Egg-Derived Products     Grass Pollen(K-O-R-T-Swt Oscar)     Milk-Related Compounds     Mixed Ragweed     Peanut-Containing Drug Products     Seasonal      Mold cats dogs ragweed       Current Medications:  Current Facility-Administered Medications   Medication Dose Route Frequency Provider Last Rate Last Admin    furosemide (LASIX) tablet 20 mg  20 mg Oral Daily Courtney Elliott MD        glipiZIDE (GLUCOTROL) tablet 5 mg  5 mg Oral QAM AC Lilia Pagoda Merary Cuevas MD   5 mg at 08/27/21 0644    montelukast (SINGULAIR) tablet 10 mg  10 mg Oral Nightly Carin Schwartz MD   10 mg at 08/26/21 2122    fluticasone (FLONASE) 50 MCG/ACT nasal spray 1 spray  1 spray Each Nostril Daily Carin Schwartz MD   1 spray at 08/26/21 1043    enoxaparin (LOVENOX) injection 30 mg  30 mg Subcutaneous Daily Carin Schwartz MD   30 mg at 08/26/21 1043    sodium chloride (Inhalant) 3 % nebulizer solution 4 mL  4 mL Nebulization PRN Carin Schwartz MD        guaiFENesin tablet 400 mg  400 mg Oral 4x Daily PRN Carin Schwartz MD        [facility]) tablet 250 mg  250 mg Oral Daily Carin Schwartz MD   250 mg at 08/26/21 1043    insulin lispro (HUMALOG) injection vial 0-12 Units  0-12 Units Subcutaneous TID WC Carin Schwartz MD   4 Units at 08/25/21 1639    insulin lispro (HUMALOG) injection vial 0-6 Units  0-6 Units Subcutaneous Nightly Carin Schwartz MD   1 Units at 08/25/21 2117    glucose (GLUTOSE) 40 % oral gel 15 g  15 g Oral PRN Carin Schwartz MD        dextrose 50 % IV solution  12.5 g IntraVENous PRN Carin Schwartz MD        glucagon (rDNA) injection 1 mg  1 mg IntraMUSCular PRN Carin Schwartz MD        dextrose 5 % solution  100 mL/hr IntraVENous PRN Carin Schwartz MD        atorvastatin (LIPITOR) tablet 40 mg  40 mg Oral Nightly Carin Schwartz MD   40 mg at 08/26/21 2122    aspirin EC tablet 81 mg  81 mg Oral Daily Carin Schwartz MD   81 mg at 08/26/21 1042    ipratropium-albuterol (DUONEB) nebulizer solution 1 ampule  1 ampule Inhalation Q4H Carin Schwartz MD   1 ampule at 08/27/21 0523    sodium chloride flush 0.9 % injection 5-40 mL  5-40 mL IntraVENous 2 times per day Carin Schwartz MD   10 mL at 08/26/21 0783    sodium chloride flush 0.9 % injection 5-40 mL  5-40 mL IntraVENous PRN Carin Schwartz MD        0.9 % sodium chloride infusion  25 mL IntraVENous PRN Zayra Barnhart MD        ondansetron (ZOFRAN-ODT) disintegrating tablet 4 mg  4 mg Oral Q8H PRN Zayra Barnhart MD        Or    ondansetron [facility]) injection 4 mg  4 mg IntraVENous Q6H PRN Zayra Barnhart MD        polyethylene glycol [facility]) packet 17 g  17 g Oral Daily PRN Zayra Barnhart MD        acetaminophen (TYLENOL) tablet 650 mg  650 mg Oral Q6H PRN Zayra Barnhart MD        Or    acetaminophen (TYLENOL) suppository 650 mg  650 mg Rectal Q6H PRN Zayra Barnhart MD        cetirizine (ZYRTEC) tablet 10 mg  10 mg Oral Daily Zayra Barnhart MD   10 mg at 08/26/21 1042    diphenhydrAMINE (BENADRYL) tablet 50 mg  50 mg Oral Nightly PRN Zayra Barnhart MD        lisinopril (PRINIVIL;ZESTRIL) tablet 2.5 mg  2.5 mg Oral Nightly Zayra Barnhart MD   2.5 mg at 08/26/21 2123    metoprolol succinate (TOPROL XL) extended release tablet 25 mg  25 mg Oral Nightly Zayra Barnhart MD   25 mg at 08/26/21 2123    spironolactone (ALDACTONE) tablet 25 mg  25 mg Oral Nightly Zayra Barnhart MD   25 mg at 08/23/21 2138      dextrose      sodium chloride         Physical Exam:  BP (!) 97/55   Pulse 73   Temp 96.7 °F (35.9 °C) (Temporal)   Resp 16   Ht 5' 10\" (1.778 m)   Wt 208 lb 3.2 oz (94.4 kg)   SpO2 97%   BMI 29.87 kg/m²   Weight change: Wt Readings from Last 3 Encounters:   08/24/21 208 lb 3.2 oz (94.4 kg)   08/06/21 211 lb (95.7 kg)   07/20/21 211 lb (95.7 kg)     The patient is awake, alert and in no discomfort or distress. No gross musculoskeletal deformity is present. No significant skin or nail changes are present. Gross examination of head, eyes, nose and throat are negative. Jugular venous pressure is normal and no carotid bruits are present. Normal respiratory effort is noted with no accessory muscle usage present. Lung fields are clear to ascultation. Cardiac examination is notable for a regular rate and rhythm with no palpable thrill.  No gallop rhythm or cardiac murmur are identified. A benign abdominal examination is present with no masses or organomegaly. Intact pulses are present throughout all extremities and no peripheral edema is present. No focal neurologic deficits are present. Intake/Output:    Intake/Output Summary (Last 24 hours) at 8/27/2021 0844  Last data filed at 8/27/2021 0507  Gross per 24 hour   Intake 240 ml   Output 2900 ml   Net -2660 ml     No intake/output data recorded. Laboratory Tests:  Lab Results   Component Value Date    CREATININE 1.2 08/27/2021    BUN 22 08/27/2021     (L) 08/27/2021    K 4.5 08/27/2021    K 4.5 08/27/2021     08/27/2021    CO2 25 08/27/2021     No results for input(s): CKTOTAL, CKMB in the last 72 hours. Invalid input(s): TROPONONI  No results found for: BNP  Lab Results   Component Value Date    WBC 4.5 08/27/2021    RBC 4.03 08/27/2021    HGB 12.6 08/27/2021    HCT 36.0 08/27/2021    MCV 89.3 08/27/2021    MCH 31.3 08/27/2021    MCHC 35.0 08/27/2021    RDW 15.1 08/27/2021    PLT 69 08/27/2021    MPV 12.3 08/27/2021     No results for input(s): ALKPHOS, ALT, AST, PROT, BILITOT, BILIDIR, LABALBU in the last 72 hours.   No results found for: MG  Lab Results   Component Value Date    PROTIME 15.1 06/22/2021    INR 1.4 06/22/2021     Lab Results   Component Value Date    TSH 2.240 06/18/2020     No components found for: CHLPL  Lab Results   Component Value Date    TRIG 84 08/21/2021    TRIG 135 06/18/2020    TRIG 150 (H) 04/27/2011     Lab Results   Component Value Date    HDL 41 08/21/2021    HDL 30 06/18/2020    HDL 34.5 (A) 04/27/2011     Lab Results   Component Value Date    LDLCALC 84 08/21/2021    [address] 06/18/2020    LDLCALC 102 (H) 04/27/2011       Cardiac Tests:  Telemetry findings reviewed: sinus rhythm, no new tachy/bradyarrhythmias overnight  Last cardiac catheterization: Coronary angiography demonstrated extensive multivessel disease with occlusion of the ostium of the left anterior

## 2021-08-27 NOTE — PLAN OF CARE
Problem: Infection:  Goal: Will remain free from infection  Description: Will remain free from infection  8/27/2021 0448 by Chris Wheeler RN  Outcome: Met This Shift  8/27/2021 0445 by Chris Wheeler RN  Outcome: Met This Shift     Problem: Daily Care:  Goal: Daily care needs are met  Description: Daily care needs are met  8/27/2021 0448 by Chris Wheeler RN  Outcome: Met This Shift  8/27/2021 0445 by Chris Wheeler RN  Outcome: Met This Shift     Problem: Discharge Planning:  Goal: Patients continuum of care needs are met  Description: Patients continuum of care needs are met  8/27/2021 0448 by Chris Wheeler RN  Outcome: Met This Shift  8/27/2021 0445 by Chris Wheeler RN  Outcome: Met This Shift     Problem: Breathing Pattern - Ineffective:  Goal: Ability to achieve and maintain a regular respiratory rate will improve  Description: Ability to achieve and maintain a regular respiratory rate will improve  8/27/2021 0448 by Chris Wheeler RN  Outcome: Met This Shift  8/27/2021 0445 by Chris Wheeler RN  Outcome: Met This Shift

## 2021-08-27 NOTE — PROGRESS NOTES
Hospitalist Progress Note      Synopsis: Patient admitted on 8/20/2021    69 y.o. male who presented to [id number]  [address] [facility] with cough and shortness of breath. He has a hx of DM2 for which he is on oral meds, valvular heart disease, dilated cardiomyopathy (followed with Dr. Baldo Cho), liver cirrhosis, pancytopenia, recent FNA of the pancreatic tail cyst who presented to the ED for shortness of breath and cough. Cough of think secretions has been going on for 7 years but have worsened over the last several months but pts shortness of breath was more significant today so EMS was called. It is associated with congestion. EMS report pt was hypoxic to 90% but in the ED he was not found to be hypoxic. Sputum was productive of thick purulent secretions. CTA pulmonary suggest bronchopulmonary pneumonia so pt was given azithromycin and rocephin. Pt also reported chest pain that was sharp in the center of his chest with b/l arm pain this am; EKG did not show acute changes but pt initial troponin was 128 and it increased to 198 - so he was given aspirin. Admitted to our service for further evaluation and treatment. Cardiology consulted and at this time cath has been cancelled, echo ordered and to be done on 8/24. Pulmonary consulted for chronic cough with hx of bronchiectasis - ruling out aspiration pneumonia vs immunodeficiency     Subjective    No acute events overnight    Exam:  BP (!) 97/55   Pulse 73   Temp 96.7 °F (35.9 °C) (Temporal)   Resp 16   Ht 5' 10\" (1.778 m)   Wt 208 lb 3.2 oz (94.4 kg)   SpO2 96%   BMI 29.87 kg/m²   General appearance: No apparent distress   HEENT: Pupils equal, round, and reactive to light. Conjunctivae/corneas clear. Neck: Supple   Respiratory:  Normal respiratory effort. Mild expiratory wheezing   Cardiovascular: Regular rate and rhythm without murmurs, rubs or gallops.   Abdomen: Soft, no tenderness, no distention, no organomegaly   Musculoskeletal: No clubbing, cyanosis or edema bilaterally. Skin: Skin color, texture, turgor normal.  No rashes or lesions. Neurologic:  nonfocal       Medications:  Reviewed    Infusion Medications    dextrose      sodium chloride       Scheduled Medications    furosemide  20 mg Oral Daily    glipiZIDE  5 mg Oral QAM AC    montelukast  10 mg Oral Nightly    fluticasone  1 spray Each Nostril Daily    enoxaparin  30 mg Subcutaneous Daily    azithromycin  250 mg Oral Daily    insulin lispro  0-12 Units Subcutaneous TID WC    insulin lispro  0-6 Units Subcutaneous Nightly    atorvastatin  40 mg Oral Nightly    aspirin  81 mg Oral Daily    ipratropium-albuterol  1 ampule Inhalation Q4H    sodium chloride flush  5-40 mL IntraVENous 2 times per day    cetirizine  10 mg Oral Daily    lisinopril  2.5 mg Oral Nightly    metoprolol succinate  25 mg Oral Nightly    spironolactone  25 mg Oral Nightly     PRN Meds: sodium chloride (Inhalant), guaiFENesin, glucose, dextrose, glucagon (rDNA), dextrose, sodium chloride flush, sodium chloride, ondansetron **OR** ondansetron, polyethylene glycol, acetaminophen **OR** acetaminophen, diphenhydrAMINE    I/O    Intake/Output Summary (Last 24 hours) at 8/27/2021 0937  Last data filed at 8/27/2021 0932  Gross per 24 hour   Intake 720 ml   Output 2900 ml   Net -2180 ml       Labs:   Recent Labs     08/25/21  0542 08/26/21  0850 08/27/21  0533   WBC 7.6 8.2 4.5   HGB 12.2* 14.0 12.6   HCT 33.9* 39.4 36.0*   PLT 75* 83* 69*       Recent Labs     08/25/21  0542 08/25/21  0542 08/26/21  0645 08/27/21  0533     --  133 131*   K 4.3   < > 4.4 4.5  4.5     --  101 100   CO2 23  --  23 25   BUN 25*  --  22 22   CREATININE 1.2  --  1.1 1.2   CALCIUM 8.7  --  8.9 8.5*    < > = values in this interval not displayed. No results for input(s): PROT, ALB, ALKPHOS, ALT, AST, BILITOT, AMYLASE, LIPASE in the last 72 hours. No results for input(s): INR in the last 72 hours.     No results for input(s): Sridevi Humphrey in the last 72 hours. Chronic labs:  Lab Results   Component Value Date    CHOL 142 08/21/2021    TRIG 84 08/21/2021    HDL 41 08/21/2021    LDLCALC 84 08/21/2021    TSH 2.240 06/18/2020    INR 1.4 06/22/2021    LABA1C 6.7 (H) 07/12/2021       Radiology:  Imaging studies reviewed today. ASSESSMENT:  NSTEMI, likely type II  Ischemic cardiomyopathy   CAP vs aspiration pneumonia  DM2  Liver cirrhosis  Thrombocytopenia 2/2 liver cirrhosis      PLAN:  Pulmonology following  Cardiology following  Consult CTS  Continue azithromycin and prednisone  Breathing treatments  If symptoms don't improve with azith for one month will get outpatient bronchoscopy  Continue home meds as ordered      Diet: ADULT DIET; Regular; 3 carb choices (45 gm/meal); Low Fat/Low Chol/High Fiber/DOMINGO  Code Status: Full Code  PT/OT Eval Status:   As needed   DVT Prophylaxis:   lovenox  Recommended disposition at discharge:  home at discharge     +++++++++++++++++++++++++++++++++++++++++++++++++  Mariel Dk, [address]  +++++++++++++++++++++++++++++++++++++++++++++++++  NOTE: This report was transcribed using voice recognition software. Every effort was made to ensure accuracy; however, inadvertent computerized transcription errors may be present.

## 2021-08-27 NOTE — PROGRESS NOTES
Pulmonary [facility]                             Pulmonary Consult/Progress Note :            Reason for Consultation: Shortness of breath, cough   CC : Shortness of breath   HPI:   Doing much better  Had heart cath   His cough has improved   Less sob     PHYSICAL EXAMINATION:     VITAL SIGNS:  BP (!) 110/59   Pulse 104   Temp 96.4 °F (35.8 °C) (Temporal)   Resp 17   Ht 5' 10\" (1.778 m)   Wt 208 lb 3.2 oz (94.4 kg)   SpO2 97%   BMI 29.87 kg/m²   Wt Readings from Last 3 Encounters:   08/24/21 208 lb 3.2 oz (94.4 kg)   08/06/21 211 lb (95.7 kg)   07/20/21 211 lb (95.7 kg)     Temp Readings from Last 3 Encounters:   08/26/21 96.4 °F (35.8 °C) (Temporal)   08/06/21 97.3 °F (36.3 °C) (Temporal)   07/20/21 97 °F (36.1 °C)     TMAX:  BP Readings from Last 3 Encounters:   08/26/21 (!) 110/59   08/06/21 (!) 109/56   08/06/21 (!) 111/59     Pulse Readings from Last 3 Encounters:   08/26/21 104   08/06/21 85   07/20/21 66           INTAKE/OUTPUTS:  I/O last 3 completed shifts:   In: 240 [P.O.:240]  Out: 900 [Urine:900]    Intake/Output Summary (Last 24 hours) at 8/26/2021 2347  Last data filed at 8/26/2021 1434  Gross per 24 hour   Intake 240 ml   Output 900 ml   Net -660 ml       General Appearance: alert and oriented to person, place and time, well-developed and   well-nourished, in no acute distress   Eyes: pupils equal, round, and reactive to light, extraocular eye movements intact, conjunctivae normal and sclera anicteric   Neck: neck supple and non tender without mass, no thyromegaly, no thyroid nodules and no cervical adenopathy   Pulmonary/Chest:Clear to auscultation b/l  Cardiovascular: normal rate, regular rhythm, normal S1 and S2, no murmurs, rubs, clicks or gallops, distal pulses intact, no carotid bruits, no murmurs, no gallops, no carotid bruits and no JVD   Abdomen: obese, soft, non-tender, non-distended, normal bowel sounds, no masses or organomegaly Extremities: no cyanosis, trace edema  Musculoskeletal: normal range of motion, no joint swelling, deformity or tenderness   Neurologic: reflexes normal and symmetric, no cranial nerve deficit noted    LABS/IMAGING:    CBC:  Lab Results   Component Value Date    WBC 8.2 08/26/2021    HGB 14.0 08/26/2021    HCT 39.4 08/26/2021    MCV 87.4 08/26/2021    PLT 83 (L) 08/26/2021    LYMPHOPCT 35.2 08/26/2021    RBC 4.51 08/26/2021    MCH 31.0 08/26/2021    MCHC 35.5 (H) 08/26/2021    RDW 14.9 08/26/2021    NEUTOPHILPCT 48.7 08/26/2021    MONOPCT 11.0 08/26/2021    BASOPCT 1.0 08/26/2021    NEUTROABS 4.01 08/26/2021    LYMPHSABS 2.90 08/26/2021    MONOSABS 0.91 08/26/2021    EOSABS 0.30 08/26/2021    BASOSABS 0.08 08/26/2021       Recent Labs     08/26/21  0850 08/25/21  0542 08/23/21  0806   WBC 8.2 7.6 11.2   HGB 14.0 12.2* 13.5   HCT 39.4 33.9* 38.0   MCV 87.4 87.8 87.8   PLT 83* 75* 86*       BMP:   Recent Labs     08/24/21  0616 08/25/21  0542 08/26/21  0645    135 133   K 4.2 4.3 4.4   CL 98 100 101   CO2 26 23 23   BUN 33* 25* 22   CREATININE 1.2 1.2 1.1       MG: No results found for: MG  Ca/Phos:   Lab Results   Component Value Date    CALCIUM 8.9 08/26/2021    PHOS 2.8 06/18/2020     Amylase: No results found for: AMYLASE  Lipase: No results found for: LIPASE  LIVER PROFILE:   No results for input(s): AST, ALT, LIPASE, BILIDIR, BILITOT, ALKPHOS in the last 72 hours. Invalid input(s): AMYLASE,  ALB    PT/INR: No results for input(s): PROTIME, INR in the last 72 hours. APTT:   No results for input(s): APTT in the last 72 hours. Cardiac Enzymes:  No results found for: CKTOTAL, CKMB, CKMBINDEX, TROPONINI    CXR:  8/20/21- significant for peribronchial thickening, no infiltrates or effusions     PFTs:  None on the system, ordered one today     2D Echocardiogram:7/11/2019-         Left ventricular size is normal.   Apical dyskinesis, mid to distal septal hypokinesis.    Overall ejection fraction mildly decreased, EF estimated about 45%. CT Chest:  8/20/21 -     Impression   1.  No indication for acute pulmonary emboli.       2.  Extensive endobronchial process in the bronchial system both lower lobes   with the retained secretions or aspiration anterior with the discrete patchy   bronchopneumonia in the left lower lobe, minimal in the right lower lobe.       3.  No aneurysm formation or dissection of the thoracic aorta.       4.  Calcifications of all coronary arteries.  Please correlate clinically.       5.  Findings for chronic calcified pancreatitis. Esha Contes is a fullness in the   tail of the pancreas which requires multiphase IV contrast MRI in better   advantage than CT for proper characterization.               PROBLEM LIST:  Patient Active Problem List   Diagnosis    Dilated cardiomyopathy ([address]    Non-rheumatic mitral regurgitation    Absolute anemia    SOBOE (shortness of breath on exertion)    Pulmonary HTN ([address]    Controlled type 2 diabetes mellitus with complication, without long-term current use of insulin ([address]    Essential (primary) hypertension    Pancytopenia (HCC)    Coagulation defect ([address]    Pancreatic cyst    Community acquired bacterial pneumonia    Bronchopneumonia                   ASSESSMENT:  1.) Chronic Cough  2.) Aspiration pneumonia   3.) Ischemic cardiomyopathy with reduced EF of 45%  4.) Pulmonary HTN type 2   5.) 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 If no improvement seen, we will plan a bronchoscopy to obtain samples. *- PFT   *- Aggressive pulmonary toilet.  3% nebulization, chest vest, PEP/flutter, breathing treatments     Bronch down the road if not better  CAD as per card/cts  Pending PFT  Discuss with wife    Megha Reno MD,[facility]  Pulmonary&Critical Care Medicine   Director of [facility]  Medical Director of [address] [place] [address]   Professor Geovanna Richey    NOTE: This report was transcribed using voice recognition software. Every effort was made to ensure accuracy; however, inadvertent computerized transcription errors may be present.

## 2021-08-28 LAB
ANION GAP SERPL CALCULATED.3IONS-SCNC: 7 MMOL/L (ref 7–16)
BUN BLDV-MCNC: 19 MG/DL (ref 6–23)
CALCIUM SERPL-MCNC: 8.8 MG/DL (ref 8.6–10.2)
CHLORIDE BLD-SCNC: 99 MMOL/L (ref 98–107)
CO2: 24 MMOL/L (ref 22–29)
CREAT SERPL-MCNC: 1 MG/DL (ref 0.7–1.2)
GFR AFRICAN AMERICAN: >60
GFR NON-AFRICAN AMERICAN: >60 ML/MIN/1.73
GLUCOSE BLD-MCNC: 49 MG/DL (ref 74–99)
MAGNESIUM: 2.1 MG/DL (ref 1.6–2.6)
METER GLUCOSE: 104 MG/DL (ref 74–99)
METER GLUCOSE: 131 MG/DL (ref 74–99)
METER GLUCOSE: 48 MG/DL (ref 74–99)
METER GLUCOSE: 94 MG/DL (ref 74–99)
METER GLUCOSE: 99 MG/DL (ref 74–99)
POTASSIUM SERPL-SCNC: 4.3 MMOL/L (ref 3.5–5)
SODIUM BLD-SCNC: 130 MMOL/L (ref 132–146)

## 2021-08-28 PROCEDURE — 83735 ASSAY OF MAGNESIUM: CPT

## 2021-08-28 PROCEDURE — 99233 SBSQ HOSP IP/OBS HIGH 50: CPT | Performed by: INTERNAL MEDICINE

## 2021-08-28 PROCEDURE — 6370000000 HC RX 637 (ALT 250 FOR IP): Performed by: INTERNAL MEDICINE

## 2021-08-28 PROCEDURE — 80048 BASIC METABOLIC PNL TOTAL CA: CPT

## 2021-08-28 PROCEDURE — 94640 AIRWAY INHALATION TREATMENT: CPT

## 2021-08-28 PROCEDURE — 36415 COLL VENOUS BLD VENIPUNCTURE: CPT

## 2021-08-28 PROCEDURE — 99232 SBSQ HOSP IP/OBS MODERATE 35: CPT | Performed by: THORACIC SURGERY (CARDIOTHORACIC VASCULAR SURGERY)

## 2021-08-28 PROCEDURE — 6360000002 HC RX W HCPCS: Performed by: INTERNAL MEDICINE

## 2021-08-28 PROCEDURE — 2060000000 HC ICU INTERMEDIATE R&B

## 2021-08-28 PROCEDURE — 82962 GLUCOSE BLOOD TEST: CPT

## 2021-08-28 PROCEDURE — 2580000003 HC RX 258: Performed by: INTERNAL MEDICINE

## 2021-08-28 PROCEDURE — 94668 MNPJ CHEST WALL SBSQ: CPT

## 2021-08-28 RX ORDER — FLUTICASONE PROPIONATE 50 MCG
1 SPRAY, SUSPENSION (ML) NASAL DAILY
Qty: 1 BOTTLE | Refills: 3 | Status: SHIPPED | OUTPATIENT
Start: 2021-08-29 | End: 2021-12-07 | Stop reason: SDUPTHER

## 2021-08-28 RX ORDER — ASPIRIN 81 MG/1
81 TABLET ORAL DAILY
Qty: 30 TABLET | Refills: 3 | Status: SHIPPED | OUTPATIENT
Start: 2021-08-29 | End: 2021-12-07 | Stop reason: SDUPTHER

## 2021-08-28 RX ORDER — MONTELUKAST SODIUM 10 MG/1
10 TABLET ORAL NIGHTLY
Qty: 30 TABLET | Refills: 3 | Status: SHIPPED | OUTPATIENT
Start: 2021-08-28 | End: 2022-01-07 | Stop reason: SDUPTHER

## 2021-08-28 RX ORDER — FUROSEMIDE 20 MG/1
20 TABLET ORAL DAILY
Qty: 60 TABLET | Refills: 3 | Status: SHIPPED | OUTPATIENT
Start: 2021-08-29 | End: 2021-12-07 | Stop reason: DRUGHIGH

## 2021-08-28 RX ORDER — AZITHROMYCIN 250 MG/1
250 TABLET, FILM COATED ORAL DAILY
Qty: 10 TABLET | Refills: 0 | Status: SHIPPED | OUTPATIENT
Start: 2021-08-29 | End: 2021-09-08

## 2021-08-28 RX ORDER — ATORVASTATIN CALCIUM 40 MG/1
40 TABLET, FILM COATED ORAL NIGHTLY
Qty: 30 TABLET | Refills: 3 | Status: SHIPPED | OUTPATIENT
Start: 2021-08-28 | End: 2021-12-07 | Stop reason: SDUPTHER

## 2021-08-28 RX ADMIN — SPIRONOLACTONE 25 MG: 25 TABLET ORAL at 22:22

## 2021-08-28 RX ADMIN — ATORVASTATIN CALCIUM 40 MG: 40 TABLET, FILM COATED ORAL at 22:22

## 2021-08-28 RX ADMIN — FLUTICASONE PROPIONATE 1 SPRAY: 50 SPRAY, METERED NASAL at 08:43

## 2021-08-28 RX ADMIN — ENOXAPARIN SODIUM 30 MG: 30 INJECTION SUBCUTANEOUS at 08:42

## 2021-08-28 RX ADMIN — GLIPIZIDE 5 MG: 5 TABLET ORAL at 08:41

## 2021-08-28 RX ADMIN — Medication 15 G: at 11:35

## 2021-08-28 RX ADMIN — Medication 15 G: at 11:40

## 2021-08-28 RX ADMIN — AZITHROMYCIN 250 MG: 250 TABLET, FILM COATED ORAL at 08:41

## 2021-08-28 RX ADMIN — IPRATROPIUM BROMIDE AND ALBUTEROL SULFATE 1 AMPULE: .5; 2.5 SOLUTION RESPIRATORY (INHALATION) at 16:52

## 2021-08-28 RX ADMIN — MONTELUKAST SODIUM 10 MG: 10 TABLET ORAL at 22:23

## 2021-08-28 RX ADMIN — CETIRIZINE HYDROCHLORIDE 10 MG: 10 TABLET, FILM COATED ORAL at 08:41

## 2021-08-28 RX ADMIN — Medication 10 ML: at 08:42

## 2021-08-28 RX ADMIN — ASPIRIN 81 MG: 81 TABLET, COATED ORAL at 08:42

## 2021-08-28 RX ADMIN — IPRATROPIUM BROMIDE AND ALBUTEROL SULFATE 1 AMPULE: .5; 2.5 SOLUTION RESPIRATORY (INHALATION) at 09:16

## 2021-08-28 RX ADMIN — Medication 10 ML: at 22:28

## 2021-08-28 ASSESSMENT — PAIN SCALES - GENERAL
PAINLEVEL_OUTOF10: 0
PAINLEVEL_OUTOF10: 0

## 2021-08-28 NOTE — PROGRESS NOTES
Hospitalist Progress Note      Synopsis: Patient admitted on 8/20/2021    69 y.o. male who presented to [facility] [address] with cough and shortness of breath. He has a hx of DM2 for which he is on oral meds, valvular heart disease, dilated cardiomyopathy (followed with Dr. Dave Doll), liver cirrhosis, pancytopenia, recent FNA of the pancreatic tail cyst who presented to the ED for shortness of breath and cough. Cough of think secretions has been going on for 7 years but have worsened over the last several months but pts shortness of breath was more significant today so EMS was called. It is associated with congestion. EMS report pt was hypoxic to 90% but in the ED he was not found to be hypoxic. Sputum was productive of thick purulent secretions. CTA pulmonary suggest bronchopulmonary pneumonia so pt was given azithromycin and rocephin. Pt also reported chest pain that was sharp in the center of his chest with b/l arm pain this am; EKG did not show acute changes but pt initial troponin was 128 and it increased to 198 - so he was given aspirin. Admitted to our service for further evaluation and treatment. Cardiology consulted and at this time cath has been cancelled, echo ordered and to be done on 8/24. Pulmonary consulted for chronic cough with hx of bronchiectasis - ruling out aspiration pneumonia vs immunodeficiency     Subjective    No acute events overnight    Exam:  BP (!) 86/50   Pulse 70   Temp 97.7 °F (36.5 °C) (Temporal)   Resp 16   Ht 5' 10\" (1.778 m)   Wt 208 lb 3.2 oz (94.4 kg)   SpO2 98%   BMI 29.87 kg/m²   General appearance: No apparent distress   HEENT: Pupils equal, round, and reactive to light. Conjunctivae/corneas clear. Neck: Supple   Respiratory:  Normal respiratory effort. Mild expiratory wheezing   Cardiovascular: Regular rate and rhythm without murmurs, rubs or gallops.   Abdomen: Soft, no tenderness, no distention, no organomegaly   Musculoskeletal: No clubbing, cyanosis or edema bilaterally. Skin: Skin color, texture, turgor normal.  No rashes or lesions. Neurologic:  nonfocal       Medications:  Reviewed    Infusion Medications    dextrose      sodium chloride       Scheduled Medications    furosemide  20 mg Oral Daily    glipiZIDE  5 mg Oral QAM AC    montelukast  10 mg Oral Nightly    fluticasone  1 spray Each Nostril Daily    enoxaparin  30 mg Subcutaneous Daily    azithromycin  250 mg Oral Daily    insulin lispro  0-12 Units Subcutaneous TID WC    insulin lispro  0-6 Units Subcutaneous Nightly    atorvastatin  40 mg Oral Nightly    aspirin  81 mg Oral Daily    ipratropium-albuterol  1 ampule Inhalation Q4H    sodium chloride flush  5-40 mL IntraVENous 2 times per day    cetirizine  10 mg Oral Daily    lisinopril  2.5 mg Oral Nightly    metoprolol succinate  25 mg Oral Nightly    spironolactone  25 mg Oral Nightly     PRN Meds: sodium chloride (Inhalant), guaiFENesin, glucose, dextrose, glucagon (rDNA), dextrose, sodium chloride flush, sodium chloride, ondansetron **OR** ondansetron, polyethylene glycol, acetaminophen **OR** acetaminophen, diphenhydrAMINE    I/O    Intake/Output Summary (Last 24 hours) at 8/28/2021 1000  Last data filed at 8/28/2021 0931  Gross per 24 hour   Intake 600 ml   Output 3850 ml   Net -3250 ml       Labs:   Recent Labs     08/26/21  0850 08/27/21  0533   WBC 8.2 4.5   HGB 14.0 12.6   HCT 39.4 36.0*   PLT 83* 69*       Recent Labs     08/26/21  0645 08/27/21  0533    131*   K 4.4 4.5  4.5    100   CO2 23 25   BUN 22 22   CREATININE 1.1 1.2   CALCIUM 8.9 8.5*       No results for input(s): PROT, ALB, ALKPHOS, ALT, AST, BILITOT, AMYLASE, LIPASE in the last 72 hours. No results for input(s): INR in the last 72 hours. No results for input(s): [place] in the last 72 hours.     Chronic labs:  Lab Results   Component Value Date    CHOL 142 08/21/2021    TRIG 84 08/21/2021    HDL 41 08/21/2021    LDLCALC 84 08/21/2021    TSH 2.240 06/18/2020    INR 1.4 06/22/2021    LABA1C 6.7 (H) 07/12/2021       Radiology:  Imaging studies reviewed today. ASSESSMENT:  NSTEMI, likely type II  Ischemic cardiomyopathy   CAP vs aspiration pneumonia  DM2  Liver cirrhosis  Thrombocytopenia 2/2 liver cirrhosis      PLAN:  Pulmonology following  Cardiology following  CTS following  Consult GI for cirrhosis work-up  Continue azithromycin and prednisone  Breathing treatments  If symptoms don't improve with azith for one month will get outpatient bronchoscopy  Continue home meds as ordered      Diet: Adult Oral Nutrition Supplement; Diabetic Oral Supplement  ADULT DIET; Dysphagia - Soft and Bite Sized; 3 carb choices (45 gm/meal); Low Fat/Low Chol/High Fiber/DOMINGO  Code Status: Full Code  PT/OT Eval Status:   As needed   DVT Prophylaxis:   lovenox  Recommended disposition at discharge:  home at discharge     +++++++++++++++++++++++++++++++++++++++++++++++++  Maple Lat, [address]  +++++++++++++++++++++++++++++++++++++++++++++++++  NOTE: This report was transcribed using voice recognition software. Every effort was made to ensure accuracy; however, inadvertent computerized transcription errors may be present.

## 2021-08-28 NOTE — PROGRESS NOTES
CC:SOB    Brief HPI:  Awake, alert. No complaints. Past Medical History:   Diagnosis Date    Allergic rhinitis     Seasonal    Cardiomyopathy ([facility]     CHF (congestive heart failure) (HCC)     DM (diabetes mellitus) ([facility]     Enlarged prostate     Thrombocytopenia (HCC)     Type 2 diabetes mellitus without complication ([facility]     VHD (valvular heart disease)      Past Surgical History:   Procedure Laterality Date    CATARACT REMOVAL Bilateral 2017    CYST REMOVAL      from left heel    HAMMER TOE SURGERY Bilateral     REFRACTIVE SURGERY Bilateral     UPPER GASTROINTESTINAL ENDOSCOPY N/A 2021    EGD W/EUS FNA performed by Tr Peralta MD at [address] [facility] [address] History     Socioeconomic History    Marital status:      Spouse name: Not on file    Number of children: Not on file    Years of education: Not on file    Highest education level: Not on file   Occupational History    Occupation: retired   Tobacco Use    Smoking status: Former Smoker     Packs/day: 1.00     Years: 20.00     Pack years: 20.00     Types: Cigarettes     Quit date: 1993     Years since quittin.6    Smokeless tobacco: Former User     Types: Chew     Quit date: 1970   Vaping Use    Vaping Use: Never used   Substance and Sexual Activity    Alcohol use: Yes     Comment: Occ beer    Drug use: Never    Sexual activity: Not on file   Other Topics Concern    Not on file   Social History Narrative    Drinks decaf coffee & occ pop. Social Determinants of Health     Financial Resource Strain:     Difficulty of Paying Living Expenses:    Food Insecurity:     Worried About Running Out of Food in the Last Year:     [address] in the Last Year:    Transportation Needs:     Lack of Transportation (Medical):      Lack of Transportation (Non-Medical):    Physical Activity:     Days of Exercise per Week:     Minutes of Exercise per Session:    Stress:     Feeling of Stress : Social Connections:     Frequency of Communication with Friends and Family:     Frequency of Social Gatherings with Friends and Family:     Attends [employer] Services:     Active Member of Clubs or Organizations:     Attends Club or Organization Meetings:     Marital Status:    Intimate Partner Violence:     Fear of Current or Ex-Partner:     Emotionally Abused:     Physically Abused:     Sexually Abused:      Family History   Problem Relation Age of Onset    Alcohol Abuse Father     No Known Problems Sister     Diabetes Brother        Vitals:    08/27/21 1730 08/27/21 2006 08/27/21 2029 08/27/21 2245   BP: (!) 96/51 (!) 108/55  (!) 89/51   Pulse: 82 76  77   Resp:  18  18   Temp:  97.8 °F (36.6 °C)  97.7 °F (36.5 °C)   TempSrc:    Temporal   SpO2:  95% 94% 97%   Weight:       Height:             Intake/Output Summary (Last 24 hours) at 8/28/2021 0815  Last data filed at 8/28/2021 0325  Gross per 24 hour   Intake 720 ml   Output 3850 ml   Net -3130 ml       Recent Labs     08/26/21  0850 08/27/21  0533   WBC 8.2 4.5   HGB 14.0 12.6   HCT 39.4 36.0*   PLT 83* 69*      Recent Labs     08/26/21  0645 08/27/21  0533   BUN 22 22   CREATININE 1.1 1.2       ROS:   Negative for CP, palpitations, SOB at rest, dizziness/lightheadedness. Physical Exam   Constitutional: Oriented to person, place, and time. Appears well-developed. No distress. CARDIOVASCULAR:  Normal apical impulse, regular rate and rhythm, normal S1 and S2, no S3 or S4, and no murmur noted  Pulmonary/Chest: Effort normal. No respiratory distress. Abdominal: Soft. Bowel sounds are normal.   Musculoskeletal: Normal range of motion. Neurological: alert and oriented to person, place, and time. Skin: Skin is warm and dry. Psychiatric: normal mood and affect.        ASSESSMENT:   Patient Active Problem List   Diagnosis    Dilated cardiomyopathy ([address]    Non-rheumatic mitral regurgitation    Absolute anemia    SOBOE (shortness of breath on exertion)    Pulmonary HTN ([address]    Controlled type 2 diabetes mellitus with complication, without long-term current use of insulin ([address]    Essential (primary) hypertension    Pancytopenia (HCC)    Coagulation defect ([address]    Pancreatic cyst    Community acquired bacterial pneumonia    Bronchopneumonia         CAD      PLAN:  GI work up to delineate his possible cirrhosis. Note: 25 minutes was spent providing face-to-face patient care, including:  and coordinating care, reviewing the chart, labs, and diagnostics, as well as medical decision making. Greater than 50% of this time was spent instructing and counseling the patient face to face regarding findings and recommendations.

## 2021-08-28 NOTE — PLAN OF CARE
Problem: Breathing Pattern - Ineffective:  Goal: Ability to achieve and maintain a regular respiratory rate will improve  Description: Ability to achieve and maintain a regular respiratory rate will improve  8/28/2021 0257 by Yamile Mcintyre RN  Outcome: Met This Shift  8/27/2021 1809 by Wilver Plaacios RN  Outcome: Met This Shift

## 2021-08-28 NOTE — PROGRESS NOTES
INPATIENT CARDIOLOGY FOLLOW-UP    Name: Raymond Mann    Age: 69 y.o. Date of Admission: 8/20/2021 10:15 AM    Date of Service: 8/28/2021    Chief Complaint: Follow-up for NSTEMI, multivessel CAD    Interim History:  No new overnight cardiac complaints. Currently with no complaints of CP, SOB, palpitations, dizziness, or lightheadedness. SR on telemetry. Review of Systems:   Cardiac: As per HPI  General: No fever, chills  Pulmonary: As per HPI  HEENT: No visual disturbances, difficult swallowing  GI: No nausea, vomiting  : No dysuria, hematuria  Endocrine: No thyroid disease, +DM  Musculoskeletal: ARIAS x 4, no focal motor deficits  Skin: Intact, no rashes  Neuro: No headache, seizures  Psych: Currently with no depression, anxiety    Problem List:  Patient Active Problem List   Diagnosis    Dilated cardiomyopathy ([address]    Non-rheumatic mitral regurgitation    Absolute anemia    SOBOE (shortness of breath on exertion)    Pulmonary HTN ([address]    Controlled type 2 diabetes mellitus with complication, without long-term current use of insulin ([address]    Essential (primary) hypertension    Pancytopenia (HCC)    Coagulation defect ([address]    Pancreatic cyst    Community acquired bacterial pneumonia    Bronchopneumonia       Allergies:   Allergies   Allergen Reactions    Penicillins Hives    Eggs Or Egg-Derived Products     Grass Pollen(K-O-R-T-Swt Oscar)     Milk-Related Compounds     Mixed Ragweed     Peanut-Containing Drug Products     Seasonal      Mold cats dogs ragweed       Current Medications:  Current Facility-Administered Medications   Medication Dose Route Frequency Provider Last Rate Last Admin    furosemide (LASIX) tablet 20 mg  20 mg Oral Daily Shanta Bonner MD   20 mg at 08/27/21 0904    glipiZIDE (GLUCOTROL) tablet 5 mg  5 mg Oral QAM AC Lilia Goins MD   5 mg at 08/28/21 0841    montelukast (SINGULAIR) tablet 10 mg  10 mg Oral Nightly Lilia Goins MD   10 mg at 08/27/21 2101    fluticasone (FLONASE) 50 MCG/ACT nasal spray 1 spray  1 spray Each Nostril Daily Angela Leyva MD   1 spray at 08/28/21 0843    enoxaparin (LOVENOX) injection 30 mg  30 mg Subcutaneous Daily Angela Leyva MD   30 mg at 08/28/21 0842    sodium chloride (Inhalant) 3 % nebulizer solution 4 mL  4 mL Nebulization PRN Angela Leyva MD        guaiFENesin tablet 400 mg  400 mg Oral 4x Daily PRN Angela Leyva MD        [facility]) tablet 250 mg  250 mg Oral Daily Angela Leyva MD   250 mg at 08/28/21 0841    insulin lispro (HUMALOG) injection vial 0-12 Units  0-12 Units Subcutaneous TID WC Angela Leyva MD   4 Units at 08/25/21 1639    insulin lispro (HUMALOG) injection vial 0-6 Units  0-6 Units Subcutaneous Nightly Angela Leyva MD   1 Units at 08/25/21 2117    glucose (GLUTOSE) 40 % oral gel 15 g  15 g Oral PRN Angela Leyva MD   15 g at 08/28/21 1140    dextrose 50 % IV solution  12.5 g IntraVENous PRN Angela Leyva MD        glucagon (rDNA) injection 1 mg  1 mg IntraMUSCular PRN Angela Leyav MD        dextrose 5 % solution  100 mL/hr IntraVENous PRN Angela Leyva MD        atorvastatin (LIPITOR) tablet 40 mg  40 mg Oral Nightly Angela Leyva MD   40 mg at 08/27/21 2101    aspirin EC tablet 81 mg  81 mg Oral Daily Agnela Leyva MD   81 mg at 08/28/21 0842    ipratropium-albuterol (DUONEB) nebulizer solution 1 ampule  1 ampule Inhalation Q4H Angela Leyva MD   1 ampule at 08/28/21 0916    sodium chloride flush 0.9 % injection 5-40 mL  5-40 mL IntraVENous 2 times per day Angela Leyva MD   10 mL at 08/28/21 0842    sodium chloride flush 0.9 % injection 5-40 mL  5-40 mL IntraVENous PRN Angela Leyva MD        0.9 % sodium chloride infusion  25 mL IntraVENous PRN Angela Leyva MD        ondansetron (ZOFRAN-ODT) disintegrating tablet 4 mg  4 mg Oral Q8H PRN Angela Leyva MD        Or    ondansetron [facility]) injection 4 mg  4 mg IntraVENous Q6H PRN Angela Leyva MD  polyethylene glycol (GLYCOLAX) packet 17 g  17 g Oral Daily PRN Ilene Blanton MD        acetaminophen (TYLENOL) tablet 650 mg  650 mg Oral Q6H PRN Ilene Blanton MD        Or    acetaminophen (TYLENOL) suppository 650 mg  650 mg Rectal Q6H PRN Ilene Blanton MD        cetirizine (ZYRTEC) tablet 10 mg  10 mg Oral Daily Ilene Blanton MD   10 mg at 08/28/21 0841    diphenhydrAMINE (BENADRYL) tablet 50 mg  50 mg Oral Nightly PRN Ilene Blanton MD        lisinopril (PRINIVIL;ZESTRIL) tablet 2.5 mg  2.5 mg Oral Nightly Ilene Blanton MD   2.5 mg at 08/27/21 2101    metoprolol succinate (TOPROL XL) extended release tablet 25 mg  25 mg Oral Nightly Ilene Blanton MD   25 mg at 08/27/21 2101    spironolactone (ALDACTONE) tablet 25 mg  25 mg Oral Nightly Ilene Blanton MD   25 mg at 08/27/21 2101      dextrose      sodium chloride         Physical Exam:  BP (!) 86/50   Pulse 70   Temp 97.7 °F (36.5 °C) (Temporal)   Resp 20   Ht 5' 10\" (1.778 m)   Wt 208 lb 3.2 oz (94.4 kg)   SpO2 97%   BMI 29.87 kg/m²   Wt Readings from Last 3 Encounters:   08/24/21 208 lb 3.2 oz (94.4 kg)   08/06/21 211 lb (95.7 kg)   07/20/21 211 lb (95.7 kg)     Appearance: Awake, alert, no acute respiratory distress  Skin: Intact, no rash  Head: Normocephalic, atraumatic  Eyes: EOMI, no conjunctival erythema  ENMT: No pharyngeal erythema, MMM, no rhinorrhea  Neck: Supple, no elevated JVP, no carotid bruits  Lungs: Clear to auscultation bilaterally. No wheezes, rales, or rhonchi.   Cardiac: Regular rate and rhythm, 2/6 systolic murmur  Abdomen: Soft, nontender, +bowel sounds  Extremities: Moves all extremities x 4, no lower extremity edema  Neurologic: No focal motor deficits apparent, normal mood and affect    Intake/Output:    Intake/Output Summary (Last 24 hours) at 8/28/2021 1424  Last data filed at 8/28/2021 0931  Gross per 24 hour   Intake 600 ml   Output 3850 ml   Net -3250 ml     I/O this shift:  In: 360 [P.O.:360]  Out: - Laboratory Tests:  Recent Labs     08/26/21  0645 08/27/21  0533 08/28/21  1120    131* 130*   K 4.4 4.5  4.5 4.3    100 99   CO2 23 25 24   BUN 22 22 19   CREATININE 1.1 1.2 1.0   GLUCOSE 81 101* 49*   CALCIUM 8.9 8.5* 8.8     Lab Results   Component Value Date    MG 2.1 08/28/2021     Lab Results   Component Value Date    ALT 28 08/21/2021    AST 43 (H) 08/21/2021    ALKPHOS 110 08/21/2021    BILITOT 1.6 (H) 08/21/2021     Recent Labs     08/26/21  0850 08/27/21  0533   WBC 8.2 4.5   RBC 4.51 4.03   HGB 14.0 12.6   HCT 39.4 36.0*   MCV 87.4 89.3   MCH 31.0 31.3   MCHC 35.5* 35.0*   RDW 14.9 15.1*   PLT 83* 69*   MPV 12.3* 12.3*     No results found for: CKTOTAL, CKMB, CKMBINDEX, TROPONINI  Lab Results   Component Value Date    INR 1.4 06/22/2021    PROTIME 15.1 (H) 06/22/2021     Lab Results   Component Value Date    TSH 2.240 06/18/2020     Lab Results   Component Value Date    LABA1C 6.7 (H) 07/12/2021     No results found for: EAG  Lab Results   Component Value Date    CHOL 142 08/21/2021    CHOL 133 06/18/2020    CHOL 166 04/27/2011     Lab Results   Component Value Date    TRIG 84 08/21/2021    TRIG 135 06/18/2020    TRIG 150 (H) 04/27/2011     Lab Results   Component Value Date    HDL 41 08/21/2021    HDL 30 06/18/2020    HDL 34.5 (A) 04/27/2011     Lab Results   Component Value Date    LDLCALC 84 08/21/2021    LDLCALC 76 06/18/2020    LDLCALC 102 (H) 04/27/2011     Lab Results   Component Value Date    LABVLDL 17 08/21/2021    LABVLDL 27 06/18/2020     No results found for: CHOLHDLRATIO    Cardiac Tests:  Telemetry findings reviewed: SR, rate 70's     EKG: Normal sinus rhythm, inferior and anterior MI age undetermined, ST-T changes suggestive lateral wall ischemia.     CTA chest: No acute PE, extensive endobronchial process with retained secretions with discrete patchy bronchopneumonia in the left lower lobe minimal in the right lower lobe. Coronary artery calcification.   Chronic calcified pancreatitis     Lexiscan stress test March 6, 2019  Electrocardiographically normal regadenoson infusion with a   clinically non-ischemic response   2. Myocardial perfusion imaging showed large apical, inferior,   inferolateral, inferoseptal defects. 3. Overall left ventricular systolic function was reduced at 20%   with severe global hypokinesis, severe inferior hypokinesis. 4.   Intermediate risk general pharmacologic stress test.       2D echocardiogram July 17, 2019, limited study EF 45%     Echocardiogram: 8/24/21 (Dr. Levy Mandujano)   Borderline dilated left ventricle. Regional wall motion abnormalities with akinesis of mid and distal   anterior, anteroseptal, anterolateral, apical and inferoapical segment as   well as basilar inferior segment and hypokinesis of inferolateral and   lateral segments. Severely reduced left ventricular systolic function. Ejection fraction is visually estimated at 25%. Normal right ventricular size and function. The left atrium is mildly dilated. Interatrial septum appears intact. Structurally normal mitral valve. Moderate centrally directed mitral regurgitation. The aortic valve appears mildly sclerotic. Mild tricuspid regurgitation. Pulmonary hypertension is mild . RVSP is 40 mmHg. Cardiac catheterization: 8/26/21 (Dr. Flaquito Pittman)  Left main: 20%  stenosis  LAD: 100 %  Stenosis  IR: 95% stenosis  Circumflex: 95 %   stenosis  RCA: Dominant.   70 %  stenosis  LV angio: not performed    Assessment:  · NSTEMI / multivessel CAD  · Ischemic cardiomyopathy / severely reduced LV systolic function  · Chronic HFrEF  · Moderate MR  · DM -- Hgb A1c 6.7  · Cirrhosis  · Chronic thrombocytopenia -- most recent plt 69  · Prior tobacco abuse    Plan:   · Continue GDMT as able (limited by episodes of hypotension)  · Continue current medications otherwise  · GI evaluation for cirrhosis  · CT surgery input noted  · Monitor CBC  · Monitor telemetry    Lya Vidal MD  [facility]

## 2021-08-29 VITALS
HEART RATE: 81 BPM | HEIGHT: 70 IN | DIASTOLIC BLOOD PRESSURE: 49 MMHG | TEMPERATURE: 97.6 F | BODY MASS INDEX: 29.81 KG/M2 | WEIGHT: 208.2 LBS | SYSTOLIC BLOOD PRESSURE: 87 MMHG | OXYGEN SATURATION: 96 % | RESPIRATION RATE: 19 BRPM

## 2021-08-29 LAB
ANION GAP SERPL CALCULATED.3IONS-SCNC: 9 MMOL/L (ref 7–16)
BUN BLDV-MCNC: 21 MG/DL (ref 6–23)
CALCIUM SERPL-MCNC: 8.7 MG/DL (ref 8.6–10.2)
CHLORIDE BLD-SCNC: 100 MMOL/L (ref 98–107)
CO2: 21 MMOL/L (ref 22–29)
CREAT SERPL-MCNC: 1 MG/DL (ref 0.7–1.2)
GFR AFRICAN AMERICAN: >60
GFR NON-AFRICAN AMERICAN: >60 ML/MIN/1.73
GLUCOSE BLD-MCNC: 101 MG/DL (ref 74–99)
HCT VFR BLD CALC: 34.7 % (ref 37–54)
HEMOGLOBIN: 12.6 G/DL (ref 12.5–16.5)
MAGNESIUM: 2.1 MG/DL (ref 1.6–2.6)
MCH RBC QN AUTO: 31.7 PG (ref 26–35)
MCHC RBC AUTO-ENTMCNC: 36.3 % (ref 32–34.5)
MCV RBC AUTO: 87.4 FL (ref 80–99.9)
METER GLUCOSE: 102 MG/DL (ref 74–99)
METER GLUCOSE: 139 MG/DL (ref 74–99)
PDW BLD-RTO: 14.6 FL (ref 11.5–15)
PLATELET # BLD: 65 E9/L (ref 130–450)
PLATELET CONFIRMATION: NORMAL
PMV BLD AUTO: 13.4 FL (ref 7–12)
POTASSIUM SERPL-SCNC: 5.3 MMOL/L (ref 3.5–5)
RBC # BLD: 3.97 E12/L (ref 3.8–5.8)
SODIUM BLD-SCNC: 130 MMOL/L (ref 132–146)
WBC # BLD: 5.1 E9/L (ref 4.5–11.5)

## 2021-08-29 PROCEDURE — 80048 BASIC METABOLIC PNL TOTAL CA: CPT

## 2021-08-29 PROCEDURE — 94640 AIRWAY INHALATION TREATMENT: CPT

## 2021-08-29 PROCEDURE — 6370000000 HC RX 637 (ALT 250 FOR IP): Performed by: INTERNAL MEDICINE

## 2021-08-29 PROCEDURE — 83735 ASSAY OF MAGNESIUM: CPT

## 2021-08-29 PROCEDURE — 36415 COLL VENOUS BLD VENIPUNCTURE: CPT

## 2021-08-29 PROCEDURE — 94669 MECHANICAL CHEST WALL OSCILL: CPT

## 2021-08-29 PROCEDURE — 6360000002 HC RX W HCPCS: Performed by: INTERNAL MEDICINE

## 2021-08-29 PROCEDURE — 85027 COMPLETE CBC AUTOMATED: CPT

## 2021-08-29 PROCEDURE — 99232 SBSQ HOSP IP/OBS MODERATE 35: CPT | Performed by: THORACIC SURGERY (CARDIOTHORACIC VASCULAR SURGERY)

## 2021-08-29 PROCEDURE — 2580000003 HC RX 258: Performed by: INTERNAL MEDICINE

## 2021-08-29 PROCEDURE — 82962 GLUCOSE BLOOD TEST: CPT

## 2021-08-29 RX ADMIN — FUROSEMIDE 20 MG: 20 TABLET ORAL at 09:29

## 2021-08-29 RX ADMIN — GLIPIZIDE 5 MG: 5 TABLET ORAL at 09:29

## 2021-08-29 RX ADMIN — FLUTICASONE PROPIONATE 1 SPRAY: 50 SPRAY, METERED NASAL at 09:28

## 2021-08-29 RX ADMIN — CETIRIZINE HYDROCHLORIDE 10 MG: 10 TABLET, FILM COATED ORAL at 09:28

## 2021-08-29 RX ADMIN — IPRATROPIUM BROMIDE AND ALBUTEROL SULFATE 1 AMPULE: .5; 2.5 SOLUTION RESPIRATORY (INHALATION) at 09:12

## 2021-08-29 RX ADMIN — Medication 10 ML: at 09:29

## 2021-08-29 RX ADMIN — ASPIRIN 81 MG: 81 TABLET, COATED ORAL at 09:29

## 2021-08-29 RX ADMIN — AZITHROMYCIN 250 MG: 250 TABLET, FILM COATED ORAL at 09:29

## 2021-08-29 RX ADMIN — ENOXAPARIN SODIUM 30 MG: 30 INJECTION SUBCUTANEOUS at 09:29

## 2021-08-29 ASSESSMENT — PAIN SCALES - GENERAL: PAINLEVEL_OUTOF10: 0

## 2021-08-29 NOTE — CONSULTS
[address]                  [address] [facility],  [facility]                                  CONSULTATION    PATIENT NAME: Kori Solorzano                  :        1950  MED REC NO:   [id number]                            ROOM:       [id number]  ACCOUNT NO:   [de-identified]                           ADMIT DATE: 2021  PROVIDER:     Paz Zafar MD    CONSULT DATE:  2021    REASON FOR CONSULTATION:  Cirrhosis unrelated to alcohol/severe coronary  artery disease, risk stratification. HISTORY OF PRESENT ILLNESS:  A 59-year-old male. He was presented for  hospitalization because of the cough which he says is a recurring  problem. Thick mucus. He has not smoked in years. 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 He does not follow  with Pulmonology. He has followed with Cardiology and was known to have  congestive heart failure with reduced ejection fraction, apparently felt  to be nonischemic. Ejection fractions have been approximately 45% by  echocardiography, but 20% on a stress test previously completed. On admission, he was found to have a mildly elevated troponin. Stress  test was positive. An echocardiogram revealed an ejection fraction on  this admission of approximately 25%. In addition, there was mild  pulmonary hypertension and moderate mitral regurgitation. Discussions  were in progress about potential for open heart surgery. Without knowledge of the same earlier in the year, he was found to have  abnormal liver function studies. He had an alkaline phosphatase 167,  ALT 30, AST 47, a bilirubin of 1.6. An ultrasound in  revealed  cholelithiasis, recannulized an umbilical vein and an echogenic liver. No ascites. He was referred to Hepatobiliary who arranged for an MRI  with MRCP. There was a 2 cm cystic lesion in the tail of the pancreas. Pancreatic calcifications.   Cirrhotic morphology. He was referred on  for an endoscopic ultrasound where a cystic lesion in the tail of the  pancreas was identified. Cytology specimen obtained, was contaminated  with gastroduodenal content, but they were negative for malignant cells. He has had serological evaluation for chronic viral hepatitis with  negative results. He has an alpha-fetoprotein of 11, a CA 19-9 of 53,  and a CEA of 7.0. He is having been known to be thrombocytopenic for years. Seen Dr. Haris Prasad in [place]. He had also been told he was iron deficient in the  past and was treated with iron replacement. It seems that he has had a  colonoscopy before. LABORATORY DATA:  His lab work reveals sodium 130, creatinine 1.0, INR  1.4 in June and currently on heparin with a PTT of 62. Bilirubin 1.6. PAST MEDICAL HISTORY:  Diabetes; history of obesity, weighing up to 400  pounds; congestive heart failure; prostatism; valvular heart disease;  occupational lung exposures. He had an EGD in 07/2021 including an EUS. No varices. Remote colonoscopy. OBJECTIVE:  GENERAL:  He is a lean, bright, alert, elderly male. He is not pale or  jaundiced. NECK:  He has no neck vein elevation or adenopathy. LUNGS:  Clear. HEART:  Tones are normal.  Regular rate. Regular rhythm. No audible  murmur. No gallop. ABDOMEN:  A benign abdomen. Devoid of tenderness. No hepatic or  splenic enlargement appreciated. EXTREMITIES:  No edema. ASSESSMENT AND PLAN:  He has cirrhosis as defined radiographically by  MRI. He had a FibroScan which was consistent with stage IV fibrosis. The etiology of his liver disease was probably nonalcoholic fatty liver  disease in the days of weighing 400 pounds and diabetes. He is  serologically negative. Iron studies would raise questions of  hemochromatosis with a ferritin of 1272, iron of 206, iron saturation of  97, and a TIBC of 212. He was iron deficient 2 yrs ago so that excluded.     He is Child's A compensated cirrhotic with a Child-Polanco score of 6. That should allow for rather an acceptable level of risk for open heart  surgery. However, his MELD score is 14 and MELD-Na which is used for  organ transplant is 19.  14 and 19 are high and put him well into a  moderate risk category. He lost his wife to complications of open heart  surgery in [facility] in 2008. He is amenable open heart surgery. Historically, he has described issues of indigestion and left arm pain,  but that does not seem to happen predictably and probably  represents the angina. Bottom line: His cirrhosis is compensated. The numbers suggest surgery is not absolutely   contraindicated, but carries a moderate risk of hepatic  decompensation and mortality within the first 30 days of surgery.         Penelope Fleming MD    D: 08/28/2021 20:42:54       T: 08/28/2021 20:47:39     [email or web address]  Job#: [id number]     Doc#: [id number]    CC:

## 2021-08-29 NOTE — CONSULTS
Appreciate Dr. Maranda Jay input   Pt seen and examined this AM   Feels well   Given his cirrhosis, high MELD score, chronic thrombocytopenia, and significantly increased surgical risk, I would not offer surgery   This was discussed at length with him and he seemed satisfied   His options would be PCI (if feasible), medical therapy, or second opinion for surgery if desired

## 2021-08-30 ENCOUNTER — CARE COORDINATION (OUTPATIENT)
Dept: CARE COORDINATION | Age: 71
End: 2021-08-30

## 2021-08-30 ENCOUNTER — TELEPHONE (OUTPATIENT)
Dept: ADMINISTRATIVE | Age: 71
End: 2021-08-30

## 2021-08-30 NOTE — TELEPHONE ENCOUNTER
Pt calling for a HFU apt/timing with Dr. Fernando Huggins - discharged 8/29/21 dx: NSTEMI/multi vessel CAD/Cath

## 2021-08-30 NOTE — DISCHARGE SUMMARY
Hospitalist Discharge Summary    Patient ID: Irena Matthews   Patient : 1950  Patient's PCP: Rolando Em MD    Admit Date: 2021   Admitting Physician: Giselle Ramos MD    Discharge Date:  2021   Discharge Physician: Larry Flores DO   Discharge Condition: Stable  Discharge Disposition: [facility] course in brief:  (Please refer to daily progress notes for a comprehensive review of the hospitalization by requesting medical records)    REASON FOR CONSULTATION:  Cirrhosis unrelated to alcohol/severe coronary  artery disease, risk stratification.     HISTORY OF PRESENT ILLNESS:  A 76-year-old male. He was presented for  hospitalization because of the cough which he says is a recurring  problem. Thick mucus. He has not smoked in years. 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 He does not follow  with Pulmonology. He has followed with Cardiology and was known to have  congestive heart failure with reduced ejection fraction, apparently felt  to be nonischemic. Ejection fractions have been approximately 45% by  echocardiography, but 20% on a stress test previously completed.     On admission, he was found to have a mildly elevated troponin. Stress  test was positive. An echocardiogram revealed an ejection fraction on  this admission of approximately 25%. In addition, there was mild  pulmonary hypertension and moderate mitral regurgitation. Discussions  were in progress about potential for open heart surgery.     Without knowledge of the same earlier in the year, he was found to have  abnormal liver function studies. He had an alkaline phosphatase 167,  ALT 30, AST 47, a bilirubin of 1.6. An ultrasound in  revealed  cholelithiasis, recannulized an umbilical vein and an echogenic liver. No ascites. He was referred to Hepatobiliary who arranged for an MRI  with MRCP. There was a 2 cm cystic lesion in the tail of the pancreas.    Pancreatic calcifications. Cirrhotic morphology. He was referred on  for an endoscopic ultrasound where a cystic lesion in the tail of the  pancreas was identified. Cytology specimen obtained, was contaminated  with gastroduodenal content, but they were negative for malignant cells. He has had serological evaluation for chronic viral hepatitis with  negative results. He has an alpha-fetoprotein of 11, a CA 19-9 of 53,  and a CEA of 7.0.     He is having been known to be thrombocytopenic for years. Seen Dr. Andrew Correa in [place]. He had also been told he was iron deficient in the  past and was treated with iron replacement. It seems that he has had a  colonoscopy before.     LABORATORY DATA:  His lab work reveals sodium 130, creatinine 1.0, INR  1.4 in June and currently on heparin with a PTT of 62. Bilirubin 1.6.     PAST MEDICAL HISTORY:  Diabetes; history of obesity, weighing up to 400  pounds; congestive heart failure; prostatism; valvular heart disease;  occupational lung exposures. He had an EGD in 07/2021 including an EUS. No varices. Remote colonoscopy.     OBJECTIVE:  GENERAL:  He is a lean, bright, alert, elderly male. He is not pale or  jaundiced. NECK:  He has no neck vein elevation or adenopathy. LUNGS:  Clear. HEART:  Tones are normal.  Regular rate. Regular rhythm. No audible  murmur. No gallop. ABDOMEN:  A benign abdomen. Devoid of tenderness. No hepatic or  splenic enlargement appreciated. EXTREMITIES:  No edema.     ASSESSMENT AND PLAN:  He has cirrhosis as defined radiographically by  MRI. He had a FibroScan which was consistent with stage IV fibrosis. The etiology of his liver disease was probably nonalcoholic fatty liver  disease in the days of weighing 400 pounds and diabetes. He is  serologically negative. Iron studies would raise questions of  hemochromatosis with a ferritin of 1272, iron of 206, iron saturation of  97, and a TIBC of 212. He was iron deficient 2 yrs ago so that excluded.     He is Child's A compensated cirrhotic with a Child-Polanco score of 6. That should allow for rather an acceptable level of risk for open heart  surgery. However, his MELD score is 14 and MELD-Na which is used for  organ transplant is 19.  14 and 19 are high and put him well into a  moderate risk category. He lost his wife to complications of open heart  surgery in [facility] in 2008. He is amenable open heart surgery. Historically, he has described issues of indigestion and left arm pain,  but that does not seem to happen predictably and probably  represents the angina.     Bottom line: His cirrhosis is compensated. The numbers suggest surgery is not absolutely   contraindicated, but carries a moderate risk of hepatic  decompensation and mortality within the first 30 days of surgery.     Cardiac Tests:  Telemetry findings reviewed: SR, rate 70's     EKG: Normal sinus rhythm, inferior and anterior MI age undetermined, ST-T changes suggestive lateral wall ischemia.     CTA chest: No acute PE, extensive endobronchial process with retained secretions with discrete patchy bronchopneumonia in the left lower lobe minimal in the right lower lobe.  Coronary artery calcification.  Chronic calcified pancreatitis     Lexiscan stress test March 6, 2019  Electrocardiographically normal regadenoson infusion with a   clinically non-ischemic response   2. Myocardial perfusion imaging showed large apical, inferior,   inferolateral, inferoseptal defects. 3. Overall left ventricular systolic function was reduced at 20%   with severe global hypokinesis, severe inferior hypokinesis.    4.   Intermediate risk general pharmacologic stress test.       2D echocardiogram July 17, 2019, limited study EF 45%     Echocardiogram: 8/24/21 (Dr. Kong Suárez)   Borderline dilated left ventricle.   Regional wall motion abnormalities with akinesis of mid and distal   anterior, anteroseptal, anterolateral, apical and inferoapical segment as   well as basilar inferior segment and hypokinesis of inferolateral and   lateral segments.   Severely reduced left ventricular systolic function.   Ejection fraction is visually estimated at 25%. Normal right ventricular size and function.   The left atrium is mildly dilated.   Interatrial septum appears intact.   Structurally normal mitral valve.   Moderate centrally directed mitral regurgitation.   The aortic valve appears mildly sclerotic.   Mild tricuspid regurgitation.   Pulmonary hypertension is mild .    RVSP is 40 mmHg.     Cardiac catheterization: 8/26/21 (Dr. Jared Grullon)  Left main: 20%  stenosis  LAD: 100 %  Stenosis  IR: 95% stenosis  Circumflex: 95 %   stenosis  RCA: Dominant.  70 %  stenosis  LV angio: not performed     Assessment:  · NSTEMI / multivessel CAD  · Ischemic cardiomyopathy / severely reduced LV systolic function  · Chronic HFrEF  · Moderate MR  · DM -- Hgb A1c 6.7  · Cirrhosis  · Chronic thrombocytopenia -- most recent plt 69  · Prior tobacco abuse     Plan:   · Continue GDMT as able (limited by episodes of hypotension)  · Continue current medications otherwise  · GI evaluation for cirrhosis  · CT surgery input noted  · Monitor CBC  · Monitor telemetry     Marina Gallegos MD  [facility]) Cardiology                      Tracey Wallace MD     D: 08/28/2021 20:42:54       T: 08/28/2021 20:47:39     [email or web address]  Job#: [id number]     Doc#: [id number]    Appreciate Dr. Shravan Fregoso input   Pt seen and examined this AM   Feels well   Given his cirrhosis, high MELD score, chronic thrombocytopenia, and significantly increased surgical risk, I would not offer surgery   This was discussed at length with him and he seemed satisfied   His options would be PCI (if feasible), medical therapy, or second opinion for surgery if desired      Consults:   IP CONSULT TO HOSPITALIST  IP CONSULT TO CARDIOLOGY  IP CONSULT TO GENERAL SURGERY  IP CONSULT TO PULMONOLOGY  IP CONSULT TO [address] [place] [address] CONSULT TO GI    Discharge Diagnoses:        Discharge Instructions / Follow up:    Future Appointments   Date Time Provider Edis Garces   8/31/2021 10:30 AM Joshua Alejandro III, MD COL SUR[facility]       Continued appropriate risk factor modification of blood pressure, diabetes and serum lipids will remain essential to reducing risk of future atherosclerotic development    Activity: activity as tolerated    Significant labs:  CBC:   Recent Labs     08/29/21  0538   WBC 5.1   RBC 3.97   HGB 12.6   HCT 34.7*   MCV 87.4   RDW 14.6   PLT 65*     BMP:   Recent Labs     08/28/21  1120 08/29/21  0538   * 130*   K 4.3 5.3*   CL 99 100   CO2 24 21*   BUN 19 21   CREATININE 1.0 1.0   MG 2.1 2.1     LFT:  No results for input(s): PROT, ALB, ALKPHOS, ALT, AST, BILITOT, AMYLASE, LIPASE in the last 72 hours. PT/INR: No results for input(s): INR, APTT in the last 72 hours. BNP: No results for input(s): BNP in the last 72 hours.   Hgb A1C:   Lab Results   Component Value Date    LABA1C 6.7 (H) 07/12/2021     Folate and B12: No results found for: [place], No results found for: FOLATE  Thyroid Studies:   Lab Results   Component Value Date    TSH 2.240 06/18/2020       Urinalysis:  No results found for: Jmaal Escobar, [address], BACTERIA, RBCUA, BLOODU, SPECGRAV, GLUCOSEU    Imaging:  Echo Complete    Result Date: 8/24/2021  Transthoracic Echocardiography Report (TTE)  Demographics   Patient Name    Tommy Red      Gender            Male                  [address] [id number]       Room Number       7226  Number   Account #       [de-identified]      Procedure Date    08/24/2021   Corporate ID                   Ordering          Vianey Vidal MD                                 Physician   Accession       [id number]     Referring  Number                         Physician   Date of Birth   1950     Sonographer       Maranda Hopson [facility]   Age             69 year(s)     Interpreting      [address] Physician         Physician Cardiology                                                   Flaco Puckett MD                                  Any Other  Procedure Type of Study   TTE procedure:Echo Complete W/Doppler & Color Flow. Procedure Date Date: 08/24/2021 Start: 10:24 AM Study Location: Portable Technical Quality: Adequate visualization Indications:Dyspnea/SOB. Patient Status: Routine Contrast Medium: Definity. Height: 70 inches Weight: 211 pounds BSA: 2.14 m^2 BMI: 30.28 kg/m^2 BP: 97/56 mmHg  Findings   Left Ventricle  Borderline dilated left ventricle. Micro-bubble contrast injected to enhance left ventricular visualization. Regional wall motion abnormalities with akinesis of mid and distal  anterior, anteroseptal, anterolateral, apical and inferoapical segment as  well as basilar inferior segment and hypokinesis of inferolateral and  lateral segments. Severely reduced left ventricular systolic function. Ejection fraction is visually estimated at 25%. Indeterminate diastolic function. Right Ventricle  Normal right ventricular size and function. Left Atrium  The left atrium is mildly dilated. Interatrial septum appears intact. Right Atrium  Normal right atrium size. Mitral Valve  Structurally normal mitral valve. Moderate centrally directed mitral regurgitation. Tricuspid Valve  The tricuspid valve appears structurally normal.  Mild tricuspid regurgitation. RVSP is 40 mmHg. Pulmonary hypertension is mild . Aortic Valve  The aortic valve leaflets were not well visualized. The aortic valve appears mildly sclerotic. Pulmonic Valve  The pulmonic valve was not well visualized. Pericardial Effusion  No evidence of pericardial effusion. Aorta  Aortic root dimension within normal limits. Conclusions   Summary  Borderline dilated left ventricle.   Regional wall motion abnormalities with akinesis of mid and distal  anterior, anteroseptal, anterolateral, apical and inferoapical segment as  well as basilar inferior segment and hypokinesis of inferolateral and  lateral segments. Severely reduced left ventricular systolic function. The left atrium is mildly dilated. Interatrial septum appears intact. Structurally normal mitral valve. Moderate centrally directed mitral regurgitation. The aortic valve appears mildly sclerotic. Mild tricuspid regurgitation. Pulmonary hypertension is mild .    Signature   ----------------------------------------------------------------  Electronically signed by Heath Levine MD(Interpreting  physician) on 08/24/2021 11:49 AM  ----------------------------------------------------------------  M-Mode/2D Measurements & Calculations   LV Diastolic    LV Systolic Dimension: 4.2   AV Cusp Separation: 2 cmLA  Dimension: 5.5  cm                           Dimension: 3.9 cmAO Root  cm              LV Volume Diastolic: 295.9   Dimension: 3.6 cm  LV FS:23.6 %    ml  LV PW           LV Volume Systolic: 77.3 ml  Diastolic: 0.6  LV EDV/LV EDV Index: 144.7  cm              ml/68 ml/m^2LV ESV/LV ESV  Septum          Index: 79.4 VU/72OL/ m^2  Diastolic: 1.2  EF Calculated: 45.1 %  cm              LV Mass Index: 87 l/min*m^2  LA volume/Index: 73.3 ml                  LV Length: 10.4 cm           /34.26ml/m^2  LV Mass: 185.84                              RA Area: 15.9 cm^2  g               LVOT: 2.1 cm  Doppler Measurements & Calculations   MV Peak E-Wave: 0.89 AV Peak Velocity:     LVOT Peak Velocity: 0.83 m/s  m/s                  1.15 m/s              LVOT Mean Velocity: 0.6 m/s  MV Peak A-Wave: 0.82 AV Peak Gradient:     LVOT Peak Gradient: 2.8  m/s                  5.33 mmHg             mmHgLVOT Mean Gradient: 1.6  MV E/A Ratio: 1.08   AV Mean Velocity:     mmHg  MV Peak Gradient: 4  0.88 m/s              Estimated RVSP: 38.9 mmHg  mmHg                 AV Mean Gradient: 3.3 Estimated RAP:3 mmHg  MV Mean Gradient:    mmHg  2.1 mmHg             AV VTI: 25.2 cm MV Mean Velocity:    AV Area               TR Velocity:2.99 m/s  0.7 m/s              (Continuity):2.57     TR Gradient:35.86 mmHg  MV Deceleration      cm^2  Time: 174.1 msec  MV P1/2t: 52.3 msec  LVOT VTI: 18.7 cm  MVA by PHT:4.21 cm^2  MV Area              Estimated PASP: 38.86  (continuity): 3 cm^2 mmHg  MV E' Septal  Velocity: 0.05 m/s  MV E' Lateral  Velocity: 7 m/s  [email or web address] [id number]    XR CHEST PORTABLE    Result Date: 8/20/2021  EXAMINATION: ONE XRAY VIEW OF THE CHEST 8/20/2021 10:42 am COMPARISON: 01/04/2019 HISTORY: ORDERING SYSTEM PROVIDED HISTORY: SOB TECHNOLOGIST PROVIDED HISTORY: Reason for exam:->SOB What reading provider will be dictating this exam?->CRC FINDINGS: There are scattered scars in the lungs. There is some peribronchial thickening. There are no acute infiltrates or effusions. Heart size is normal.     No acute process     CTA PULMONARY W CONTRAST    Result Date: 8/20/2021  EXAMINATION: CTA OF THE CHEST 8/20/2021 1:55 pm TECHNIQUE: CTA of the chest was performed after the administration of intravenous contrast.  Multiplanar reformatted images are provided for review. MIP images are provided for review. Dose modulation, iterative reconstruction, and/or weight based adjustment of the mA/kV was utilized to reduce the radiation dose to as low as reasonably achievable. COMPARISON: None. HISTORY: ORDERING SYSTEM PROVIDED HISTORY: SOB, hemoptysis TECHNOLOGIST PROVIDED HISTORY: Reason for exam:->SOB, hemoptysis Decision Support Exception - unselect if not a suspected or confirmed emergency medical condition->Emergency Medical Condition (MA) What reading provider will be dictating this exam?->CRC FINDINGS: There is no indication for acute pulmonary embolus in the main PA, right left main PAs in the lobar, segmental and subsegmental branches to the 3/4 order approximately. contrast enhancement the protocol is recommended the.  Upper abdominal structures not fully covered on this study. Adrenal glands not enlarged. Upper aspect of the kidneys appear unremarkable. 1.  No indication for acute pulmonary emboli. 2.  Extensive endobronchial process in the bronchial system both lower lobes with the retained secretions or aspiration anterior with the discrete patchy bronchopneumonia in the left lower lobe, minimal in the right lower lobe. 3.  No aneurysm formation or dissection of the thoracic aorta. 4.  Calcifications of all coronary arteries. Please correlate clinically. 5.  Findings for chronic calcified pancreatitis. There is a fullness in the tail of the pancreas which requires multiphase IV contrast MRI in better advantage than CT for proper characterization. Fluoroscopy modified barium swallow with video    Result Date: 8/24/2021  EXAMINATION: MODIFIED BARIUM SWALLOW WAS PERFORMED IN CONJUNCTION WITH [facility] WITH ELENA UNIT DICOM DISPLAY TECHNIQUE: Fluoroscopic evaluation of the swallowing mechanism was performed with multiple consistency of barium product. FLUOROSCOPY DOSE AND TYPE OR TIME AND EXPOSURES: Images: Cine fluoroscopy Fluoroscopic time: 2 minutes Total dose: 9 mGy COMPARISON: None HISTORY: ORDERING SYSTEM PROVIDED HISTORY: r/o aspiration TECHNOLOGIST PROVIDED HISTORY: Reason for exam:->r/o aspiration What reading provider will be dictating this exam?->CRC FINDINGS: Oral phase mild delay with slow mastication. Pharyngeal phase mild impairment evident by mild vallecular and piriform sinus barium retention. Otherwise satisfactory swallow. No laryngeal penetration with barium or barium aspiration. Mildly impaired swallowing mechanism with oral phase mild delay and mild barium residuals. Otherwise satisfactory swallow. No barium aspiration. Please see separate speech pathology report for full discussion of findings and recommendations. Discharge Medications:      Medication List      START taking these medications    aspirin 81 MG EC tablet  Take 1 tablet by mouth daily     atorvastatin 40 MG tablet  Commonly known as: LIPITOR  Take 1 tablet by mouth nightly     azithromycin 250 MG tablet  Commonly known as: ZITHROMAX  Take 1 tablet by mouth daily for 10 days     fluticasone 50 MCG/ACT nasal spray  Commonly known as: FLONASE  1 spray by Each Nostril route daily     montelukast 10 MG tablet  Commonly known as: SINGULAIR  Take 1 tablet by mouth nightly        CHANGE how you take these medications    furosemide 20 MG tablet  Commonly known as: LASIX  Take 1 tablet by mouth daily  What changed:   · medication strength  · how much to take        CONTINUE taking these medications    Allergy Relief 25 MG tablet  Generic drug: diphenhydrAMINE     Aquaphor 3 in 1 Diaper Rash 15 % Crea  Generic drug: Zinc Oxide     glyBURIDE 5 MG tablet  Commonly known as: DIABETA     lisinopril 2.5 MG tablet  Commonly known as: PRINIVIL;ZESTRIL     metoprolol succinate 25 MG extended release tablet  Commonly known as: TOPROL XL     PreserVision AREDS 2+Multi Vit Caps     spironolactone 25 MG tablet  Commonly known as: ALDACTONE     ZyrTEC Allergy 10 MG tablet  Generic drug: cetirizine           Where to Get Your Medications      These medications were sent to [address] [place] [address] [phone] -  [phone]  [address] [place] [id number]    Phone: [phone]   · aspirin 81 MG EC tablet  · atorvastatin 40 MG tablet  · azithromycin 250 MG tablet  · fluticasone 50 MCG/ACT nasal spray  · furosemide 20 MG tablet  · montelukast 10 MG tablet         Time Spent on discharge is more than 45 minutes in the examination, evaluation, counseling and review of medications and discharge plan.    +++++++++++++++++++++++++++++++++++++++++++++++++  DO [place] Physician - Hospitalist  [facility] - [address] [facility]  [place]  +++++++++++++++++++++++++++++++++++++++++++++++++  NOTE: This report was transcribed using voice recognition software. Every effort was made to ensure accuracy; however, inadvertent computerized transcription errors may be present.

## 2021-08-30 NOTE — CARE COORDINATION
[facility] Transitions Initial Follow Up Call    Call within 2 business days of discharge: Yes    Patient: Pratima Bustos Patient : 1950   MRN: [id number]  Reason for Admission: cough    Discharge Date: 21 RARS: Readmission Risk Score: 14      Last Discharge [facility]       Complaint Diagnosis Description Type Department Provider    21 Shortness of Breath Bronchopneumonia . .. ED to Hosp-Admission (Discharged) (ADMITTED) [facility] [address], DO; Reshma Bang. .. Spoke with: patient. Farheen Hawley states he is doing pretty good. He has some congestion today. States he does not have any chest pain, shortness of breath or dizziness. Medications were reviewed with patient. He has filled all prescriptions and is taking them as written. Appetite is good. Patient states that he is awaiting callbacks for follow appts from Dr. Moraima John, Dr. Bernadette Daugherty, Dr. Cheryl Valerio and Dr. Marc Omallye. Patient has a follow up appointment scheduled with Dr. Vasiliy Marshall tomorrow. Patient has no other concerns or needs at this time. CTN will await communication/handoff from [place] [id number]. Facility: [facility]    Non-face-to-face services provided:  Obtained and reviewed discharge summary and/or continuity of care documents     Transitions of Care Initial Call    Was this an external facility discharge? No Discharge Facility: [facility]    Challenges to be reviewed by the provider   Additional needs identified to be addressed with provider: No  none             Method of communication with provider : none      Advance Care Planning:   Does patient have an Advance Directive: reviewed and current. Was this a readmission? No  Patient stated reason for admission:   Patients top risk factors for readmission: medical condition-pneumonia and polypharmacy    Care Transition Nurse (CTN) contacted the patient by telephone to perform post hospital discharge assessment. Verified name and  with patient as identifiers. Provided introduction to self, and explanation of the CTN role. CTN reviewed discharge instructions, medical action plan and red flags with patient who verbalized understanding. Patient given an opportunity to ask questions and does not have any further questions or concerns at this time. Were discharge instructions available to patient? Yes. Reviewed appropriate site of care based on symptoms and resources available to patient including: PCP, Specialist and [address] [place] [address] Road. The patient agrees to contact the PCP office for questions related to their healthcare. Medication reconciliation was performed with patient, who verbalizes understanding of administration of home medications. Advised obtaining a 90-day supply of all daily and as-needed medications. Covid Risk Education     Educated patient about risk for severe COVID-19 due to risk factors according to CDC guidelines. CTN reviewed discharge instructions, medical action plan and red flag symptoms with the patient who verbalized understanding. Discussed COVID vaccination status: No. Education provided on COVID-19 vaccination as appropriate. Discussed exposure protocols and quarantine with CDC Guidelines. Patient was given an opportunity to verbalize any questions and concerns and agrees to contact CTN or health care provider for questions related to their healthcare. Reviewed and educated patient on any new and changed medications related to discharge diagnosis. Was patient discharged with a pulse oximeter? No Discussed and confirmed pulse oximeter discharge instructions and when to notify provider or seek emergency care. CTN provided contact information. will await handoff from [address] [place] [address] based on severity of symptoms and risk factors.   Plan for next call: symptom management-shortness of breath      Care Transitions 24 Hour Call    Schedule Follow Up Appointment with PCP: Completed  Do you have any ongoing symptoms?: No  Do you have a copy of your discharge instructions?: Yes  Do you have all of your prescriptions and are they filled?: Yes  Have you been contacted by a [address]?: No  Have you scheduled your follow up appointment?: Yes  How are you going to get to your appointment?: Car - drive self  Were you discharged with any Home Care or Post Acute Services: No  Care Transitions Interventions  No Identified Needs         Follow Up  Future Appointments   Date Time Provider Edis Garces   8/31/2021 10:30 AM Giselle Tamez MD COL SURG [facility]       Tay Lewis LPN

## 2021-08-30 NOTE — PROCEDURES
[address]                  [address] [facility],  [facility]                               PULMONARY FUNCTION    PATIENT NAME: Benito Cavanaugh                  :        1950  MED REC NO:   [id number]                            ROOM:  ACCOUNT NO:   [de-identified]                           ADMIT DATE: 2021  PROVIDER:     Gucci Shields MD    DATE OF PROCEDURE:  2021    HEIGHT:  70.    WEIGHT:  211. YEAR OF SMOKING:  14.    REQUESTING PROVIDER:  Gucci Shields MD    SPIROMETRY:  FVC 3.52, 80, which is _____ predicted. FEV1 2.44, which  is 77. There is significant bronchodilator response, meets ATS  criteria. FEV1/FVC 69. LUNG VOLUME:  Slow vital capacity 3.81, which is 91. ERV 1.56, which is  170. RV 4.36, which is 175. TLC 8.18, which is 116. RV/TLC 53, which  is 148. DIFFUSION CAPACITY:  25.63, which is 79, corrects for alveolar volume  95. IMPRESSION:  This PFT is showing evidence of mild obstructive lung  disease along with significant bronchodilator response with severe air  trapping along with normal diffusion capacity when corrects for alveolar  volumes. Differential diagnosis could be asthma/COPD overlap syndrome. Clinical and radiological correlation indicated with the patient's  history and clinical and radiological findings.         Scott Leon MD    D: 2021 10:46:40       T: 2021 10:49:07     [id number]  Job#: [id number]     Doc#: [id number]    CC:

## 2021-08-31 ENCOUNTER — OFFICE VISIT (OUTPATIENT)
Dept: SURGERY | Age: 71
End: 2021-08-31
Payer: MEDICARE

## 2021-08-31 VITALS
SYSTOLIC BLOOD PRESSURE: 87 MMHG | BODY MASS INDEX: 29.78 KG/M2 | HEIGHT: 70 IN | OXYGEN SATURATION: 96 % | TEMPERATURE: 97.6 F | DIASTOLIC BLOOD PRESSURE: 49 MMHG | WEIGHT: 208 LBS | HEART RATE: 81 BPM

## 2021-08-31 DIAGNOSIS — I25.10 CORONARY ARTERY DISEASE DUE TO CALCIFIED CORONARY LESION: Primary | ICD-10-CM

## 2021-08-31 DIAGNOSIS — K74.60 CIRRHOSIS OF LIVER WITHOUT ASCITES, UNSPECIFIED HEPATIC CIRRHOSIS TYPE (HCC): ICD-10-CM

## 2021-08-31 DIAGNOSIS — I25.84 CORONARY ARTERY DISEASE DUE TO CALCIFIED CORONARY LESION: Primary | ICD-10-CM

## 2021-08-31 PROCEDURE — 4040F PNEUMOC VAC/ADMIN/RCVD: CPT | Performed by: TRANSPLANT SURGERY

## 2021-08-31 PROCEDURE — 3017F COLORECTAL CA SCREEN DOC REV: CPT | Performed by: TRANSPLANT SURGERY

## 2021-08-31 PROCEDURE — G8427 DOCREV CUR MEDS BY ELIG CLIN: HCPCS | Performed by: TRANSPLANT SURGERY

## 2021-08-31 PROCEDURE — G8417 CALC BMI ABV UP PARAM F/U: HCPCS | Performed by: TRANSPLANT SURGERY

## 2021-08-31 PROCEDURE — 99213 OFFICE O/P EST LOW 20 MIN: CPT | Performed by: TRANSPLANT SURGERY

## 2021-08-31 PROCEDURE — 1123F ACP DISCUSS/DSCN MKR DOCD: CPT | Performed by: TRANSPLANT SURGERY

## 2021-08-31 PROCEDURE — 1111F DSCHRG MED/CURRENT MED MERGE: CPT | Performed by: TRANSPLANT SURGERY

## 2021-08-31 PROCEDURE — 1036F TOBACCO NON-USER: CPT | Performed by: TRANSPLANT SURGERY

## 2021-08-31 NOTE — PROGRESS NOTES
nontender, no guarding, no peritoneal signs  MSK: no clubbing/ no cyanosis/ gaitnormal       Assessment/Plan:  Cirrhosis of the liver secondary to fatty liver disease (Child's A, MELD Na 15,  with pancreatic body cysts (2cm) with dilated pancreatic duct  - I reviewed his images prior to our office visit and we also reviewed them together today along with his EUS  - current with major heart conditions  - recommend seeing Dr. Antonio Ramirez at [facility]  - repeat MRI in 1 year on his pancreatic lesion  - US in 6 months    20 Minutes of which greater than 50% was spent counseling or coordinating his care. Thank you for the consultation allowing me to take part in Mr. Prado's care.      Please send a copy of my note to Dr. Vivek Gallegos    Electronically signed by Nikole Avelar MD on 8/31/2021 at 10:32 AM

## 2021-09-06 PROCEDURE — 94060 EVALUATION OF WHEEZING: CPT | Performed by: INTERNAL MEDICINE

## 2021-09-06 PROCEDURE — 94729 DIFFUSING CAPACITY: CPT | Performed by: INTERNAL MEDICINE

## 2021-09-06 PROCEDURE — 94726 PLETHYSMOGRAPHY LUNG VOLUMES: CPT | Performed by: INTERNAL MEDICINE

## 2021-09-29 ENCOUNTER — OFFICE VISIT (OUTPATIENT)
Dept: CARDIOLOGY CLINIC | Age: 71
End: 2021-09-29
Payer: MEDICARE

## 2021-09-29 VITALS
BODY MASS INDEX: 30.26 KG/M2 | SYSTOLIC BLOOD PRESSURE: 94 MMHG | HEART RATE: 63 BPM | HEIGHT: 70 IN | OXYGEN SATURATION: 98 % | WEIGHT: 211.4 LBS | DIASTOLIC BLOOD PRESSURE: 42 MMHG | RESPIRATION RATE: 18 BRPM

## 2021-09-29 DIAGNOSIS — I50.22 CHRONIC HFREF (HEART FAILURE WITH REDUCED EJECTION FRACTION) (HCC): Primary | ICD-10-CM

## 2021-09-29 DIAGNOSIS — I25.10 CORONARY ARTERY DISEASE INVOLVING NATIVE CORONARY ARTERY OF NATIVE HEART WITHOUT ANGINA PECTORIS: ICD-10-CM

## 2021-09-29 DIAGNOSIS — I25.5 ISCHEMIC CARDIOMYOPATHY: ICD-10-CM

## 2021-09-29 PROCEDURE — 93000 ELECTROCARDIOGRAM COMPLETE: CPT | Performed by: INTERNAL MEDICINE

## 2021-09-29 PROCEDURE — 1036F TOBACCO NON-USER: CPT | Performed by: NURSE PRACTITIONER

## 2021-09-29 PROCEDURE — 99214 OFFICE O/P EST MOD 30 MIN: CPT | Performed by: NURSE PRACTITIONER

## 2021-09-29 PROCEDURE — G8417 CALC BMI ABV UP PARAM F/U: HCPCS | Performed by: NURSE PRACTITIONER

## 2021-09-29 PROCEDURE — 3017F COLORECTAL CA SCREEN DOC REV: CPT | Performed by: NURSE PRACTITIONER

## 2021-09-29 PROCEDURE — G8427 DOCREV CUR MEDS BY ELIG CLIN: HCPCS | Performed by: NURSE PRACTITIONER

## 2021-09-29 PROCEDURE — 4040F PNEUMOC VAC/ADMIN/RCVD: CPT | Performed by: NURSE PRACTITIONER

## 2021-09-29 PROCEDURE — 1123F ACP DISCUSS/DSCN MKR DOCD: CPT | Performed by: NURSE PRACTITIONER

## 2021-09-29 NOTE — PATIENT INSTRUCTIONS
1. Continue current cardiac medications     2. Get blood work within the next week     3. Referral to [facility] advanced heart failure     4. Follow up with Dr. Ector Toussaint in 3 months in [facility]     5. Weigh yourself daily    -Stay Hydrated    -Diet should sodium restricted to 2 grams    -Again watch your daily weight trends and if you gain water weight please follow below instructions.    -If you gain 3-5 pounds in 2-3 days OR notice that you are retaining fluid in anyway just like you did before then take an extra dose of your water pill (furosemide/Lasix) every day until you lose the weight or feel better.     -If you notice that you have taken more than 2 extra doses in 1 week then please call and let us know. -If at any time you feel that you are retaining fluid, your medications are not working, or you feel ill in anyway, then please call us for either same day appointment or the next day, and for instructions. Our goal is to keep you out of the emergency room and the hospital and we have ways to do it. You just need to call us in a timely manner.     -If you become sick for other reasons, and notice that you are not urinating as much, the urine is very dark, you have significant diarrhea or vomiting, then please DO NOT take your water pill and CALL US immediately.

## 2021-09-29 NOTE — Clinical Note
Please make referral to [facility] - CTS/advanced heart failure. He would like to be seen at outreach location closer to his house if possible.      Thank you

## 2021-09-29 NOTE — PROGRESS NOTES
[id number] [facility] Cardiology  Office Visit         Reason for Visit: Heart Failure    Primary Cardiologist: Dr. Jose Angel Elam        History of Present Illness:     Mr. Frances Arnold is a 69year old male with a PMHx of ischemic cardiomyopathy, multivessel CAD, moderate MR, liver cirrhosis, HTN, T2DM, history of morbid obesity with significant weight loss and former tobacco abuse. He was admitted to the hospital at the end of August with complaints of shortness of breath, cough, diaphoresis. Upon arrival he was hypoxic. Elevated troponin suggestive of NSTEMI. He was initiated on heparin drip. Echocardiogram was obtained that demonstrated LVEF 25% with WMAs, moderate MR. He subsequently underwent LHC that demonstrated multivessel disease. CT surgery was consulted and awaited GI recommendations given history of liver disease/cirrhosis with chronic thrombocytopenia. He was deemed a poor candidate for surgery, maintained on medical therapy and discharged home. He presents today in follow-up since discharge from the hospital 8/29/2021 he has obtained a second opinion at [facility] and was also deemed not a CABG candidate. He would like to obtain a third evaluation in [place]. He has been compliant with all of his current cardiac medications. He has good urinary response to oral diuretic with furosemide, he has stable weights and is monitoring his diet for sodium content. Review of systems is negative for chest pain, pressure, discomfort. When ambulating on an incline, He does not leg claudication. History is negative for neurological symptoms including transient loss of vision, asymmetric weakness, aphasia, dysphasia, numbness, tingling.        Patient Active Problem List    Diagnosis Date Noted    Bronchopneumonia     Community acquired bacterial pneumonia 08/20/2021    Pancreatic cyst     Coagulation defect ([facility] 07/12/2021    Pancytopenia ([facility] 04/19/2021    Controlled type 2 diabetes mellitus with complication, without long-term current use of insulin ([facility] 06/14/2019    Dilated cardiomyopathy ([facility] 01/25/2019    Non-rheumatic mitral regurgitation 01/25/2019    Absolute anemia 01/25/2019    SOBOE (shortness of breath on exertion) 01/25/2019    Pulmonary HTN ([facility] 01/25/2019    Essential (primary) hypertension 12/13/2016     PAST MEDICAL HISTORY:  1. Obesity  2. Former smoker with a 20-pack-year history and quit in 1993 and a history of chewing tobacco  3. Diabetes since 1996, non-insulin-requiring  4. Enlarged prostate  5. Allergic rhinitis  6. Cirrhosis per ultrasound of the liver secondary to fatty liver disease with pancreatic body cyst and dilated pancreatic duct, portal hypertension, status post endoscopy/ultrasound with a biopsy done on August 6, 2021, and on August 19 EGD with EUS FNA  7. Gallstones  8. Cataract removal  9. Valvular heart disease with mitral regurgitation and tricuspid regurgitation and pulmonary hypertension, not well documented  10. Dilated cardiomyopathy and congestive heart failure with an EF of 45 by 2D echocardiogram January 9, 2019  11. Sinus bradycardia  12. Lexiscan stress test March 6, 2019  Electrocardiographically normal regadenoson infusion with a   clinically non-ischemic response   2. Myocardial perfusion imaging showed large apical, inferior,   inferolateral, inferoseptal defects. 3. Overall left ventricular systolic function was reduced at 20%   with severe global hypokinesis, severe inferior hypokinesis. 4.   Intermediate risk general pharmacologic stress test.      13. 2D echocardiogram July 17, 2019, limited study EF 45%  14.  Thrombocytopenia platelet count 84 in July 2021         Past Medical History:   Diagnosis Date    Allergic rhinitis     Seasonal    Cardiomyopathy ([facility]     CHF (congestive heart failure) (HCC)     DM (diabetes mellitus) ([facility]     Enlarged prostate     Thrombocytopenia (HCC)     Type 2 diabetes mellitus without complication (HCC)     VHD (valvular heart disease)            Past Surgical History:   Procedure Laterality Date    CATARACT REMOVAL Bilateral 2017    CYST REMOVAL      from left heel    HAMMER TOE SURGERY Bilateral 1979    REFRACTIVE SURGERY Bilateral     UPPER GASTROINTESTINAL ENDOSCOPY N/A 8/6/2021    EGD W/EUS FNA performed by Christi Farias MD at [address] [place] [address]   Allergen Reactions    Penicillins Hives    Eggs Or Egg-Derived Products     Grass Pollen(K-O-R-T-Swt Oscar)     Milk-Related Compounds     Mixed Ragweed     Peanut-Containing Drug Products     Seasonal      Mold ragweed         Outpatient Medications Marked as Taking for the 9/29/21 encounter (Office Visit) with PIERCE Doty CNP   Medication Sig Dispense Refill    aspirin 81 MG EC tablet Take 1 tablet by mouth daily 30 tablet 3    montelukast (SINGULAIR) 10 MG tablet Take 1 tablet by mouth nightly 30 tablet 3    atorvastatin (LIPITOR) 40 MG tablet Take 1 tablet by mouth nightly 30 tablet 3    fluticasone (FLONASE) 50 MCG/ACT nasal spray 1 spray by Each Nostril route daily 1 Bottle 3    furosemide (LASIX) 20 MG tablet Take 1 tablet by mouth daily 60 tablet 3    glyBURIDE (DIABETA) 5 MG tablet Take 5 mg by mouth daily (with breakfast)      lisinopril (PRINIVIL;ZESTRIL) 2.5 MG tablet Take 2.5 mg by mouth nightly      metoprolol succinate (TOPROL XL) 25 MG extended release tablet Take 25 mg by mouth nightly      cetirizine (ZYRTEC ALLERGY) 10 MG tablet Take 10 mg by mouth daily      spironolactone (ALDACTONE) 25 MG tablet Take 25 mg by mouth nightly      Zinc Oxide (AQUAPHOR 3 IN 1 DIAPER RASH) 15 % CREA Apply topically daily as needed (apply to both bilateral lower legs)       Multiple Vitamins-Minerals (PRESERVISION AREDS 2+MULTI VIT) CAPS Take 1 capsule by mouth 2 times daily      diphenhydrAMINE (ALLERGY RELIEF) 25 MG tablet Take 50 mg by mouth nightly as needed for Itching             Guideline directed medical/device therapy:  ARNI/ACE I/ARB: Yes  Beta blocker: Yes  Aldosterone antagonist:  Yes  ICD/CRT-P/-D:  none  QRS interval on recent ECG (personally reviewed/interpreted): <120 ms  Percentage RV pacing (personally reviewed/interpreted): %/NA            Review of Systems:   Cardiac: As per HPI  General: No fever, chills, rigors  Pulmonary: As per HPI  HEENT: No visual disturbances, difficult swallowing  GI: No nausea, vomiting, abdominal pain  : No dysuria or hematuria  Endocrine: No thyroid disease or diabetes  Musculoskeletal: ARIAS x 4, no focal motor deficits  Skin: Intact, no rashes  Neuro/Psych: No headache or seizures          Weights: Wt Readings from Last 3 Encounters:   09/29/21 211 lb 6.4 oz (95.9 kg)   08/31/21 208 lb (94.3 kg)   08/24/21 208 lb 3.2 oz (94.4 kg)             Physical Examination:     BP (!) 94/42   Pulse 63   Resp 18   Ht 5' 10\" (1.778 m)   Wt 211 lb 6.4 oz (95.9 kg)   SpO2 98%   BMI 30.33 kg/m²     CONSTITUTIONAL: Alert and oriented times 3, no acute distress and cooperative to examination with proper mood and affect. SKIN: Skin color, texture, turgor normal. No rashes or lesions. LYMPH: no cervical nodes, no inguinal nodes  HEENT: Head is normocephalic, atraumatic. EOMI, PERRLA. NECK: Supple, symmetrical, trachea midline, no adenopathy, thyroid symmetric, not enlarged and no tenderness, skin normal.  CHEST/LUNGS: chest symmetric with normal A/P diameter, normal respiratory rate and rhythm, lungs clear to auscultation without wheezes, rales or rhonchi. No accessory muscle use. Scars None   CARDIOVASCULAR: Heart sounds are normal.  Regular rate and rhythm without murmur, gallop or rub. Normal S1 and S2. . Carotid and femoral pulses 2+/4 and equal bilaterally. ABDOMEN: Normal shape. No and Laparoscopic scar(s) present. Normal bowel sounds. No bruits. soft, nondistended, no masses or organomegaly. no evidence of hernia. Percussion: Normal without hepatosplenomegally.  Tenderness: absent. RECTAL: deferred, not clinically indicated  NEUROLOGIC: There are no focalizing motor or sensory deficits. CN II-XII are grossly intact. Pinky Angles EXTREMITIES: no cyanosis, no clubbing. Trace bilateral lower extremity edema. Warm and well perfused. All the following diagnostics were personally reviewed and interpreted by me. LAB DATA:     8/29/2021 05:38   Sodium 130 (L)   Potassium 5.3 (H)   Chloride 100   CO2 21 (L)   BUN 21   Creatinine 1.0   Anion Gap 9   GFR Non- >60   GFR African American >60   Magnesium 2.1   Glucose 101 (H)   Calcium 8.7   WBC 5.1   RBC 3.97   Hemoglobin Quant 12.6   Hematocrit 34.7 (L)   MCV 87.4   MCH 31.7   MCHC 36.3 (H)   MPV 13.4 (H)   RDW 14.6   Platelet Count 65 (L)   Platelet Confirmation CONFIRMED       IMAGING:    [facility] Pulmonary (8/20/2021)  Impression  1.  No indication for acute pulmonary emboli. 2.  Extensive endobronchial process in the bronchial system both lower lobes with the retained secretions or aspiration anterior with the discrete patchy bronchopneumonia in the left lower lobe, minimal in the right lower lobe. 3.  No aneurysm formation or dissection of the thoracic aorta. 4.  Calcifications of all coronary arteries.  Please correlate clinically. 5.  Findings for chronic calcified pancreatitis. [place] is a fullness in the  tail of the pancreas which requires multiphase IV contrast MRI in better  advantage than CT for proper characterization. CARDIAC TESTING:    TTE (8/24/2021, Dr. Cuate Andrade)   Summary   Borderline dilated left ventricle. Regional wall motion abnormalities with akinesis of mid and distal   anterior, anteroseptal, anterolateral, apical and inferoapical segment as   well as basilar inferior segment and hypokinesis of inferolateral and   lateral segments. Severely reduced left ventricular systolic function. EF 25%   The left atrium is mildly dilated. Interatrial septum appears intact.    Structurally normal mitral valve.   Moderate centrally directed mitral regurgitation. The aortic valve appears mildly sclerotic. Mild tricuspid regurgitation. Pulmonary hypertension is mild . [facility] (8/26/2021)  Findings:  Left main: 20%  stenosis  LAD: 100 %  Stenosis  IR: 95% stenosis  Circumflex: 95 %   stenosis  RCA: Dominant. 70 %  stenosis  LV angio: not performed  Hemodynamics:  LV: 66 mmHg. EDP: 7 No gradient across AV. Ao: 64/35 ( 46 ). Post op diagnosis:  MV CAD     EKG  Sinus Rhythm    Occasional ectopic ventricular beats     Low voltage. Old inferior infarct  Old anterior infarct. Nonspecific ST/T abnormality. Normal QTc interval      ASSESSMENT:  1. Chronic HFrEF  2. ACC stage C / NYHA class III  3. Euvolemic  4. Ischemic cardiomyopathy  5. LVEF 25%, LVEDD 5.5, LVMI 87  6. CAD/MVD > turned down for surgery from [facility] SPECIAL[facility]  7. Moderate MR  8. HTN  9. Child's class A hepatic cirrhosis  10. Thrombocytopenia   11. T2DM  12. History of morbid obesity with significant weight loss  13. Former smoker with 20-pack-[facility] history [facility]  1. Continue current cardiac medications     2. Get blood work within the next week     3. Referral to [facility] CTS and advanced heart failure > evaluate for CABG vs high risk PCI. He is somewhat of a \"jokester\" and it unclear     4. Follow up with Dr. Orville Marshall in 3 months in [facility]     5. Weigh yourself daily    -Stay Hydrated    -Diet should sodium restricted to 2 grams    -Again watch your daily weight trends and if you gain water weight please follow below instructions.    -If you gain 3-5 pounds in 2-3 days OR notice that you are retaining fluid in anyway just like you did before then take an extra dose of your water pill (furosemide/Lasix) every day until you lose the weight or feel better.     -If you notice that you have taken more than 2 extra doses in 1 week then please call and let us know.     -If at any time you feel that you are retaining fluid, your medications are not working, or you feel ill in anyway, then please call us for either same day appointment or the next day, and for instructions. Our goal is to keep you out of the emergency room and the hospital and we have ways to do it. You just need to call us in a timely manner.     -If you become sick for other reasons, and notice that you are not urinating as much, the urine is very dark, you have significant diarrhea or vomiting, then please DO NOT take your water pill and CALL US immediately.     Valentín Rico APRN-CNP  [facility] Cardiology

## 2021-09-30 DIAGNOSIS — R00.1 SINUS BRADYCARDIA: ICD-10-CM

## 2021-09-30 DIAGNOSIS — I38 VHD (VALVULAR HEART DISEASE): ICD-10-CM

## 2021-09-30 DIAGNOSIS — I27.20 PULMONARY HTN (HCC): ICD-10-CM

## 2021-09-30 DIAGNOSIS — I42.0 DILATED CARDIOMYOPATHY (HCC): ICD-10-CM

## 2021-09-30 DIAGNOSIS — I50.22 CHRONIC HFREF (HEART FAILURE WITH REDUCED EJECTION FRACTION) (HCC): Primary | ICD-10-CM

## 2021-09-30 NOTE — PROGRESS NOTES
Faxed films/radiology   And echo dept to pacs transfer last 12 months of test with reports. Per Blank Dill CNP placed order referral to [facility] or [facility] available. Patient preference to [facility] location.       Faxed clinicals and referral and demographics to [address] [place] [address]

## 2021-10-01 ENCOUNTER — CARE COORDINATION (OUTPATIENT)
Dept: CASE MANAGEMENT | Age: 71
End: 2021-10-01

## 2021-10-01 NOTE — CARE COORDINATION
[facility] Transitions Follow Up Call [facility]    10/1/2021    Patient: Tabatha Kessler  Patient : 1950   MRN: <[id number]>  Reason for Admission: Pneumonia  Discharge Date: 21 RARS: Readmission Risk Score: 14    [facility] Care Transitions Follow Up Call:  Spoke with patient for Follow up [address] [place] Transition Call post hospital discharge. CTN following up 1st  [facility] call after [facility] hand off of care. Patient states he is doing well. Denies SOB, wheezing, cough, chest pain with inspiration or expiration, fatigue, fever or chills. Patient confirms all medications are available and are being taken as directed. Patient has fair appetite and is drinking adequate fluids. Normal bladder and bowel elimination patterns. Patient has no needs or concerns for writer at this time. Will continue to follow. Randall Prasad LPN    [phone]  Bri Horowitz / Salvador Buitrago Coordinator    Needs to be reviewed by the provider   Additional needs identified to be addressed with provider No  none             Method of communication with provider : none    Care Transition Nurse (CTN) contacted the patient by telephone to follow up after admission on 2021. Verified name and  with patient as identifiers. Addressed changes since last contact: none  Discussed follow-up appointments. If no appointment was previously scheduled, appointment scheduling offered: Yes   Is follow up appointment scheduled within 7 days of discharge? Yes    Advance Care Planning:   Does patient have an Advance Directive:  reviewed and current. CTN reviewed discharge instructions, medical action plan and red flags with patient and discussed any barriers to care and/or understanding of plan of care after discharge. Discussed appropriate site of care based on symptoms and resources available to patient including: When to call 911.    The patient agrees to contact the PCP office for questions related to their healthcare. Patients top risk factors for readmission: lack of knowledge about disease, medical condition-Pneumonia and medication management  Interventions to address risk factors: Obtained and reviewed discharge summary and/or continuity of care documents    Non-[facility] follow up appointment(s): 8/    CTN provided contact information for future needs. Plan for follow-up call in 7-10 days based on severity of symptoms and risk factors. Plan for next call: symptom management-PNA           Care Transitions Subsequent and Final Call    Subsequent and Final Calls  Do you have any ongoing symptoms?: Yes  Patient-reported symptoms: Shortness of Breath  Have your medications changed?: No  Do you have any questions related to your medications?: No  Do you currently have any active services?: No  Do you have any needs or concerns that I can assist you with?: No  Identified Barriers: None  Care Transitions Interventions  Other Interventions:            Follow Up  Future Appointments   Date Time Provider Edis Garces   12/7/2021  1:15 PM MD Markos [place] [facility]       Chase Schroeder LPN

## 2021-10-04 PROBLEM — I50.22 CHRONIC HFREF (HEART FAILURE WITH REDUCED EJECTION FRACTION) (HCC): Status: ACTIVE | Noted: 2021-10-04

## 2021-10-04 PROBLEM — I25.5 ISCHEMIC CARDIOMYOPATHY: Status: ACTIVE | Noted: 2021-10-04

## 2021-10-04 PROBLEM — I25.10 CORONARY ARTERY DISEASE INVOLVING NATIVE CORONARY ARTERY OF NATIVE HEART WITHOUT ANGINA PECTORIS: Status: ACTIVE | Noted: 2021-10-04

## 2021-10-05 ENCOUNTER — TELEPHONE (OUTPATIENT)
Dept: CARDIOLOGY CLINIC | Age: 71
End: 2021-10-05

## 2021-10-05 NOTE — TELEPHONE ENCOUNTER
CHF f/u call:  Feels ok, shopping now, pushing grocery buggy now. Weighs daily. Keep hydrated. Good urination. Breathing: no SOB. Only if up and down steps doing laundry. Sleeps on 2 pillow, no PND. Prior to hospital was in recliner. Now able use bed with 2 pillows. Urine: color  Clear  To yellow. Nocturia x 1-3 x  (depends how much I drink at night)  Dizzy/lightheded: rare with bending over if stand to fast    Diuretic:  Lasix 20mg daily      Declines CHF Clinic at present. Has ELP f/u with DR Chandan Weaver. Pending  apt with advanced heart failure. 21 referral placed. Left  for Kiara at [address] , provided patient  Name/ phone/  . He prefers [place] location but will go to [facility] if weather is good for an initial visit. Let office know when he is set for initial visit for [facility] advanced cardiology.      Tavo Guerrero RN

## 2021-10-05 NOTE — TELEPHONE ENCOUNTER
----- Message from PIERCE Galindo - CNP sent at 10/4/2021 12:36 PM EDT -----  Labs reviewed  Vitals at last office appointment: BP 94/42   Pulse 63      Rise in pro-bnp    How is he feeling? Weights? Response to diuretic?  Etc  Lives in [place] - if interested can refer to CHF clinic    Thank you

## 2021-10-06 NOTE — PROGRESS NOTES
10/6/2021 per Km Mendoza CNP request, ALFONSO Rodriguez [place] [phone] for CTS referral     Fax order and records to [phone] AT Vars[facility] Calero    (pacs transfer was debra requested for [facility] will look for it)    She will call patient to schedule CTS initial visit. (NO [place] location for CTS, she will discuss with patient).        Annette Alford RN

## 2021-10-08 RX ORDER — GLYBURIDE 5 MG/1
TABLET ORAL
Qty: 90 TABLET | Refills: 1 | Status: SHIPPED
Start: 2021-10-08 | End: 2022-03-31 | Stop reason: SDUPTHER

## 2021-10-08 NOTE — TELEPHONE ENCOUNTER
Last Appointment:  7/12/2021  Future Appointments   Date Time Provider Edis Mili   10/15/2021 10:30 AM MD Abbi Monreal [facility]   12/7/2021  1:15 PM [place]  [address]

## 2021-10-14 ENCOUNTER — CARE COORDINATION (OUTPATIENT)
Dept: CASE MANAGEMENT | Age: 71
End: 2021-10-14

## 2021-10-14 NOTE — CARE COORDINATION
of knowledge about disease, medical condition-Pneumonia and medication management  Interventions to address risk factors: Obtained and reviewed discharge summary and/or continuity of care documents    Non-[facility] follow up appointment(s): 8/31/2021    CTN provided contact information for future needs. Plan for follow-up call in 7-10 days based on severity of symptoms and risk factors. Plan for next call: symptom management-Pneumonia    Care Transitions Subsequent and Final Call    Subsequent and Final Calls  Do you have any ongoing symptoms?: No  Have your medications changed?: No  Do you have any questions related to your medications?: No  Do you currently have any active services?: No  Do you have any needs or concerns that I can assist you with?: No  Identified Barriers: None  Care Transitions Interventions  Other Interventions:            Follow Up  Future Appointments   Date Time Provider Edis Garces   10/22/2021 11:00 AM Jose Luis Renteria MD [facility]   12/7/2021  1:15 PM Antonio Aguayo MD [facility]       Chalmers Fabry, LPN

## 2021-10-22 ENCOUNTER — OFFICE VISIT (OUTPATIENT)
Dept: FAMILY MEDICINE CLINIC | Age: 71
End: 2021-10-22
Payer: MEDICARE

## 2021-10-22 VITALS
DIASTOLIC BLOOD PRESSURE: 60 MMHG | HEIGHT: 70 IN | BODY MASS INDEX: 31.21 KG/M2 | SYSTOLIC BLOOD PRESSURE: 102 MMHG | WEIGHT: 218 LBS | HEART RATE: 63 BPM | TEMPERATURE: 96.9 F | OXYGEN SATURATION: 97 %

## 2021-10-22 DIAGNOSIS — E11.8 CONTROLLED TYPE 2 DIABETES MELLITUS WITH COMPLICATION, WITHOUT LONG-TERM CURRENT USE OF INSULIN (HCC): ICD-10-CM

## 2021-10-22 DIAGNOSIS — I42.0 DILATED CARDIOMYOPATHY (HCC): ICD-10-CM

## 2021-10-22 DIAGNOSIS — E11.8 CONTROLLED TYPE 2 DIABETES MELLITUS WITH COMPLICATION, WITHOUT LONG-TERM CURRENT USE OF INSULIN (HCC): Primary | ICD-10-CM

## 2021-10-22 DIAGNOSIS — K76.6 PORTAL HYPERTENSION (HCC): ICD-10-CM

## 2021-10-22 DIAGNOSIS — R93.2 ABNORMAL FINDINGS ON DIAGNOSTIC IMAGING OF LIVER AND BILIARY TRACT: ICD-10-CM

## 2021-10-22 DIAGNOSIS — D68.9 COAGULATION DEFECT (HCC): ICD-10-CM

## 2021-10-22 DIAGNOSIS — R79.89 ELEVATED LFTS: ICD-10-CM

## 2021-10-22 LAB
ANION GAP SERPL CALCULATED.3IONS-SCNC: 11 MMOL/L (ref 7–16)
BUN BLDV-MCNC: 17 MG/DL (ref 6–23)
CALCIUM SERPL-MCNC: 9.4 MG/DL (ref 8.6–10.2)
CHLORIDE BLD-SCNC: 107 MMOL/L (ref 98–107)
CO2: 20 MMOL/L (ref 22–29)
CREAT SERPL-MCNC: 1.3 MG/DL (ref 0.7–1.2)
GFR AFRICAN AMERICAN: >60
GFR NON-AFRICAN AMERICAN: 54 ML/MIN/1.73
GLUCOSE BLD-MCNC: 142 MG/DL (ref 74–99)
HBA1C MFR BLD: 6.9 % (ref 4–5.6)
POTASSIUM SERPL-SCNC: 4.8 MMOL/L (ref 3.5–5)
PRO-BNP: 755 PG/ML (ref 0–125)
SODIUM BLD-SCNC: 138 MMOL/L (ref 132–146)

## 2021-10-22 PROCEDURE — 99214 OFFICE O/P EST MOD 30 MIN: CPT | Performed by: FAMILY MEDICINE

## 2021-10-22 PROCEDURE — 4040F PNEUMOC VAC/ADMIN/RCVD: CPT | Performed by: FAMILY MEDICINE

## 2021-10-22 PROCEDURE — G8417 CALC BMI ABV UP PARAM F/U: HCPCS | Performed by: FAMILY MEDICINE

## 2021-10-22 PROCEDURE — 1036F TOBACCO NON-USER: CPT | Performed by: FAMILY MEDICINE

## 2021-10-22 PROCEDURE — 3044F HG A1C LEVEL LT 7.0%: CPT | Performed by: FAMILY MEDICINE

## 2021-10-22 PROCEDURE — 2022F DILAT RTA XM EVC RTNOPTHY: CPT | Performed by: FAMILY MEDICINE

## 2021-10-22 PROCEDURE — G8484 FLU IMMUNIZE NO ADMIN: HCPCS | Performed by: FAMILY MEDICINE

## 2021-10-22 PROCEDURE — 1123F ACP DISCUSS/DSCN MKR DOCD: CPT | Performed by: FAMILY MEDICINE

## 2021-10-22 PROCEDURE — G8427 DOCREV CUR MEDS BY ELIG CLIN: HCPCS | Performed by: FAMILY MEDICINE

## 2021-10-22 PROCEDURE — 3017F COLORECTAL CA SCREEN DOC REV: CPT | Performed by: FAMILY MEDICINE

## 2021-10-22 NOTE — PROGRESS NOTES
Nasir presents to the office today for   Chief Complaint   Patient presents with    Diabetes     Pancytopenia  Follows with Dr. Gilma Fry  Needs labs     Diabetes  Taking meds as Rx  He is back on Glyburide  Glucose readings improved  Low 100s in AM     Dilated cardiomyopathy  Seeing Dr. Gilma Fry  Now seeing [address] [facility] [place] stable     Elevated LFTs/Cirrhosis  Following with Dr. Campbell Sons   Referred for EUS which was negative for malignancy        Review of Systems     /60   Pulse 63   Temp 96.9 °F (36.1 °C) (Temporal)   Ht 5' 10\" (1.778 m)   Wt 218 lb (98.9 kg)   SpO2 97%   BMI 31.28 kg/m²   Physical Exam  Constitutional:       Appearance: Normal appearance. HENT:      Head: Normocephalic and atraumatic. Eyes:      Extraocular Movements: Extraocular movements intact. Conjunctiva/sclera: Conjunctivae normal.   Cardiovascular:      Rate and Rhythm: Normal rate. Heart sounds: Murmur heard. Pulmonary:      Effort: Pulmonary effort is normal.      Breath sounds: Normal breath sounds. Skin:     General: Skin is warm. Neurological:      Mental Status: He is alert and oriented to person, place, and time.    Psychiatric:         Mood and Affect: Mood normal.         Behavior: Behavior normal.            Current Outpatient Medications:     glyBURIDE (DIABETA) 5 MG tablet, take 1 tablet by mouth EVERY MORNING WITH BREAKFAST, Disp: 90 tablet, Rfl: 1    aspirin 81 MG EC tablet, Take 1 tablet by mouth daily, Disp: 30 tablet, Rfl: 3    montelukast (SINGULAIR) 10 MG tablet, Take 1 tablet by mouth nightly, Disp: 30 tablet, Rfl: 3    atorvastatin (LIPITOR) 40 MG tablet, Take 1 tablet by mouth nightly, Disp: 30 tablet, Rfl: 3    fluticasone (FLONASE) 50 MCG/ACT nasal spray, 1 spray by Each Nostril route daily, Disp: 1 Bottle, Rfl: 3    furosemide (LASIX) 20 MG tablet, Take 1 tablet by mouth daily, Disp: 60 tablet, Rfl: 3    lisinopril (PRINIVIL;ZESTRIL) 2.5 MG tablet, Take 2.5 mg by mouth nightly, Disp: , Rfl:     metoprolol succinate (TOPROL XL) 25 MG extended release tablet, Take 25 mg by mouth nightly, Disp: , Rfl:     cetirizine (ZYRTEC ALLERGY) 10 MG tablet, Take 10 mg by mouth daily, Disp: , Rfl:     spironolactone (ALDACTONE) 25 MG tablet, Take 25 mg by mouth nightly, Disp: , Rfl:     Zinc Oxide (AQUAPHOR 3 IN 1 DIAPER RASH) 15 % CREA, Apply topically daily as needed (apply to both bilateral lower legs) , Disp: , Rfl:     Multiple Vitamins-Minerals (PRESERVISION AREDS 2+MULTI VIT) CAPS, Take 1 capsule by mouth 2 times daily, Disp: , Rfl:     diphenhydrAMINE (ALLERGY RELIEF) 25 MG tablet, Take 50 mg by mouth nightly as needed for Itching, Disp: , Rfl:      Past Medical History:   Diagnosis Date    Allergic rhinitis     Seasonal    Cardiomyopathy ([facility]     CHF (congestive heart failure) ([facility]     DM (diabetes mellitus) ([address]     Enlarged prostate     Thrombocytopenia (HCC)     Type 2 diabetes mellitus without complication ([address]     VHD (valvular heart disease)        [facility] was seen today for diabetes. Diagnoses and all orders for this visit:    Controlled type 2 diabetes mellitus with complication, without long-term current use of insulin ([address]  -     BRAIN NATRIURETIC PEPTIDE; Future  -     BASIC METABOLIC PANEL; Future  -     Hemoglobin A1C; Future    Dilated cardiomyopathy (HCC)  -     BRAIN NATRIURETIC PEPTIDE; Future  -     BASIC METABOLIC PANEL;  Future    Elevated LFTs    Coagulation defect (HCC)    Portal hypertension (HCC)    Abnormal findings on diagnostic imaging of liver and biliary tract        Labs today  F/u with all specialists  Recheck 3 months    Maryuri Ryan MD

## 2021-11-02 ENCOUNTER — CARE COORDINATION (OUTPATIENT)
Dept: CASE MANAGEMENT | Age: 71
End: 2021-11-02

## 2021-11-02 NOTE — CARE COORDINATION
[facility] Transitions Follow Up Call    2021    Patient: Barby Jackson  Patient : 1950   MRN: <[id number]>  Reason for Admission: 2021 - 2021 [facility]. CAP. Discharge Date: 21 RARS: Readmission Risk Score: 14  BPCI       PCP 10/22 11:00. Attended. MILVIA Tijerina  10:00. Canceled. Dr Cesar Kee  10:30. Attended. Dr HOLY [facility]  1:00. Attended. Dr Chetna Vera  1:15. Care Transitions Follow Up Call    Needs to be reviewed by the provider   Additional needs identified to be addressed with provider: No  none             Method of communication with provider : none      Care Transition Nurse (CTN) contacted the patient by telephone to follow up after admission. Verified name and  with patient as identifiers. Addressed changes since last contact: Peace Rodriguez reports he has no exertional SOB or edema concerns. Wearing compression stockings. States he has cough if outdoors in cold air. Enc to use scarf over mouth/nose when in cold climate, v/u. Reports yellow sinus congestion and using Flonase in AM. States he has seasonal allergies and does not feel he has a sinus infection. Advised to notify physician for any fevers, worsened sinus congestion, facial pain, or darkening/blood streaked nasal discharge, v/u. Reports home BS  range. Pt request for RD referral for assist w/ diet/meal planning. Pt is diabetic and has cirrhosis and HF history. Denies any HHC needs. Discussed follow-up appointments. If no appointment was previously scheduled, appointment scheduling offered: Declines need for PCP appt and states if sinus congestion worsens he will call to schedule. .   Is follow up appointment scheduled within 7 days of discharge? Yes.     Advance Care Planning:   Does patient have an Advance Directive: NR.     CTN reviewed discharge instructions, medical action plan and red flags with patient and discussed any barriers to care and/or understanding of plan of care after discharge. Discussed appropriate site of care based on symptoms and resources available to patient including: When to call 911. The patient agrees to contact the PCP office for questions related to their healthcare. Patients top risk factors for readmission: Reviewed s/s sinus infection to report to physician. Interventions to address risk factors: Pt states he will outreach PCP for any increase in symptoms or fever. Non-[facility] follow up appointment(s):     CTN provided contact information for future needs. Plan for follow-up call in 7-10 days based on severity of symptoms and risk factors. Plan for next call: symptom management-BPCI FU    Care Transitions Subsequent and Final Call    Subsequent and Final Calls  Do you have any ongoing symptoms?: Yes  Patient-reported symptoms: Other, Weakness  Have your medications changed?: No  Do you have any questions related to your medications?: No  Do you currently have any active services?: No  Do you have any needs or concerns that I can assist you with?: No  Identified Barriers: Lack of Education  Care Transitions Interventions    Registered Dietician: Completed    Other Interventions:            Follow Up  Future Appointments   Date Time Provider Edis Garces   12/7/2021  1:15 PM MD Jitendra Marroquin [facility]       Medhat Conroy RN

## 2021-11-03 ENCOUNTER — CARE COORDINATION (OUTPATIENT)
Dept: CARE COORDINATION | Age: 71
End: 2021-11-03

## 2021-11-03 NOTE — CARE COORDINATION
[address] [facility] regarding Dietitian referral. RD unable to leave  with call back number- patient answered and immediately hung up x2. RD will notify [facility], [facility]. RD will outreach in one week and will follow up as appropriate.      [address] [facility], [address] [place] [address]

## 2021-11-08 ENCOUNTER — CARE COORDINATION (OUTPATIENT)
Dept: CASE MANAGEMENT | Age: 71
End: 2021-11-08

## 2021-11-08 NOTE — CARE COORDINATION
[facility] Transitions Follow Up Call    2021    Patient: Abi Perez  Patient : 1950   MRN: <[id number]>  Reason for Admission: 2021 - 2021 [address] CAP  Discharge Date: 21 RARS: Readmission Risk Score: 14  BPCI     PCP 10/22 11:00. Attended. MILVIA Tijer[place]  10:00. Canceled. Dr Sena Maciel  10:30. Attended. Dr Joan Grant  1:00. Attended. Dr Forest Oliver  1:15. Care Transitions Follow Up Call    Needs to be reviewed by the provider   Additional needs identified to be addressed with provider: No  none             Method of communication with provider : none      Care Transition Nurse (CTN) contacted the patient by telephone to follow up after admission. Verified name and  with patient as identifiers. Addressed changes since last contact: Laci Kim reports he is doing fine. States he has exertional SOB unchanged from baseline. States BS are less than 200. Requesting RD food list mailed to him. Advised him RD did try to outreach and he states he is unaware but does prefer something mailed. RD advised. Discussed follow-up appointments. If no appointment was previously scheduled, appointment scheduling offered: Attended. Is follow up appointment scheduled within 7 days of discharge? No.    Advance Care Planning:   Does patient have an Advance Directive: NR.     CTN reviewed discharge instructions, medical action plan and red flags with patient and discussed any barriers to care and/or understanding of plan of care after discharge. Discussed appropriate site of care based on symptoms and resources available to patient including: When to call 911. The patient agrees to contact the PCP office for questions related to their healthcare. Patients top risk factors for readmission: PN  Interventions to address risk factors: Reviewed s/s PN to report to physician.       Non-[facility] follow up appointment(s):     CTN provided contact information for future needs. Plan for follow-up call in 10-14 days based on severity of symptoms and risk factors. Plan for next call: symptom management-BPCI fu    Care Transitions Subsequent and Final Call    Subsequent and Final Calls  Do you have any ongoing symptoms?: Yes  Patient-reported symptoms: Shortness of Breath  Have your medications changed?: No  Do you have any questions related to your medications?: No  Do you currently have any active services?: No  Do you have any needs or concerns that I can assist you with?: No  Identified Barriers: Lack of Education  Care Transitions Interventions    Registered Dietician: Completed    Other Interventions:            Follow Up  Future Appointments   Date Time Provider Edis Garces   12/7/2021  1:15 PM MD Isabela Gonzales [facility]       Sabrina Walker RN

## 2021-11-10 ENCOUNTER — CARE COORDINATION (OUTPATIENT)
Dept: CARE COORDINATION | Age: 71
End: 2021-11-10

## 2021-11-10 NOTE — CARE COORDINATION
Per chart review, patient requested nutrition education sent via mail. RD will mail Managing Blood Sugar, Cirrhosis Nutrition Therapy and Heart Failure Nutrition Therapy. RD will outreach in 2 weeks and answer any nutrition related questions at this time.      [address] [facility], [address] [place] [address]

## 2021-11-24 ENCOUNTER — CARE COORDINATION (OUTPATIENT)
Dept: CARE COORDINATION | Age: 71
End: 2021-11-24

## 2021-11-24 NOTE — CARE COORDINATION
Contacted Shahram Velasco regarding Dietitian follow up. Patient states he received the educational handouts in the mail and found the information helpful. Pt has no nutrition related questions at this time. RD provided contact information and encouraged patient to outreach to RD in the future with any nutrition related questions or concerns. RD will follow/assist with patient return call.         [address]

## 2021-12-06 PROBLEM — I05.9 MITRAL VALVE DISEASE: Status: ACTIVE | Noted: 2021-09-13

## 2021-12-06 PROBLEM — I50.20 HFREF (HEART FAILURE WITH REDUCED EJECTION FRACTION) (HCC): Status: ACTIVE | Noted: 2021-09-13

## 2021-12-06 PROBLEM — K86.2 PANCREATIC CYST: Status: ACTIVE | Noted: 2021-09-13

## 2021-12-06 PROBLEM — K74.60 HEPATIC CIRRHOSIS (HCC): Status: ACTIVE | Noted: 2021-09-13

## 2021-12-06 PROBLEM — I25.10 CORONARY ATHEROSCLEROSIS: Status: ACTIVE | Noted: 2021-09-13

## 2021-12-07 ENCOUNTER — OFFICE VISIT (OUTPATIENT)
Dept: CARDIOLOGY CLINIC | Age: 71
End: 2021-12-07
Payer: MEDICARE

## 2021-12-07 VITALS
RESPIRATION RATE: 20 BRPM | HEIGHT: 71 IN | HEART RATE: 56 BPM | SYSTOLIC BLOOD PRESSURE: 114 MMHG | WEIGHT: 225 LBS | BODY MASS INDEX: 31.5 KG/M2 | DIASTOLIC BLOOD PRESSURE: 58 MMHG

## 2021-12-07 DIAGNOSIS — I10 HYPERTENSION, UNSPECIFIED TYPE: Primary | ICD-10-CM

## 2021-12-07 PROCEDURE — 1036F TOBACCO NON-USER: CPT | Performed by: INTERNAL MEDICINE

## 2021-12-07 PROCEDURE — 4040F PNEUMOC VAC/ADMIN/RCVD: CPT | Performed by: INTERNAL MEDICINE

## 2021-12-07 PROCEDURE — 3017F COLORECTAL CA SCREEN DOC REV: CPT | Performed by: INTERNAL MEDICINE

## 2021-12-07 PROCEDURE — G8417 CALC BMI ABV UP PARAM F/U: HCPCS | Performed by: INTERNAL MEDICINE

## 2021-12-07 PROCEDURE — 1123F ACP DISCUSS/DSCN MKR DOCD: CPT | Performed by: INTERNAL MEDICINE

## 2021-12-07 PROCEDURE — 99214 OFFICE O/P EST MOD 30 MIN: CPT | Performed by: INTERNAL MEDICINE

## 2021-12-07 PROCEDURE — 93000 ELECTROCARDIOGRAM COMPLETE: CPT | Performed by: INTERNAL MEDICINE

## 2021-12-07 PROCEDURE — G8427 DOCREV CUR MEDS BY ELIG CLIN: HCPCS | Performed by: INTERNAL MEDICINE

## 2021-12-07 PROCEDURE — G8484 FLU IMMUNIZE NO ADMIN: HCPCS | Performed by: INTERNAL MEDICINE

## 2021-12-07 RX ORDER — ASPIRIN 81 MG/1
81 TABLET ORAL DAILY
Qty: 90 TABLET | Refills: 3 | Status: SHIPPED | OUTPATIENT
Start: 2021-12-07

## 2021-12-07 RX ORDER — FLUTICASONE PROPIONATE 50 MCG
1 SPRAY, SUSPENSION (ML) NASAL DAILY
Qty: 1 EACH | Refills: 5 | Status: SHIPPED | OUTPATIENT
Start: 2021-12-07

## 2021-12-07 RX ORDER — ATORVASTATIN CALCIUM 40 MG/1
40 TABLET, FILM COATED ORAL NIGHTLY
Qty: 90 TABLET | Refills: 3 | Status: SHIPPED | OUTPATIENT
Start: 2021-12-07

## 2021-12-07 RX ORDER — FUROSEMIDE 40 MG/1
1 TABLET ORAL DAILY
COMMUNITY
Start: 2021-11-22 | End: 2022-02-21

## 2021-12-07 NOTE — PROGRESS NOTES
CHIEF COMPLAINT: CMP/CAD    HISTORY OF PRESENT ILLNESS: Patient is a 69 y.o. male seen at the request of Sudeep Banuelos MD.      Pateint with a PMHx of ischemic cardiomyopathy, multivessel CAD, moderate MR, liver cirrhosis, HTN, T2DM, history of morbid obesity with significant weight loss and former tobacco abuse. No CP or SOB. PAST MEDICAL HISTORY:  1. Obesity  2. Former smoker with a 20-pack-year history and quit in 1993 and a history of chewing tobacco  3. Diabetes since 1996, non-insulin-requiring  4. Enlarged prostate  5. Allergic rhinitis  6. Cirrhosis per ultrasound of the liver secondary to fatty liver disease with pancreatic body cyst and dilated pancreatic duct, portal hypertension, status post endoscopy/ultrasound with a biopsy done on August 6, 2021, and on August 19 EGD with EUS FNA  7. Gallstones  8. Cataract removal  9. Valvular heart disease with mitral regurgitation and tricuspid regurgitation and pulmonary hypertension, not well documented  10. Dilated cardiomyopathy and congestive heart failure with an EF of 45 by 2D echocardiogram January 9, 2019  11. Sinus bradycardia  12. Lexiscan stress test March 6, 2019  Electrocardiographically normal regadenoson infusion with a   clinically non-ischemic response   2. Myocardial perfusion imaging showed large apical, inferior,   inferolateral, inferoseptal defects. 3. Overall left ventricular systolic function was reduced at 20%   with severe global hypokinesis, severe inferior hypokinesis. 4.   Intermediate risk general pharmacologic stress test.   13. 2D echocardiogram July 17, 2019, limited study EF 45%  14.  Thrombocytopenia platelet count 84 in July 2021       Past Medical History:   Diagnosis Date    Allergic rhinitis     Seasonal    Cardi[facility]     CHF (congestive heart failure) (HCC)     DM (diabetes mellitus) ([facility]     Enlarged prostate     Thrombocytopenia (HCC)     Type 2 diabetes mellitus without complication ([facility]     VHD (valvular heart disease)        Patient Active Problem List   Diagnosis    Dilated cardiomyopathy ([facility]    Non-rheumatic mitral regurgitation    Absolute anemia    SOBOE (shortness of breath on exertion)    Pulmonary HTN (HCC)    Controlled type 2 diabetes mellitus with complication, without long-term current use of insulin (HCC)    HTN (hypertension)    Pancytopenia ([facility]    Coagulation defect ([facility]    Pancreatic cyst    Community acquired bacterial pneumonia    Bronchopneumonia    Chronic HFrEF (heart failure with reduced ejection fraction) (HCC)    Coronary atherosclerosis    Ischemic cardiomyopathy    Hepatic cirrhosis (HCC)    HFrEF (heart failure with reduced ejection fraction) (HCC)    Mitral valve disease       Allergies   Allergen Reactions    Penicillins Hives    Ambrosia Artemisiifolia (Ragweed) Skin Test Other (See Comments)    Cat Hair Extract Other (See Comments)    Eggs Or Egg-Derived Products     Grass Pollen(K-O-R-T-Swt Oscar)     Milk-Related Compounds     Mixed Ragweed     Molds & Smuts Other (See Comments)    Peanut-Containing Drug Products     Seasonal      Mold ragweed       Current Outpatient Medications   Medication Sig Dispense Refill    furosemide (LASIX) 40 MG tablet Take 1 tablet by mouth daily      glyBURIDE (DIABETA) 5 MG tablet take 1 tablet by mouth EVERY MORNING WITH BREAKFAST 90 tablet 1    aspirin 81 MG EC tablet Take 1 tablet by mouth daily 30 tablet 3    montelukast (SINGULAIR) 10 MG tablet Take 1 tablet by mouth nightly 30 tablet 3    atorvastatin (LIPITOR) 40 MG tablet Take 1 tablet by mouth nightly 30 tablet 3    fluticasone (FLONASE) 50 MCG/ACT nasal spray 1 spray by Each Nostril route daily 1 Bottle 3    lisinopril (PRINIVIL;ZESTRIL) 2.5 MG tablet Take 2.5 mg by mouth nightly      metoprolol succinate (TOPROL XL) 25 MG extended release tablet Take 25 mg by mouth nightly      cetirizine (ZYRTEC ALLERGY) 10 MG tablet Take 10 mg by mouth daily      spironolactone (ALDACTONE) 25 MG tablet Take 25 mg by mouth nightly      Zinc Oxide (AQUAPHOR 3 IN 1 DIAPER RASH) 15 % CREA Apply topically daily as needed (apply to both bilateral lower legs)       Multiple Vitamins-Minerals (PRESERVISION AREDS 2+MULTI VIT) CAPS Take 1 capsule by mouth 2 times daily       No current facility-administered medications for this visit. Social History     Socioeconomic History    Marital status:      Spouse name: Not on file    Number of children: Not on file    Years of education: Not on file    Highest education level: Not on file   Occupational History    Occupation: retired   Tobacco Use    Smoking status: Former Smoker     Packs/day: 1.00     Years: 20.00     Pack years: 20.00     Types: Cigarettes     Quit date: 1993     Years since quittin.8    Smokeless tobacco: Former User     Types: Chew     Quit date: 1970   Vaping Use    Vaping Use: Never used   Substance and Sexual Activity    Alcohol use: Yes     Comment: Occ beer    Drug use: Never    Sexual activity: Not on file   Other Topics Concern    Not on file   Social History Narrative    Drinks decaf coffee & occ pop. Social Determinants of Health     Financial Resource Strain:     Difficulty of Paying Living Expenses: Not on file   Food Insecurity:     Worried About Running Out of Food in the Last Year: Not on file    Shaista of Food in the Last Year: Not on file   Transportation Needs:     Lack of Transportation (Medical): Not on file    Lack of Transportation (Non-Medical):  Not on file   Physical Activity:     Days of Exercise per Week: Not on file    Minutes of Exercise per Session: Not on file   Stress:     Feeling of Stress : Not on file   Social Connections:     Frequency of Communication with Friends and Family: Not on file    Frequency of Social Gatherings with Friends and Family: Not on file    Attends Zoroastrian Services: Not on file   Rex Henriquez Active Member of Clubs or Organizations: Not on file    Attends Club or Organization Meetings: Not on file    Marital Status: Not on file   Intimate Partner Violence:     Fear of Current or Ex-Partner: Not on file    Emotionally Abused: Not on file    Physically Abused: Not on file    Sexually Abused: Not on file   Housing Stability:     Unable to Pay for Housing in the Last Year: Not on file    Number of [place] in the Last Year: Not on file    Unstable Housing in the Last Year: Not on file       Family History   Problem Relation Age of Onset    Alcohol Abuse Father     No Known Problems Sister     Diabetes Brother        Review of Systems:  Heart: as above   Lungs: as above   Eyes: denies changes in vision or discharge. Ears: denies changes in hearing or pain. Nose: denies epistaxis or masses   Throat: denies sore throat or trouble swallowing. Neuro: denies numbness, tingling, tremors. Skin: denies rashes or itching. : denies hematuria, dysuria   GI: denies vomiting, diarrhea   Psych: denies mood changed, anxiety, depression. All other systems negative. Physical Exam   BP (!) 114/58   Pulse 56   Resp 20   Ht 5' 11\" (1.803 m)   Wt 225 lb (102.1 kg)   BMI 31.38 kg/m²   Constitutional: Oriented to person, place, and time. Well-developed and well-nourished. No distress. Head: Normocephalic and atraumatic. Eyes: EOM are normal. Pupils are equal, round, and reactive to light. Neck: Normal range of motion. Neck supple. No hepatojugular reflux and no JVD present. Carotid bruit is not present. No tracheal deviation present. No thyromegaly present. Cardiovascular: Normal rate, regular rhythm, normal heart sounds and intact distal pulses. Exam reveals no gallop and no friction rub. No murmur heard. Pulmonary/Chest: Effort normal and breath sounds normal. No respiratory distress. No wheezes. No rales. No tenderness. Abdominal: Soft.  Bowel sounds are normal. No distension and no mass. No tenderness. No rebound and no guarding. Musculoskeletal: Normal range of motion. No edema and no tenderness. Lymphadenopathy:   No cervical adenopathy. No groin adenopathy. Neurological: Alert and oriented to person, place, and time. Skin: Skin is warm and dry. No rash noted. Not diaphoretic. No erythema. Psychiatric: Normal mood and affect. Behavior is normal.     EKG personally reviewed 12/07/21:  nonspecific ST and T waves changes, sinus bradycardia. [facility] Pulmonary (8/20/2021)  Impression  1.  No indication for acute pulmonary emboli. 2.  Extensive endobronchial process in the bronchial system both lower lobes with the retained secretions or aspiration anterior with the discrete patchy bronchopneumonia in the left lower lobe, minimal in the right lower lobe. 3.  No aneurysm formation or dissection of the thoracic aorta. 4.  Calcifications of all coronary arteries.  Please correlate clinically. 5.  Findings for chronic calcified pancreatitis. Briana Gosselin is a fullness in the tail of the pancreas which requires multiphase IV contrast MRI in better advantage than CT for proper characterization. TTE (8/24/2021, Dr. Lauro Morris)   Summary   Borderline dilated left ventricle. Regional wall motion abnormalities with akinesis of mid and distal anterior, anteroseptal, anterolateral, apical and inferoapical segment as well as basilar inferior segment and hypokinesis of inferolateral and lateral segments. Severely reduced left ventricular systolic function. EF 25%   The left atrium is mildly dilated. Interatrial septum appears intact. Structurally normal mitral valve. Moderate centrally directed mitral regurgitation. The aortic valve appears mildly sclerotic. Mild tricuspid regurgitation. Pulmonary hypertension is mild . [facility] (8/26/2021)  Findings:  Left main: 20%  stenosis  LAD: 100 %  Stenosis  IR: 95% stenosis  Circumflex: 95 %   stenosis  RCA: Dominant.   70 %  stenosis  LV angio: not performed  Hemodynamics:  LV: 66 mmHg. EDP: 7 No gradient across AV. Ao: 64/35 ( 46 ). Post op diagnosis:  MV CAD    ASSESSMENT AND PLAN:  Patient Active Problem List   Diagnosis    Dilated cardiomyopathy ([address]    Non-rheumatic mitral regurgitation    Absolute anemia    SOBOE (shortness of breath on exertion)    Pulmonary HTN (HCC)    Controlled type 2 diabetes mellitus with complication, without long-term current use of insulin (HCC)    HTN (hypertension)    Pancytopenia ([facility]    Coagulation defect ([address]    Pancreatic cyst    Community acquired bacterial pneumonia    Bronchopneumonia    Chronic HFrEF (heart failure with reduced ejection fraction) (HCC)    Coronary atherosclerosis    Ischemic cardiomyopathy    Hepatic cirrhosis (HCC)    HFrEF (heart failure with reduced ejection fraction) (HCC)    Mitral valve disease     1. Chronic HFrEF/ICMP:    LVEF 25%, LVEDD 5.5, LVMI 87    Referral to [facility] CTS and advanced heart failure and evaluate for CABG vs high risk PCI. 2. CAD: Turned down for surgery from [facility]. 3. VHD: Moderate MR.    4. HTN: Observe. 5. Child's class A hepatic cirrhosis    6. Thrombocytopenia: Follow labs. 7. ASHLEY Luz D.O.   Cardiologist  Cardiology, [id number]

## 2022-01-07 RX ORDER — MONTELUKAST SODIUM 10 MG/1
10 TABLET ORAL NIGHTLY
Qty: 30 TABLET | Refills: 3 | Status: SHIPPED
Start: 2022-01-07 | End: 2022-04-26

## 2022-01-19 ENCOUNTER — OFFICE VISIT (OUTPATIENT)
Dept: CARDIOLOGY CLINIC | Age: 72
End: 2022-01-19
Payer: MEDICARE

## 2022-01-19 VITALS
RESPIRATION RATE: 20 BRPM | HEART RATE: 60 BPM | BODY MASS INDEX: 31.5 KG/M2 | WEIGHT: 225 LBS | SYSTOLIC BLOOD PRESSURE: 122 MMHG | HEIGHT: 71 IN | DIASTOLIC BLOOD PRESSURE: 56 MMHG

## 2022-01-19 DIAGNOSIS — I10 HYPERTENSION, UNSPECIFIED TYPE: Primary | ICD-10-CM

## 2022-01-19 PROCEDURE — G8484 FLU IMMUNIZE NO ADMIN: HCPCS | Performed by: INTERNAL MEDICINE

## 2022-01-19 PROCEDURE — 1036F TOBACCO NON-USER: CPT | Performed by: INTERNAL MEDICINE

## 2022-01-19 PROCEDURE — 93000 ELECTROCARDIOGRAM COMPLETE: CPT | Performed by: INTERNAL MEDICINE

## 2022-01-19 PROCEDURE — 3017F COLORECTAL CA SCREEN DOC REV: CPT | Performed by: INTERNAL MEDICINE

## 2022-01-19 PROCEDURE — 1123F ACP DISCUSS/DSCN MKR DOCD: CPT | Performed by: INTERNAL MEDICINE

## 2022-01-19 PROCEDURE — G8417 CALC BMI ABV UP PARAM F/U: HCPCS | Performed by: INTERNAL MEDICINE

## 2022-01-19 PROCEDURE — 99214 OFFICE O/P EST MOD 30 MIN: CPT | Performed by: INTERNAL MEDICINE

## 2022-01-19 PROCEDURE — 4040F PNEUMOC VAC/ADMIN/RCVD: CPT | Performed by: INTERNAL MEDICINE

## 2022-01-19 PROCEDURE — G8427 DOCREV CUR MEDS BY ELIG CLIN: HCPCS | Performed by: INTERNAL MEDICINE

## 2022-01-19 NOTE — PROGRESS NOTES
CHIEF COMPLAINT: CMP/CAD    HISTORY OF PRESENT ILLNESS: Patient is a 66 y.o. male seen at the request of Chapis Arevalo MD.      Pateint with a PMHx of ischemic cardiomyopathy, multivessel CAD, moderate MR, liver cirrhosis, HTN, T2DM, history of morbid obesity with significant weight loss and former tobacco abuse. No CP or SOB. PAST MEDICAL HISTORY:  1. Obesity  2. Former smoker with a 20-pack-year history and quit in 1993 and a history of chewing tobacco  3. Diabetes since 1996, non-insulin-requiring  4. Enlarged prostate  5. Allergic rhinitis  6. Cirrhosis per ultrasound of the liver secondary to fatty liver disease with pancreatic body cyst and dilated pancreatic duct, portal hypertension, status post endoscopy/ultrasound with a biopsy done on August 6, 2021, and on August 19 EGD with EUS FNA  7. Gallstones  8. Cataract removal  9. Valvular heart disease with mitral regurgitation and tricuspid regurgitation and pulmonary hypertension, not well documented  10. Dilated cardiomyopathy and congestive heart failure with an EF of 45 by 2D echocardiogram January 9, 2019  11. Sinus bradycardia  12. Lexiscan stress test March 6, 2019  Electrocardiographically normal regadenoson infusion with a   clinically non-ischemic response   2. Myocardial perfusion imaging showed large apical, inferior,   inferolateral, inferoseptal defects. 3. Overall left ventricular systolic function was reduced at 20%   with severe global hypokinesis, severe inferior hypokinesis. 4.   Intermediate risk general pharmacologic stress test.   13. 2D echocardiogram July 17, 2019, limited study EF 45%  14.  Thrombocytopenia platelet count 84 in July 2021       Past Medical History:   Diagnosis Date    Allergic rhinitis     Seasonal    [facility]omy[facility]     CHF (congestive heart failure) (HCC)     DM (diabetes mellitus) ([facility]     Enlarged prostate     Thrombocytopenia (HCC)     Type 2 diabetes mellitus without complication ([facility]     VHD (valvular heart disease)        Patient Active Problem List   Diagnosis    [facility]    Non-rheumatic mitral regurgitation    Absolute anemia    SOBOE (shortness of breath on exertion)    Pulmonary HTN (HCC)    Controlled type 2 diabetes mellitus with complication, without long-term current use of insulin (HCC)    HTN (hypertension)    Pancytopenia ([facility]    [facility]    Pancreatic cyst    Community acquired bacterial pneumonia    Bronchopneumonia    Chronic HFrEF (heart failure with reduced ejection fraction) (HCC)    Coronary atherosclerosis    Ischemic cardiomyopathy    Hepatic cirrhosis (HCC)    HFrEF (heart failure with reduced ejection fraction) (HCC)    Mitral valve disease       Allergies   Allergen Reactions    Penicillins Hives    Ambrosia Artemisiifolia (Ragweed) Skin Test Other (See Comments)    Cat Hair Extract Other (See Comments)    Eggs Or Egg-Derived Products     Grass Pollen(K-O-R-T-Swt Oscar)     Milk-Related Compounds     Mixed Ragweed     Molds & Smuts Other (See Comments)    Peanut-Containing Drug Products     Seasonal      Mold ragweed       Current Outpatient Medications   Medication Sig Dispense Refill    montelukast (SINGULAIR) 10 MG tablet Take 1 tablet by mouth nightly 30 tablet 3    furosemide (LASIX) 40 MG tablet Take 1 tablet by mouth daily      atorvastatin (LIPITOR) 40 MG tablet Take 1 tablet by mouth nightly 90 tablet 3    aspirin 81 MG EC tablet Take 1 tablet by mouth daily 90 tablet 3    fluticasone (FLONASE) 50 MCG/ACT nasal spray 1 spray by Each Nostril route daily 1 each 5    glyBURIDE (DIABETA) 5 MG tablet take 1 tablet by mouth EVERY MORNING WITH BREAKFAST 90 tablet 1    lisinopril (PRINIVIL;ZESTRIL) 2.5 MG tablet Take 2.5 mg by mouth nightly      metoprolol succinate (TOPROL XL) 25 MG extended release tablet Take 25 mg by mouth nightly      cetirizine (ZYRTEC ALLERGY) 10 MG tablet Take 10 mg by mouth daily      spironolactone (ALDACTONE) 25 MG tablet Take 25 mg by mouth nightly      Zinc Oxide (AQUAPHOR 3 IN 1 DIAPER RASH) 15 % CREA Apply topically daily as needed (apply to both bilateral lower legs)       Multiple Vitamins-Minerals (PRESERVISION AREDS 2+MULTI VIT) CAPS Take 1 capsule by mouth 2 times daily       No current facility-administered medications for this visit. Social History     Socioeconomic History    Marital status:      Spouse name: Not on file    Number of children: Not on file    Years of education: Not on file    Highest education level: Not on file   Occupational History    Occupation: retired   Tobacco Use    Smoking status: Former Smoker     Packs/day: 1.00     Years: 20.00     Pack years: 20.00     Types: Cigarettes     Quit date: 1993     Years since quittin.0    Smokeless tobacco: Former User     Types: Chew     Quit date: 1970   Vaping Use    Vaping Use: Never used   Substance and Sexual Activity    Alcohol use: Yes     Comment: Occ beer    Drug use: Never    Sexual activity: Not on file   Other Topics Concern    Not on file   Social History Narrative    Drinks decaf coffee & occ pop. Social Determinants of Health     Financial Resource Strain:     Difficulty of Paying Living Expenses: Not on file   Food Insecurity:     Worried About Running Out of Food in the Last Year: Not on file    Shaista of Food in the Last Year: Not on file   Transportation Needs:     Lack of Transportation (Medical): Not on file    Lack of Transportation (Non-Medical):  Not on file   Physical Activity:     Days of Exercise per Week: Not on file    Minutes of Exercise per Session: Not on file   Stress:     Feeling of Stress : Not on file   Social Connections:     Frequency of Communication with Friends and Family: Not on file    Frequency of Social Gatherings with Friends and Family: Not on file    Attends Moravian Services: Not on file   Kerry Donald Active Member of Clubs or Organizations: Not on file    Attends Club or Organization Meetings: Not on file    Marital Status: Not on file   Intimate Partner Violence:     Fear of Current or Ex-Partner: Not on file    Emotionally Abused: Not on file    Physically Abused: Not on file    Sexually Abused: Not on file   Housing Stability:     Unable to Pay for Housing in the Last Year: Not on file    Number of [place] in the Last Year: Not on file    Unstable Housing in the Last Year: Not on file       Family History   Problem Relation Age of Onset    Alcohol Abuse Father     No Known Problems Sister     Diabetes Brother        Review of Systems:  Heart: as above   Lungs: as above   Eyes: denies changes in vision or discharge. Ears: denies changes in hearing or pain. Nose: denies epistaxis or masses   Throat: denies sore throat or trouble swallowing. Neuro: denies numbness, tingling, tremors. Skin: denies rashes or itching. : denies hematuria, dysuria   GI: denies vomiting, diarrhea   Psych: denies mood changed, anxiety, depression. All other systems negative. Physical Exam   BP (!) 122/56   Pulse 60   Resp 20   Ht 5' 11\" (1.803 m)   Wt 225 lb (102.1 kg)   BMI 31.38 kg/m²   Constitutional: Oriented to person, place, and time. Well-developed and well-nourished. No distress. Head: Normocephalic and atraumatic. Eyes: EOM are normal. Pupils are equal, round, and reactive to light. Neck: Normal range of motion. Neck supple. No hepatojugular reflux and no JVD present. Carotid bruit is not present. No tracheal deviation present. No thyromegaly present. Cardiovascular: Normal rate, regular rhythm, normal heart sounds and intact distal pulses. Exam reveals no gallop and no friction rub. No murmur heard. Pulmonary/Chest: Effort normal and breath sounds normal. No respiratory distress. No wheezes. No rales. No tenderness. Abdominal: Soft.  Bowel sounds are normal. No distension and no mass. No tenderness. No rebound and no guarding. Musculoskeletal: Normal range of motion. No edema and no tenderness. Lymphadenopathy:   No cervical adenopathy. No groin adenopathy. Neurological: Alert and oriented to person, place, and time. Skin: Skin is warm and dry. No rash noted. Not diaphoretic. No erythema. Psychiatric: Normal mood and affect. Behavior is normal.     EKG personally reviewed 01/19/22:  nonspecific ST and T waves changes, sinus bradycardia. CTA Pulmonary (8/20/2021)  Impression  1.  No indication for acute pulmonary emboli. 2.  Extensive endobronchial process in the bronchial system both lower lobes with the retained secretions or aspiration anterior with the discrete patchy bronchopneumonia in the left lower lobe, minimal in the right lower lobe. 3.  No aneurysm formation or dissection of the thoracic aorta. 4.  Calcifications of all coronary arteries.  Please correlate clinically. 5.  Findings for chronic calcified pancreatitis. Gail Sussy is a fullness in the tail of the pancreas which requires multiphase IV contrast MRI in better advantage than CT for proper characterization. TTE (8/24/2021, Dr. Nathaniel Leija)   Summary   Borderline dilated left ventricle. Regional wall motion abnormalities with akinesis of mid and distal anterior, anteroseptal, anterolateral, apical and inferoapical segment as well as basilar inferior segment and hypokinesis of inferolateral and lateral segments. Severely reduced left ventricular systolic function. EF 25%   The left atrium is mildly dilated. Interatrial septum appears intact. Structurally normal mitral valve. Moderate centrally directed mitral regurgitation. The aortic valve appears mildly sclerotic. Mild tricuspid regurgitation. Pulmonary hypertension is mild . [facility] (8/26/2021)  Findings:  Left main: 20%  stenosis  LAD: 100 %  Stenosis  IR: 95% stenosis  Circumflex: 95 %   stenosis  RCA: Dominant.   70 %  stenosis  LV angio: not performed  Hemodynamics:  LV: 66 mmHg. EDP: 7 No gradient across AV. Ao: 64/35 ( 46 ). Post op diagnosis:  MV CAD    ASSESSMENT AND PLAN:  Patient Active Problem List   Diagnosis    Dilated cardiomyopathy ([address]    Non-rheumatic mitral regurgitation    Absolute anemia    SOBOE (shortness of breath on exertion)    Pulmonary HTN (HCC)    Controlled type 2 diabetes mellitus with complication, without long-term current use of insulin (HCC)    HTN (hypertension)    Pancytopenia ([address]    Coagulation defect ([address]    Pancreatic cyst    Community acquired bacterial pneumonia    Bronchopneumonia    Chronic HFrEF (heart failure with reduced ejection fraction) (HCC)    Coronary atherosclerosis    Ischemic cardiomyopathy    Hepatic cirrhosis (HCC)    HFrEF (heart failure with reduced ejection fraction) (HCC)    Mitral valve disease     1. Chronic HFrEF/ICMP:    LVEF 25%, LVEDD 5.5, LVMI 87    Referral to [facility] CTS and advanced heart failure and evaluate for CABG vs high risk PCI. 2. CAD: Turn[facility]. 3. VHD: Moderate MR.    4. HTN: Observe. 5. Child's class A hepatic cirrhosis    6. Thrombocytopenia: Follow labs. 7. DM     Yisel Ya D.O.   Cardiologist  Cardiology, [facility]

## 2022-01-25 ENCOUNTER — OFFICE VISIT (OUTPATIENT)
Dept: FAMILY MEDICINE CLINIC | Age: 72
End: 2022-01-25
Payer: MEDICARE

## 2022-01-25 ENCOUNTER — TELEPHONE (OUTPATIENT)
Dept: FAMILY MEDICINE CLINIC | Age: 72
End: 2022-01-25

## 2022-01-25 VITALS
DIASTOLIC BLOOD PRESSURE: 76 MMHG | WEIGHT: 221 LBS | HEART RATE: 61 BPM | SYSTOLIC BLOOD PRESSURE: 130 MMHG | TEMPERATURE: 97 F | HEIGHT: 71 IN | OXYGEN SATURATION: 98 % | BODY MASS INDEX: 30.94 KG/M2

## 2022-01-25 DIAGNOSIS — R05.9 COUGH: Primary | ICD-10-CM

## 2022-01-25 PROCEDURE — G8427 DOCREV CUR MEDS BY ELIG CLIN: HCPCS | Performed by: FAMILY MEDICINE

## 2022-01-25 PROCEDURE — G8484 FLU IMMUNIZE NO ADMIN: HCPCS | Performed by: FAMILY MEDICINE

## 2022-01-25 PROCEDURE — 3017F COLORECTAL CA SCREEN DOC REV: CPT | Performed by: FAMILY MEDICINE

## 2022-01-25 PROCEDURE — 99213 OFFICE O/P EST LOW 20 MIN: CPT | Performed by: FAMILY MEDICINE

## 2022-01-25 PROCEDURE — 1123F ACP DISCUSS/DSCN MKR DOCD: CPT | Performed by: FAMILY MEDICINE

## 2022-01-25 PROCEDURE — 4040F PNEUMOC VAC/ADMIN/RCVD: CPT | Performed by: FAMILY MEDICINE

## 2022-01-25 PROCEDURE — G8417 CALC BMI ABV UP PARAM F/U: HCPCS | Performed by: FAMILY MEDICINE

## 2022-01-25 PROCEDURE — 1036F TOBACCO NON-USER: CPT | Performed by: FAMILY MEDICINE

## 2022-01-25 RX ORDER — AZITHROMYCIN 250 MG/1
250 TABLET, FILM COATED ORAL SEE ADMIN INSTRUCTIONS
Qty: 6 TABLET | Refills: 0 | Status: SHIPPED | OUTPATIENT
Start: 2022-01-25 | End: 2022-01-30

## 2022-01-25 NOTE — TELEPHONE ENCOUNTER
----- Message from Chelle Erwin sent at 1/24/2022  2:29 PM EST -----  Subject: Refill Request    QUESTIONS  Name of Medication? Other - sinus infaction  Patient-reported dosage and instructions? unknown  How many days do you have left? 0  Preferred Pharmacy? [de-identified] [id number] phone number (if available)? [phone]  Additional Information for Provider? Mucinex is not working. pt. is   congested. running nose and eye pressure  ---------------------------------------------------------------------------  --------------  CALL BACK INFO  What is the best way for the office to contact you? OK to leave message on   voicemail  Preferred Call Back Phone Number?  [phone]

## 2022-01-25 NOTE — PROGRESS NOTES
Nasir presents to the office today for   Chief Complaint   Patient presents with    Congestion     3 weeks    Cough      Cough  X 3 weeks  Green phlegm  No fever  A lot of sinus congestion  Thick and he feels like it won't drain    Review of Systems     /76   Pulse 61   Temp 97 °F (36.1 °C) (Temporal)   Ht 5' 11\" (1.803 m)   Wt 221 lb (100.2 kg)   SpO2 98%   BMI 30.82 kg/m²   Physical Exam  Constitutional:       Appearance: Normal appearance. HENT:      Head: Normocephalic and atraumatic. Eyes:      Extraocular Movements: Extraocular movements intact. Conjunctiva/sclera: Conjunctivae normal.   Cardiovascular:      Rate and Rhythm: Normal rate. Heart sounds: Normal heart sounds. Pulmonary:      Effort: Pulmonary effort is normal.      Breath sounds: Normal breath sounds. Skin:     General: Skin is warm. Neurological:      Mental Status: He is alert and oriented to person, place, and time.    Psychiatric:         Mood and Affect: Mood normal.         Behavior: Behavior normal.            Current Outpatient Medications:     Fexofenadine HCl (MUCINEX ALLERGY PO), Take by mouth, Disp: , Rfl:     azithromycin (ZITHROMAX) 250 MG tablet, Take 1 tablet by mouth See Admin Instructions for 5 days 500mg on day 1 followed by 250mg on days 2 - 5, Disp: 6 tablet, Rfl: 0    montelukast (SINGULAIR) 10 MG tablet, Take 1 tablet by mouth nightly, Disp: 30 tablet, Rfl: 3    furosemide (LASIX) 40 MG tablet, Take 1 tablet by mouth daily, Disp: , Rfl:     atorvastatin (LIPITOR) 40 MG tablet, Take 1 tablet by mouth nightly, Disp: 90 tablet, Rfl: 3    aspirin 81 MG EC tablet, Take 1 tablet by mouth daily, Disp: 90 tablet, Rfl: 3    fluticasone (FLONASE) 50 MCG/ACT nasal spray, 1 spray by Each Nostril route daily, Disp: 1 each, Rfl: 5    glyBURIDE (DIABETA) 5 MG tablet, take 1 tablet by mouth EVERY MORNING WITH BREAKFAST, Disp: 90 tablet, Rfl: 1    lisinopril (PRINIVIL;ZESTRIL) 2.5 MG tablet, Take 2.5 mg by mouth nightly, Disp: , Rfl:     metoprolol succinate (TOPROL XL) 25 MG extended release tablet, Take 25 mg by mouth nightly, Disp: , Rfl:     cetirizine (ZYRTEC ALLERGY) 10 MG tablet, Take 10 mg by mouth daily, Disp: , Rfl:     spironolactone (ALDACTONE) 25 MG tablet, Take 25 mg by mouth nightly, Disp: , Rfl:     Zinc Oxide (AQUAPHOR 3 IN 1 DIAPER RASH) 15 % CREA, Apply topically daily as needed (apply to both bilateral lower legs) , Disp: , Rfl:     Multiple Vitamins-Minerals (PRESERVISION AREDS 2+MULTI VIT) CAPS, Take 1 capsule by mouth 2 times daily, Disp: , Rfl:      Past Medical History:   Diagnosis Date    Allergic rhinitis     Seasonal    [facility]     CHF (congestive heart failure) (HCC)     DM [facility]di[facility] mellitus) [facility]     Enlarged prostate     Thrombocytopenia (HCC)     Type 2 diabetes mellitus without complication ([facility]     VHD (valvular heart disease)        Erin Aguayo was seen today for congestion and cough. Diagnoses and all orders for this visit:    Cough  -     azithromycin (ZITHROMAX) 250 MG tablet;  Take 1 tablet by mouth See Admin Instructions for 5 days 500mg on day 1 followed by 250mg on days 2 - 5       Call if no improvement in 3 weeks for Tiny Hartley MD

## 2022-01-28 RX ORDER — METOPROLOL SUCCINATE 25 MG/1
TABLET, EXTENDED RELEASE ORAL
Qty: 90 TABLET | Refills: 3 | Status: SHIPPED | OUTPATIENT
Start: 2022-01-28

## 2022-02-21 RX ORDER — FUROSEMIDE 40 MG/1
TABLET ORAL
Qty: 90 TABLET | Refills: 3 | Status: SHIPPED | OUTPATIENT
Start: 2022-02-21

## 2022-03-07 RX ORDER — SPIRONOLACTONE 25 MG/1
TABLET ORAL
Qty: 90 TABLET | Refills: 1 | Status: SHIPPED
Start: 2022-03-07 | End: 2022-07-05

## 2022-03-07 RX ORDER — LISINOPRIL 2.5 MG/1
TABLET ORAL
Qty: 90 TABLET | Refills: 1 | Status: SHIPPED
Start: 2022-03-07 | End: 2022-08-29

## 2022-03-15 ENCOUNTER — TELEPHONE (OUTPATIENT)
Dept: HEMATOLOGY | Age: 72
End: 2022-03-15

## 2022-03-31 ENCOUNTER — OFFICE VISIT (OUTPATIENT)
Dept: FAMILY MEDICINE CLINIC | Age: 72
End: 2022-03-31
Payer: MEDICARE

## 2022-03-31 VITALS
TEMPERATURE: 97.2 F | HEIGHT: 69 IN | WEIGHT: 234 LBS | SYSTOLIC BLOOD PRESSURE: 102 MMHG | BODY MASS INDEX: 34.66 KG/M2 | OXYGEN SATURATION: 96 % | HEART RATE: 74 BPM | DIASTOLIC BLOOD PRESSURE: 42 MMHG

## 2022-03-31 DIAGNOSIS — I42.0 DILATED CARDIOMYOPATHY (HCC): ICD-10-CM

## 2022-03-31 DIAGNOSIS — D68.9 COAGULATION DEFECT (HCC): ICD-10-CM

## 2022-03-31 DIAGNOSIS — D61.818 PANCYTOPENIA (HCC): ICD-10-CM

## 2022-03-31 DIAGNOSIS — E11.8 CONTROLLED TYPE 2 DIABETES MELLITUS WITH COMPLICATION, WITHOUT LONG-TERM CURRENT USE OF INSULIN (HCC): Primary | ICD-10-CM

## 2022-03-31 DIAGNOSIS — K76.6 PORTAL HYPERTENSION (HCC): ICD-10-CM

## 2022-03-31 DIAGNOSIS — I25.118 ATHEROSCLEROTIC HEART DISEASE OF NATIVE CORONARY ARTERY WITH OTHER FORMS OF ANGINA PECTORIS (HCC): ICD-10-CM

## 2022-03-31 DIAGNOSIS — E11.8 CONTROLLED TYPE 2 DIABETES MELLITUS WITH COMPLICATION, WITHOUT LONG-TERM CURRENT USE OF INSULIN (HCC): ICD-10-CM

## 2022-03-31 DIAGNOSIS — I10 ESSENTIAL (PRIMARY) HYPERTENSION: ICD-10-CM

## 2022-03-31 DIAGNOSIS — R79.89 ELEVATED LFTS: ICD-10-CM

## 2022-03-31 LAB
ALBUMIN SERPL-MCNC: 3 G/DL (ref 3.5–5.2)
ALP BLD-CCNC: 131 U/L (ref 40–129)
ALT SERPL-CCNC: 47 U/L (ref 0–40)
ANION GAP SERPL CALCULATED.3IONS-SCNC: 13 MMOL/L (ref 7–16)
AST SERPL-CCNC: 66 U/L (ref 0–39)
BASOPHILS ABSOLUTE: 0.1 E9/L (ref 0–0.2)
BASOPHILS RELATIVE PERCENT: 2.7 % (ref 0–2)
BILIRUB SERPL-MCNC: 1.5 MG/DL (ref 0–1.2)
BUN BLDV-MCNC: 20 MG/DL (ref 6–23)
CALCIUM SERPL-MCNC: 8.9 MG/DL (ref 8.6–10.2)
CHLORIDE BLD-SCNC: 103 MMOL/L (ref 98–107)
CHOLESTEROL, TOTAL: 93 MG/DL (ref 0–199)
CO2: 19 MMOL/L (ref 22–29)
CREAT SERPL-MCNC: 1.4 MG/DL (ref 0.7–1.2)
EOSINOPHILS ABSOLUTE: 0.35 E9/L (ref 0.05–0.5)
EOSINOPHILS RELATIVE PERCENT: 9.6 % (ref 0–6)
GFR AFRICAN AMERICAN: >60
GFR NON-AFRICAN AMERICAN: 50 ML/MIN/1.73
GLUCOSE BLD-MCNC: 401 MG/DL (ref 74–99)
HBA1C MFR BLD: 8.1 % (ref 4–5.6)
HCT VFR BLD CALC: 38 % (ref 37–54)
HDLC SERPL-MCNC: 36 MG/DL
HEMOGLOBIN: 12.7 G/DL (ref 12.5–16.5)
IMMATURE GRANULOCYTES #: 0.02 E9/L
IMMATURE GRANULOCYTES %: 0.5 % (ref 0–5)
LDL CHOLESTEROL CALCULATED: 38 MG/DL (ref 0–99)
LYMPHOCYTES ABSOLUTE: 1.26 E9/L (ref 1.5–4)
LYMPHOCYTES RELATIVE PERCENT: 34.5 % (ref 20–42)
MCH RBC QN AUTO: 31.3 PG (ref 26–35)
MCHC RBC AUTO-ENTMCNC: 33.4 % (ref 32–34.5)
MCV RBC AUTO: 93.6 FL (ref 80–99.9)
MONOCYTES ABSOLUTE: 0.53 E9/L (ref 0.1–0.95)
MONOCYTES RELATIVE PERCENT: 14.5 % (ref 2–12)
NEUTROPHILS ABSOLUTE: 1.39 E9/L (ref 1.8–7.3)
NEUTROPHILS RELATIVE PERCENT: 38.2 % (ref 43–80)
PDW BLD-RTO: 15 FL (ref 11.5–15)
PLATELET # BLD: 55 E9/L (ref 130–450)
PLATELET CONFIRMATION: NORMAL
PMV BLD AUTO: 13.9 FL (ref 7–12)
POTASSIUM SERPL-SCNC: 4.6 MMOL/L (ref 3.5–5)
PRO-BNP: 1428 PG/ML (ref 0–125)
RBC # BLD: 4.06 E12/L (ref 3.8–5.8)
SODIUM BLD-SCNC: 135 MMOL/L (ref 132–146)
TOTAL PROTEIN: 7.2 G/DL (ref 6.4–8.3)
TRIGL SERPL-MCNC: 96 MG/DL (ref 0–149)
TSH SERPL DL<=0.05 MIU/L-ACNC: 3.78 UIU/ML (ref 0.27–4.2)
VLDLC SERPL CALC-MCNC: 19 MG/DL
WBC # BLD: 3.7 E9/L (ref 4.5–11.5)

## 2022-03-31 PROCEDURE — 4040F PNEUMOC VAC/ADMIN/RCVD: CPT | Performed by: FAMILY MEDICINE

## 2022-03-31 PROCEDURE — 2022F DILAT RTA XM EVC RTNOPTHY: CPT | Performed by: FAMILY MEDICINE

## 2022-03-31 PROCEDURE — G8427 DOCREV CUR MEDS BY ELIG CLIN: HCPCS | Performed by: FAMILY MEDICINE

## 2022-03-31 PROCEDURE — 99214 OFFICE O/P EST MOD 30 MIN: CPT | Performed by: FAMILY MEDICINE

## 2022-03-31 PROCEDURE — 1036F TOBACCO NON-USER: CPT | Performed by: FAMILY MEDICINE

## 2022-03-31 PROCEDURE — G8484 FLU IMMUNIZE NO ADMIN: HCPCS | Performed by: FAMILY MEDICINE

## 2022-03-31 PROCEDURE — 1123F ACP DISCUSS/DSCN MKR DOCD: CPT | Performed by: FAMILY MEDICINE

## 2022-03-31 PROCEDURE — 3017F COLORECTAL CA SCREEN DOC REV: CPT | Performed by: FAMILY MEDICINE

## 2022-03-31 PROCEDURE — G8417 CALC BMI ABV UP PARAM F/U: HCPCS | Performed by: FAMILY MEDICINE

## 2022-03-31 PROCEDURE — 3046F HEMOGLOBIN A1C LEVEL >9.0%: CPT | Performed by: FAMILY MEDICINE

## 2022-03-31 RX ORDER — BLOOD SUGAR DIAGNOSTIC
1 STRIP MISCELLANEOUS DAILY
Qty: 50 EACH | Refills: 3 | Status: SHIPPED | OUTPATIENT
Start: 2022-03-31

## 2022-03-31 RX ORDER — GLYBURIDE 5 MG/1
TABLET ORAL
Qty: 90 TABLET | Refills: 1 | Status: SHIPPED
Start: 2022-03-31 | End: 2022-10-03

## 2022-03-31 ASSESSMENT — ENCOUNTER SYMPTOMS
NAUSEA: 0
VOMITING: 0
SHORTNESS OF BREATH: 0
WHEEZING: 0

## 2022-03-31 NOTE — PROGRESS NOTES
Nasir presents to the office today for   Chief Complaint   Patient presents with    Diabetes     Pancytopenia  Use to see Dr. Sis Julien    Diabetes  Taking meds as Rx  He is back on Glyburide  Glucose readings improved  Low 100s in AM     Dilated cardiomyopathy  Seeing Dr. Yuriy Hui at the end of the month    Elevated LFTs/Cirrhosis  Following with Dr. Darshana Casas upcoming and appt end of April       Review of Systems   Constitutional: Negative for chills and fever. Respiratory: Negative for shortness of breath and wheezing. Cardiovascular: Negative for chest pain and palpitations. Gastrointestinal: Negative for nausea and vomiting. Genitourinary: Negative for dysuria, hematuria and urgency. Skin: Negative for rash. Neurological: Negative for dizziness and light-headedness. BP (!) 102/42   Pulse 74   Temp 97.2 °F (36.2 °C) (Temporal)   Ht 5' 9\" (1.753 m)   Wt 234 lb (106.1 kg)   SpO2 96%   BMI 34.56 kg/m²   Physical Exam  Constitutional:       Appearance: Normal appearance. HENT:      Head: Normocephalic and atraumatic. Eyes:      Extraocular Movements: Extraocular movements intact. Conjunctiva/sclera: Conjunctivae normal.   Cardiovascular:      Rate and Rhythm: Normal rate. Heart sounds: Normal heart sounds. Pulmonary:      Effort: Pulmonary effort is normal.      Breath sounds: Normal breath sounds. Skin:     General: Skin is warm. Neurological:      Mental Status: He is alert and oriented to person, place, and time. Psychiatric:         Mood and Affect: Mood normal.         Behavior: Behavior normal.            Current Outpatient Medications:     glyBURIDE (DIABETA) 5 MG tablet, take 1 tablet by mouth EVERY MORNING WITH BREAKFAST, Disp: 90 tablet, Rfl: 1    blood glucose test strips (ONETOUCH ULTRA) strip, Inject 1 each into the skin daily As needed. , Disp: 50 each, Rfl: 3    spironolactone (ALDACTONE) 25 MG tablet, take 1 tablet by mouth once daily, Disp: 90 tablet, Rfl: 1    lisinopril (PRINIVIL;ZESTRIL) 2.5 MG tablet, take 1 tablet by mouth once daily, Disp: 90 tablet, Rfl: 1    furosemide (LASIX) 40 MG tablet, take 1 tablet by mouth once daily, Disp: 90 tablet, Rfl: 3    metoprolol succinate (TOPROL XL) 25 MG extended release tablet, take 1 tablet by mouth once daily, Disp: 90 tablet, Rfl: 3    Fexofenadine HCl (MUCINEX ALLERGY PO), Take by mouth, Disp: , Rfl:     montelukast (SINGULAIR) 10 MG tablet, Take 1 tablet by mouth nightly, Disp: 30 tablet, Rfl: 3    atorvastatin (LIPITOR) 40 MG tablet, Take 1 tablet by mouth nightly, Disp: 90 tablet, Rfl: 3    aspirin 81 MG EC tablet, Take 1 tablet by mouth daily, Disp: 90 tablet, Rfl: 3    fluticasone (FLONASE) 50 MCG/ACT nasal spray, 1 spray by Each Nostril route daily, Disp: 1 each, Rfl: 5    cetirizine (ZYRTEC ALLERGY) 10 MG tablet, Take 10 mg by mouth daily, Disp: , Rfl:     Zinc Oxide (AQUAPHOR 3 IN 1 DIAPER RASH) 15 % CREA, Apply topically daily as needed (apply to both bilateral lower legs) , Disp: , Rfl:     Multiple Vitamins-Minerals (PRESERVISION AREDS 2+MULTI VIT) CAPS, Take 1 capsule by mouth 2 times daily, Disp: , Rfl:      Past Medical History:   Diagnosis Date    Allergic rhinitis     Seasonal    [facility]     CHF (congestive heart failure) (HCC)     DM (diabetes mellitus) (HCC)     Enlarged prostate     Thrombocytopenia (HCC)     Type 2 diabetes mellitus without complication (HCC)     VHD (valvular heart disease)        Wendy Ba was seen today for diabetes. Diagnoses and all orders for this visit:    Controlled type 2 diabetes mellitus with complication, without long-term current use of insulin ([facility]  -     glyBURIDE (DIABETA) 5 MG tablet; take 1 tablet by mouth EVERY MORNING WITH BREAKFAST  -     Brain Natriuretic Peptide; Future  -     CBC with Auto Differential; Future  -     Comprehensive Metabolic Panel;  Future  -     Hemoglobin A1C; Future  -     Lipid Panel; Future  -     TSH; Future  -     blood glucose test strips (ONETOUCH ULTRA) strip; Inject 1 each into the skin daily As needed. Dilated cardiomyopathy ([facility]  -     Brain Natriuretic Peptide; Future    Elevated LFTs  -     Comprehensive Metabolic Panel; Future    Essential (primary) hypertension  -     Comprehensive Metabolic Panel; Future  -     Lipid Panel;  Future    Portal hypertension (HCC)    Pancytopenia (HCC)    Coagulation defect (HCC)    Atherosclerotic heart disease of native coronary artery with other forms of angina pectoris ([facility]       Labs today  F/u with all specialists    Essie Gibson MD

## 2022-04-14 ENCOUNTER — OFFICE VISIT (OUTPATIENT)
Dept: CARDIOLOGY CLINIC | Age: 72
End: 2022-04-14
Payer: MEDICARE

## 2022-04-14 VITALS
RESPIRATION RATE: 20 BRPM | WEIGHT: 236 LBS | BODY MASS INDEX: 33.04 KG/M2 | HEIGHT: 71 IN | SYSTOLIC BLOOD PRESSURE: 112 MMHG | DIASTOLIC BLOOD PRESSURE: 58 MMHG | HEART RATE: 58 BPM

## 2022-04-14 DIAGNOSIS — I27.20 PULMONARY HTN (HCC): Primary | ICD-10-CM

## 2022-04-14 DIAGNOSIS — I25.10 CORONARY ARTERY DISEASE INVOLVING NATIVE HEART WITHOUT ANGINA PECTORIS, UNSPECIFIED VESSEL OR LESION TYPE: ICD-10-CM

## 2022-04-14 PROBLEM — J30.9 ALLERGIC RHINITIS: Status: ACTIVE | Noted: 2022-04-14

## 2022-04-14 PROBLEM — I51.9 LEFT VENTRICULAR DYSFUNCTION: Status: ACTIVE | Noted: 2021-10-04

## 2022-04-14 PROBLEM — R09.02 HYPOXIA: Status: ACTIVE | Noted: 2022-04-14

## 2022-04-14 PROCEDURE — 1036F TOBACCO NON-USER: CPT | Performed by: INTERNAL MEDICINE

## 2022-04-14 PROCEDURE — 99214 OFFICE O/P EST MOD 30 MIN: CPT | Performed by: INTERNAL MEDICINE

## 2022-04-14 PROCEDURE — G8417 CALC BMI ABV UP PARAM F/U: HCPCS | Performed by: INTERNAL MEDICINE

## 2022-04-14 PROCEDURE — 4040F PNEUMOC VAC/ADMIN/RCVD: CPT | Performed by: INTERNAL MEDICINE

## 2022-04-14 PROCEDURE — 3017F COLORECTAL CA SCREEN DOC REV: CPT | Performed by: INTERNAL MEDICINE

## 2022-04-14 PROCEDURE — 1123F ACP DISCUSS/DSCN MKR DOCD: CPT | Performed by: INTERNAL MEDICINE

## 2022-04-14 PROCEDURE — G8427 DOCREV CUR MEDS BY ELIG CLIN: HCPCS | Performed by: INTERNAL MEDICINE

## 2022-04-14 PROCEDURE — 93000 ELECTROCARDIOGRAM COMPLETE: CPT | Performed by: INTERNAL MEDICINE

## 2022-04-14 NOTE — PROGRESS NOTES
CHIEF COMPLAINT: CMP/CAD    HISTORY OF PRESENT ILLNESS: Patient is a 66 y.o. male seen at the request of Zamzam Thibodeaux MD.      Pateint with a PMHx of ischemic cardiomyopathy, multivessel CAD, moderate MR, liver cirrhosis, HTN, T2DM, history of morbid obesity with significant weight loss and former tobacco abuse. No CP. Some SOB. PAST MEDICAL HISTORY:  1. Obesity  2. Former smoker with a 20-pack-year history and quit in 1993 and a history of chewing tobacco  3. Diabetes since 1996, non-insulin-requiring  4. Enlarged prostate  5. Allergic rhinitis  6. Cirrhosis per ultrasound of the liver secondary to fatty liver disease with pancreatic body cyst and dilated pancreatic duct, portal hypertension, status post endoscopy/ultrasound with a biopsy done on August 6, 2021, and on August 19 EGD with EUS FNA  7. Gallstones  8. Cataract removal  9. Valvular heart disease with mitral regurgitation and tricuspid regurgitation and pulmonary hypertension, not well documented  10. Dilated cardiomyopathy and congestive heart failure with an EF of 45 by 2D echocardiogram January 9, 2019  11. Sinus bradycardia  12. Lexiscan stress test March 6, 2019  Electrocardiographically normal regadenoson infusion with a   clinically non-ischemic response   2. Myocardial perfusion imaging showed large apical, inferior,   inferolateral, inferoseptal defects. 3. Overall left ventricular systolic function was reduced at 20%   with severe global hypokinesis, severe inferior hypokinesis. 4.   Intermediate risk general pharmacologic stress test.   13. 2D echocardiogram July 17, 2019, limited study EF 45%  14.  Thrombocytopenia platelet count 84 in July 2021       Past Medical History:   Diagnosis Date    Allergic rhinitis     Seasonal    [facility]     CHF (congestive heart failure) (HCC)     DM (diabetes mellitus) ([facility]     Enlarged prostate     Thrombocytopenia (HCC)     Type 2 diabetes mellitus without complication [facility]     VHD (valvular heart disease)        Patient Active Problem List   Diagnosis    Dilated cardiomyopathy ([facility]    Non-rheumatic mitral regurgitation    Absolute anemia    SOBOE (shortness of breath on exertion)    Pulmonary HTN (HCC)    Controlled type 2 diabetes mellitus with complication, without long-term current use of insulin (HCC)    HTN (hypertension)    Pancytopenia ([facility]    Coagulation defect ([facility]    Pancreatic cyst    Community acquired bacterial pneumonia    Bronchopneumonia    Chronic HFrEF (heart failure with reduced ejection fraction) (HCC)    Atherosclerotic heart disease of native coronary artery with other forms of angina pectoris (HCC)    Left ventricular dysfunction    Hepatic cirrhosis (HCC)    HFrEF (heart failure with reduced ejection fraction) (HCC)    Mitral valve disease    Portal hypertension (HCC)    Allergic rhinitis    Hypoxia    Thrombocytopenia (HCC)       Allergies   Allergen Reactions    Penicillins Hives    Ambrosia Artemisiifolia (Ragweed) Skin Test Other (See Comments)    Cat Hair Extract Other (See Comments)    Eggs Or Egg-Derived Products     Grass Pollen(K-O-R-T-Swt Oscar)     Milk-Related Compounds     Mixed Ragweed     Molds & Smuts Other (See Comments)    Peanut-Containing Drug Products     Seasonal      Mold ragweed       Current Outpatient Medications   Medication Sig Dispense Refill    glyBURIDE (DIABETA) 5 MG tablet take 1 tablet by mouth EVERY MORNING WITH BREAKFAST 90 tablet 1    blood glucose test strips (ONETOUCH ULTRA) strip Inject 1 each into the skin daily As needed.  50 each 3    spironolactone (ALDACTONE) 25 MG tablet take 1 tablet by mouth once daily 90 tablet 1    lisinopril (PRINIVIL;ZESTRIL) 2.5 MG tablet take 1 tablet by mouth once daily 90 tablet 1    furosemide (LASIX) 40 MG tablet take 1 tablet by mouth once daily 90 tablet 3    metoprolol succinate (TOPROL XL) 25 MG extended release tablet take 1 tablet by mouth once daily 90 tablet 3    Fexofenadine HCl (MUCINEX ALLERGY PO) Take by mouth      montelukast (SINGULAIR) 10 MG tablet Take 1 tablet by mouth nightly 30 tablet 3    atorvastatin (LIPITOR) 40 MG tablet Take 1 tablet by mouth nightly 90 tablet 3    aspirin 81 MG EC tablet Take 1 tablet by mouth daily 90 tablet 3    fluticasone (FLONASE) 50 MCG/ACT nasal spray 1 spray by Each Nostril route daily 1 each 5    cetirizine (ZYRTEC ALLERGY) 10 MG tablet Take 10 mg by mouth daily      Zinc Oxide (AQUAPHOR 3 IN 1 DIAPER RASH) 15 % CREA Apply topically daily as needed (apply to both bilateral lower legs)       Multiple Vitamins-Minerals (PRESERVISION AREDS 2+MULTI VIT) CAPS Take 1 capsule by mouth 2 times daily       No current facility-administered medications for this visit. Social History     Socioeconomic History    Marital status:      Spouse name: Not on file    Number of children: Not on file    Years of education: Not on file    Highest education level: Not on file   Occupational History    Occupation: retired   Tobacco Use    Smoking status: Former Smoker     Packs/day: 1.00     Years: 20.00     Pack years: 20.00     Types: Cigarettes     Quit date: 1993     Years since quittin.2    Smokeless tobacco: Former User     Types: Chew     Quit date: 1970   Vaping Use    Vaping Use: Never used   Substance and Sexual Activity    Alcohol use: Yes     Comment: Occ beer    Drug use: Never    Sexual activity: Not on file   Other Topics Concern    Not on file   Social History Narrative    Drinks decaf coffee & occ pop. Social Determinants of Health     Financial Resource Strain:     Difficulty of Paying Living Expenses: Not on file   Food Insecurity:     Worried About Running Out of Food in the Last Year: Not on file    Shaista of Food in the Last Year: Not on file   Transportation Needs:     Lack of Transportation (Medical):  Not on file    Lack of Transportation (Non-Medical): Not on file   Physical Activity:     Days of Exercise per Week: Not on file    Minutes of Exercise per Session: Not on file   Stress:     Feeling of Stress : Not on file   Social Connections:     Frequency of Communication with Friends and Family: Not on file    Frequency of Social Gatherings with Friends and Family: Not on file    Attends [employer] Services: Not on file    Active Member of [address] [place] [address] or Organizations: Not on file    Attends Club or Organization Meetings: Not on file    Marital Status: Not on file   Intimate Partner Violence:     Fear of Current or Ex-Partner: Not on file    Emotionally Abused: Not on file    Physically Abused: Not on file    Sexually Abused: Not on file   Housing Stability:     Unable to Pay for Housing in the Last Year: Not on file    Number of [place] in the Last Year: Not on file    Unstable Housing in the Last Year: Not on file       Family History   Problem Relation Age of Onset    Alcohol Abuse Father     No Known Problems Sister     Diabetes Brother        Review of Systems:  Heart: as above   Lungs: as above   Eyes: denies changes in vision or discharge. Ears: denies changes in hearing or pain. Nose: denies epistaxis or masses   Throat: denies sore throat or trouble swallowing. Neuro: denies numbness, tingling, tremors. Skin: denies rashes or itching. : denies hematuria, dysuria   GI: denies vomiting, diarrhea   Psych: denies mood changed, anxiety, depression. All other systems negative. Physical Exam   BP (!) 112/58   Pulse 58   Resp 20   Ht 5' 11\" (1.803 m)   Wt 236 lb (107 kg)   BMI 32.92 kg/m²   Constitutional: Oriented to person, place, and time. Well-developed and well-nourished. No distress. Head: Normocephalic and atraumatic. Eyes: EOM are normal. Pupils are equal, round, and reactive to light. Neck: Normal range of motion. Neck supple. No hepatojugular reflux and no JVD present.  Carotid bruit is not present. No tracheal deviation present. No thyromegaly present. Cardiovascular: Normal rate, regular rhythm, normal heart sounds and intact distal pulses. Exam reveals no gallop and no friction rub. No murmur heard. Pulmonary/Chest: Effort normal and breath sounds normal. No respiratory distress. No wheezes. No rales. No tenderness. Abdominal: Soft. Bowel sounds are normal. No distension and no mass. No tenderness. No rebound and no guarding. Musculoskeletal: Normal range of motion. No edema and no tenderness. Lymphadenopathy:   No cervical adenopathy. No groin adenopathy. Neurological: Alert and oriented to person, place, and time. Skin: Skin is warm and dry. No rash noted. Not diaphoretic. No erythema. Psychiatric: Normal mood and affect. Behavior is normal.     EKG personally reviewed 04/14/22:  nonspecific ST and T waves changes, sinus bradycardia. [facility] Pulmonary (8/20/2021)  Impression  1.  No indication for acute pulmonary emboli. 2.  Extensive endobronchial process in the bronchial system both lower lobes with the retained secretions or aspiration anterior with the discrete patchy bronchopneumonia in the left lower lobe, minimal in the right lower lobe. 3.  No aneurysm formation or dissection of the thoracic aorta. 4.  Calcifications of all coronary arteries.  Please correlate clinically. 5.  Findings for chronic calcified pancreatitis. Amedeo Nails is a fullness in the tail of the pancreas which requires multiphase IV contrast MRI in better advantage than CT for proper characterization. TTE (8/24/2021, Dr. Chau Saavedra)   Summary   Borderline dilated left ventricle. Regional wall motion abnormalities with akinesis of mid and distal anterior, anteroseptal, anterolateral, apical and inferoapical segment as well as basilar inferior segment and hypokinesis of inferolateral and lateral segments. Severely reduced left ventricular systolic function.  EF 25%   The left atrium is mildly dilated. Interatrial septum appears intact. Structurally normal mitral valve. Moderate centrally directed mitral regurgitation. The aortic valve appears mildly sclerotic. Mild tricuspid regurgitation. Pulmonary hypertension is mild . [facility] (8/26/2021)  Findings:  Left main: 20%  stenosis  LAD: 100 %  Stenosis  IR: 95% stenosis  Circumflex: 95 %   stenosis  RCA: Dominant. 70 %  stenosis  LV angio: not performed  Hemodynamics:  LV: 66 mmHg. EDP: 7 No gradient across AV. Ao: 64/35 ( 46 ). Post op diagnosis:  MV CAD    ASSESSMENT AND PLAN:  Patient Active Problem List   Diagnosis    Dilated cardiomyopathy ([address]    Non-rheumatic mitral regurgitation    Absolute anemia    SOBOE (shortness of breath on exertion)    Pulmonary HTN ([address]    Controlled type 2 diabetes mellitus with complication, without long-term current use of insulin (HCC)    HTN (hypertension)    Pancytopenia ([address]    Coagulation defect ([address]    Pancreatic cyst    Community acquired bacterial pneumonia    Bronchopneumonia    Chronic HFrEF (heart failure with reduced ejection fraction) ([address]    Atherosclerotic heart disease of native coronary artery with other forms of angina pectoris ([address]    Left ventricular dysfunction    Hepatic cirrhosis ([address]    HFrEF (heart failure with reduced ejection fraction) ([address]    Mitral valve disease    Portal hypertension ([address]    Allergic rhinitis    Hypoxia    Thrombocytopenia ([address]     1. Chronic HFrEF/ICMP:    LVEF 25%, LVEDD 5.5, LVMI 87    Referral to [facility] CTS and advanced heart failure and evaluate for CABG vs high risk PCI. 2. CAD: Turned down for [facility] from [facility]. 3. VHD: Moderate MR.    4. HTN: Observe. 5. Child's class A hepatic cirrhosis    6. Thrombocytopenia: Follow labs. 7. DM     Nara Olivas D.O.   Cardiologist  Cardiology, [address] [facility]

## 2022-04-18 ENCOUNTER — HOSPITAL ENCOUNTER (OUTPATIENT)
Dept: ULTRASOUND IMAGING | Age: 72
Discharge: HOME | End: 2022-04-20
Payer: MEDICARE

## 2022-04-18 DIAGNOSIS — K74.60 CIRRHOSIS OF LIVER WITHOUT ASCITES, UNSPECIFIED HEPATIC CIRRHOSIS TYPE (HCC): ICD-10-CM

## 2022-04-18 PROCEDURE — 76705 ECHO EXAM OF ABDOMEN: CPT

## 2022-04-26 ENCOUNTER — OFFICE VISIT (OUTPATIENT)
Dept: SURGERY | Age: 72
End: 2022-04-26
Payer: MEDICARE

## 2022-04-26 VITALS
SYSTOLIC BLOOD PRESSURE: 82 MMHG | HEIGHT: 71 IN | HEART RATE: 58 BPM | WEIGHT: 236 LBS | BODY MASS INDEX: 33.04 KG/M2 | DIASTOLIC BLOOD PRESSURE: 49 MMHG | OXYGEN SATURATION: 96 % | RESPIRATION RATE: 20 BRPM | TEMPERATURE: 97.2 F

## 2022-04-26 DIAGNOSIS — K86.2 PANCREATIC CYST: Primary | ICD-10-CM

## 2022-04-26 PROCEDURE — 4040F PNEUMOC VAC/ADMIN/RCVD: CPT | Performed by: TRANSPLANT SURGERY

## 2022-04-26 PROCEDURE — 99213 OFFICE O/P EST LOW 20 MIN: CPT | Performed by: TRANSPLANT SURGERY

## 2022-04-26 PROCEDURE — 1123F ACP DISCUSS/DSCN MKR DOCD: CPT | Performed by: TRANSPLANT SURGERY

## 2022-04-26 PROCEDURE — G8427 DOCREV CUR MEDS BY ELIG CLIN: HCPCS | Performed by: TRANSPLANT SURGERY

## 2022-04-26 PROCEDURE — 1036F TOBACCO NON-USER: CPT | Performed by: TRANSPLANT SURGERY

## 2022-04-26 PROCEDURE — G8417 CALC BMI ABV UP PARAM F/U: HCPCS | Performed by: TRANSPLANT SURGERY

## 2022-04-26 PROCEDURE — 3017F COLORECTAL CA SCREEN DOC REV: CPT | Performed by: TRANSPLANT SURGERY

## 2022-04-26 RX ORDER — MONTELUKAST SODIUM 10 MG/1
10 TABLET ORAL NIGHTLY
Qty: 30 TABLET | Refills: 3 | Status: SHIPPED
Start: 2022-04-26 | End: 2022-08-30

## 2022-04-26 NOTE — PROGRESS NOTES
Hepatobiliary and Pancreatic Surgery Attending History and Physical    Patient's Name/Date of Birth: Alicia Lopez /1950 (66 y.o.)    CC: abnormal liver function tests    HPI:  Patient is a very pleasant 69year old male who used to weigh 400lbs and he had an US liver which showed cirrhosis and choelithiasis and portal hypertension. He recently was admitted with multi vessel coronary disease. He is currently on anti-platelet therapy. Due to his cirrhosis he was not deemed a candidate locally for bypass intervention. He was seen at [facility] who deemed him a candidate. He denies any further weightloss or abdominal pain. Physical Exam:  BP (!) 82/49   Pulse 58   Temp 97.2 °F (36.2 °C)   Resp 20   Ht 5' 11\" (1.803 m)   Wt 236 lb (107 kg)   SpO2 96%   BMI 32.92 kg/m²     PSYCH: mood and affect normal, alert and oriented x 3: No apparent distress, comfortable  EYES: Sclera white, pupils equal round and reactive to light  ENMT:  Hearing normal, trachea midline, ears externally intact  LYMPH: no obvious lympadenopathy in neck. RESP: Respiratory effort was normal with no retractions or use of accessory muscles. CV:  No pedal edema  GI/ Abdomen: Soft, nondistended, nontender, no guarding, no peritoneal signs  MSK: no clubbing/ no cyanosis/ gaitnormal       Assessment/Plan:  Cirrhosis of the liver secondary to fatty liver disease  with pancreatic body cysts (2cm) with dilated pancreatic duct  - I reviewed their images prior to our office visit and we also reviewed them together today. - no evidence of any masses in his liver  - will need repeat MRI in 6 months    20 Minutes of which greater than 50% was spent counseling or coordinating his care. Thank you for the consultation allowing me to take part in Mr. Prado's care.      Please send a copy of my note to Dr. Swain     Electronically signed by Michi Barnes MD on 4/26/2022 at 9:41 AM

## 2022-05-16 LAB
ALBUMIN SERPL-MCNC: 3 G/DL (ref 3.5–5.2)
ALP BLD-CCNC: 123 U/L (ref 40–129)
ALT SERPL-CCNC: 40 U/L (ref 0–40)
ANION GAP SERPL CALCULATED.3IONS-SCNC: 9 MMOL/L (ref 7–16)
APTT: 40.7 SEC (ref 24.5–35.1)
AST SERPL-CCNC: 54 U/L (ref 0–39)
BASOPHILS ABSOLUTE: 0.12 E9/L (ref 0–0.2)
BASOPHILS RELATIVE PERCENT: 2.8 % (ref 0–2)
BILIRUB SERPL-MCNC: 1.4 MG/DL (ref 0–1.2)
BUN BLDV-MCNC: 22 MG/DL (ref 6–23)
CALCIUM SERPL-MCNC: 8.2 MG/DL (ref 8.6–10.2)
CHLORIDE BLD-SCNC: 106 MMOL/L (ref 98–107)
CO2: 22 MMOL/L (ref 22–29)
CREAT SERPL-MCNC: 1.6 MG/DL (ref 0.7–1.2)
EOSINOPHILS ABSOLUTE: 0.55 E9/L (ref 0.05–0.5)
EOSINOPHILS RELATIVE PERCENT: 12.7 % (ref 0–6)
GFR AFRICAN AMERICAN: 52
GFR NON-AFRICAN AMERICAN: 43 ML/MIN/1.73
GLUCOSE BLD-MCNC: 225 MG/DL (ref 74–99)
HCT VFR BLD CALC: 40.8 % (ref 37–54)
HEMOGLOBIN: 13.8 G/DL (ref 12.5–16.5)
IMMATURE GRANULOCYTES #: 0.01 E9/L
IMMATURE GRANULOCYTES %: 0.2 % (ref 0–5)
INR BLD: 1.8
LYMPHOCYTES ABSOLUTE: 1.77 E9/L (ref 1.5–4)
LYMPHOCYTES RELATIVE PERCENT: 41 % (ref 20–42)
MCH RBC QN AUTO: 31.2 PG (ref 26–35)
MCHC RBC AUTO-ENTMCNC: 33.8 % (ref 32–34.5)
MCV RBC AUTO: 92.3 FL (ref 80–99.9)
MONOCYTES ABSOLUTE: 0.48 E9/L (ref 0.1–0.95)
MONOCYTES RELATIVE PERCENT: 11.1 % (ref 2–12)
NEUTROPHILS ABSOLUTE: 1.39 E9/L (ref 1.8–7.3)
NEUTROPHILS RELATIVE PERCENT: 32.2 % (ref 43–80)
PDW BLD-RTO: 15.5 FL (ref 11.5–15)
PLATELET # BLD: 53 E9/L (ref 130–450)
PLATELET CONFIRMATION: NORMAL
PMV BLD AUTO: 12.9 FL (ref 7–12)
POTASSIUM SERPL-SCNC: 4.2 MMOL/L (ref 3.5–5)
PROTHROMBIN TIME: 19.7 SEC (ref 9.3–12.4)
RBC # BLD: 4.42 E12/L (ref 3.8–5.8)
SODIUM BLD-SCNC: 137 MMOL/L (ref 132–146)
TOTAL PROTEIN: 7.3 G/DL (ref 6.4–8.3)
WBC # BLD: 4.3 E9/L (ref 4.5–11.5)

## 2022-05-17 LAB
ANTI-MITOCHONDRIAL AB, IFA: NEGATIVE
ANTI-NUCLEAR ANTIBODY (ANA): NEGATIVE
HBV SURFACE AB TITR SER: NORMAL {TITER}
HEPATITIS B SURFACE ANTIGEN INTERPRETATION: NORMAL
HEPATITIS C ANTIBODY INTERPRETATION: NORMAL
SMOOTH MUSCLE ANTIBODY: NEGATIVE

## 2022-05-18 LAB
AFP-TUMOR MARKER: 9 NG/ML (ref 0–9)
HEPATITIS B CORE TOTAL ANTIBODY: REACTIVE

## 2022-05-19 LAB
ALPHA-1 ANTITRYPSIN: 71 MG/DL (ref 90–200)
HEPATITIS BE ANTIBODY: NEGATIVE
HEPATITIS BE ANTIGEN: NEGATIVE

## 2022-05-20 LAB — LIVER-KIDNEY MICROSOME-1 AB IGG: 1.1 U (ref 0–24.9)

## 2022-07-05 ENCOUNTER — OFFICE VISIT (OUTPATIENT)
Dept: CARDIOLOGY CLINIC | Age: 72
End: 2022-07-05
Payer: MEDICARE

## 2022-07-05 VITALS
HEART RATE: 63 BPM | SYSTOLIC BLOOD PRESSURE: 114 MMHG | DIASTOLIC BLOOD PRESSURE: 58 MMHG | RESPIRATION RATE: 20 BRPM | BODY MASS INDEX: 31.08 KG/M2 | HEIGHT: 71 IN | WEIGHT: 222 LBS

## 2022-07-05 DIAGNOSIS — I10 HYPERTENSION, UNSPECIFIED TYPE: Primary | ICD-10-CM

## 2022-07-05 PROCEDURE — 99214 OFFICE O/P EST MOD 30 MIN: CPT | Performed by: INTERNAL MEDICINE

## 2022-07-05 PROCEDURE — G8427 DOCREV CUR MEDS BY ELIG CLIN: HCPCS | Performed by: INTERNAL MEDICINE

## 2022-07-05 PROCEDURE — 1123F ACP DISCUSS/DSCN MKR DOCD: CPT | Performed by: INTERNAL MEDICINE

## 2022-07-05 PROCEDURE — 1036F TOBACCO NON-USER: CPT | Performed by: INTERNAL MEDICINE

## 2022-07-05 PROCEDURE — 93000 ELECTROCARDIOGRAM COMPLETE: CPT | Performed by: INTERNAL MEDICINE

## 2022-07-05 PROCEDURE — 3017F COLORECTAL CA SCREEN DOC REV: CPT | Performed by: INTERNAL MEDICINE

## 2022-07-05 PROCEDURE — G8417 CALC BMI ABV UP PARAM F/U: HCPCS | Performed by: INTERNAL MEDICINE

## 2022-07-05 RX ORDER — SPIRONOLACTONE 50 MG/1
TABLET, FILM COATED ORAL
COMMUNITY
Start: 2022-06-19

## 2022-07-05 NOTE — PROGRESS NOTES
CHIEF COMPLAINT: CMP/CAD    HISTORY OF PRESENT ILLNESS: Patient is a 66 y.o. male seen at the request of Camille Patterson MD.      Pateint with a PMHx of ischemic cardiomyopathy, multivessel CAD, moderate MR, liver cirrhosis, HTN, DM, history of morbid obesity with significant weight loss and former tobacco abuse. No CP. Some SOB. PAST MEDICAL HISTORY:  1. Obesity  2. Former smoker with a 20-pack-year history and quit in 1993 and a history of chewing tobacco  3. Diabetes since 1996, non-insulin-requiring  4. Enlarged prostate  5. Allergic rhinitis  6. Cirrhosis per ultrasound of the liver secondary to fatty liver disease with pancreatic body cyst and dilated pancreatic duct, portal hypertension, status post endoscopy/ultrasound with a biopsy done on August 6, 2021, and on August 19 EGD with EUS FNA  7. Gallstones  8. Cataract removal  9. Valvular heart disease with mitral regurgitation and tricuspid regurgitation and pulmonary hypertension, not well documented  10. Dilated cardiomyopathy and congestive heart failure with an EF of 45 by 2D echocardiogram January 9, 2019  11. Sinus bradycardia  12. Lexiscan stress test March 6, 2019  Electrocardiographically normal regadenoson infusion with a   clinically non-ischemic response   2. Myocardial perfusion imaging showed large apical, inferior,   inferolateral, inferoseptal defects. 3. Overall left ventricular systolic function was reduced at 20%   with severe global hypokinesis, severe inferior hypokinesis. 4.   Intermediate risk general pharmacologic stress test.   13. 2D echocardiogram July 17, 2019, limited study EF 45%  14.  Thrombocytopenia platelet count 84 in July 2021       Past Medical History:   Diagnosis Date    Allergic rhinitis     Seasonal    Cardiomyopathy [facility] [place] [address]     CHF (congestive heart failure) (HCC)     DM (diabetes mellitus) ([facility] [place] [address]     Enlarged prostate     Thrombocytopenia (HCC)     Type 2 diabetes mellitus without complication ([facility] [address]     VHD (valvular heart disease)        Patient Active Problem List   Diagnosis    Dilated cardiomyopathy ([facility] [address]    Non-rheumatic mitral regurgitation    Absolute anemia    SOBOE (shortness of breath on exertion)    Pulmonary HTN (HCC)    Controlled type 2 diabetes mellitus with complication, without long-term current use of insulin (HCC)    HTN (hypertension)    Pancytopenia ([facility] [address]    Coagulation defect ([facility] [address]    Pancreatic cyst    Community acquired bacterial pneumonia    Bronchopneumonia    Chronic HFrEF (heart failure with reduced ejection fraction) (HCC)    Atherosclerotic heart disease of native coronary artery with other forms of angina pectoris (HCC)    Left ventricular dysfunction    Hepatic cirrhosis (HCC)    HFrEF (heart failure with reduced ejection fraction) (HCC)    Mitral valve disease    Portal hypertension (HCC)    Allergic rhinitis    Hypoxia    Thrombocytopenia (HCC)       Allergies   Allergen Reactions    Penicillins Hives    Ambrosia Artemisiifolia (Ragweed) Skin Test Other (See Comments)    Cat Hair Extract Other (See Comments)    Eggs Or Egg-Derived Products     Grass Pollen(K-O-R-T-Swt Oscar)     Macadamia Nut Oil Hives    Milk-Related Compounds     Mixed Ragweed     Molds & Smuts Other (See Comments)    Peanut-Containing Drug Products     Seasonal      Mold ragweed       Current Outpatient Medications   Medication Sig Dispense Refill    spironolactone (ALDACTONE) 50 MG tablet take 1 tablet by mouth twice a day      montelukast (SINGULAIR) 10 MG tablet take 1 tablet by mouth nightly 30 tablet 3    glyBURIDE (DIABETA) 5 MG tablet take 1 tablet by mouth EVERY MORNING WITH BREAKFAST 90 tablet 1    blood glucose test strips (ONETOUCH ULTRA) strip Inject 1 each into the skin daily As needed.  50 each 3    lisinopril (PRINIVIL;ZESTRIL) 2.5 MG tablet take 1 tablet by mouth once daily 90 tablet 1    furosemide (LASIX) 40 MG tablet take 1 tablet by mouth once daily 90 tablet 3    metoprolol succinate (TOPROL XL) 25 MG extended release tablet take 1 tablet by mouth once daily 90 tablet 3    Fexofenadine HCl (MUCINEX ALLERGY PO) Take by mouth      atorvastatin (LIPITOR) 40 MG tablet Take 1 tablet by mouth nightly 90 tablet 3    aspirin 81 MG EC tablet Take 1 tablet by mouth daily 90 tablet 3    fluticasone (FLONASE) 50 MCG/ACT nasal spray 1 spray by Each Nostril route daily 1 each 5    cetirizine (ZYRTEC ALLERGY) 10 MG tablet Take 10 mg by mouth daily      Multiple Vitamins-Minerals (PRESERVISION AREDS 2+MULTI VIT) CAPS Take 1 capsule by mouth 2 times daily       No current facility-administered medications for this visit. Social History     Socioeconomic History    Marital status:      Spouse name: Not on file    Number of children: Not on file    Years of education: Not on file    Highest education level: Not on file   Occupational History    Occupation: retired   Tobacco Use    Smoking status: Former Smoker     Packs/day: 1.00     Years: 20.00     Pack years: 20.00     Types: Cigarettes     Quit date: 1993     Years since quittin.4    Smokeless tobacco: Former User     Types: Chew     Quit date: 1970   Vaping Use    Vaping Use: Never used   Substance and Sexual Activity    Alcohol use: Not Currently     Comment: rarely beer    Drug use: Never    Sexual activity: Not on file   Other Topics Concern    Not on file   Social History Narrative    Drinks decaf coffee & occ pop. Social Determinants of Health     Financial Resource Strain:     Difficulty of Paying Living Expenses: Not on file   Food Insecurity:     Worried About Running Out of Food in the Last Year: Not on file    Shaista of Food in the Last Year: Not on file   Transportation Needs:     Lack of Transportation (Medical): Not on file    Lack of Transportation (Non-Medical):  Not on file   Physical Activity:     Days of Exercise per Week: Not on file    Minutes of Exercise per Session: Not on file   Stress:     Feeling of Stress : Not on file   Social Connections:     Frequency of Communication with Friends and Family: Not on file    Frequency of Social Gatherings with Friends and Family: Not on file    Attends Buddhism Services: Not on file    Active Member of [address] [place] [address] [employer] or Organizations: Not on file    Attends Club or Organization Meetings: Not on file    Marital Status: Not on file   Intimate Partner Violence:     Fear of Current or Ex-Partner: Not on file    Emotionally Abused: Not on file    Physically Abused: Not on file    Sexually Abused: Not on file   Housing Stability:     Unable to Pay for Housing in the Last Year: Not on file    Number of [place] in the Last Year: Not on file    Unstable Housing in the Last Year: Not on file       Family History   Problem Relation Age of Onset    Alcohol Abuse Father     No Known Problems Sister     Diabetes Brother        Review of Systems:  Heart: as above   Lungs: as above   Eyes: denies changes in vision or discharge. Ears: denies changes in hearing or pain. Nose: denies epistaxis or masses   Throat: denies sore throat or trouble swallowing. Neuro: denies numbness, tingling, tremors. Skin: denies rashes or itching. : denies hematuria, dysuria   GI: denies vomiting, diarrhea   Psych: denies mood changed, anxiety, depression. All other systems negative. Physical Exam   BP (!) 114/58   Pulse 63   Resp 20   Ht 5' 11\" (1.803 m)   Wt 222 lb (100.7 kg)   BMI 30.96 kg/m²   Constitutional: Oriented to person, place, and time. Well-developed and well-nourished. No distress. Head: Normocephalic and atraumatic. Eyes: EOM are normal. Pupils are equal, round, and reactive to light. Neck: Normal range of motion. Neck supple. No hepatojugular reflux and no JVD present. Carotid bruit is not present. No tracheal deviation present. No thyromegaly present.    Cardiovascular: Normal rate, regular rhythm, normal heart sounds and intact distal pulses. Exam reveals no gallop and no friction rub. No murmur heard. Pulmonary/Chest: Effort normal and breath sounds normal. No respiratory distress. No wheezes. No rales. No tenderness. Abdominal: Soft. Bowel sounds are normal. No distension and no mass. No tenderness. No rebound and no guarding. Musculoskeletal: Normal range of motion. No edema and no tenderness. Lymphadenopathy:   No cervical adenopathy. No groin adenopathy. Neurological: Alert and oriented to person, place, and time. Skin: Skin is warm and dry. No rash noted. Not diaphoretic. No erythema. Psychiatric: Normal mood and affect. Behavior is normal.     EKG personally reviewed 07/05/22:  NSR, nonspecific ST and T waves changes. [facility] Pulmonary (8/20/2021)  1.  No indication for acute pulmonary emboli. 2.  Extensive endobronchial process in the bronchial system both lower lobes with the retained secretions or aspiration anterior with the discrete patchy bronchopneumonia in the left lower lobe, minimal in the right lower lobe. 3.  No aneurysm formation or dissection of the thoracic aorta. 4.  Calcifications of all coronary arteries.  Please correlate clinically. 5.  Findings for chronic calcified pancreatitis. Jodie Clement is a fullness in the tail of the pancreas which requires multiphase IV contrast MRI in better advantage than CT for proper characterization. TTE (8/24/2021, Dr. Jory Garcia)   Summary   Borderline dilated left ventricle. Regional wall motion abnormalities with akinesis of mid and distal anterior, anteroseptal, anterolateral, apical and inferoapical segment as well as basilar inferior segment and hypokinesis of inferolateral and lateral segments. Severely reduced left ventricular systolic function. EF 25%   The left atrium is mildly dilated. Interatrial septum appears intact. Structurally normal mitral valve. Moderate centrally directed mitral regurgitation. The aortic valve appears mildly sclerotic. Mild tricuspid regurgitation. Pulmonary hypertension is mild . [facility] (8/26/2021)  Findings:  Left main: 20%  stenosis  LAD: 100 %  Stenosis  IR: 95% stenosis  Circumflex: 95 %   stenosis  RCA: Dominant. 70 %  stenosis  LV angio: not performed  Hemodynamics:  LV: 66 mmHg. EDP: 7 No gradient across AV. Ao: 64/35 ( 46 ). Post op diagnosis:  MV CAD    ASSESSMENT AND PLAN:  Patient Active Problem List   Diagnosis    Dilated cardiomyopathy ([address]    Non-rheumatic mitral regurgitation    Absolute anemia    SOBOE (shortness of breath on exertion)    Pulmonary HTN ([address]    Controlled type 2 diabetes mellitus with complication, without long-term current use of insulin (HCC)    HTN (hypertension)    Pancytopenia ([address]    Coagulation defect ([address]    Pancreatic cyst    Community acquired bacterial pneumonia    Bronchopneumonia    Chronic HFrEF (heart failure with reduced ejection fraction) ([address]    Atherosclerotic heart disease of native coronary artery with other forms of angina pectoris ([address]    Left ventricular dysfunction    Hepatic cirrhosis ([address]    HFrEF (heart failure with reduced ejection fraction) ([address]    Mitral valve disease    Portal hypertension ([address]    Allergic rhinitis    Hypoxia    Thrombocytopenia ([address]     1. Chronic HFrEF/ICMP:    LVEF 25%, LVEDD 5.5, LVMI 87    Referral to [facility] CTS and advanced heart failure and evaluate for CABG vs high risk PCI. 2. CAD: Turned down for surgery from [place] and [facility]. 3. VHD: Moderate MR.    4. HTN: Observe. 5. Child's class A hepatic cirrhosis    6. Thrombocytopenia: Follow labs. 7. DM     Eri Mcclain D.O.   Cardiologist  Cardiology, [address] [place] [address] Rd

## 2022-08-17 LAB
ALBUMIN SERPL-MCNC: 3.1 G/DL (ref 3.5–5.2)
ALP BLD-CCNC: 104 U/L (ref 40–129)
ALT SERPL-CCNC: 40 U/L (ref 0–40)
ANION GAP SERPL CALCULATED.3IONS-SCNC: 8 MMOL/L (ref 7–16)
APTT: 37 SEC (ref 24.5–35.1)
AST SERPL-CCNC: 46 U/L (ref 0–39)
BASOPHILS ABSOLUTE: 0.09 E9/L (ref 0–0.2)
BASOPHILS RELATIVE PERCENT: 1.9 % (ref 0–2)
BILIRUB SERPL-MCNC: 1.4 MG/DL (ref 0–1.2)
BUN BLDV-MCNC: 18 MG/DL (ref 6–23)
CALCIUM SERPL-MCNC: 9.2 MG/DL (ref 8.6–10.2)
CHLORIDE BLD-SCNC: 108 MMOL/L (ref 98–107)
CO2: 21 MMOL/L (ref 22–29)
CREAT SERPL-MCNC: 1.5 MG/DL (ref 0.7–1.2)
EOSINOPHILS ABSOLUTE: 0.52 E9/L (ref 0.05–0.5)
EOSINOPHILS RELATIVE PERCENT: 11 % (ref 0–6)
GFR AFRICAN AMERICAN: 56
GFR NON-AFRICAN AMERICAN: 46 ML/MIN/1.73
GLUCOSE BLD-MCNC: 170 MG/DL (ref 74–99)
HCT VFR BLD CALC: 35 % (ref 37–54)
HEMOGLOBIN: 12.1 G/DL (ref 12.5–16.5)
IMMATURE GRANULOCYTES #: 0.02 E9/L
IMMATURE GRANULOCYTES %: 0.4 % (ref 0–5)
INR BLD: 1.6
LYMPHOCYTES ABSOLUTE: 1.63 E9/L (ref 1.5–4)
LYMPHOCYTES RELATIVE PERCENT: 34.5 % (ref 20–42)
MCH RBC QN AUTO: 32.2 PG (ref 26–35)
MCHC RBC AUTO-ENTMCNC: 34.6 % (ref 32–34.5)
MCV RBC AUTO: 93.1 FL (ref 80–99.9)
MONOCYTES ABSOLUTE: 0.48 E9/L (ref 0.1–0.95)
MONOCYTES RELATIVE PERCENT: 10.1 % (ref 2–12)
NEUTROPHILS ABSOLUTE: 1.99 E9/L (ref 1.8–7.3)
NEUTROPHILS RELATIVE PERCENT: 42.1 % (ref 43–80)
PDW BLD-RTO: 15.9 FL (ref 11.5–15)
PLATELET # BLD: 52 E9/L (ref 130–450)
PLATELET CONFIRMATION: NORMAL
PMV BLD AUTO: 13.3 FL (ref 7–12)
POTASSIUM SERPL-SCNC: 4.7 MMOL/L (ref 3.5–5)
PROTHROMBIN TIME: 17.5 SEC (ref 9.3–12.4)
RBC # BLD: 3.76 E12/L (ref 3.8–5.8)
SODIUM BLD-SCNC: 137 MMOL/L (ref 132–146)
TOTAL PROTEIN: 6.9 G/DL (ref 6.4–8.3)
WBC # BLD: 4.7 E9/L (ref 4.5–11.5)

## 2022-08-18 ENCOUNTER — TELEPHONE (OUTPATIENT)
Dept: HEMATOLOGY | Age: 72
End: 2022-08-18

## 2022-08-18 LAB
HBV SURFACE AB TITR SER: NORMAL {TITER}
HEPATITIS B SURFACE ANTIGEN INTERPRETATION: NORMAL

## 2022-08-18 NOTE — TELEPHONE ENCOUNTER
Spoke to [place] about he upcoming scheduled MRI. A follow up appt was made with Dr Shayy Stallworth at the [place] office. [place] repeated all appt information and instructions back to staff to confirm he had it written down correctly.  Labs completed on 8/17/22

## 2022-08-19 LAB — ALPHA-1 ANTITRYPSIN: 67 MG/DL (ref 90–200)

## 2022-08-22 LAB
HBV QNT LOG, IU/ML: NOT DETECTED LOG IU/ML
HBV QNT, IU/ML: NOT DETECTED IU/ML
INTERPRETATION: NOT DETECTED

## 2022-08-29 RX ORDER — LISINOPRIL 2.5 MG/1
TABLET ORAL
Qty: 90 TABLET | Refills: 3 | Status: SHIPPED | OUTPATIENT
Start: 2022-08-29

## 2022-08-30 RX ORDER — MONTELUKAST SODIUM 10 MG/1
10 TABLET ORAL NIGHTLY
Qty: 30 TABLET | Refills: 3 | Status: SHIPPED | OUTPATIENT
Start: 2022-08-30

## 2022-09-03 ENCOUNTER — HOSPITAL ENCOUNTER (OUTPATIENT)
Dept: MRI IMAGING | Age: 72
Discharge: HOME | End: 2022-09-05
Payer: MEDICARE

## 2022-09-03 DIAGNOSIS — K86.2 PANCREATIC CYST: ICD-10-CM

## 2022-09-03 PROCEDURE — 74183 MRI ABD W/O CNTR FLWD CNTR: CPT

## 2022-09-03 PROCEDURE — 6360000004 HC RX CONTRAST MEDICATION: Performed by: RADIOLOGY

## 2022-09-03 PROCEDURE — A9579 GAD-BASE MR CONTRAST NOS,1ML: HCPCS | Performed by: RADIOLOGY

## 2022-09-03 RX ADMIN — GADOTERIDOL 20 ML: 279.3 INJECTION, SOLUTION INTRAVENOUS at 12:54

## 2022-09-13 ENCOUNTER — OFFICE VISIT (OUTPATIENT)
Dept: SURGERY | Age: 72
End: 2022-09-13
Payer: MEDICARE

## 2022-09-13 VITALS
WEIGHT: 227 LBS | DIASTOLIC BLOOD PRESSURE: 42 MMHG | HEART RATE: 63 BPM | HEIGHT: 71 IN | SYSTOLIC BLOOD PRESSURE: 75 MMHG | BODY MASS INDEX: 31.78 KG/M2 | OXYGEN SATURATION: 96 %

## 2022-09-13 DIAGNOSIS — K74.60 LIVER CIRRHOSIS SECONDARY TO NASH (HCC): ICD-10-CM

## 2022-09-13 DIAGNOSIS — K75.81 LIVER CIRRHOSIS SECONDARY TO NASH (HCC): ICD-10-CM

## 2022-09-13 DIAGNOSIS — R16.0 LIVER MASS: Primary | ICD-10-CM

## 2022-09-13 DIAGNOSIS — K86.2 PANCREATIC CYST: ICD-10-CM

## 2022-09-13 PROCEDURE — 99213 OFFICE O/P EST LOW 20 MIN: CPT | Performed by: TRANSPLANT SURGERY

## 2022-09-13 PROCEDURE — 1123F ACP DISCUSS/DSCN MKR DOCD: CPT | Performed by: TRANSPLANT SURGERY

## 2022-09-13 PROCEDURE — G8427 DOCREV CUR MEDS BY ELIG CLIN: HCPCS | Performed by: TRANSPLANT SURGERY

## 2022-09-13 PROCEDURE — 1036F TOBACCO NON-USER: CPT | Performed by: TRANSPLANT SURGERY

## 2022-09-13 PROCEDURE — G8417 CALC BMI ABV UP PARAM F/U: HCPCS | Performed by: TRANSPLANT SURGERY

## 2022-09-13 PROCEDURE — 3017F COLORECTAL CA SCREEN DOC REV: CPT | Performed by: TRANSPLANT SURGERY

## 2022-09-13 NOTE — PROGRESS NOTES
Hepatobiliary and Pancreatic Surgery Attending History and Physical    Patient's Name/Date of Birth: Barry  /1950 (66 y.o.)    CC: abnormal liver function tests    HPI:  Patient is a very pleasant 69year old male who used to weigh 400lbs and he had an US liver which showed cirrhosis and choelithiasis and portal hypertension. He does have multivessel coronary disease. He had an EUS a year ago which showed a cyst in the tail of his pancreas. Cytology was negative for malignant cells. He has surveillance imaging with a MRI which showed growth of the pancreatic body cyst.      Physical Exam:  BP (!) 75/42 (Site: Left Upper Arm, Position: Sitting, Cuff Size: Large Adult)   Pulse 63   Ht 5' 11\" (1.803 m)   Wt 227 lb (103 kg)   SpO2 96%   BMI 31.66 kg/m²     PSYCH: mood and affect normal, alert and oriented x 3: No apparent distress, comfortable  EYES: Sclera white, pupils equal round and reactive to light  ENMT:  Hearing normal, trachea midline, ears externally intact  LYMPH: no obvious lympadenopathy in neck. RESP: Respiratory effort was normal with no retractions or use of accessory muscles. CV:  No pedal edema  GI/ Abdomen: Soft, nondistended, nontender, no guarding, no peritoneal signs  MSK: no clubbing/ no cyanosis/ gaitnormal       Assessment/Plan:  Cirrhosis of the liver secondary to fatty liver disease  with pancreatic body cysts (3cm now 3.6cm) with nondilated pancreatic duct distally  - I reviewed their images prior to our office visit and we also reviewed them together today. - he has had interval growth. His pancreatic tail is not dilated distally  - will need repeat MRI in 3 months - also has an indeterminate right lobe mass    20 Minutes of which greater than 50% was spent counseling or coordinating his care. Thank you Dr. Evangelina Quiros for the consultation allowing me to take part in Mr. Prado's care.      Please send a copy of my note to Dr. Eugena Duane    Electronically signed by Favian Carrera MD on 9/13/2022 at 11:39 AM

## 2022-09-29 DIAGNOSIS — E11.8 CONTROLLED TYPE 2 DIABETES MELLITUS WITH COMPLICATION, WITHOUT LONG-TERM CURRENT USE OF INSULIN (HCC): ICD-10-CM

## 2022-10-03 LAB
ALBUMIN SERPL-MCNC: 2.7 G/DL (ref 3.5–5.2)
ALP BLD-CCNC: 114 U/L (ref 40–129)
ALT SERPL-CCNC: 44 U/L (ref 0–40)
ANION GAP SERPL CALCULATED.3IONS-SCNC: 11 MMOL/L (ref 7–16)
AST SERPL-CCNC: 43 U/L (ref 0–39)
BILIRUB SERPL-MCNC: 2 MG/DL (ref 0–1.2)
BUN BLDV-MCNC: 22 MG/DL (ref 6–23)
CALCIUM SERPL-MCNC: 9.1 MG/DL (ref 8.6–10.2)
CHLORIDE BLD-SCNC: 105 MMOL/L (ref 98–107)
CO2: 18 MMOL/L (ref 22–29)
CREAT SERPL-MCNC: 1.7 MG/DL (ref 0.7–1.2)
GFR AFRICAN AMERICAN: 48
GFR NON-AFRICAN AMERICAN: 40 ML/MIN/1.73
GLUCOSE BLD-MCNC: 172 MG/DL (ref 74–99)
POTASSIUM SERPL-SCNC: 4.6 MMOL/L (ref 3.5–5)
SODIUM BLD-SCNC: 134 MMOL/L (ref 132–146)
TOTAL PROTEIN: 7.3 G/DL (ref 6.4–8.3)

## 2022-10-03 RX ORDER — GLYBURIDE 5 MG/1
TABLET ORAL
Qty: 30 TABLET | Refills: 0 | Status: SHIPPED
Start: 2022-10-03 | End: 2022-10-21 | Stop reason: SDUPTHER

## 2022-10-07 ENCOUNTER — OFFICE VISIT (OUTPATIENT)
Dept: FAMILY MEDICINE CLINIC | Age: 72
End: 2022-10-07
Payer: MEDICARE

## 2022-10-07 VITALS
OXYGEN SATURATION: 95 % | HEART RATE: 64 BPM | TEMPERATURE: 96.9 F | SYSTOLIC BLOOD PRESSURE: 132 MMHG | BODY MASS INDEX: 30.38 KG/M2 | HEIGHT: 71 IN | RESPIRATION RATE: 15 BRPM | WEIGHT: 217 LBS | DIASTOLIC BLOOD PRESSURE: 88 MMHG

## 2022-10-07 DIAGNOSIS — N18.32 STAGE 3B CHRONIC KIDNEY DISEASE (HCC): ICD-10-CM

## 2022-10-07 DIAGNOSIS — K76.6 PORTAL HYPERTENSION (HCC): ICD-10-CM

## 2022-10-07 DIAGNOSIS — L84 CALLUS OF FOOT: ICD-10-CM

## 2022-10-07 DIAGNOSIS — I50.22 CHRONIC SYSTOLIC HEART FAILURE (HCC): ICD-10-CM

## 2022-10-07 DIAGNOSIS — Z00.00 MEDICARE ANNUAL WELLNESS VISIT, SUBSEQUENT: Primary | ICD-10-CM

## 2022-10-07 DIAGNOSIS — E11.8 CONTROLLED TYPE 2 DIABETES MELLITUS WITH COMPLICATION, WITHOUT LONG-TERM CURRENT USE OF INSULIN (HCC): ICD-10-CM

## 2022-10-07 DIAGNOSIS — I25.118 ATHEROSCLEROTIC HEART DISEASE OF NATIVE CORONARY ARTERY WITH OTHER FORMS OF ANGINA PECTORIS (HCC): ICD-10-CM

## 2022-10-07 DIAGNOSIS — R79.89 ELEVATED LFTS: ICD-10-CM

## 2022-10-07 DIAGNOSIS — D61.818 PANCYTOPENIA (HCC): ICD-10-CM

## 2022-10-07 DIAGNOSIS — I10 ESSENTIAL (PRIMARY) HYPERTENSION: ICD-10-CM

## 2022-10-07 DIAGNOSIS — I42.0 DILATED CARDIOMYOPATHY (HCC): ICD-10-CM

## 2022-10-07 PROBLEM — N18.30 CHRONIC RENAL DISEASE, STAGE III (HCC): Status: ACTIVE | Noted: 2022-10-07

## 2022-10-07 LAB — HBA1C MFR BLD: 7.9 %

## 2022-10-07 PROCEDURE — 3017F COLORECTAL CA SCREEN DOC REV: CPT | Performed by: FAMILY MEDICINE

## 2022-10-07 PROCEDURE — 3051F HG A1C>EQUAL 7.0%<8.0%: CPT | Performed by: FAMILY MEDICINE

## 2022-10-07 PROCEDURE — 83036 HEMOGLOBIN GLYCOSYLATED A1C: CPT | Performed by: FAMILY MEDICINE

## 2022-10-07 PROCEDURE — 1123F ACP DISCUSS/DSCN MKR DOCD: CPT | Performed by: FAMILY MEDICINE

## 2022-10-07 PROCEDURE — G8484 FLU IMMUNIZE NO ADMIN: HCPCS | Performed by: FAMILY MEDICINE

## 2022-10-07 PROCEDURE — G0439 PPPS, SUBSEQ VISIT: HCPCS | Performed by: FAMILY MEDICINE

## 2022-10-07 ASSESSMENT — LIFESTYLE VARIABLES
HOW OFTEN DO YOU HAVE A DRINK CONTAINING ALCOHOL: NEVER
HOW OFTEN DO YOU HAVE A DRINK CONTAINING ALCOHOL: NEVER
HOW MANY STANDARD DRINKS CONTAINING ALCOHOL DO YOU HAVE ON A TYPICAL DAY: PATIENT DOES NOT DRINK
HOW MANY STANDARD DRINKS CONTAINING ALCOHOL DO YOU HAVE ON A TYPICAL DAY: PATIENT DOES NOT DRINK

## 2022-10-07 ASSESSMENT — PATIENT HEALTH QUESTIONNAIRE - PHQ9
SUM OF ALL RESPONSES TO PHQ QUESTIONS 1-9: 2
SUM OF ALL RESPONSES TO PHQ9 QUESTIONS 1 & 2: 2
2. FEELING DOWN, DEPRESSED OR HOPELESS: 1
SUM OF ALL RESPONSES TO PHQ QUESTIONS 1-9: 2
1. LITTLE INTEREST OR PLEASURE IN DOING THINGS: 1
1. LITTLE INTEREST OR PLEASURE IN DOING THINGS: 1
SUM OF ALL RESPONSES TO PHQ QUESTIONS 1-9: 2
2. FEELING DOWN, DEPRESSED OR HOPELESS: 1
SUM OF ALL RESPONSES TO PHQ9 QUESTIONS 1 & 2: 2
SUM OF ALL RESPONSES TO PHQ QUESTIONS 1-9: 2

## 2022-10-07 NOTE — PATIENT INSTRUCTIONS
Personalized Preventive Plan for Carline Kwon - 10/7/2022  Medicare offers a range of preventive health benefits. Some of the tests and screenings are paid in full while other may be subject to a deductible, co-insurance, and/or copay. Some of these benefits include a comprehensive review of your medical history including lifestyle, illnesses that may run in your family, and various assessments and screenings as appropriate. After reviewing your medical record and screening and assessments performed today your provider may have ordered immunizations, labs, imaging, and/or referrals for you. A list of these orders (if applicable) as well as your Preventive Care list are included within your After Visit Summary for your review. Other Preventive Recommendations:    A preventive eye exam performed by an eye specialist is recommended every 1-2 years to screen for glaucoma; cataracts, macular degeneration, and other eye disorders. A preventive dental visit is recommended every 6 months. Try to get at least 150 minutes of exercise per week or 10,000 steps per day on a pedometer . Order or download the FREE \"Exercise & Physical Activity: Your Everyday Guide\" from [employer]. Call 8-671.191.5045 or search [employer] online. You need 7604-5867 mg of calcium and 6423-7462 IU of vitamin D per day. It is possible to meet your calcium requirement with diet alone, but a vitamin D supplement is usually necessary to meet this goal.  When exposed to the sun, use a sunscreen that protects against both UVA and UVB radiation with an SPF of 30 or greater. Reapply every 2 to 3 hours or after sweating, drying off with a towel, or swimming. Always wear a seat belt when traveling in a car. Always wear a helmet when riding a bicycle or motorcycle.

## 2022-10-07 NOTE — PROGRESS NOTES
Medicare Annual Wellness Visit    Amberly Harp is here for Diabetes and Other (Cramping in legs )    Assessment & Plan   Medicare annual wellness visit, subsequent  Callus of foot  -     Sherin Tavares, YAMILA, Podiatry, [facility]  Stage 3b chronic kidney disease ([facility] [address]  -     Rachelle Severin, MD, Nephrology, [facility]  Dilated cardiomyopathy [facility])  Essential (primary) hypertension  Elevated LFTs  Controlled type 2 diabetes mellitus with complication, without long-term current use of insulin ([address]  -     POCT glycosylated hemoglobin (Hb A1C)  Portal hypertension ([address]  Pancytopenia ([address]  Atherosclerotic heart disease of native coronary artery with other forms of angina pectoris ([address]  Chronic systolic heart failure ([address]    Recommendations for Preventive Services Due: see orders and patient instructions/AVS.  Recommended screening schedule for the next 5-10 years is provided to the patient in written form: see Patient Instructions/AVS.     Return for Medicare Annual Wellness Visit in 1 year. Subjective   The following acute and/or chronic problems were also addressed today:    Pancytopenia  Stable    CKDIII  Needs nephrology     Diabetes  A1C improving  He is back on Glyburide  Glucose readings improved  Low 100s in AM     Dilated cardiomyopathy  Up to date with cardiology  Not a transplant candidate per his hx     Elevated LFTs/Cirrhosis  Following with Dr. Cole Rivera upcoming and appt end of April    Patient's complete Health Risk Assessment and screening values have been reviewed and are found in Flowsheets. The following problems were reviewed today and where indicated follow up appointments were made and/or referrals ordered.     Positive Risk Factor Screenings with Interventions:             General Health and ACP:  General  In general, how would you say your health is?: (!) Poor  In the past 7 days, have you experienced any of the following: New or Increased Pain, New or Increased Fatigue, Loneliness, Social Isolation, Stress or Anger?: (!) Yes  Select all that apply: (!) New or Increased Pain  Do you get the social and emotional support that you need?: Yes  Do you have a Living Will?: Yes    Advance Directives       Power of  Living Will ACP-Advance Directive ACP-Power of     Not on File Filed on 08/06/21 Filed Not on File          General Health Risk Interventions:  Pain issues: home exercises provided    Health Habits/Nutrition:  Physical Activity: Inactive    Days of Exercise per Week: 0 days    Minutes of Exercise per Session: 0 min     Have you lost any weight without trying in the past 3 months?: (!) Yes  Body mass index: (!) 30.26  Have you seen the dentist within the past year?: Yes  Health Habits/Nutrition Interventions:  Nutritional issues:  educational materials for healthy, well-balanced diet provided    Hearing/Vision:  Do you or your family notice any trouble with your hearing that hasn't been managed with hearing aids?: (!) Yes  Do you have difficulty driving, watching TV, or doing any of your daily activities because of your eyesight?: No  Have you had an eye exam within the past year?: Appointment is scheduled  No results found.   Hearing/Vision Interventions:  N/a    Safety:  Do you have working smoke detectors?: Yes  Do you have any tripping hazards - loose or unsecured carpets or rugs?: No  Do you have any tripping hazards - clutter in doorways, halls, or stairs?: No  Do you have either shower bars, grab bars, non-slip mats or non-slip surfaces in your shower or bathtub?: (!) No  Do all of your stairways have a railing or banister?: (!) No  Do you always fasten your seatbelt when you are in a car?: Yes  Safety Interventions:  Home safety tips provided           Objective   Vitals:    10/07/22 1119   BP: 132/88   Pulse: 64   Resp: 15   Temp: 96.9 °F (36.1 °C)   TempSrc: Temporal   SpO2: 95%   Weight: 217 lb (98.4 kg)   Height: 5' 11\" (1.803 m)      Body mass index is 30.27 kg/m². General Appearance: alert and oriented to person, place and time, well-developed and well-nourished, in no acute distress  Pulmonary/Chest: clear to auscultation bilaterally- no wheezes, rales or rhonchi, normal air movement, no respiratory distress  Cardiovascular: normal rate  Musculoskeletal: normal range of motion, no joint swelling, deformity or tenderness  Neurologic: gait and coordination normal and speech normal       Allergies   Allergen Reactions    Penicillins Hives    Ambrosia Artemisiifolia (Ragweed) Skin Test Other (See Comments)    Cat Hair Extract Other (See Comments)    Eggs Or Egg-Derived Products     Grass Pollen(K-O-R-T-Swt Oscar)     Macadamia Nut Oil Hives    Milk-Related Compounds     Mixed Ragweed     Molds & Smuts Other (See Comments)    Peanut-Containing Drug Products     Seasonal      Mold ragweed     Prior to Visit Medications    Medication Sig Taking? Authorizing Provider   glyBURIDE (DIABETA) 5 MG tablet take 1 tablet by mouth every morning WITH BREAKFAST Yes Yi Barrera MD   montelukast (SINGULAIR) 10 MG tablet take 1 tablet by mouth nightly Yes Demarco Harrison DO   lisinopril (PRINIVIL;ZESTRIL) 2.5 MG tablet take 1 tablet by mouth once daily Yes Demarco Harrison DO   spironolactone (ALDACTONE) 50 MG tablet take 1 tablet by mouth twice a day Yes Historical Provider, MD   blood glucose test strips (ONETOUCH ULTRA) strip Inject 1 each into the skin daily As needed.  Yes Yi Barrera MD   furosemide (LASIX) 40 MG tablet take 1 tablet by mouth once daily Yes Demarco Harrison DO   metoprolol succinate (TOPROL XL) 25 MG extended release tablet take 1 tablet by mouth once daily Yes Demarco Harrison DO   atorvastatin (LIPITOR) 40 MG tablet Take 1 tablet by mouth nightly Yes Demarco Harrison DO   aspirin 81 MG EC tablet Take 1 tablet by mouth daily Yes Demarco Harrison DO   fluticasone (FLONASE) 50 MCG/ACT nasal spray 1 spray by Each Nostril route daily Yes Meri Dai Melissa Valencia DO   cetirizine (ZYRTEC) 10 MG tablet Take 10 mg by mouth daily Yes Historical Provider, MD   Multiple Vitamins-Minerals (PRESERVISION AREDS 2+MULTI VIT) CAPS Take 1 capsule by mouth 2 times daily Yes Historical Provider, MD   Fexofenadine HCl (MUCINEX ALLERGY PO) Take by mouth  Patient not taking: Reported on 10/7/2022  Historical Provider, MD Reyna (Including outside providers/suppliers regularly involved in providing care):   Patient Care Team:  Yi Barrera MD as PCP - General (Family Medicine)  Yi Barrera MD as PCP - [facility] Empaneled Provider  Gena Huffman MD as Consulting Physician (Hematology and Oncology)     Reviewed and updated this visit:  Tobacco  Allergies  Meds  Med Hx  Surg Hx  Soc Hx  Fam Hx

## 2022-10-21 DIAGNOSIS — E11.8 CONTROLLED TYPE 2 DIABETES MELLITUS WITH COMPLICATION, WITHOUT LONG-TERM CURRENT USE OF INSULIN (HCC): ICD-10-CM

## 2022-10-21 RX ORDER — GLYBURIDE 5 MG/1
5 TABLET ORAL
Qty: 90 TABLET | Refills: 1 | Status: SHIPPED | OUTPATIENT
Start: 2022-10-21

## 2022-10-28 ENCOUNTER — OFFICE VISIT (OUTPATIENT)
Dept: PODIATRY | Age: 72
End: 2022-10-28

## 2022-10-28 VITALS — HEIGHT: 71 IN | WEIGHT: 217 LBS | BODY MASS INDEX: 30.38 KG/M2

## 2022-10-28 DIAGNOSIS — B35.1 TINEA UNGUIUM: Primary | ICD-10-CM

## 2022-10-28 DIAGNOSIS — L84 CORNS AND CALLOSITIES: ICD-10-CM

## 2022-10-28 DIAGNOSIS — G60.8 HEREDITARY SENSORY NEUROPATHY: ICD-10-CM

## 2022-10-28 DIAGNOSIS — E11.9 TYPE 2 DIABETES MELLITUS WITHOUT COMPLICATION, UNSPECIFIED WHETHER LONG TERM INSULIN USE (HCC): ICD-10-CM

## 2022-10-28 RX ORDER — AMMONIUM LACTATE 12 G/100G
LOTION TOPICAL
Qty: 400 G | Refills: 2 | Status: SHIPPED | OUTPATIENT
Start: 2022-10-28

## 2022-10-28 NOTE — PROGRESS NOTES
Earle Des is here today for a diabetic foot exam and nail care. his PCP is Judah Deleon MD last OV was 10/07/2022. Hemoglobin A1c from 10/7/2022.  7.9.

## 2022-11-08 ENCOUNTER — OFFICE VISIT (OUTPATIENT)
Dept: FAMILY MEDICINE CLINIC | Age: 72
End: 2022-11-08
Payer: MEDICARE

## 2022-11-08 VITALS
TEMPERATURE: 97.9 F | DIASTOLIC BLOOD PRESSURE: 82 MMHG | HEIGHT: 71 IN | RESPIRATION RATE: 18 BRPM | HEART RATE: 75 BPM | SYSTOLIC BLOOD PRESSURE: 110 MMHG | BODY MASS INDEX: 31.92 KG/M2 | WEIGHT: 228 LBS | OXYGEN SATURATION: 98 %

## 2022-11-08 DIAGNOSIS — E11.9 TYPE 2 DIABETES MELLITUS WITHOUT COMPLICATION, WITHOUT LONG-TERM CURRENT USE OF INSULIN (HCC): ICD-10-CM

## 2022-11-08 DIAGNOSIS — L03.116 BILATERAL LOWER LEG CELLULITIS: Primary | ICD-10-CM

## 2022-11-08 DIAGNOSIS — L03.115 BILATERAL LOWER LEG CELLULITIS: Primary | ICD-10-CM

## 2022-11-08 DIAGNOSIS — I50.22 CHRONIC HFREF (HEART FAILURE WITH REDUCED EJECTION FRACTION) (HCC): ICD-10-CM

## 2022-11-08 DIAGNOSIS — R06.02 SOB (SHORTNESS OF BREATH): ICD-10-CM

## 2022-11-08 PROCEDURE — G8484 FLU IMMUNIZE NO ADMIN: HCPCS | Performed by: PHYSICIAN ASSISTANT

## 2022-11-08 PROCEDURE — 3078F DIAST BP <80 MM HG: CPT | Performed by: PHYSICIAN ASSISTANT

## 2022-11-08 PROCEDURE — 1123F ACP DISCUSS/DSCN MKR DOCD: CPT | Performed by: PHYSICIAN ASSISTANT

## 2022-11-08 PROCEDURE — 99214 OFFICE O/P EST MOD 30 MIN: CPT | Performed by: PHYSICIAN ASSISTANT

## 2022-11-08 PROCEDURE — 3017F COLORECTAL CA SCREEN DOC REV: CPT | Performed by: PHYSICIAN ASSISTANT

## 2022-11-08 PROCEDURE — 3074F SYST BP LT 130 MM HG: CPT | Performed by: PHYSICIAN ASSISTANT

## 2022-11-08 PROCEDURE — G8417 CALC BMI ABV UP PARAM F/U: HCPCS | Performed by: PHYSICIAN ASSISTANT

## 2022-11-08 PROCEDURE — 1036F TOBACCO NON-USER: CPT | Performed by: PHYSICIAN ASSISTANT

## 2022-11-08 PROCEDURE — 2022F DILAT RTA XM EVC RTNOPTHY: CPT | Performed by: PHYSICIAN ASSISTANT

## 2022-11-08 PROCEDURE — G8427 DOCREV CUR MEDS BY ELIG CLIN: HCPCS | Performed by: PHYSICIAN ASSISTANT

## 2022-11-08 PROCEDURE — 3051F HG A1C>EQUAL 7.0%<8.0%: CPT | Performed by: PHYSICIAN ASSISTANT

## 2022-11-08 NOTE — PROGRESS NOTES
Chief Complaint       Leg Swelling and Cough      History of Present Illness   Source of history provided by:  patient. Kandace Plata is a 66 y.o. old male presenting to the walk in clinic for evaluation of bilateral lower extremity edema, redness, and pain which has been present for the past 10 days. Patient denies any known injury to the site. He is also complaining of chest congestion, occasionally productive cough, and intermittent shortness of breath with exertion which has also been present for 10 days. Denies any fever, chills, loss of taste or smell, CP, dyspnea, LE edema, abdominal pain, vomiting, rash, or lethargy. Denies any hx of asthma, COPD, or tobacco use. Patient is a former smoker. Past medical history is significant for chronic CHF, cardiomyopathy, and type 2 diabetes. Patient denies recent sick exposures. Patient has been vaccinated for COVID-19. ROS    Unless otherwise stated in this report or unable to obtain because of the patient's clinical or mental status as evidenced by the medical record, this patients's positive and negative responses for Review of Systems, constitutional, psych, eyes, ENT, cardiovascular, respiratory, gastrointestinal, neurological, genitourinary, musculoskeletal, integument systems and systems related to the presenting problem are either stated in the preceding or were not pertinent or were negative for the symptoms and/or complaints related to the medical problem. Past Medical History:  has a past medical history of Allergic rhinitis, Cardiomyopathy ([address] CHF (congestive heart failure) ([address] DM (diabetes mellitus) ([address] Enlarged prostate, Thrombocytopenia ([address] Type 2 diabetes mellitus without complication ([address] and VHD (valvular heart disease). Past Surgical History:  has a past surgical history that includes Refractive surgery (Bilateral);  Hammer toe surgery (Bilateral, 1979); cyst removal; Cataract removal (Bilateral, 2017); and Upper gastrointestinal endoscopy (N/A, 8/6/2021). Social History:  reports that he quit smoking about 29 years ago. His smoking use included cigarettes. He has a 20.00 pack-year smoking history. He quit smokeless tobacco use about 52 years ago. His smokeless tobacco use included chew. He reports that he does not currently use alcohol. He reports that he does not use drugs. Family History: family history includes Alcohol Abuse in his father; Diabetes in his brother; No Known Problems in his sister. Allergies: Penicillins, Ambrosia artemisiifolia (ragweed) skin test, Cat hair extract, Egg lecithin, Eggs or egg-derived products, Grass pollen(k-o-r-t-swt arielle), Macadamia nut oil, Milk-related compounds, Mixed ragweed, Molds & smuts, Peanut-containing drug products, and Seasonal    Physical Exam         VS:  /82   Pulse 75   Temp 97.9 °F (36.6 °C)   Resp 18   Ht 5' 11\" (1.803 m)   Wt 228 lb (103.4 kg)   SpO2 98%   BMI 31.80 kg/m²    Oxygen Saturation Interpretation: Normal.    Constitutional:  Alert, development consistent with age. NAD. Head:  NC/NT. Airway patent. Mouth: Posterior pharynx with mild erythema and clear postnasal drip. No tonsillar hypertrophy or exudate. Neck:  Normal ROM. Supple. No anterior cervical adenopathy noted. Lungs: Breath sounds are diminished at the bilateral lung bases. Patient is breathing comfortably without any signs of respiratory distress noted. CV:  Regular rate and rhythm, normal heart sounds, without pathological murmurs, ectopy, gallops, or rubs. LEs with 2+ pitting edema bilaterally. Overlying skin with severe erythema, moderate warmth, and moderate TTP bilaterally. Skin:  Normal turgor. Warm, dry, without visible rash. Lymphatic: No lymphangitis or adenopathy noted. Neurological:  Oriented. Motor functions intact.     Lab / Imaging Results   (All laboratory and radiology results have been personally reviewed by myself)  Labs:  No results found for this visit on 11/08/22. Imaging: All Radiology results interpreted by Radiologist unless otherwise noted. Assessment / Plan     Impression(s):  Angel Soto was seen today for leg swelling and cough. Diagnoses and all orders for this visit:    Bilateral lower leg cellulitis    SOB (shortness of breath)    Chronic HFrEF (heart failure with reduced ejection fraction) ([facility]    Type 2 diabetes mellitus without complication, without long-term current use of insulin ([facility]    Disposition:  Disposition: Discharge to home. Pt advised that he needs a comprehensive cardiopulmonary workup including STAT labs and imaging that is above the scope of our walk in clinic. He also has severe bilateral cellulitis which may require IV antibiotics. Pt advised to go straight to the ED for further evaluation and management. Pt agreed with this care plan and agreed to go immediately. He was offered transfer by EMS but declined. He has a family member who agrees to take him immediately by private vehicle. Pt left our office in stable condition. Further disposition to follow. All questions answered. Frandy De Souza PA-C    **This report was transcribed using voice recognition software. Every effort was made to ensure accuracy; however, inadvertent computerized transcription errors may be present.

## 2022-11-09 ENCOUNTER — APPOINTMENT (OUTPATIENT)
Dept: GENERAL RADIOLOGY | Age: 72
DRG: 682 | End: 2022-11-09
Payer: MEDICARE

## 2022-11-09 ENCOUNTER — HOSPITAL ENCOUNTER (INPATIENT)
Age: 72
LOS: 4 days | Discharge: HOME | DRG: 682 | End: 2022-11-13
Attending: EMERGENCY MEDICINE | Admitting: FAMILY MEDICINE
Payer: MEDICARE

## 2022-11-09 ENCOUNTER — APPOINTMENT (OUTPATIENT)
Dept: ULTRASOUND IMAGING | Age: 72
DRG: 682 | End: 2022-11-09
Payer: MEDICARE

## 2022-11-09 DIAGNOSIS — I50.22 CHRONIC SYSTOLIC CONGESTIVE HEART FAILURE (HCC): ICD-10-CM

## 2022-11-09 DIAGNOSIS — N17.9 AKI (ACUTE KIDNEY INJURY) (HCC): Primary | ICD-10-CM

## 2022-11-09 DIAGNOSIS — L03.119 CELLULITIS OF LOWER EXTREMITY, UNSPECIFIED LATERALITY: ICD-10-CM

## 2022-11-09 DIAGNOSIS — I95.9 HYPOTENSION, UNSPECIFIED HYPOTENSION TYPE: ICD-10-CM

## 2022-11-09 PROBLEM — N18.9 ACUTE KIDNEY INJURY SUPERIMPOSED ON CHRONIC KIDNEY DISEASE (HCC): Status: ACTIVE | Noted: 2022-11-09

## 2022-11-09 LAB
ADENOVIRUS BY PCR: NOT DETECTED
ANION GAP SERPL CALCULATED.3IONS-SCNC: 8 MMOL/L (ref 7–16)
BASOPHILS ABSOLUTE: 0.16 E9/L (ref 0–0.2)
BASOPHILS RELATIVE PERCENT: 1.8 % (ref 0–2)
BORDETELLA PARAPERTUSSIS BY PCR: NOT DETECTED
BORDETELLA PERTUSSIS BY PCR: NOT DETECTED
BUN BLDV-MCNC: 37 MG/DL (ref 6–23)
CALCIUM SERPL-MCNC: 8.3 MG/DL (ref 8.6–10.2)
CHLAMYDOPHILIA PNEUMONIAE BY PCR: NOT DETECTED
CHLORIDE BLD-SCNC: 99 MMOL/L (ref 98–107)
CO2: 21 MMOL/L (ref 22–29)
CORONAVIRUS 229E BY PCR: NOT DETECTED
CORONAVIRUS HKU1 BY PCR: NOT DETECTED
CORONAVIRUS NL63 BY PCR: NOT DETECTED
CORONAVIRUS OC43 BY PCR: NOT DETECTED
CREAT SERPL-MCNC: 2 MG/DL (ref 0.7–1.2)
EKG ATRIAL RATE: 64 BPM
EKG Q-T INTERVAL: 432 MS
EKG QRS DURATION: 74 MS
EKG QTC CALCULATION (BAZETT): 442 MS
EKG R AXIS: -41 DEGREES
EKG T AXIS: 76 DEGREES
EKG VENTRICULAR RATE: 63 BPM
EOSINOPHILS ABSOLUTE: 1.3 E9/L (ref 0.05–0.5)
EOSINOPHILS RELATIVE PERCENT: 14.4 % (ref 0–6)
GFR SERPL CREATININE-BSD FRML MDRD: 35 ML/MIN/1.73
GLUCOSE BLD-MCNC: 139 MG/DL (ref 74–99)
HCT VFR BLD CALC: 34.8 % (ref 37–54)
HEMOGLOBIN: 12 G/DL (ref 12.5–16.5)
HUMAN METAPNEUMOVIRUS BY PCR: NOT DETECTED
HUMAN RHINOVIRUS/ENTEROVIRUS BY PCR: DETECTED
IMMATURE GRANULOCYTES #: 0.08 E9/L
IMMATURE GRANULOCYTES %: 0.9 % (ref 0–5)
INFLUENZA A BY PCR: NOT DETECTED
INFLUENZA A BY PCR: NOT DETECTED
INFLUENZA B BY PCR: NOT DETECTED
INFLUENZA B BY PCR: NOT DETECTED
LACTIC ACID, SEPSIS: 2.1 MMOL/L (ref 0.5–1.9)
LYMPHOCYTES ABSOLUTE: 1.95 E9/L (ref 1.5–4)
LYMPHOCYTES RELATIVE PERCENT: 21.7 % (ref 20–42)
MCH RBC QN AUTO: 31.8 PG (ref 26–35)
MCHC RBC AUTO-ENTMCNC: 34.5 % (ref 32–34.5)
MCV RBC AUTO: 92.3 FL (ref 80–99.9)
METER GLUCOSE: 159 MG/DL (ref 74–99)
MONOCYTES ABSOLUTE: 1.07 E9/L (ref 0.1–0.95)
MONOCYTES RELATIVE PERCENT: 11.9 % (ref 2–12)
MYCOPLASMA PNEUMONIAE BY PCR: NOT DETECTED
NEUTROPHILS ABSOLUTE: 4.44 E9/L (ref 1.8–7.3)
NEUTROPHILS RELATIVE PERCENT: 49.3 % (ref 43–80)
PARAINFLUENZA VIRUS 1 BY PCR: NOT DETECTED
PARAINFLUENZA VIRUS 2 BY PCR: NOT DETECTED
PARAINFLUENZA VIRUS 3 BY PCR: NOT DETECTED
PARAINFLUENZA VIRUS 4 BY PCR: NOT DETECTED
PDW BLD-RTO: 14.3 FL (ref 11.5–15)
PLATELET # BLD: 134 E9/L (ref 130–450)
PMV BLD AUTO: 11.6 FL (ref 7–12)
POTASSIUM REFLEX MAGNESIUM: 4.6 MMOL/L (ref 3.5–5)
PRO-BNP: 1671 PG/ML (ref 0–125)
RBC # BLD: 3.77 E12/L (ref 3.8–5.8)
RESPIRATORY SYNCYTIAL VIRUS BY PCR: NOT DETECTED
SARS-COV-2, NAAT: NOT DETECTED
SARS-COV-2, PCR: NOT DETECTED
SODIUM BLD-SCNC: 128 MMOL/L (ref 132–146)
WBC # BLD: 9 E9/L (ref 4.5–11.5)

## 2022-11-09 PROCEDURE — 2580000003 HC RX 258: Performed by: NURSE PRACTITIONER

## 2022-11-09 PROCEDURE — 83605 ASSAY OF LACTIC ACID: CPT

## 2022-11-09 PROCEDURE — 96374 THER/PROPH/DIAG INJ IV PUSH: CPT

## 2022-11-09 PROCEDURE — 6370000000 HC RX 637 (ALT 250 FOR IP): Performed by: NURSE PRACTITIONER

## 2022-11-09 PROCEDURE — 99222 1ST HOSP IP/OBS MODERATE 55: CPT | Performed by: NURSE PRACTITIONER

## 2022-11-09 PROCEDURE — 87502 INFLUENZA DNA AMP PROBE: CPT

## 2022-11-09 PROCEDURE — 87040 BLOOD CULTURE FOR BACTERIA: CPT

## 2022-11-09 PROCEDURE — 82962 GLUCOSE BLOOD TEST: CPT

## 2022-11-09 PROCEDURE — 87635 SARS-COV-2 COVID-19 AMP PRB: CPT

## 2022-11-09 PROCEDURE — 99285 EMERGENCY DEPT VISIT HI MDM: CPT

## 2022-11-09 PROCEDURE — 93010 ELECTROCARDIOGRAM REPORT: CPT | Performed by: INTERNAL MEDICINE

## 2022-11-09 PROCEDURE — 85025 COMPLETE CBC W/AUTO DIFF WBC: CPT

## 2022-11-09 PROCEDURE — 6360000002 HC RX W HCPCS

## 2022-11-09 PROCEDURE — 6360000002 HC RX W HCPCS: Performed by: NURSE PRACTITIONER

## 2022-11-09 PROCEDURE — 93005 ELECTROCARDIOGRAM TRACING: CPT

## 2022-11-09 PROCEDURE — 2060000000 HC ICU INTERMEDIATE R&B

## 2022-11-09 PROCEDURE — 0202U NFCT DS 22 TRGT SARS-COV-2: CPT

## 2022-11-09 PROCEDURE — 93925 LOWER EXTREMITY STUDY: CPT

## 2022-11-09 PROCEDURE — 93970 EXTREMITY STUDY: CPT

## 2022-11-09 PROCEDURE — 80048 BASIC METABOLIC PNL TOTAL CA: CPT

## 2022-11-09 PROCEDURE — 87150 DNA/RNA AMPLIFIED PROBE: CPT

## 2022-11-09 PROCEDURE — 71045 X-RAY EXAM CHEST 1 VIEW: CPT

## 2022-11-09 PROCEDURE — 2580000003 HC RX 258

## 2022-11-09 PROCEDURE — 83880 ASSAY OF NATRIURETIC PEPTIDE: CPT

## 2022-11-09 RX ORDER — AMMONIUM LACTATE 12 G/100G
LOTION TOPICAL 2 TIMES DAILY
Status: DISCONTINUED | OUTPATIENT
Start: 2022-11-09 | End: 2022-11-13 | Stop reason: HOSPADM

## 2022-11-09 RX ORDER — INSULIN LISPRO 100 [IU]/ML
0-4 INJECTION, SOLUTION INTRAVENOUS; SUBCUTANEOUS NIGHTLY
Status: DISCONTINUED | OUTPATIENT
Start: 2022-11-09 | End: 2022-11-13 | Stop reason: HOSPADM

## 2022-11-09 RX ORDER — ONDANSETRON 2 MG/ML
4 INJECTION INTRAMUSCULAR; INTRAVENOUS EVERY 6 HOURS PRN
Status: DISCONTINUED | OUTPATIENT
Start: 2022-11-09 | End: 2022-11-13 | Stop reason: HOSPADM

## 2022-11-09 RX ORDER — HEPARIN SODIUM 10000 [USP'U]/ML
5000 INJECTION, SOLUTION INTRAVENOUS; SUBCUTANEOUS EVERY 8 HOURS
Status: DISCONTINUED | OUTPATIENT
Start: 2022-11-09 | End: 2022-11-13 | Stop reason: HOSPADM

## 2022-11-09 RX ORDER — POLYETHYLENE GLYCOL 3350 17 G/17G
17 POWDER, FOR SOLUTION ORAL DAILY PRN
Status: DISCONTINUED | OUTPATIENT
Start: 2022-11-09 | End: 2022-11-13 | Stop reason: HOSPADM

## 2022-11-09 RX ORDER — ONDANSETRON 4 MG/1
4 TABLET, ORALLY DISINTEGRATING ORAL EVERY 8 HOURS PRN
Status: DISCONTINUED | OUTPATIENT
Start: 2022-11-09 | End: 2022-11-13 | Stop reason: HOSPADM

## 2022-11-09 RX ORDER — SODIUM CHLORIDE 0.9 % (FLUSH) 0.9 %
5-40 SYRINGE (ML) INJECTION PRN
Status: DISCONTINUED | OUTPATIENT
Start: 2022-11-09 | End: 2022-11-13 | Stop reason: HOSPADM

## 2022-11-09 RX ORDER — ACETAMINOPHEN 325 MG/1
650 TABLET ORAL EVERY 6 HOURS PRN
Status: DISCONTINUED | OUTPATIENT
Start: 2022-11-09 | End: 2022-11-13 | Stop reason: HOSPADM

## 2022-11-09 RX ORDER — VITS A,C,E/LUTEIN/MINERALS 300MCG-200
1 TABLET ORAL 2 TIMES DAILY
Status: DISCONTINUED | OUTPATIENT
Start: 2022-11-09 | End: 2022-11-13 | Stop reason: HOSPADM

## 2022-11-09 RX ORDER — DEXTROSE MONOHYDRATE 100 MG/ML
INJECTION, SOLUTION INTRAVENOUS CONTINUOUS PRN
Status: DISCONTINUED | OUTPATIENT
Start: 2022-11-09 | End: 2022-11-13 | Stop reason: HOSPADM

## 2022-11-09 RX ORDER — 0.9 % SODIUM CHLORIDE 0.9 %
500 INTRAVENOUS SOLUTION INTRAVENOUS ONCE
Status: COMPLETED | OUTPATIENT
Start: 2022-11-09 | End: 2022-11-09

## 2022-11-09 RX ORDER — SPIRONOLACTONE 25 MG/1
50 TABLET ORAL 2 TIMES DAILY
Status: DISCONTINUED | OUTPATIENT
Start: 2022-11-09 | End: 2022-11-13 | Stop reason: HOSPADM

## 2022-11-09 RX ORDER — SODIUM CHLORIDE 9 MG/ML
INJECTION, SOLUTION INTRAVENOUS PRN
Status: DISCONTINUED | OUTPATIENT
Start: 2022-11-09 | End: 2022-11-13 | Stop reason: HOSPADM

## 2022-11-09 RX ORDER — ASPIRIN 81 MG/1
81 TABLET ORAL DAILY
Status: DISCONTINUED | OUTPATIENT
Start: 2022-11-10 | End: 2022-11-13 | Stop reason: HOSPADM

## 2022-11-09 RX ORDER — MIDODRINE HYDROCHLORIDE 5 MG/1
2.5 TABLET ORAL 2 TIMES DAILY WITH MEALS
Status: DISCONTINUED | OUTPATIENT
Start: 2022-11-10 | End: 2022-11-11

## 2022-11-09 RX ORDER — ACETAMINOPHEN 650 MG/1
650 SUPPOSITORY RECTAL EVERY 6 HOURS PRN
Status: DISCONTINUED | OUTPATIENT
Start: 2022-11-09 | End: 2022-11-13 | Stop reason: HOSPADM

## 2022-11-09 RX ORDER — SODIUM CHLORIDE 0.9 % (FLUSH) 0.9 %
5-40 SYRINGE (ML) INJECTION EVERY 12 HOURS SCHEDULED
Status: DISCONTINUED | OUTPATIENT
Start: 2022-11-09 | End: 2022-11-13 | Stop reason: HOSPADM

## 2022-11-09 RX ORDER — METOPROLOL SUCCINATE 25 MG/1
25 TABLET, EXTENDED RELEASE ORAL DAILY
Status: DISCONTINUED | OUTPATIENT
Start: 2022-11-10 | End: 2022-11-10

## 2022-11-09 RX ORDER — INSULIN LISPRO 100 [IU]/ML
0-4 INJECTION, SOLUTION INTRAVENOUS; SUBCUTANEOUS
Status: DISCONTINUED | OUTPATIENT
Start: 2022-11-10 | End: 2022-11-13 | Stop reason: HOSPADM

## 2022-11-09 RX ORDER — ATORVASTATIN CALCIUM 40 MG/1
40 TABLET, FILM COATED ORAL NIGHTLY
Status: DISCONTINUED | OUTPATIENT
Start: 2022-11-09 | End: 2022-11-13 | Stop reason: HOSPADM

## 2022-11-09 RX ORDER — MONTELUKAST SODIUM 10 MG/1
10 TABLET ORAL NIGHTLY
Status: DISCONTINUED | OUTPATIENT
Start: 2022-11-09 | End: 2022-11-13 | Stop reason: HOSPADM

## 2022-11-09 RX ADMIN — MONTELUKAST SODIUM 10 MG: 10 TABLET ORAL at 21:51

## 2022-11-09 RX ADMIN — WATER 1000 MG: 1 INJECTION INTRAMUSCULAR; INTRAVENOUS; SUBCUTANEOUS at 15:33

## 2022-11-09 RX ADMIN — Medication 10 ML: at 21:53

## 2022-11-09 RX ADMIN — SODIUM CHLORIDE 500 ML: 9 INJECTION, SOLUTION INTRAVENOUS at 15:33

## 2022-11-09 RX ADMIN — Medication: at 21:52

## 2022-11-09 RX ADMIN — SPIRONOLACTONE 50 MG: 25 TABLET ORAL at 21:51

## 2022-11-09 RX ADMIN — Medication 1 TABLET: at 21:51

## 2022-11-09 RX ADMIN — ATORVASTATIN CALCIUM 40 MG: 40 TABLET, FILM COATED ORAL at 21:51

## 2022-11-09 RX ADMIN — HEPARIN SODIUM 5000 UNITS: 10000 INJECTION INTRAVENOUS; SUBCUTANEOUS at 21:54

## 2022-11-09 ASSESSMENT — ENCOUNTER SYMPTOMS
RHINORRHEA: 1
SHORTNESS OF BREATH: 0
EYE PAIN: 0
ABDOMINAL PAIN: 0
NAUSEA: 0
DIARRHEA: 0
COUGH: 1
CONSTIPATION: 0
VOMITING: 0

## 2022-11-09 ASSESSMENT — LIFESTYLE VARIABLES
HOW OFTEN DO YOU HAVE A DRINK CONTAINING ALCOHOL: NEVER
HOW MANY STANDARD DRINKS CONTAINING ALCOHOL DO YOU HAVE ON A TYPICAL DAY: PATIENT DOES NOT DRINK

## 2022-11-09 ASSESSMENT — PAIN SCALES - GENERAL: PAINLEVEL_OUTOF10: 2

## 2022-11-09 ASSESSMENT — PAIN - FUNCTIONAL ASSESSMENT
PAIN_FUNCTIONAL_ASSESSMENT: NONE - DENIES PAIN
PAIN_FUNCTIONAL_ASSESSMENT: 0-10

## 2022-11-09 NOTE — PROGRESS NOTES
Admission database completed to best of this RN's ability. Care plan and education initiated. Pt from home alone. Denies any assistive devices with ambulation. RA at baseline. Has glucometer and diabetic supplies at home. Denies any [place] [id number] services prior to admission.

## 2022-11-09 NOTE — ED PROVIDER NOTES
Carline Kwon is a 66 y.o. male    Chief Complaint   Patient presents with    Shortness of Breath     Has hx of    Leg Swelling     Bilat. Lower leg         HPI   Carline Kwon is a 66 y.o. male presenting to the ED for Shortness of Breath (Has hx of) and Leg Swelling (Bilat. Lower leg)    History comes primarily from the patient. He is a 66year old male with a history of CHF EF 25% (8/24/21) followed by Dr. Tari Dela Cruz (Cardiology) presenting for cough and worsening leg swelling/redness/itching all for 2 weeks. Severity is moderate. He has tried Mucinex for his cough without relief. Nothing makes his symptoms worse. He states he was seen in [place] for these symptoms and were worried for possible lung infection as well as bilateral leg infection. He endorses cough, chills, rhinorrhea, and headache. Denies shortness of breath, chest pain, abdominal pain, nausea, vomiting. He states he was given Ammonium sulfate cream for his legs 2 weeks ago without relief of symptoms. Denies history of COPD or asthma. Denies long recent travel, recent surgeries, or history of PE/DVT. Review of Systems   Constitutional:  Positive for chills. Negative for fever. HENT:  Positive for rhinorrhea. Negative for ear pain. Eyes:  Negative for pain. Respiratory:  Positive for cough. Negative for shortness of breath. Cardiovascular:  Positive for leg swelling. Negative for chest pain and palpitations. Gastrointestinal:  Negative for abdominal pain, constipation, diarrhea, nausea and vomiting. Genitourinary:  Negative for difficulty urinating and dysuria. Musculoskeletal:  Negative for arthralgias and myalgias. Skin:  Negative for rash. Neurological:  Positive for headaches. Negative for dizziness. All other systems reviewed and are negative. Physical Exam  Constitutional:       Appearance: Normal appearance. HENT:      Head: Normocephalic and atraumatic.       Nose: Nose normal.   Eyes: Extraocular Movements: Extraocular movements intact. Pupils: Pupils are equal, round, and reactive to light. Cardiovascular:      Rate and Rhythm: Normal rate and regular rhythm. Pulmonary:      Effort: Pulmonary effort is normal.      Breath sounds: Normal breath sounds. Abdominal:      General: Abdomen is flat. Palpations: Abdomen is soft. Tenderness: There is no abdominal tenderness. Musculoskeletal:         General: Normal range of motion. Cervical back: Normal range of motion. Right lower leg: Edema present. Left lower leg: Edema present. Comments: Bilateral leg erythema   Skin:     General: Skin is warm and dry. Neurological:      General: No focal deficit present. Mental Status: He is alert and oriented to person, place, and time. Psychiatric:         Behavior: Behavior normal.        Procedures     MDM     Patient presented to the Emergency Department for Shortness of Breath (Has hx of) and Leg Swelling (Bilat. Lower leg)    He is a 66year old male with a history of DM, CHF EF 25%, (8/24/21) followed by Dr. Irina Fournier (Cardiology), and liver cirrhosis presenting for cough and worsening leg swelling/redness/itching all for 2 weeks. CBC unremarkable, with no white count. BMP shows hyponatremia 128, BUN 37 (elevated from 22 1 month ago), CR 2.0 (elevated from 1.7). BNP elevated to 1671. COVID and influenza negative. CXR unremarkable. EKG unremarkable  Bilateral Dopple LE shows no evidence of occlusion with abnormal wave forms and flow turbulence in the small vessels below knee bilaterally. He was given 500cc fluids, however did not want to overload him given CHF history. BP has remained stable at 82/40. O2 sat remains stable at 99% on RA. He also has redness of bilateral legs that look cellulitic. He was given dose a of Ancef here in the ED.  Given that patient has MELE in setting of CHF with EF 25% and hypotension and bilateral lower leg cellulitis needing antibiotic treatment, patient was admitted to Dr. Julianna Cast service. EKG: This EKG is signed and interpreted by me. Rate: 63  Rhythm: NSR  Axis: left  Interpretation: no acute changes  Comparison: stable as compared to patient's most recent EKG 7/5/22    ED Course as of 11/09/22 1632   Wed Nov 09, 2022   1503 I discussed lab results with the patient as well as CXR results. Bilateral Doppler LE US pending.  [KM]   1122 I discussed at length the plan for admission with the patient due to an MELE in the setting of systolic CHF with EF 45% as well as need for further antibiotic treatment for likely cellulitis of bilateral legs. BP slightly improved to 82/40. Vitals otherwise stable. Patient initially reluctant to stay, but agreed to the plan. [KM]   26 I spoke with Dr. Shravan Alford about admission of the patient [KM]      ED Course User Index  [KM] Viri Herzog MD       --------------------------------------------- PAST HISTORY ---------------------------------------------  Past Medical History:  has a past medical history of Allergic rhinitis, Cardiomyopathy ([facility] CHF (congestive heart failure) ([facility] DM (diabetes mellitus) ([facility] Enlarged prostate, Thrombocytopenia ([facility] Type 2 diabetes mellitus without complication ([facility] and VHD (valvular heart disease). Past Surgical History:  has a past surgical history that includes Refractive surgery (Bilateral); Hammer toe surgery (Bilateral, 1979); cyst removal; Cataract removal (Bilateral, 2017); and Upper gastrointestinal endoscopy (N/A, 8/6/2021). Social History:  reports that he quit smoking about 29 years ago. His smoking use included cigarettes. He has a 20.00 pack-year smoking history. He quit smokeless tobacco use about 52 years ago. His smokeless tobacco use included chew. He reports that he does not currently use alcohol. He reports that he does not use drugs.     Family History: family history includes Alcohol Abuse in his father; Diabetes in his brother; No Known Problems in his sister. The patients home medications have been reviewed.     Allergies: Penicillins, Ambrosia artemisiifolia (ragweed) skin test, Cat hair extract, Egg lecithin, Eggs or egg-derived products, Grass pollen(k-o-r-t-swt arielle), Macadamia nut oil, Milk-related compounds, Mixed ragweed, Molds & smuts, Peanut-containing drug products, and Seasonal    -------------------------------------------------- RESULTS -------------------------------------------------    LABS:  Results for orders placed or performed during the hospital encounter of 11/09/22   COVID-19, Rapid    Specimen: Nasopharyngeal Swab   Result Value Ref Range    SARS-CoV-2, NAAT Not Detected Not Detected   RAPID INFLUENZA A/B ANTIGENS    Specimen: Nasopharyngeal   Result Value Ref Range    Influenza A by PCR Not Detected Not Detected    Influenza B by PCR Not Detected Not Detected   CBC with Auto Differential   Result Value Ref Range    WBC 9.0 4.5 - 11.5 E9/L    RBC 3.77 (L) 3.80 - 5.80 E12/L    Hemoglobin 12.0 (L) 12.5 - 16.5 g/dL    Hematocrit 34.8 (L) 37.0 - 54.0 %    MCV 92.3 80.0 - 99.9 fL    MCH 31.8 26.0 - 35.0 pg    MCHC 34.5 32.0 - 34.5 %    RDW 14.3 11.5 - 15.0 fL    Platelets 012 653 - 056 E9/L    MPV 11.6 7.0 - 12.0 fL    Neutrophils % 49.3 43.0 - 80.0 %    Immature Granulocytes % 0.9 0.0 - 5.0 %    Lymphocytes % 21.7 20.0 - 42.0 %    Monocytes % 11.9 2.0 - 12.0 %    Eosinophils % 14.4 (H) 0.0 - 6.0 %    Basophils % 1.8 0.0 - 2.0 %    Neutrophils Absolute 4.44 1.80 - 7.30 E9/L    Immature Granulocytes # 0.08 E9/L    Lymphocytes Absolute 1.95 1.50 - 4.00 E9/L    Monocytes Absolute 1.07 (H) 0.10 - 0.95 E9/L    Eosinophils Absolute 1.30 (H) 0.05 - 0.50 E9/L    Basophils Absolute 0.16 0.00 - 0.20 N8/Y   Basic Metabolic Panel w/ Reflex to MG   Result Value Ref Range    Sodium 128 (L) 132 - 146 mmol/L    Potassium reflex Magnesium 4.6 3.5 - 5.0 mmol/L    Chloride 99 98 - 107 mmol/L    CO2 21 (L) 22 - 29 mmol/L Anion Gap 8 7 - 16 mmol/L    Glucose 139 (H) 74 - 99 mg/dL    BUN 37 (H) 6 - 23 mg/dL    Creatinine 2.0 (H) 0.7 - 1.2 mg/dL    Est, Glom Filt Rate 35 >=60 mL/min/1.73    Calcium 8.3 (L) 8.6 - 10.2 mg/dL   Brain Natriuretic Peptide   Result Value Ref Range    Pro-BNP 1,671 (H) 0 - 125 pg/mL   Lactate, Sepsis   Result Value Ref Range    Lactic Acid, Sepsis 2.1 (H) 0.5 - 1.9 mmol/L   EKG 12 Lead   Result Value Ref Range    Ventricular Rate 63 BPM    Atrial Rate 64 BPM    QRS Duration 74 ms    Q-T Interval 432 ms    QTc Calculation (Bazett) 442 ms    R Axis -41 degrees    T Axis 76 degrees       RADIOLOGY:  US DUP LOWER EXTREMITIES BILATERAL VENOUS   Final Result   No evidence of DVT in either lower extremity. RECOMMENDATIONS:   Unavailable         XR CHEST PORTABLE   Final Result   No acute process. ------------------------- NURSING NOTES AND VITALS REVIEWED ---------------------------  Date / Time Roomed:  11/9/2022  9:42 AM  ED Bed Assignment:  22/22    The nursing notes within the ED encounter and vital signs as below have been reviewed. Patient Vitals for the past 24 hrs:   BP Temp Temp src Pulse Resp SpO2 Height Weight   11/09/22 1153 (!) 82/40 98 °F (36.7 °C) Oral 64 20 97 % -- --   11/09/22 0924 (!) 77/39 97.9 °F (36.6 °C) Tympanic 78 18 99 % 5' 11\" (1.803 m) 220 lb (99.8 kg)       Oxygen Saturation Interpretation: Normal    ------------------------------------------ PROGRESS NOTES ------------------------------------------  Re-evaluation(s):  Time: 0778  Patients symptoms show no change  Repeat physical examination is not changed    Counseling:  I have spoken with the patient and discussed todays results, in addition to providing specific details for the plan of care and counseling regarding the diagnosis and prognosis.   Their questions are answered at this time and they are agreeable with the plan of admission.    --------------------------------- ADDITIONAL PROVIDER NOTES ---------------------------------  Consultations:  Time: 9053. Spoke with Dr. Johana Gil. Discussed case. They will admit the patient. This patient's ED course included: a personal history and physicial examination, re-evaluation prior to disposition, multiple bedside re-evaluations, IV medications, cardiac monitoring, continuous pulse oximetry, and complex medical decision making and emergency management    This patient has remained hemodynamically stable during their ED course. Diagnosis:  1. MELE (acute kidney injury) ([facility]    2. Chronic systolic congestive heart failure ([facility]    3. Hypotension, unspecified hypotension type    4. Cellulitis of lower extremity, unspecified laterality        Disposition:  Patient's disposition: Admit to med/surg floor  Patient's condition is stable. Strict return precautions were discussed including but not limited too new or worsening symtpoms. They verbalized understanding and were agreeable with the plan.  All questions were answered and patient was admitted       Edwige Donaldson MD  Resident  11/11/22 6865

## 2022-11-09 NOTE — H&P
[id number] [facility]   History and Physical    CHIEF COMPLAINT: Shortness of breath, leg swelling & pain    History of Present Illness:  Nadia Hitchcock is a 66 y.o. male with a history of CHF, CKD, HTN, cirrhosis, & DM who presents with Shortness of Breath (Has hx of) and Leg Swelling (Bilat. Lower leg)  Patient states he has had URI for the last 2 weeks-cough, congestion, sinus pressure, intermittent headache. Also reports 10lb weight gain over the last 10 days with bilat LE swelling, pain, & itching. Patient went to walk-in clinic yesterday for the symptoms & was advised to go to ER immediately for further evaluation of SOB & bilat LE cellulitis. Pertinent ER findings include hyponatremia (128), MELE on CKD (Cr 2.0), lactic acid 2.1, BNP 1,671 (at/near baseline), anemia (12.0/34.8). Resp panel positive for rhinovirus. Flu, COVID-negative. CXR negative for acute process. US bilat LE's negative for DVT. Blood cultures sent in ER. While in ER, patient received 500 mL IVF bolus & Ancef.     Left leg      Right leg        WORK UP SINCE ARRIVAL:  Results for orders placed or performed during the hospital encounter of 11/09/22   COVID-19, Rapid    Specimen: Nasopharyngeal Swab   Result Value Ref Range    SARS-CoV-2, NAAT Not Detected Not Detected   RAPID INFLUENZA A/B ANTIGENS    Specimen: Nasopharyngeal   Result Value Ref Range    Influenza A by PCR Not Detected Not Detected    Influenza B by PCR Not Detected Not Detected   CBC with Auto Differential   Result Value Ref Range    WBC 9.0 4.5 - 11.5 E9/L    RBC 3.77 (L) 3.80 - 5.80 E12/L    Hemoglobin 12.0 (L) 12.5 - 16.5 g/dL    Hematocrit 34.8 (L) 37.0 - 54.0 %    MCV 92.3 80.0 - 99.9 fL    MCH 31.8 26.0 - 35.0 pg    MCHC 34.5 32.0 - 34.5 %    RDW 14.3 11.5 - 15.0 fL    Platelets 060 918 - 670 E9/L    MPV 11.6 7.0 - 12.0 fL    Neutrophils % 49.3 43.0 - 80.0 %    Immature Granulocytes % 0.9 0.0 - 5.0 %    Lymphocytes % 21.7 20.0 - 42.0 % Monocytes % 11.9 2.0 - 12.0 %    Eosinophils % 14.4 (H) 0.0 - 6.0 %    Basophils % 1.8 0.0 - 2.0 %    Neutrophils Absolute 4.44 1.80 - 7.30 E9/L    Immature Granulocytes # 0.08 E9/L    Lymphocytes Absolute 1.95 1.50 - 4.00 E9/L    Monocytes Absolute 1.07 (H) 0.10 - 0.95 E9/L    Eosinophils Absolute 1.30 (H) 0.05 - 0.50 E9/L    Basophils Absolute 0.16 0.00 - 0.20 J4/D   Basic Metabolic Panel w/ Reflex to MG   Result Value Ref Range    Sodium 128 (L) 132 - 146 mmol/L    Potassium reflex Magnesium 4.6 3.5 - 5.0 mmol/L    Chloride 99 98 - 107 mmol/L    CO2 21 (L) 22 - 29 mmol/L    Anion Gap 8 7 - 16 mmol/L    Glucose 139 (H) 74 - 99 mg/dL    BUN 37 (H) 6 - 23 mg/dL    Creatinine 2.0 (H) 0.7 - 1.2 mg/dL    Est, Glom Filt Rate 35 >=60 mL/min/1.73    Calcium 8.3 (L) 8.6 - 10.2 mg/dL   Brain Natriuretic Peptide   Result Value Ref Range    Pro-BNP 1,671 (H) 0 - 125 pg/mL   Lactate, Sepsis   Result Value Ref Range    Lactic Acid, Sepsis 2.1 (H) 0.5 - 1.9 mmol/L   EKG 12 Lead   Result Value Ref Range    Ventricular Rate 63 BPM    Atrial Rate 64 BPM    QRS Duration 74 ms    Q-T Interval 432 ms    QTc Calculation (Bazett) 442 ms    R Axis -41 degrees    T Axis 76 degrees     XR CHEST PORTABLE   Final Result by Maile Heck MD (11/09 1043)   No acute process. US DUP LOWER EXTREMITIES BILATERAL VENOUS    (Results Pending)       REVIEW OF SYSTEMS:  no fevers, chills, cp, n/v, ha, vision/hearing changes, wt changes, hot/cold flashes, diarrhea, constipation, dysuria/hematuria unless noted in HPI. Complete ROS performed with the patient and is otherwise negative.     ALLERGIES:  Penicillins, Ambrosia artemisiifolia (ragweed) skin test, Cat hair extract, Egg lecithin, Eggs or egg-derived products, Grass pollen([id number]-o-r-t-swt arielle), Macadamia nut oil, Milk-related compounds, Mixed ragweed, Molds & smuts, Peanut-containing drug products, and Seasonal    PAST MEDICAL & SURGICAL HISTORY:  Pt  has a past medical history of Allergic rhinitis, Cardiomyopathy ([address] CHF (congestive heart failure) ([address] DM (diabetes mellitus) ([facility] [address] Enlarged prostate, Thrombocytopenia ([address] Type 2 diabetes mellitus without complication ([facility] [address] and VHD (valvular heart disease). .   Pt  has a past surgical history that includes Refractive surgery (Bilateral); Hammer toe surgery (Bilateral, 1979); cyst removal; Cataract removal (Bilateral, 2017); and Upper gastrointestinal endoscopy (N/A, 8/6/2021). Social History:  Pt  reports that he quit smoking about 29 years ago. His smoking use included cigarettes. He has a 20.00 pack-year smoking history. He quit smokeless tobacco use about 52 years ago. His smokeless tobacco use included chew. He reports that he does not currently use alcohol. He reports that he does not use drugs. .   Pt lives alone in a/an single-family home    Family History:  Pt family history includes Alcohol Abuse in his father; Diabetes in his brother; No Known Problems in his sister. Informant(s) for H&P: Patient, chart review    Pt  height is 5' 11\" (1.803 m) and weight is 220 lb (99.8 kg). His oral temperature is 98 °F (36.7 °C). His blood pressure is 82/40 (abnormal) and his pulse is 64. His respiration is 20 and oxygen saturation is 97%. .   Body mass index is 30.68 kg/m². Pt's home meds include Fexofenadine HCl, PreserVision AREDS 2+Multi Vit, ammonium lactate, aspirin, atorvastatin, blood glucose test strips, cetirizine, fluticasone, furosemide, glyBURIDE, lisinopril, metoprolol succinate, montelukast, and spironolactone    PHYSICAL EXAM:  General Appearance: Awake, alert and oriented. In no acute distress  Skin: Warm and dry.   Bilat LEs erythematous & excoriated with pitting edema  Head: normocephalic and atraumatic  Eyes: pupils equal, round, and reactive to light, extraocular eye movements intact, conjunctivae normal  ENT: external ear and ear canal normal bilaterally, nose without deformity  Neck: supple and non-tender without mass, no cervical lymphadenopathy  Pulmonary/Chest: clear, diminished to auscultation bilaterally- no wheezes, rales or rhonchi. Normal air movement. No respiratory distress. Cardiovascular: normal rate, regular rhythm. Normal S1 and S2. No murmurs, rubs, clicks, or gallops  Abdomen: soft, non-tender, non-distended. Normal bowel sounds. No masses or organomegaly  Extremities: Bilat LE's with rubor, 2-3+ pitting edema  Musculoskeletal: normal range of motion, no joint swelling, deformity or tenderness  Neurologic: reflexes normal and symmetric, no cranial nerve deficit, gait, coordination and speech normal      BP (!) 82/40   Pulse 64   Temp 98 °F (36.7 °C) (Oral)   Resp 20   Ht 5' 11\" (1.803 m)   Wt 220 lb (99.8 kg)   SpO2 97%   BMI 30.68 kg/m²     Recent Labs     11/09/22  1043   *   K 4.6   CL 99   CO2 21*   BUN 37*   CREATININE 2.0*   GLUCOSE 139*   CALCIUM 8.3*       Recent Labs     11/09/22  1043   WBC 9.0   RBC 3.77*   HGB 12.0*   HCT 34.8*   MCV 92.3   MCH 31.8   MCHC 34.5   RDW 14.3      MPV 11.6       No results for input(s): POCGLU in the last 72 hours. Radiology: XR CHEST PORTABLE    Result Date: 11/9/2022  No acute process. EKG preliminary Narrative & Impression    Junctional rhythm  Left axis deviation  Low voltage QRS  Inferior infarct , age undetermined  Anterolateral infarct , age undetermined  Abnormal ECG  No previous ECGs available       Assessment & Plan:    Hypotension  -Received 500 mL IVF bolus in ER  -Midodrine 2.5mg BID  -Ortho BP's x1     HFrEF: 8/2021 ECHO-LVEF 25%.   Follows with Dr. Ignacio Davey  -BNP 1,671  -complaints of dyspnea & peripheral edema  -continue home metoprolol & aldactone with parameters  -ECHO  -Consult cardiology    MELE on CKD: Referred to Dr Jan Kessler, has not yet established  -Initial Cr 2.0, recent baseline 1.5-1.7  -Consult nephrology    Cirrhosis  -consult GI    Peripheral edema with inflammation  -received ancef in ER  -wound care consulted  -arterial doppler bilat LE's    Code Status: Full Code- palliative consult to discuss status  DVT prophylaxis: Heparin    45 minutes or more spent reviewing patient chart, assessing patient, discussing plan of care with patient and family, discussing plan of care with collaborating physician, and documentation. NOTE: This report was transcribed using voice recognition software. Every effort was made to ensure accuracy; however, inadvertent computerized transcription errors may be present.      Electronically signed by PIERCE Moreno NP on 11/9/2022 at 4:08 PM Mastoid Interpolation Flap Text: A decision was made to reconstruct the defect utilizing an interpolation axial flap and a staged reconstruction.  A telfa template was made of the defect.  This telfa template was then used to outline the mastoid interpolation flap.  The donor area for the pedicle flap was then injected with anesthesia.  The flap was excised through the skin and subcutaneous tissue down to the layer of the underlying musculature.  The pedicle flap was carefully excised within this deep plane to maintain its blood supply.  The edges of the donor site were undermined.   The donor site was closed in a primary fashion.  The pedicle was then rotated into position and sutured.  Once the tube was sutured into place, adequate blood supply was confirmed with blanching and refill.  The pedicle was then wrapped with xeroform gauze and dressed appropriately with a telfa and gauze bandage to ensure continued blood supply and protect the attached pedicle.

## 2022-11-10 PROBLEM — I50.23 ACUTE ON CHRONIC SYSTOLIC CHF (CONGESTIVE HEART FAILURE) (HCC): Status: ACTIVE | Noted: 2021-10-04

## 2022-11-10 PROBLEM — B34.8 RHINOVIRUS: Status: ACTIVE | Noted: 2022-11-10

## 2022-11-10 PROBLEM — I87.2 VENOUS STASIS DERMATITIS OF BOTH LOWER EXTREMITIES: Status: ACTIVE | Noted: 2022-11-10

## 2022-11-10 LAB
ALBUMIN SERPL-MCNC: 2.5 G/DL (ref 3.5–5.2)
ALP BLD-CCNC: 134 U/L (ref 40–129)
ALT SERPL-CCNC: 33 U/L (ref 0–40)
ANION GAP SERPL CALCULATED.3IONS-SCNC: 10 MMOL/L (ref 7–16)
AST SERPL-CCNC: 48 U/L (ref 0–39)
BASOPHILS ABSOLUTE: 0.19 E9/L (ref 0–0.2)
BASOPHILS RELATIVE PERCENT: 1.8 % (ref 0–2)
BILIRUB SERPL-MCNC: 1.7 MG/DL (ref 0–1.2)
BILIRUBIN DIRECT: 0.6 MG/DL (ref 0–0.3)
BILIRUBIN, INDIRECT: 1.1 MG/DL (ref 0–1)
BUN BLDV-MCNC: 30 MG/DL (ref 6–23)
CALCIUM SERPL-MCNC: 8 MG/DL (ref 8.6–10.2)
CEA: 5.7 NG/ML (ref 0–5.2)
CHLORIDE BLD-SCNC: 101 MMOL/L (ref 98–107)
CHLORIDE URINE RANDOM: 22 MMOL/L
CO2: 20 MMOL/L (ref 22–29)
CREAT SERPL-MCNC: 1.8 MG/DL (ref 0.7–1.2)
EOSINOPHILS ABSOLUTE: 1.22 E9/L (ref 0.05–0.5)
EOSINOPHILS RELATIVE PERCENT: 11.4 % (ref 0–6)
GFR SERPL CREATININE-BSD FRML MDRD: 39 ML/MIN/1.73
GLUCOSE BLD-MCNC: 97 MG/DL (ref 74–99)
HCT VFR BLD CALC: 32 % (ref 37–54)
HEMOGLOBIN: 11.3 G/DL (ref 12.5–16.5)
IMMATURE GRANULOCYTES #: 0.08 E9/L
IMMATURE GRANULOCYTES %: 0.7 % (ref 0–5)
INR BLD: 1.7
LYMPHOCYTES ABSOLUTE: 2.2 E9/L (ref 1.5–4)
LYMPHOCYTES RELATIVE PERCENT: 20.5 % (ref 20–42)
MCH RBC QN AUTO: 32.2 PG (ref 26–35)
MCHC RBC AUTO-ENTMCNC: 35.3 % (ref 32–34.5)
MCV RBC AUTO: 91.2 FL (ref 80–99.9)
METER GLUCOSE: 162 MG/DL (ref 74–99)
METER GLUCOSE: 182 MG/DL (ref 74–99)
METER GLUCOSE: 230 MG/DL (ref 74–99)
METER GLUCOSE: 94 MG/DL (ref 74–99)
MONOCYTES ABSOLUTE: 1.45 E9/L (ref 0.1–0.95)
MONOCYTES RELATIVE PERCENT: 13.5 % (ref 2–12)
NEUTROPHILS ABSOLUTE: 5.57 E9/L (ref 1.8–7.3)
NEUTROPHILS RELATIVE PERCENT: 52.1 % (ref 43–80)
PDW BLD-RTO: 13.9 FL (ref 11.5–15)
PLATELET # BLD: 127 E9/L (ref 130–450)
PMV BLD AUTO: 12 FL (ref 7–12)
POTASSIUM REFLEX MAGNESIUM: 4.5 MMOL/L (ref 3.5–5)
POTASSIUM SERPL-SCNC: 4.5 MMOL/L (ref 3.5–5)
POTASSIUM, UR: 42.4 MMOL/L
PROCALCITONIN: 0.14 NG/ML (ref 0–0.08)
PROTHROMBIN TIME: 19.2 SEC (ref 9.3–12.4)
RBC # BLD: 3.51 E12/L (ref 3.8–5.8)
SODIUM BLD-SCNC: 131 MMOL/L (ref 132–146)
SODIUM URINE: 53 MMOL/L
TOTAL PROTEIN: 6.1 G/DL (ref 6.4–8.3)
URIC ACID, SERUM: 6 MG/DL (ref 3.4–7)
WBC # BLD: 10.7 E9/L (ref 4.5–11.5)

## 2022-11-10 PROCEDURE — 86301 IMMUNOASSAY TUMOR CA 19-9: CPT

## 2022-11-10 PROCEDURE — 85025 COMPLETE CBC W/AUTO DIFF WBC: CPT

## 2022-11-10 PROCEDURE — 6370000000 HC RX 637 (ALT 250 FOR IP): Performed by: NURSE PRACTITIONER

## 2022-11-10 PROCEDURE — 82378 CARCINOEMBRYONIC ANTIGEN: CPT

## 2022-11-10 PROCEDURE — 82104 ALPHA-1-ANTITRYPSIN PHENO: CPT

## 2022-11-10 PROCEDURE — 80076 HEPATIC FUNCTION PANEL: CPT

## 2022-11-10 PROCEDURE — 84300 ASSAY OF URINE SODIUM: CPT

## 2022-11-10 PROCEDURE — 97161 PT EVAL LOW COMPLEX 20 MIN: CPT

## 2022-11-10 PROCEDURE — 82436 ASSAY OF URINE CHLORIDE: CPT

## 2022-11-10 PROCEDURE — 99222 1ST HOSP IP/OBS MODERATE 55: CPT | Performed by: INTERNAL MEDICINE

## 2022-11-10 PROCEDURE — 2580000003 HC RX 258: Performed by: NURSE PRACTITIONER

## 2022-11-10 PROCEDURE — 82103 ALPHA-1-ANTITRYPSIN TOTAL: CPT

## 2022-11-10 PROCEDURE — 2060000000 HC ICU INTERMEDIATE R&B

## 2022-11-10 PROCEDURE — 80053 COMPREHEN METABOLIC PANEL: CPT

## 2022-11-10 PROCEDURE — 85610 PROTHROMBIN TIME: CPT

## 2022-11-10 PROCEDURE — 84145 PROCALCITONIN (PCT): CPT

## 2022-11-10 PROCEDURE — 99232 SBSQ HOSP IP/OBS MODERATE 35: CPT | Performed by: STUDENT IN AN ORGANIZED HEALTH CARE EDUCATION/TRAINING PROGRAM

## 2022-11-10 PROCEDURE — 6370000000 HC RX 637 (ALT 250 FOR IP): Performed by: STUDENT IN AN ORGANIZED HEALTH CARE EDUCATION/TRAINING PROGRAM

## 2022-11-10 PROCEDURE — 6360000002 HC RX W HCPCS: Performed by: NURSE PRACTITIONER

## 2022-11-10 PROCEDURE — 80048 BASIC METABOLIC PNL TOTAL CA: CPT

## 2022-11-10 PROCEDURE — 81256 HFE GENE: CPT

## 2022-11-10 PROCEDURE — APPSS60 APP SPLIT SHARED TIME 46-60 MINUTES: Performed by: NURSE PRACTITIONER

## 2022-11-10 PROCEDURE — 36415 COLL VENOUS BLD VENIPUNCTURE: CPT

## 2022-11-10 PROCEDURE — 84550 ASSAY OF BLOOD/URIC ACID: CPT

## 2022-11-10 PROCEDURE — 82105 ALPHA-FETOPROTEIN SERUM: CPT

## 2022-11-10 PROCEDURE — 82962 GLUCOSE BLOOD TEST: CPT

## 2022-11-10 PROCEDURE — 84133 ASSAY OF URINE POTASSIUM: CPT

## 2022-11-10 RX ORDER — METHOCARBAMOL 500 MG/1
500 TABLET, FILM COATED ORAL 4 TIMES DAILY PRN
Status: DISCONTINUED | OUTPATIENT
Start: 2022-11-10 | End: 2022-11-13 | Stop reason: HOSPADM

## 2022-11-10 RX ORDER — PANTOPRAZOLE SODIUM 40 MG/1
40 TABLET, DELAYED RELEASE ORAL
Status: DISCONTINUED | OUTPATIENT
Start: 2022-11-10 | End: 2022-11-13 | Stop reason: HOSPADM

## 2022-11-10 RX ORDER — CEFUROXIME AXETIL 500 MG/1
500 TABLET ORAL EVERY 12 HOURS SCHEDULED
Status: DISCONTINUED | OUTPATIENT
Start: 2022-11-10 | End: 2022-11-13 | Stop reason: HOSPADM

## 2022-11-10 RX ORDER — GUAIFENESIN/DEXTROMETHORPHAN 100-10MG/5
5 SYRUP ORAL EVERY 4 HOURS PRN
Status: DISCONTINUED | OUTPATIENT
Start: 2022-11-10 | End: 2022-11-13 | Stop reason: HOSPADM

## 2022-11-10 RX ADMIN — HEPARIN SODIUM 5000 UNITS: 10000 INJECTION INTRAVENOUS; SUBCUTANEOUS at 06:01

## 2022-11-10 RX ADMIN — HEPARIN SODIUM 5000 UNITS: 10000 INJECTION INTRAVENOUS; SUBCUTANEOUS at 21:57

## 2022-11-10 RX ADMIN — MIDODRINE HYDROCHLORIDE 2.5 MG: 5 TABLET ORAL at 16:21

## 2022-11-10 RX ADMIN — CEFUROXIME AXETIL 500 MG: 500 TABLET ORAL at 21:50

## 2022-11-10 RX ADMIN — METHOCARBAMOL TABLETS 500 MG: 500 TABLET, COATED ORAL at 17:11

## 2022-11-10 RX ADMIN — Medication: at 21:54

## 2022-11-10 RX ADMIN — HEPARIN SODIUM 5000 UNITS: 10000 INJECTION INTRAVENOUS; SUBCUTANEOUS at 13:14

## 2022-11-10 RX ADMIN — ATORVASTATIN CALCIUM 40 MG: 40 TABLET, FILM COATED ORAL at 21:50

## 2022-11-10 RX ADMIN — GUAIFENESIN AND DEXTROMETHORPHAN 5 ML: 100; 10 SYRUP ORAL at 17:12

## 2022-11-10 RX ADMIN — PANTOPRAZOLE SODIUM 40 MG: 40 TABLET, DELAYED RELEASE ORAL at 08:37

## 2022-11-10 RX ADMIN — PETROLATUM: 420 OINTMENT TOPICAL at 21:55

## 2022-11-10 RX ADMIN — MONTELUKAST SODIUM 10 MG: 10 TABLET ORAL at 21:50

## 2022-11-10 RX ADMIN — Medication 10 ML: at 21:53

## 2022-11-10 RX ADMIN — Medication: at 08:37

## 2022-11-10 RX ADMIN — Medication 1 TABLET: at 21:50

## 2022-11-10 RX ADMIN — MIDODRINE HYDROCHLORIDE 2.5 MG: 5 TABLET ORAL at 08:38

## 2022-11-10 RX ADMIN — METHOCARBAMOL TABLETS 500 MG: 500 TABLET, COATED ORAL at 23:46

## 2022-11-10 RX ADMIN — GUAIFENESIN AND DEXTROMETHORPHAN 5 ML: 100; 10 SYRUP ORAL at 23:46

## 2022-11-10 RX ADMIN — SPIRONOLACTONE 50 MG: 25 TABLET ORAL at 08:38

## 2022-11-10 RX ADMIN — ASPIRIN 81 MG: 81 TABLET, COATED ORAL at 08:38

## 2022-11-10 RX ADMIN — Medication 1 TABLET: at 08:38

## 2022-11-10 RX ADMIN — METOPROLOL SUCCINATE 25 MG: 25 TABLET, EXTENDED RELEASE ORAL at 08:38

## 2022-11-10 ASSESSMENT — PAIN SCALES - GENERAL
PAINLEVEL_OUTOF10: 0
PAINLEVEL_OUTOF10: 0
PAINLEVEL_OUTOF10: 4
PAINLEVEL_OUTOF10: 0

## 2022-11-10 ASSESSMENT — PAIN DESCRIPTION - DESCRIPTORS: DESCRIPTORS: ACHING;DULL;DISCOMFORT

## 2022-11-10 ASSESSMENT — PAIN DESCRIPTION - ORIENTATION: ORIENTATION: LEFT;RIGHT

## 2022-11-10 ASSESSMENT — PAIN DESCRIPTION - LOCATION: LOCATION: GROIN;LEG

## 2022-11-10 ASSESSMENT — PAIN - FUNCTIONAL ASSESSMENT: PAIN_FUNCTIONAL_ASSESSMENT: ACTIVITIES ARE NOT PREVENTED

## 2022-11-10 NOTE — PLAN OF CARE
Problem: Pain  Goal: Verbalizes/displays adequate comfort level or baseline comfort level  Outcome: Progressing     Problem: Chronic Conditions and Co-morbidities  Goal: Patient's chronic conditions and co-morbidity symptoms are monitored and maintained or improved  Outcome: Progressing  Flowsheets (Taken 11/10/2022 0800 by Eliceo Dutton RN)  Care Plan - Patient's Chronic Conditions and Co-Morbidity Symptoms are Monitored and Maintained or Improved: Monitor and assess patient's chronic conditions and comorbid symptoms for stability, deterioration, or improvement     Problem: Safety - Adult  Goal: Free from fall injury  Outcome: Progressing

## 2022-11-10 NOTE — CONSULTS
following:  to take medications as prescribed, report any intolerable side effects of medications to the cardiologist / doctor, do not just stop taking the medication; follow a cardiac heart healthy / low sodium diet; weigh yourself daily, exercise regularly- per doctor recommendation and not to smoke or use an excess amount of alcohol. We discussed calling the cardiologist / doctor with a weight gain of 3 pounds in one day or a total of 5 pounds or more in one week. Also, if you should have a significant weight loss of 3# or more in one day to call the doctor, they may need to decrease or hold the diuretic dose. On days you feels nauseated and not eating / drinking, having emesis or diarrhea,  informed to call the cardiologist  / doctor, they may need to decrease or hold diuretic to avoid dehydration. I stressed the importance of informing their cardiologist the first day of onset of any of the signs and symptoms in the \"Yellow Zone\" of the HF Zones. Patient verbalizes understanding. Greater than 30 minutes was spent educating patient. The Heart Failure Booklet given to the patient with additional patient education addressing:  What is Heart Failure? Things You Can Do to Live Well with HF  How to Take Your Medications  How to Eat Less Salt  Ida its Salt?   Exercising Well with Heart Failure  Signs and symptoms of HF to report  Weight Yourself Each Day  Home Self Management- activity, weight tracking, taking medications as prescribed, meals /diet planning (sodium and fluid restriction), how to read food labels, keeping physician follow ups, smoking cessation, follow the Heart Failure Zones  The Heart Failure zones  Every Dose Every Day      Instructed  to call 911 if you have any of the following symptoms:       Struggling to breathe unrelieved with rest,     Having chest pain     Have confusion or cant think clearly          Krish Graves, RN BSN, RN  Heart Failure Navigator        CONGESTIVE HEART FAILURE (CHF) AHA GUIDELINES  (Must be completed for Primary Diagnosis CHF or History of CHF)    Discharge Plan:  I placed the Heart Failure Home Instructions in patient's discharge instructions. Per Heart Failure GWT[facility], the patient should have a follow-up appointment made within 7 days of discharge.     New Diagnosis No    ECHO Results most recent:  Lab Results   Component Value Date    LVEF 25 2021    LVEFMODE Echo 2019                                        Social History     Tobacco Use   Smoking Status Former    Packs/day: 1.00    Years: 20.00    Pack years: 20.00    Types: Cigarettes    Quit date: 1993    Years since quittin.8   Smokeless Tobacco Former    Types: Chew    Quit date: 1970        Immunization History   Administered Date(s) Administered    COVID-19, PFIZER GRAY top, DO NOT Dilute, (age 15 y+), IM, 27 mcg/0.3 mL 2022, 2022    COVID-19, PFIZER PURPLE top, DILUTE for use, (age 15 y+), 30mcg/0.3mL 2021, 2021    Pneumococcal Conjugate 13-valent ([id number]) 2019          Angiotensin-Converting-Enzyme (ACE) inhibitor ordered:  [] Yes  [x] No (specify contraindication):    [] Contraindicated  [] Hypotensive patient who was at immediate risk of cardiogenic shock  [] Hospitalized patient who experienced marked azotemia  [] Other Contraindications  [] Not Eligible  [] Not Tolerant  [] Patient Reason  [] System Reason  [x] Other Reason    Angiotensin II receptor blockers (ARB) ordered:  [] Yes  [x] No (specify contraindication):    [] Contraindicated  [] Hypotensive patient who was at immediate risk of cardiogenic shock  [] Hospitalized patient who experienced marked azotemia  [x] Other Contraindications    ARNI - Angiotensin Receptor Neprilysin Inhibitor ordered:  [] Yes  [x] No (specify contraindication):    [] ACE inhibitor use within the prior 36 hours  [] Allergy  [] Hyperkalemia  [] Hypotension  [] Renal dysfunction defined as creatinine > 2.5 mg/dL in men or > 2.0 mg/dL in women  [] Other Contraindications  [] Not Eligible  [] Not Tolerant  [] Patient Reason  []System Reason  [x]Other Reason      Beta Blocker ordered:    [x] Yes  [] No (specify contraindication):    [] Contraindicated  [] Asthma  [] Fluid Overload  [] Low Blood Pressure  [] Patient recently treated with an intravenous positive inotropic agent  [] Other Contraindications  [] Not Eligible  [] Not Tolerant  [] Patient Reason  [] System Reason    SGLT2 Inhibitor ordered:  [] Yes  [x] No (specify contraindication):    [] Contraindicated  [] Patient currently on dialysis  [] Ketoacidosis  [] Known hypersensitivity to the medication  [] Type I diabetes (not approved for use in patients with Type I diabetes due to increased risk of ketoacidosis)  [] Other Contraindications  [] Not Eligible  [] Not Tolerant  [] Patient Reason  [] System Reason  [x] Other Reason    Aldosterone Antagonist ordered:  [x] Yes  [] No (specify contraindication):    [] Contraindicated  [] Allergy due to aldosterone receptor antagonist  [] Hyperkalemia  [] Renal dysfunction defined as creatinine >2.5 mg/dL in men or >2.0 mg/dL in women.   [] Other contraindications  [] Not Eligible  [] Not Tolerant  [] Patient Reason  [] System Reason  [] Other Reason

## 2022-11-10 NOTE — CONSULTS
Inpatient Cardiology Consultation      Reason for Consult: HFrEF with exacerbation    Consulting Physician: Dr. Carlota Gonzalez    Requesting Physician: PIERCE Onofre    Date of Consultation: 11/10/2022    HISTORY OF PRESENT ILLNESS:   Mr. Martin Haney is a 66year old  male who follows with Dr. Moe Monzon. He was most recently seen in the office with Dr. Shira Rider on 07/05/2022. His medical history includes obesity, remote tobacco abuse, T2DM, CKD, BPH, liver cirrhosis, dilated cardiomyopathy with moderate MR (not considered a candidate for CABG at [facility] or [facility]), chronic HFrEF, and thrombocytopenia. Mr. Martin Haney presented to [facility] ED on 11/09/2022 with complaints of cough and dyspnea. He states that prior to presentation over the course of the past 2 weeks he has been having a worsening productive cough with clear, green, and yellow sputum at times. He denies accompanying fever and chills. He also denies chest pain. He states over the course of the past 2 weeks he has been having MALLORY, orthopnea and PND. States that he has been unable to sleep for the past several days secondary to postnasal drip that induces cough. He states that he is also had edema to BLE for the past 2 weeks. Denies change in his diet and states that he drinks at a minimum a gallon of water a day in addition to [place] [address]. States compliance with his medications to include Lasix daily. He states that he did follow-up with [facility] (records currently unavailable for review) and was deemed a candidate for surgical intervention of multivessel CAD. However, he states \" there was no guarantee that I would make it, so I decided not to have the surgery\". Upon arrival to the ED his VS were oral temperature 97.9-78-18-90 9% RA-77/39. EKG SR with low voltage QRS and delayed precordial transition (as interpreted by Dr. Ramya Benjamin). proBNP 1671. CXR unremarkable. BLE ultrasound negative for DVT.   He received a 500 cc NS bolus, and Ancef. He was admitted to a telemetry monitored unit. GI was consulted. Nephrology was consulted. An echocardiogram was ordered. Neurology was consulted for management of HFrEF. Please note: past medical records were reviewed per electronic medical record (EMR) - see detailed reports under Past Medical/ Surgical History. Past Medical and Surgical History:    Dilated cardiomyopathy/Chronic HFrEF  TTE 07/11/2019 (Dr. Kathleen Carlson): Limited echo for LV function. LV size is normal.  Apical dyskinesis, mid to distal septal hypokinesis. Overall EF mildly decreased, EF estimated about 45%. No recent study found in the local [facility]  TTE 08/24/2021 (Dr. Jose Singh): Borderline dilated LV. Regional wall motion abnormalities with akinesis of the mid and distal anterior, anteroseptal, anterolateral, apical and inferior apical segment as well as basilar inferior segment hypokinesis of the inferior lateral and lateral segments. Severely reduced LV systolic function. EF 25%. The left atrium is mildly dilated. Interatrial septum appears intact. Structurally normal mitral valve. Moderate centrally directed MR. The aortic valve appears mildly sclerotic. Mild TR.   Mild PHTN  Cardiac catheterization 08/26/2021 (Dr. Dustin Mejias) for NSTEMI: Severe multivessel CAD  Referral to [facility] 68 and advanced heart failure for evaluation for CABG versus high risk PCI per Dr. Landry Le office note 07/05/2022 (turndown for CABG from [facility])  Obesity  Remote tobacco abuse  T2DM  CKD  BPH  Cirrhosis per ultrasound of liver secondary to fatty liver disease with pancreatic body cyst and dilated pancreatic duct, portal hypertension s/p endoscopy/ultrasound with biopsy on 08/06/2021 and again on 08/19/2021 with EGD with EUS FNA  S/p cataract removal, bilateral hammertoe surgery, left heel cystectomy  Thrombocytopenia with platelet count 20,686 and 07/2021        Medications Prior to admit:  Prior to Admission medications Medication Sig Start Date End Date Taking? Authorizing Provider   glyBURIDE (DIABETA) 5 MG tablet Take 1 tablet by mouth daily (with breakfast) 10/21/22  Yes Zhanna Smith MD   lisinopril (PRINIVIL;ZESTRIL) 2.5 MG tablet take 1 tablet by mouth once daily 8/29/22  Yes Levester Dolin, DO   spironolactone (ALDACTONE) 50 MG tablet take 1 tablet by mouth twice a day 6/19/22  Yes Historical Provider, MD   furosemide (LASIX) 40 MG tablet take 1 tablet by mouth once daily  Patient taking differently: Will take a 2nd pill if legs swelling 2/21/22  Yes Levester Dolin, DO   metoprolol succinate (TOPROL XL) 25 MG extended release tablet take 1 tablet by mouth once daily 1/28/22  Yes Levester Dolin, DO   atorvastatin (LIPITOR) 40 MG tablet Take 1 tablet by mouth nightly 12/7/21  Yes Levester Dolin, DO   aspirin 81 MG EC tablet Take 1 tablet by mouth daily 12/7/21  Yes Levester Dolin, DO   ammonium lactate (LAC-HYDRIN) 12 % lotion Apply topically twice daily 10/28/22   Ro Cough, DPM   montelukast (SINGULAIR) 10 MG tablet take 1 tablet by mouth nightly 8/30/22   Levester Dolin, DO   blood glucose test strips (ONETOUCH ULTRA) strip Inject 1 each into the skin daily As needed.  3/31/22   Zhanna Smith MD   fluticasone [place]) 50 MCG/ACT nasal spray 1 spray by Each Nostril route daily  Patient taking differently: 1 spray by Each Nostril route daily as needed 12/7/21   Levester Dolin, DO   cetirizine (ZYRTEC) 10 MG tablet Take 10 mg by mouth daily    Historical Provider, MD   Multiple Vitamins-Minerals (PRESERVISION AREDS 2+MULTI VIT) CAPS Take 1 capsule by mouth 2 times daily    Historical Provider, MD       Current Medications:    Current Facility-Administered Medications: pantoprazole (PROTONIX) tablet 40 mg, 40 mg, Oral, QAM AC  sodium chloride flush 0.9 % injection 5-40 mL, 5-40 mL, IntraVENous, 2 times per day  sodium chloride flush 0.9 % injection 5-40 mL, 5-40 mL, IntraVENous, PRN  0.9 % sodium chloride infusion, , IntraVENous, PRN  ondansetron (ZOFRAN-ODT) disintegrating tablet 4 mg, 4 mg, Oral, Q8H PRN **OR** ondansetron (ZOFRAN) injection 4 mg, 4 mg, IntraVENous, Q6H PRN  polyethylene glycol (GLYCOLAX) packet 17 g, 17 g, Oral, Daily PRN  acetaminophen (TYLENOL) tablet 650 mg, 650 mg, Oral, Q6H PRN **OR** acetaminophen (TYLENOL) suppository 650 mg, 650 mg, Rectal, Q6H PRN  midodrine (PROAMATINE) tablet 2.5 mg, 2.5 mg, Oral, BID WC  heparin (porcine) injection 5,000 Units, 5,000 Units, SubCUTAneous, Q8H  perflutren lipid microspheres (DEFINITY) injection 1.5 mL, 1.5 mL, IntraVENous, ONCE PRN  ammonium lactate (LAC-HYDRIN) 12 % lotion, , Topical, BID  aspirin EC tablet 81 mg, 81 mg, Oral, Daily  atorvastatin (LIPITOR) tablet 40 mg, 40 mg, Oral, Nightly  metoprolol succinate (TOPROL XL) extended release tablet 25 mg, 25 mg, Oral, Daily  montelukast (SINGULAIR) tablet 10 mg, 10 mg, Oral, Nightly  antioxidant multivitamin (OCUVITE) tablet, 1 tablet, Oral, BID  spironolactone (ALDACTONE) tablet 50 mg, 50 mg, Oral, BID  insulin lispro (HUMALOG) injection vial 0-4 Units, 0-4 Units, SubCUTAneous, TID WC  insulin lispro (HUMALOG) injection vial 0-4 Units, 0-4 Units, SubCUTAneous, Nightly  glucose chewable tablet 16 g, 4 tablet, Oral, PRN  dextrose bolus 10% 125 mL, 125 mL, IntraVENous, PRN **OR** dextrose bolus 10% 250 mL, 250 mL, IntraVENous, PRN  glucagon (rDNA) injection 1 mg, 1 mg, SubCUTAneous, PRN  dextrose 10 % infusion, , IntraVENous, Continuous PRN    Allergies:  Penicillins, Ambrosia artemisiifolia (ragweed) skin test, Cat hair extract, Egg lecithin, Eggs or egg-derived products, Grass pollen(k-o-r-t-swt arielle), Macadamia nut oil, Milk-related compounds, Mixed ragweed, Molds & smuts, Peanut-containing drug products, and Seasonal    Social History:    Denies tobacco, alcohol, illicit drug use. Denies routine caffeine intake.     Family History:   Please note this information was not obtained at this time, as it is limited in nature due to the patient's advanced age. REVIEW OF SYSTEMS:     Constitutional: Denies fevers, chills, night sweats, and fatigue. Complains of productive cough with postnasal drip-see HPI  HEENT: Denies headaches, nose bleeds, and blurred vision,oral pain, abscess or lesion. Musculoskeletal: Denies falls, pain to BLE with ambulation and edema to BLE. Neurological: Denies dizziness and lightheadedness, numbness and tingling  Cardiovascular: Denies chest pain, palpitations, and feelings of heart racing. Respiratory: Complains of MALLORY, orthopnea and PND-see HPI  Gastrointestinal: Denies heartburn, nausea/vomiting, diarrhea and constipation, black/bloody, and tarry stools. Genitourinary: Denies dysuria and hematuria  Hematologic: Denies excessive bruising or bleeding  Lymphatic: Denies lumps and bumps to neck, axilla, breast, and groin  Endocrine: Denies excessive thirst. Denies intolerance to hot and cold  Psychiatric: Denies anxiety and depression. PHYSICAL EXAM:   BP (!) 83/43   Pulse 88   Temp 98 °F (36.7 °C) (Oral)   Resp 16   Ht 5' 11\" (1.803 m)   Wt 230 lb 14.4 oz (104.7 kg)   SpO2 96%   BMI 32.20 kg/m²   CONST:  Well developed, obese elderly  male who appears stated age. Awake, alert, cooperative, no apparent distress  HEENT:   Head- Normocephalic, atraumatic   Eyes- Conjunctivae pink, anicteric  Throat- Oral mucosa pink and moist  Neck-  No stridor, trachea midline, no jugular venous distention. No adenopathy   CHEST: Chest symmetrical and non-tender to palpation. No accessory muscle use or intercostal retractions  RESPIRATORY: Lung sounds -scattered crackles throughout fields   CARDIOVASCULAR:     No carotid bruit  Heart Inspection- shows no noted pulsations  Heart Palpation- no heaves or thrills; PMI is non-displaced   Heart Ausculation- Regular rate and rhythm, no murmur.  No s3, s4 or rub   PV: 1+ pitting bilateral lower extremity edema with left greater than right, erythema noted to BLE with scabbed areas and weeping areas with serous drainage. No varicosities. Pedal pulses palpable, no clubbing or cyanosis   ABDOMEN: Soft, obese, non-tender to light palpation. Bowel sounds present. No palpable masses no organomegaly; no abdominal bruit  MS: Good muscle strength and tone. No atrophy or abnormal movements. : Deferred  SKIN: Warm and dry no statis dermatitis or ulcers   NEURO / PSYCH: Oriented to person, place and time. Speech clear and appropriate. Follows all commands. Pleasant affect     DATA:    ECG: As above  Tele strips: As above  Diagnostic:  Labs:   CBC:   Recent Labs     11/09/22  1043 11/10/22  0340   WBC 9.0 10.7   HGB 12.0* 11.3*   HCT 34.8* 32.0*    127*     BMP:   Recent Labs     11/09/22  1043 11/10/22  0340   * 131*   K 4.6 4.5  4.5   CO2 21* 20*   BUN 37* 30*   CREATININE 2.0* 1.8*   LABGLOM 35 39   CALCIUM 8.3* 8.0*     Lab Results   Component Value Date    LABA1C 7.9 10/07/2022   FASTING LIPID PANEL:  Lab Results   Component Value Date/Time    CHOL 93 03/31/2022 01:36 PM    HDL 36 03/31/2022 01:36 PM    LDLCALC 38 03/31/2022 01:36 PM    TRIG 96 03/31/2022 01:36 PM     LIVER PROFILE:  Recent Labs     11/10/22  0340   AST 48*   ALT 33   LABALBU 2.5*      Latest Reference Range & Units 9/30/21 09:22 10/22/21 11:32 3/31/22 13:36 11/9/22 10:43   Pro-BNP 0 - 125 pg/mL 1,300 (H) 755 (H) 1,428 (H) 1,671 (H)   (H): Data is abnormally high    11/09/2022 CXR:  No acute process    11/09/2022 BLE Ultrasound:  No evidence of DVT in either lower extremity. IMPRESSION and PLAN to follow per Dr. Santiago Malave    Electronically signed by PIERCE Khan CNP on 11/10/2022 at 8:47 AM        I independently interviewed and examined the patient. I have reviewed the above documentation completed by the [facility]. Please see my additional contributions to the HPI, physical exam, and assessment / medical decision making.   I contributed more than 51% of the patient care.    Briefly, 79-year-old male with complex cardiac history with known history of heart failure with depressed systolic function with last echo revealing EF of 25% in 2021, complex coronary artery disease noted to have multivessel disease but apparently deemed not a candidate for CABG at both [facility] and subsequently has been medically managed. He has obesity, prior tobacco abuse, diabetes, chronic kidney disease, BPH, and cirrhosis as well as chronic thrombocytopenia. He presented with viral illness symptoms describing cough and postnasal drip and dyspnea on exertion and fatigue as well as lower extremity redness and swelling and now found to have RSV and being treated as such. BNP is noted to be over 1000 and liver enzymes are also elevated as well as noted to have acute on chronic kidney injury. Cardiology consulted for heart failure with depressed systolic function. It is noted patient was significantly hypotensive on presentation with systolic blood pressure noted to be in the upper 70s requiring normal saline bolus. He is currently on beta-blocker and spironolactone and given this hypotension as well as acute on chronic kidney injury I would recommend holding these medications. Review of Systems:  Cardiac: As per HPI  General: No fever, chills  Pulmonary: As per HPI  GI: No nausea, vomiting  Musculoskeletal: ARIAS x 4, no focal motor deficits  Skin: Intact, no rashes  Neuro/Psych: No headache or seizures    Physical Exam:  BP (!) 83/43   Pulse 88   Temp 98 °F (36.7 °C) (Oral)   Resp 16   Ht 5' 11\" (1.803 m)   Wt 230 lb 14.4 oz (104.7 kg)   SpO2 96%   BMI 32.20 kg/m²   Appearance: Awake, alert, no acute respiratory distress  Skin: Intact, no rash  Head: Normocephalic, atraumatic  ENMT: MMM, no rhinorrhea  Neck: Supple, no carotid bruits  Lungs: Clear to auscultation bilaterally. No wheezes, rales, or rhonchi.   Cardiac: Regular rate and rhythm, +S1S2, no murmurs apparent  Abdomen: Soft, +bowel sounds  Extremities: Moves all extremities x 4, no lower extremity edema  Neurologic: No focal motor deficits apparent, normal mood and affect    TTE 8/2021  Left Ventricle  Borderline dilated left ventricle. Micro-bubble contrast injected to enhance left ventricular visualization. Regional wall motion abnormalities with akinesis of mid and distal anterior, anteroseptal, anterolateral, apical and inferoapical  segment as well as basilar inferior segment and hypokinesis of inferolateral and lateral segments. Severely reduced left ventricular systolic function. Ejection fraction is visually estimated at 25%. Indeterminate diastolic function. Right Ventricle  Normal right ventricular size and function. Assessment/Plan:   Acute decompensated heart failure with depressed systolic function in the setting of acute on chronic kidney injury and hyponatremia in a patient with liver cirrhosis  I recommend nephrology consultation given acute on chronic kidney injury and hyponatremia with sodium noted to be 128 yesterday. Awaiting echocardiogram to guide therapy  Currently not on guideline directed medical therapy due to significant hypotension  Once blood pressure improved we will discuss beta-blocker, Entresto, Aldactone and possibly [place] or Vasile Slate.       Complex coronary artery disease with known multivessel disease that is medically managed because patient apparently not a surgical candidate for CABG  Unable to initiate beta-blocker given hypotension  Platelet appears stable and initiate aspirin to see platelet can tolerate  Once blood pressure stabilized please initiate beta-blocker  Continue on statin therapy      RSV  Management per primary service and ID    Acute on chronic kidney injury with hyponatremia  Consider nephrology consultation      Hypotension  Rule out sepsis  Obtain orthostatic vital signs  Agree with midodrine and uptitrate to improved blood pressure to see if he can initiate guideline directed medical therapy    Liver cirrhosis  Management per primary and GI    Chronic thrombocytopenia  Monitor closely while on aspirin and if worsening hold aspirin      Pancreatic lesion  Management per primary service    History of anemia   Monitor closely    Diabetes  Drip per primary service    BPH        Mildred Donahue MD  [facility] ([facility]) Cardiology

## 2022-11-10 NOTE — DISCHARGE INSTR - DIET
Good nutrition is important when healing from an illness, injury, or surgery. Follow any nutrition recommendations given to you during your hospital stay. If you were given an oral nutrition supplement while in the hospital, continue to take this supplement at home. You can take it with meals, in-between meals, and/or before bedtime. These supplements can be purchased at most local grocery stores, pharmacies, and chain super-stores. If you have any questions about your diet or nutrition, call the hospital and ask for the dietitian: Sunday SELINA Rose, RODNEY, [facility] at [phone]      DIABETIC, LOW SODIUM DIET GUIDELINES  A low salt and consistent carbohydrate diet is recommended to manage fluid retention and diabetes. To follow a heart-healthy and consistent carbohydrate diet,  Eat a balanced diet with whole grains, fruits and vegetables, and lean protein sources. Choose heart-healthy unsaturated fats. Limit saturated fats, trans fats, and cholesterol intake. Eat more plant-based or vegetarian meals using beans and soy foods for protein. Eat whole, unprocessed foods to limit the amount of sodium (salt) you eat. Choose a consistent amount of carbohydrate at each meal and snack. Limit refined carbohydrates especially sugar, sweets and sugar-sweetened beverages. Tips  Choose foods with heart healthy fats. Polyunsaturated and monounsaturated fat are unsaturated fats that may help lower your blood cholesterol level when used in place of saturated fat in your diet. Research shows that substituting saturated fats with unsaturated fats is beneficial to cholesterol levels. Try these easy swaps:      Instead of Try:   Butter, stick margarine, or solid shortening Reduced-fat, whipped, or liquid spreads   Beef, pork, or poultry with skin    Fish and seafood   Chips, crackers, snack foods Raw or unsalted nuts and seeds or nut butters  Hummus with vegetables  Avocado on toast   Coconut oil, palm oil Liquid vegetable oils: corn, canola, olive, soybean and safflower oils      Tips for Choosing Heart-Healthy Carbohydrates  Consume a consistent amount of carbohydrate  It is important to eat foods with carbohydrates in moderation because they impact your blood glucose level. Carbohydrates can be found in many foods such as:    Grains (breads, crackers, rice, pasta, and cereals)  Starchy Vegetables (potatoes, corn, and peas)  Beans and legumes  Milk, soy milk, and yogurt  Fruit and fruit juice  Sweets (cakes, cookies, ice cream, jam and jelly)  For many adults, eating 3 to 5 servings of carbohydrate foods at each meal and 1 or 2 carbohydrate servings for each snack works well. Check your blood glucose level regularly. It can tell you if you need to adjust when you eat carbohydrates. Choose foods rich in viscous (soluble) fiber  Viscous, or soluble, is found in the walls of plant cells. Viscous fiber is found only in plant-based foods. Eating foods with fiber helps to lower your unhealthy cholesterol and keep your blood glucose in range    Rich sources of viscous fiber include vegetables (asparagus, [place] sprouts, sweet potatoes, turnips) fruit (apricots, mangoes, oranges), legumes, and whole grains (barley, oats, and oat bran). As you increase your fiber intake gradually, also increase the amount of water you drink. This will help prevent constipation. If you have difficulty achieving this goal, ask your RDN about fiber laxatives. Choose fiber supplements made with viscous fibers such as psyllium seed husks or methylcellulose to help lower unhealthy cholesterol. Limit refined carbohydrates  There are three types of carbohydrates: starches, sugar, and fiber. Some carbohydrates occur naturally in food, like the starches in rice or corn or the sugars in fruits and milk. Refined carbohydrates--foods with high amounts of simple sugars--can raise triglyceride levels.  High triglyceride levels are associated with coronary heart disease. Some examples of refined carbohydrate foods are table sugar, sweets, and beverages sweetened with added sugar. Tips for Reducing Sodium (Salt)  Although sodium is important for your body to function, too much sodium can be harmful for people with high blood pressure. As sodium and fluid buildup in your tissues and bloodstream, your blood pressure increases. High blood pressure may cause damage to other organs and increase your risk for a stroke. Even if you take a pill for blood pressure or a water pill (diuretic) to remove fluid, it is still important to have less salt in your diet. Ask your doctor and RDN what amount of sodium is right for you. Avoid processed foods. Eat more fresh foods. Fresh fruits and vegetables are naturally low in sodium, as well as frozen vegetables and fruits that have no added juices or sauces. Fresh meats are lower in sodium than processed meats, such as funes, sausage, and hotdogs. Read the nutrition label or ask your  to help you find a fresh meat that is low in sodium. Eat less salt--at the table and when cooking. A single teaspoon of table salt has 2,300 mg of sodium. Leave the salt out of recipes for pasta, casseroles, and soups. Ask your RDN how to cook your favorite recipes without sodium  Be a smart . Look for food packages that say salt-free or sodium-free.  These items contain less than 5 milligrams of sodium per serving. Very low-sodium products contain less than 35 milligrams of sodium per serving. Low-sodium products contain less than 140 milligrams of sodium per serving. Beware for Unsalted or No Added Salt products. These items may still be high in sodium. Check the nutrition label. Add flavors to your food without adding sodium. Try lemon juice, lime juice, fruit juice or vinegar. Dry or fresh herbs add flavor. Try basil, bay leaf, dill, rosemary, parsley, amie, dry mustard, nutmeg, thyme, and paprika.   Pepper, red pepper flakes, and cayenne pepper can add spice t your meals without adding sodium. Hot sauce contains sodium, but if you use just a drop or two, it will not add up to much. Buy a sodium-free seasoning blend or make your own at home. Additional Lifestyle Tips  Achieve and maintain a healthy weight. Talk with your RDN or your doctor about what is a healthy weight for you. Set goals to reach and maintain that weight. To lose weight, reduce your calorie intake along with increasing your physical activity. A weight loss of 10 to 15 pounds could reduce LDL-cholesterol by 5 milligrams per deciliter. Participate in physical activity. Talk with your health care team to find out what types of physical activity are best for you. Set a plan to get about 30 minutes of exercise on most days.   Foods Recommended  Food Group Foods Recommended   Grains Whole grain breads and cereals, including whole wheat, barley, rye, buckwheat, corn, teff, quinoa, millet, amaranth, brown or wild rice, sorghum, and oats  Pasta, especially whole wheat or other whole grain types  [place] [employer] [place], quinoa or wild rice  Whole grain crackers, bread, rolls, pitas  Home-made bread with reduced-sodium baking soda   Protein Foods Lean cuts of beef and pork (loin, leg, round, extra lean hamburger)  Skinless poultry  Fish  Venison and other wild game  Dried beans and peas  Nuts and nut butters  Meat alternatives made with soy or textured vegetable protein  Egg whites or egg substitute  Cold cuts made with lean meat or soy protein   Dairy Nonfat (skim), low-fat, or 1%-fat milk  Nonfat or low-fat yogurt or cottage cheese  Fat-free and low-fat cheese   Vegetables Fresh, frozen, or canned vegetables without added fat or salt   Fruits Fresh, frozen, canned, or dried fruit   Oils Unsaturated oils (corn, olive, peanut, soy, sunflower, canola)  Soft or liquid margarines and vegetable oil spreads  Salad dressings  Seeds and nuts  Avocado       Foods Not Recommended  Food Group Foods Not Recommended   Grains Breads or crackers topped with salt  Cereals (hot or cold) with more than 300 mg sodium per serving  Biscuits, cornbread, and other quick breads prepared with baking soda  Bread crumbs or stuffing mix from a store  High-fat bakery products, such as doughnuts, biscuits, croissants, [place] pastries, pies, cookies  Instant cooking foods to which you add hot water and stir--potatoes, noodles, rice, etc.  Packaged starchy foods--seasoned noodle or rice dishes, stuffing mix, macaroni and cheese dinner  Snacks made with partially hydrogenated oils, including chips, cheese puffs, snack mixes, regular crackers, butter-flavored popcorn   Protein Foods Higher-fat cuts of meats (ribs, t-bone steak, regular hamburger)  [place], sausage, or hot dogs  Cold cuts, such as salami or bologna, deli meats, cured meats, corned beef  Organ meats (liver, brains, gizzards, sweetbreads)  Poultry with skin  Fried or smoked meat, poultry, and fish  Whole eggs and egg yolks (more than 2-4 per week)  Salted legumes, nuts, seeds, or nut/seed butters  Meat alternatives with high levels of sodium (>300 mg per serving) or saturated fat (>5 g per serving)   Dairy Whole milk,?2% fat milk, buttermilk  Whole milk yogurt or ice cream  Cream  Half-&-half  Cream cheese  Sour cream  Cheese   Vegetables Canned or frozen vegetables with salt, fresh vegetables prepared with salt, butter, cheese, or cream sauce  Fried vegetables  Pickled vegetables such as olives, pickles, or sauerkraut   Fruits Fried fruits  Fruits served with butter or cream   Oils Butter, stick margarine, shortening  Partially hydrogenated oils or trans fats  Tropical oils (coconut, palm, palm kernel oils)   Other Candy, sugar sweetened soft drinks and desserts  Salt, sea salt, garlic salt, and seasoning mixes containing salt  Bouillon cubes  Ketchup, barbecue sauce, Worcestershire sauce, soy sauce, teriyaki sauce  Miso  Conda Dose, olives, relish     Low Salt,  Consistent Carbohydrate Sample 1-Day Menu   Breakfast 1 cup cooked oatmeal (2 carbohydrate servings)  3/4 cup blueberries (1 carbohydrate serving)  1 ounce almonds  1 cup skim milk (1 carbohydrate serving)  1 cup coffee   Morning Snack 1 cup sugar-free nonfat yogurt (1 carbohydrate serving)   Lunch 2 slices whole-wheat bread (2 carbohydrate servings)  2 ounces lean turkey breast  1 ounce low-fat Swiss cheese  1 teaspoon mustard  1 slice tomato  1 lettuce leaf  1 small pear (1 carbohydrate serving)  1 cup skim milk (1 carbohydrate serving)   Afternoon Snack 1 ounce trail mix with unsalted nuts, seeds, and raisins (1 carbohydrate serving)   Evening Meal 3 ounces salmon  2/3 cup cooked brown rice (2 carbohydrate servings)  1 teaspoon soft margarine  1 cup cooked broccoli with 1/2 cup cooked carrots (1 carbohydrate serving  Carrots, cooked, boiled, drained, without salt  1 cup lettuce  1 teaspoon olive oil with vinegar for dressing  1 small whole grain roll (1 carbohydrate serving)  1 teaspoon soft margarine  1 cup unsweetened tea   Evening Snack 1 extra-small banana (1 carbohydrate serving)

## 2022-11-10 NOTE — CONSULTS
Nutrition Education    Educated on Diabetic, Low Na diet   Learners: Patient  Readiness: RODNEY - pt off the floor at [address] 11/10  Method: Handout  Contact name and number provided. SUMMARY: Consult re: Multiple food allerges and for DM, Low Na diet teaching. Pt's food allergies are noted in his Dietary Dept. Card file and his meals will be moderated appropriately. Pt is currently at [address] out of his room 11/10. Provided written diet information in Discharge Instructions and RD contact information. Also note that pt in Droplet Isolation currently d/t +Rhinovirus. Pt had been provided this diet information in the past as well by Care Coordination Registered Dietitian. Will continue inpatient monitoring per Policy.       Laure Smyth RD, CNSC, LD  Contact Number: [phone]

## 2022-11-10 NOTE — PROGRESS NOTES
[facility] Progress Note    Admitting Date and Time: 11/9/2022  9:42 AM  Admit Dx: MELE (acute kidney injury) ([facility] [P92.1]  Chronic systolic congestive heart failure (HCC) [I50.22]  Cellulitis of lower extremity, unspecified laterality [L03.119]  Hypotension, unspecified hypotension type [I95.9]      Assessment:    Principal Problem:    Hypotension  Active Problems:    Chronic renal disease, stage III (HCC) [010866]    Acute kidney injury superimposed on chronic kidney disease (HCC)    MELE (acute kidney injury) ([facility]    Controlled type 2 diabetes mellitus with complication, without long-term current use of insulin (HCC)    Hepatic cirrhosis (HCC)    HFrEF (heart failure with reduced ejection fraction) ([facility]    Portal hypertension ([facility]  Resolved Problems:    * No resolved hospital problems. *      Plan:  1. Acute on chronic HFrEF  - Last ECHO LVEF 25%, moderate MR, mild TR, Pulm pressure of 40 mmHg    Per [facility] notes -> \"ischemic cardiomyopathy- LVEF of 25% with LVEDD 55 and 2+MR (per TTE 08/2021) and multivessel CAD (per cor angio 08/2021) ie., Left main 20%, LAD - ostial occlusion. Ramus - 90-95% ostial, Cx- 90-95% ostial, 80% at OM1, 95% mid RCA 60-70% prox, Posterolateral - 70-80% ostial and PDA- 60-70% ostial. He has pertinent hx of Liver cirrhosis ; unclear etio, possibly BE +/- alcoholic. He was deemed not a CAB candidate per multidisciplinary discussion at our institution\". 2nd opinion at [facility] and was deemed a surgical candidate but opted not to proceed with CABG due to risks for not surviving the procedure. Compliant with medications, drinks a large volume of water. States recently unable to elevate legs due to URI symptoms limiting ability to lay down. Cardiology consulted. ECHO follow up  Lisinopril, aldactone, metoprolol -> all on hold as BP will not support  Started midodrine  Lasix on hold    2.  Cirrhosis  - felt due to fatty liver disease,   Negative viral studies and autoimmune workup in past  Hx of Alpha 1 antitrypsin deficiency and elevated iron    Has a pancreatic lesion that has increased in size over past 1 year, follows with HPB surgery  Elevated CA 19-9 and CEA, normal AFP  Hx of EUS August 2021, non-diagnostic biopsy  Considering repeat    3. Rhinovirus  - not hypoxic and does not have much coughing or SOB  Does have significant sinus congestion, maxillary pain/pressure, headache and purulent nasal drainage. Will give Cefuroxime course for secondary bacterial infection as he has had symptoms > 14 days    4. MELE on CKD   - baseline Cr ~ 1.5  Presented with Cr of 2.0, appears hypervolemic  Holding diuretics however, as above, blood pressure will not support. Possible renal hypoperfusion  Nephrology consulted. 5. Bilateral stasis dermatitis  - he has symmetric appearing erythema and warmth to both lower legs. Not c/w cellulitis given distribution  Dopplers negative for DVTs or acute arterial occlusive process. Will apply topical emollients, ACE wraps, elevated legs as able    6. DM   - continue sliding scale    7. Thrombocytopenia  - likely due to cirrhosis, chronic  ASA started, follow    VTE prophylaxis: SQH - follow platelets    Subjective:  Patient is being followed for MELE (acute kidney injury) ([facility] [C20.4]  Chronic systolic congestive heart failure ([facility] [I50.22]  Cellulitis of lower extremity, unspecified laterality [L03.119]  Hypotension, unspecified hypotension type [I95.9]     Patient states he has been sick for > 2 weeks with URI symptoms, coughing/congestion. Primarily with rhinorrhea and post-nasal drainage causing him to gag if he lies supine. States he has not been able to lie down and raise legs due to his sinus drainage, and thinks this is why he has increased edema. Went to an urgent care with these complaints and was directed to ED.   Admitted with concern for acute HFrEF, also with cirrhosis, MELE on CKD, stasis dermatitis BLE.    11/10 -> no overnight events. He is on room air. He is still complaining primarily of sinus pain/pressure, thick purulent sinus drainage and post-nasal gtt causing him to cough. Denies chest pain or pressure. States legs more swollen than usual and he is not able to ambulate like usual       ROS: denies fever, chills,  n/v, HA unless stated above.       pantoprazole  40 mg Oral QAM AC    cefUROXime  500 mg Oral 2 times per day    sodium chloride flush  5-40 mL IntraVENous 2 times per day    midodrine  2.5 mg Oral BID WC    heparin (porcine)  5,000 Units SubCUTAneous Q8H    ammonium lactate   Topical BID    aspirin  81 mg Oral Daily    atorvastatin  40 mg Oral Nightly    montelukast  10 mg Oral Nightly    ocuvite-lutein  1 tablet Oral BID    [Held by provider] spironolactone  50 mg Oral BID    insulin lispro  0-4 Units SubCUTAneous TID WC    insulin lispro  0-4 Units SubCUTAneous Nightly     sodium chloride flush, 5-40 mL, PRN  sodium chloride, , PRN  ondansetron, 4 mg, Q8H PRN   Or  ondansetron, 4 mg, Q6H PRN  polyethylene glycol, 17 g, Daily PRN  acetaminophen, 650 mg, Q6H PRN   Or  acetaminophen, 650 mg, Q6H PRN  perflutren lipid microspheres, 1.5 mL, ONCE PRN  glucose, 4 tablet, PRN  dextrose bolus, 125 mL, PRN   Or  dextrose bolus, 250 mL, PRN  glucagon (rDNA), 1 mg, PRN  dextrose, , Continuous PRN         Objective:    BP (!) 83/43   Pulse 79   Temp 99 °F (37.2 °C) (Oral)   Resp 18   Ht 5' 11\" (1.803 m)   Wt 230 lb 14.4 oz (104.7 kg)   SpO2 98%   BMI 32.20 kg/m²     General Appearance: alert and oriented to person, place and time and in no acute distress  Skin: warm and dry  Head: normocephalic and atraumatic  Eyes: pupils equal, round, and reactive to light, extraocular eye movements intact, conjunctivae normal  Neck: neck supple and non tender without mass   Pulmonary/Chest: no wheeze or rhonchi  Cardiovascular: normal rate, normal S1 and S2 and no carotid bruits  Abdomen: obese, distended, ascites  Extremities: no cyanosis, no clubbing. 3+ edema, circumferential erythema/warmth lower legs  Neurologic: no cranial nerve deficit and speech normal        Recent Labs     11/09/22  1043 11/10/22  0340   * 131*   K 4.6 4.5  4.5   CL 99 101   CO2 21* 20*   BUN 37* 30*   CREATININE 2.0* 1.8*   GLUCOSE 139* 97   CALCIUM 8.3* 8.0*       Recent Labs     11/09/22  1043 11/10/22  0340   WBC 9.0 10.7   RBC 3.77* 3.51*   HGB 12.0* 11.3*   HCT 34.8* 32.0*   MCV 92.3 91.2   MCH 31.8 32.2   MCHC 34.5 35.3*   RDW 14.3 13.9    127*   MPV 11.6 12.0       Radiology:     Time spent reviewing chart, clinical exam, discussing case and answering questions with staff/consultants/patient/family = 20 minutes      NOTE: This report was transcribed using voice recognition software. Every effort was made to ensure accuracy; however, inadvertent computerized transcription errors may be present. Electronically signed by PIERCE Small CNP on 11/10/2022 at 4:24 PM    Addendum: I have personally participated in the history, exam, medical decision making with  Ely Noland NP  on the date of service and I agree with all of the pertinent clinical information unless otherwise noted. I have also reviewed and agree with the past medical, family, and social history unless otherwise noted. Patient was admitted with acute on chronic systolic CHF, cirrhosis, rhinovirus positive with suspected sinus secondary bacterial infection, MELE on CKD and bilateral lower extremity stasis dermatitis. PHYSICAL EXAM:  Vitals:  BP (!) 83/43   Pulse 79   Temp 99 °F (37.2 °C) (Oral)   Resp 18   Ht 5' 11\" (1.803 m)   Wt 230 lb 14.4 oz (104.7 kg)   SpO2 98%   BMI 32.20 kg/m²   Gen: awake, alert, NAD  Lungs: clear to auscultation bilaterally no crackles no wheezing. Heart: RRR, no murmur   Abdomen: soft nontender ascites present positive bowel sounds.   Extremities: full range of motion , 2+ bilateral lower extremity peripheral edema, bilateral lower extremity erythema circumferentially      Impression:  Principal Problem:    Hypotension  Active Problems:    Chronic renal disease, stage III ([address] [[id number]]    Acute kidney injury superimposed on chronic kidney disease (HCC)    MELE (acute kidney injury) ([address]    Controlled type 2 diabetes mellitus with complication, without long-term current use of insulin (HCC)    Hepatic cirrhosis (HCC)    HFrEF (heart failure with reduced ejection fraction) ([address]    Portal hypertension ([address]  Resolved Problems:    * No resolved hospital problems. *      My findings/plan include:  Patient is a 79-year-old male admitted with acute on chronic systolic CHF, cirrhosis, rhinovirus positive with suspected sign of secondary bacterial infection, MELE on CKD and bilateral lower extremity stasis dermatitis. Blood pressures are soft not allowing diuresis, cardiology has been consulted, appreciate recommendations. Started on midodrine. Follow-up echo. His cirrhosis is felt to be due to nonalcoholic fatty liver disease. He is rhinovirus positive and is complaining of significant sinus symptoms with  congestion, maxillary pain/pressure, headache and purulent nasal date drainage. We will go ahead and treat him for for a secondary bacterial infection with cefuroxime. He also presents with an MELE on CKD, nephrology has been consulted. Lastly he presents with bilateral lower extremity venous stasis dermatitis. NOTE: This report was transcribed using voice recognition software. Every effort was made to ensure accuracy; however, inadvertent computerized transcription errors may be present.   Electronically signed by Lois Pires MD on 11/10/2022 at 5:44 PM

## 2022-11-10 NOTE — PROGRESS NOTES
Physical Therapy  Facility/Department: [address] INTERNAL MEDICINE 2  Physical Therapy Initial Assessment    Name: Tyler Flores  : 1950  MRN: [id number]  Date of Service: 11/10/2022           Patient Diagnosis(es): The primary encounter diagnosis was MELE (acute kidney injury) ([address] Diagnoses of Chronic systolic congestive heart failure ([address] Hypotension, unspecified hypotension type, and Cellulitis of lower extremity, unspecified laterality were also pertinent to this visit. Past Medical History:  has a past medical history of Allergic rhinitis, Cardiomyopathy ([address] CHF (congestive heart failure) ([address] DM (diabetes mellitus) ([address] Enlarged prostate, Thrombocytopenia ([address] Type 2 diabetes mellitus without complication ([address] and VHD (valvular heart disease). Past Surgical History:  has a past surgical history that includes Refractive surgery (Bilateral); Hammer toe surgery (Bilateral, ); cyst removal; Cataract removal (Bilateral, ); and Upper gastrointestinal endoscopy (N/A, 2021). Evaluating Therapist: Tari Og PT     Referring Provider:  PIERCE Damon - CNP    PT order : PT eval and treat     Room #: 410   DIAGNOSIS:  The primary encounter diagnosis was MELE (acute kidney injury) ([address] Diagnoses of Chronic systolic congestive heart failure ([address] Hypotension, unspecified hypotension type, and Cellulitis of lower extremity, unspecified laterality were also pertinent to this visit. PRECAUTIONS: falls, droplet precautions     Social:  Pt lives alone  in a  1  floor plan  uses basement for laundry   Prior to admission pt walked with  no AD, independent. Pt's son lives next door      Initial Evaluation  Date:  11/10/2022  Treatment      Short Term/ Long Term   Goals   Was pt agreeable to Eval/treatment? Yes      Does pt have pain?   None reported      Bed Mobility  Rolling:  NT   Supine to sit:  independent   Sit to supine:  NT   Scooting:  independent Independent    Transfers Sit to stand: S/SBA   Stand to sit: S/SBA   Stand pivot:  NT   Independent    Ambulation     40  feet with  no AD S/SBA     150  feet with  no AD with independent, no LOB        Stair negotiation: ascended and descended NT , in isolation    4-12  steps with  1  rail with  S/I    LE ROM  WFL     LE strength  4/ 5      AM- PAC RAW score  18/ 24            Pt is alert and Oriented      Balance: S/SBA . Fall risk due to  decreased balance   Edema: B LEs   Bed/Chair alarm: yes      ASSESSMENT  Pt displays functional ability as noted in the objective portion of this evaluation. Conditions Requiring Skilled Therapeutic Intervention:    [x]Decreased strength     []Decreased ROM  [x]Decreased functional mobility  [x]Decreased balance   []Decreased endurance   []Decreased posture  []Decreased sensation  []Decreased coordination   []Decreased vision  [x]Decreased safety awareness   []Increased pain       Treatment/Education:    Pt in bed  upon arrival ; agreeable to PT. Mobility as above. LEs edematous and reddened, but pt reports no pain. Mildly unsteady with gait, but no LOB . Pt on RA. SpO 2 > 95% with mobility            Pt educated on fall risk,  safety with mobility        Patient response to education:   Pt verbalized understanding Pt demonstrated skill Pt requires further education in this area     x       Comments:  Pt left in recliner with LEs elevated after session, with call light in reach. Rehab potential is Good for reaching above PT goals. Pts/ family goals   1. Home     Patient and or family understand(s) diagnosis, prognosis, and plan of care. -  yes     PLAN  PT care will be provided in accordance with the objectives noted above. Whenever appropriate, clear delegation orders will be provided for nursing staff. Exercises and functional mobility practice will be used as well as appropriate assistive devices or modalities to obtain goals.  Patient and family education will also be administered as needed. PLAN OF CARE:    Current Treatment Recommendations     [x] Strengthening to improve independence with functional mobility   [] ROM to improve independence with functional mobility   [x] Balance Training to improve static/dynamic balance and to reduce fall risk  [x] Endurance Training to improve activity tolerance during functional mobility   [x] Transfer Training to improve safety and independence with all functional transfers   [x] Gait Training to improve gait mechanics, endurance and assess need for appropriate assistive device  [x] Stair Training in preparation for safe discharge home and/or into the community   [x] Positioning to prevent skin breakdown and contractures  [x] Safety and Education Training   [x] Patient/Caregiver Education   [] HEP  [] Other     Frequency of treatments will be 2-5x/week x  7-10 days. Time in: 1510   Time out:  1518       Evaluation Time includes thorough review of current medical information, gathering information on past medical history/social history and prior level of function, completion of standardized testing/informal observation of tasks, assessment of data and education on plan of care and goals.     CPT codes:  [x] Low Complexity PT evaluation 80951  [] Moderate Complexity PT evaluation 82409  [] High Complexity PT evaluation 65876  [] PT Re-evaluation 69961  [] Gait training 22558  minutes  [] Therapeutic activities 24709  minutes  [] Therapeutic exercises 00455  minutes  [] Neuromuscular reeducation 53344  minutes       George 18 number:  PT 0920

## 2022-11-10 NOTE — CONSULTS
Department of Internal Medicine  Nephrology Nurse Practitioner Consult Note      Reason for Consult:  MELE on CKD  Requesting Physician:  PIERCE Clancy NP    CHIEF COMPLAINT:  cough and increasing leg edema x2 weeks    History Obtained From:  patient, electronic medical record    HISTORY OF PRESENT ILLNESS:  cough and increasing leg edema x2 weeks    Past Medical History:        Diagnosis Date    Allergic rhinitis     Seasonal    Cardiomyopathy ([facility]     CHF (congestive heart failure) ([facility])     DM (diabetes mellitus) ([facility]     Enlarged prostate     Thrombocytopenia ([facility])     Type 2 diabetes mellitus without complication ([facility])     VHD (valvular heart disease)      Past Surgical History:        Procedure Laterality Date    CATARACT REMOVAL Bilateral 2017    CYST REMOVAL      from left heel    HAMMER TOE SURGERY Bilateral 1979    REFRACTIVE SURGERY Bilateral     UPPER GASTROINTESTINAL ENDOSCOPY N/A 8/6/2021    EGD W/EUS FNA performed by Linder Skiff, MD at [facility] ENDOSCOPY     Current Medications:    Current Facility-Administered Medications: pantoprazole (PROTONIX) tablet 40 mg, 40 mg, Oral, QAM AC  cefUROXime (CEFTIN) tablet 500 mg, 500 mg, Oral, 2 times per day  white petrolatum ointment, , Topical, BID  methocarbamol (ROBAXIN) tablet 500 mg, 500 mg, Oral, 4x Daily PRN  guaiFENesin-dextromethorphan (ROBITUSSIN DM) 100-10 MG/5ML syrup 5 mL, 5 mL, Oral, Q4H PRN  sodium chloride flush 0.9 % injection 5-40 mL, 5-40 mL, IntraVENous, 2 times per day  sodium chloride flush 0.9 % injection 5-40 mL, 5-40 mL, IntraVENous, PRN  0.9 % sodium chloride infusion, , IntraVENous, PRN  ondansetron (ZOFRAN-ODT) disintegrating tablet 4 mg, 4 mg, Oral, Q8H PRN **OR** ondansetron (ZOFRAN) injection 4 mg, 4 mg, IntraVENous, Q6H PRN  polyethylene glycol (GLYCOLAX) packet 17 g, 17 g, Oral, Daily PRN  acetaminophen (TYLENOL) tablet 650 mg, 650 mg, Oral, Q6H PRN **OR** acetaminophen (TYLENOL) suppository 650 mg, 650 mg, Rectal, Q6H PRN  midodrine (PROAMATINE) tablet 2.5 mg, 2.5 mg, Oral, BID WC  heparin (porcine) injection 5,000 Units, 5,000 Units, SubCUTAneous, Q8H  perflutren lipid microspheres (DEFINITY) injection 1.5 mL, 1.5 mL, IntraVENous, ONCE PRN  ammonium lactate (LAC-HYDRIN) 12 % lotion, , Topical, BID  aspirin EC tablet 81 mg, 81 mg, Oral, Daily  atorvastatin (LIPITOR) tablet 40 mg, 40 mg, Oral, Nightly  montelukast (SINGULAIR) tablet 10 mg, 10 mg, Oral, Nightly  antioxidant multivitamin (OCUVITE) tablet, 1 tablet, Oral, BID  [Held by provider] spironolactone (ALDACTONE) tablet 50 mg, 50 mg, Oral, BID  insulin lispro (HUMALOG) injection vial 0-4 Units, 0-4 Units, SubCUTAneous, TID WC  insulin lispro (HUMALOG) injection vial 0-4 Units, 0-4 Units, SubCUTAneous, Nightly  glucose chewable tablet 16 g, 4 tablet, Oral, PRN  dextrose bolus 10% 125 mL, 125 mL, IntraVENous, PRN **OR** dextrose bolus 10% 250 mL, 250 mL, IntraVENous, PRN  glucagon (rDNA) injection 1 mg, 1 mg, SubCUTAneous, PRN  dextrose 10 % infusion, , IntraVENous, Continuous PRN  Allergies:  Penicillins, Ambrosia artemisiifolia (ragweed) skin test, Cat hair extract, Egg lecithin, Eggs or egg-derived products, Grass pollen(k-o-r-t-swt arielle), Macadamia nut oil, Milk-related compounds, Mixed ragweed, Molds & smuts, Peanut-containing drug products, and Seasonal    Social History:    TOBACCO:   reports that he quit smoking about 29 years ago. His smoking use included cigarettes. He has a 20.00 pack-year smoking history. He quit smokeless tobacco use about 52 years ago. His smokeless tobacco use included chew. ETOH:   reports that he does not currently use alcohol.     Family History:       Problem Relation Age of Onset    Alcohol Abuse Father     No Known Problems Sister     Diabetes Brother      REVIEW OF SYSTEMS:    CONSTITUTIONAL:  negative  EYES:  negative  HEENT:  negative  RESPIRATORY:  negative  CARDIOVASCULAR:  negative  GASTROINTESTINAL:  negative  GENITOURINARY: negative  INTEGUMENT/BREAST:  negative  MUSCULOSKELETAL:  negative  NEUROLOGICAL:  negative  BEHAVIOR/PSYCH:  negative  PHYSICAL EXAM:      Vitals:    VITALS:  BP (!) 83/43   Pulse 79   Temp 99 °F (37.2 °C) (Oral)   Resp 18   Ht 5' 11\" (1.803 m)   Wt 230 lb 14.4 oz (104.7 kg)   SpO2 98%   BMI 32.20 kg/m²   24HR BLOOD PRESSURE RANGE:  Systolic (83FHP), CME:70 , Min:83 , CXN:073  ; Diastolic (76MXF), ODU:80, Min:38, Max:54   24HR INTAKE/OUTPUT:  No intake or output data in the 24 hours ending 11/10/22 1656    Constitutional:  A&O, NAD  HEENT:  PERRLA  Respiratory:  CTA  Cardiovascular/Edema:  RRR  Gastrointestinal:  soft, nontender  Neurologic:  cranial nerves II-XII intact  Skin:  warm, dry  Other:  edema**    DATA:    CBC with Differential:    Lab Results   Component Value Date/Time    WBC 10.7 11/10/2022 03:40 AM    RBC 3.51 11/10/2022 03:40 AM    HGB 11.3 11/10/2022 03:40 AM    HCT 32.0 11/10/2022 03:40 AM     11/10/2022 03:40 AM    MCV 91.2 11/10/2022 03:40 AM    MCH 32.2 11/10/2022 03:40 AM    MCHC 35.3 11/10/2022 03:40 AM    RDW 13.9 11/10/2022 03:40 AM    SEGSPCT 35.6 04/25/2022 01:40 PM    METASPCT 1.7 08/23/2021 08:06 AM    LYMPHOPCT 20.5 11/10/2022 03:40 AM    MONOPCT 13.5 11/10/2022 03:40 AM    MYELOPCT 0.9 08/20/2021 10:38 AM    BASOPCT 1.8 11/10/2022 03:40 AM    MONOSABS 1.45 11/10/2022 03:40 AM    LYMPHSABS 2.20 11/10/2022 03:40 AM    EOSABS 1.22 11/10/2022 03:40 AM    BASOSABS 0.19 11/10/2022 03:40 AM     CMP:    Lab Results   Component Value Date/Time     11/10/2022 03:40 AM    K 4.5 11/10/2022 03:40 AM    K 4.5 11/10/2022 03:40 AM     11/10/2022 03:40 AM    CO2 20 11/10/2022 03:40 AM    BUN 30 11/10/2022 03:40 AM    CREATININE 1.8 11/10/2022 03:40 AM    GFRAA 48 10/03/2022 08:48 AM    AGRATIO 0.6 04/25/2022 01:40 PM    LABGLOM 39 11/10/2022 03:40 AM    GLUCOSE 97 11/10/2022 03:40 AM    GLUCOSE 157 04/27/2011 11:06 AM    PROT 6.1 11/10/2022 03:40 AM    LABALBU 2.5 11/10/2022 03:40 AM    LABALBU 4.7 04/27/2011 11:06 AM    CALCIUM 8.0 11/10/2022 03:40 AM    BILITOT 1.7 11/10/2022 03:40 AM    ALKPHOS 134 11/10/2022 03:40 AM    AST 48 11/10/2022 03:40 AM    ALT 33 11/10/2022 03:40 AM     Magnesium:    Lab Results   Component Value Date/Time    MG 2.1 08/29/2021 05:38 AM     Phosphorus:    Lab Results   Component Value Date/Time    PHOS 2.8 06/18/2020 01:58 PM     Radiology Review:      MRI ABD 9/3/22   As seen on prior comparison 06/29/2021 MRI cystic changes involving the   pancreas throughout multiple segments of the pancreas when compared to prior   comparison demonstrate probable interval growth of the pancreatic tail lesion   now measure up to 3.6 cm previously this was around 3 cm at maximum measuring   the similar plane. Other areas including uncinate process lesion of cystic   involvement similar to prior all of which remaining nonenhancing. Continued   considerations for pseudocyst in the setting of prior pancreatitis versus   serous cystadenoma or IPMN. Given interval growth follow-up considered   within 6-12 months utilizing MRI to ensure stability. Cirrhotic hepatic morphology with trace perihepatic ascites. No obvious   abnormal enhancing lesion with areas of subcentimeter involvement right   hepatic lobe somewhat indeterminate with attention on follow-up     CXR 11/9/22   No acute process.        IMPRESSION/RECOMMENDATIONS:      MELE on CKD, improving  Hyponatremia, mild   CKD stage IIIb, baseline creatinine 1.3-1.7 mg/dL, cardiorenal syndrome  HFrEF 25%, on aldactone, lasix, Cardiology following  Hx HTN, on lisinopril, metoprolol  Hypotension, on midodrine  Liver cirrhosis, GI following  Chronic thrombocytopenia,  Anemia, normocytic  Pancreatic lesion,  Type 2 DM, on glyburide, SSI  HLD, on statin  Rhinovirus, URI, on Cefuroxime      Plan:  Hold BP meds  Continue midodrine   Continue to monitor renal function

## 2022-11-10 NOTE — CARE COORDINATION
+ rhinovirus. Met w/ patient. Explained role of  and plan of care. Lives alone in a 1 story house- 3 steps to entrance- son lives next door to him. Independent PTA. Has nebulizer. No Hx HHC or ELLA. PCP is Dr. Livier Haney and pharmacy is [facility]. Per pt, plan is to return home on discharge- son will provide transportation home. Anticipating no needs.  Will follow Saige Pardo RN case manager

## 2022-11-10 NOTE — CONSULTS
Name:  Kelly Quinones  :  1950  MRN:  [id number]  Room:  [address] [place] [address]  DOS:  11/10/2022    [facility]  [facility]  Dr. Saira Block M.D. Dr. Katerina Padron M.D. Dr. Serena Cummings D.O. Dr. Mejia Mccurdy M.D. Dr. Lindsey Evans D.O.     -NP Consult Note-    PCP:  Pancho Yee MD  Admitting Physician:  Merlin Davis DO  Consultants:  GI, cardiology, nephrology  Chief Complaint:    Chief Complaint   Patient presents with    Shortness of Breath     Has hx of    Leg Swelling     Bilat. Lower leg     Reason for Consult:  Cirrhosis    History of Present Illness  Kelly Quinones is a 66 y.o. male patient on consult for cirrhosis. Patient initially presented to the hospital with complaints of cough and swelling of the legs. This has been worsening over the past 2 weeks. Cough is reported as dry and nonproductive. He denies chest pain. Complains of shortness of breath. He complains of dizziness only when leaning over. He denies fever, chills, or diaphoresis. He denies abdominal pain. Denies nausea or vomiting. Specifically denies hematemesis or emesis of coffee-ground material.  Most recent bowel movement reported as 1 to 2 days ago. Stools described as brown and formed. At baseline, he normally moves his bowels every 1 to 2 days. Denies diarrhea, blood, or melena. PMH: Cirrhosis, heart failure, CAD, MI, hypertension, dyslipidemia, diabetes, pancreatic lesion, cellulitis, lung disease. PSH: Cardiac catheterization, skin resection, foot surgery. Endoscopic ultrasound. No prior EGD or colonoscopy. Family history: Father with cirrhosis secondary to alcohol. Brother with diabetes. Maternal uncle with intestinal cancer. Maternal cousin with prostate cancer. Paternal uncle with heart disease. He is unaware of any other personal or family history of GI issues or malignancy. Social history: Patient quit smoking about 30 years ago. He denies significant alcohol use. He denies current or past recreational or illicit drug use. On arrival, WBC 9.0, hemoglobin 12.0, hematocrit 34.8. Normocytic. Platelets 061. BUN 37, creatinine 2.0, sodium 128, potassium 4.6, chloride 99, CO2 21, calcium 8.3. Blood glucose 139. Elevated proBNP 1671. Positive for rhinovirus. Chest x-ray with no acute findings. Ultrasound bilateral lower extremities with no evidence of DVT. TRIAGE VITALS  BP: (!) 96/54, Temp: 97.8 °F (36.6 °C), Heart Rate: 80, Resp: 18, SpO2: 98 %    Vitals:    11/09/22 2051 11/09/22 2052 11/09/22 2345 11/10/22 0618   BP: (!) 99/38 (!) 104/46 (!) 96/54    Pulse: 78 76 80    Resp: 18 18 18    Temp: 98.6 °F (37 °C)  97.8 °F (36.6 °C)    TempSrc: Oral  Oral    SpO2: 100%  98%    Weight:    230 lb 14.4 oz (104.7 kg)   Height:             Histories  Past Medical History:   Diagnosis Date    Allergic rhinitis     Seasonal    Cardiomyopathy (HCC)     CHF (congestive heart failure) (HCC)     DM (diabetes mellitus) (HCC)     Enlarged prostate     Thrombocytopenia (HCC)     Type 2 diabetes mellitus without complication (HCC)     VHD (valvular heart disease)      Past Surgical History:   Procedure Laterality Date    CATARACT REMOVAL Bilateral 2017    CYST REMOVAL      from left heel    HAMMER TOE SURGERY Bilateral 1979    REFRACTIVE SURGERY Bilateral     UPPER GASTROINTESTINAL ENDOSCOPY N/A 8/6/2021    EGD W/EUS FNA performed by Sharrell Ormond, MD at [facility]     Family History   Problem Relation Age of Onset    Alcohol Abuse Father     No Known Problems Sister     Diabetes Brother        Home Medications  Prior to Admission medications    Medication Sig Start Date End Date Taking?  Authorizing Provider   glyBURIDE (DIABETA) 5 MG tablet Take 1 tablet by mouth daily (with breakfast) 10/21/22  Yes Meagan Sousa MD   lisinopril (PRINIVIL;ZESTRIL) 2.5 MG tablet take 1 tablet by mouth once daily 8/29/22  Yes Johanna Many, DO   spironolactone (ALDACTONE) 50 MG tablet take 1 tablet by mouth twice a day 6/19/22  Yes Historical Provider, MD   furosemide (LASIX) 40 MG tablet take 1 tablet by mouth once daily  Patient taking differently: Will take a 2nd pill if legs swelling 2/21/22  Yes Neeraj Holman DO   metoprolol succinate (TOPROL XL) 25 MG extended release tablet take 1 tablet by mouth once daily 1/28/22  Yes Neeraj Holman DO   atorvastatin (LIPITOR) 40 MG tablet Take 1 tablet by mouth nightly 12/7/21  Yes Neeraj Holman DO   aspirin 81 MG EC tablet Take 1 tablet by mouth daily 12/7/21  Yes Neeraj Holman DO   ammonium lactate (LAC-HYDRIN) 12 % lotion Apply topically twice daily 10/28/22   Dodie Connors DPM   montelukast (SINGULAIR) 10 MG tablet take 1 tablet by mouth nightly 8/30/22   Neeraj Holman DO   blood glucose test strips (ONETOUCH ULTRA) strip Inject 1 each into the skin daily As needed. 3/31/22   Argelia Barnes MD   fluticasone (FLONASE) 50 MCG/ACT nasal spray 1 spray by Each Nostril route daily  Patient taking differently: 1 spray by Each Nostril route daily as needed 12/7/21   Neeraj Holman DO   cetirizine (ZYRTEC) 10 MG tablet Take 10 mg by mouth daily    Historical Provider, MD   Multiple Vitamins-Minerals (PRESERVISION AREDS 2+MULTI VIT) CAPS Take 1 capsule by mouth 2 times daily    Historical Provider, MD       Allergies  Penicillins, Ambrosia artemisiifolia (ragweed) skin test, Cat hair extract, Egg lecithin, Eggs or egg-derived products, Grass pollen(k-o-r-t-swt arielle), Macadamia nut oil, Milk-related compounds, Mixed ragweed, Molds & smuts, Peanut-containing drug products, and Seasonal    Social Hx  Social History     Socioeconomic History    Marital status:       Spouse name: Not on file    Number of children: Not on file    Years of education: Not on file    Highest education level: Not on file   Occupational History    Occupation: retired   Tobacco Use    Smoking status: Former     Packs/day: 1.00     Years: 20.00     Pack years: 20.00 Types: Cigarettes     Quit date: 1993     Years since quittin.8    Smokeless tobacco: Former     Types: Chew     Quit date: 1970   Vaping Use    Vaping Use: Never used   Substance and Sexual Activity    Alcohol use: Not Currently     Comment: rarely beer    Drug use: Never    Sexual activity: Not on file   Other Topics Concern    Not on file   Social History Narrative    Drinks decaf coffee & occ pop. Social Determinants of Health     Financial Resource Strain: Not on file   Food Insecurity: Not on file   Transportation Needs: Not on file   Physical Activity: Inactive    Days of Exercise per Week: 0 days    Minutes of Exercise per Session: 0 min   Stress: Not on file   Social Connections: Not on file   Intimate Partner Violence: Not on file   Housing Stability: Not on file       Review of Systems  All bolded are positive; please see HPI  General:  Fever, chills, diaphoresis, fatigue, malaise, night sweats, weight loss  Psychological:  Anxiety, disorientation, hallucinations. ENT:  Epistaxis, headaches, vertigo, visual changes. Cardiovascular:  Chest pain, irregular heartbeats, palpitations, paroxysmal nocturnal dyspnea. Respiratory:  Shortness of breath, coughing, sputum production, hemoptysis, wheezing, orthopnea.   Gastrointestinal:  Nausea, vomiting, diarrhea, heartburn, constipation, abdominal pain, hematemesis, hematochezia, melena, acholic stools  Genito-Urinary:  Dysuria, urgency, frequency, hematuria  Musculoskeletal:  Joint pain, joint stiffness, joint swelling, muscle pain  Neurology:  Headache, focal neurological deficits, weakness, numbness, paresthesia  Derm:  Rashes, ulcers, excoriations, bruising  Extremities:  Decreased ROM, peripheral edema, mottling    Physical Examination  Vitals:  BP (!) 96/54   Pulse 80   Temp 97.8 °F (36.6 °C) (Oral)   Resp 18   Ht 5' 11\" (1.803 m)   Wt 230 lb 14.4 oz (104.7 kg)   SpO2 98%   BMI 32.20 kg/m²   General Appearance:  awake, alert, and oriented to person, place, time, and purpose; appears stated age and cooperative; no apparent distress no labored breathing  HEENT:  NCAT; PERRL; conjunctiva pink, sclera clear  Neck:  no adenopathy, bruit, JVD, tenderness, masses, or nodules; supple, symmetrical, trachea midline, thyroid not enlarged  Lung:  clear to auscultation bilaterally; no use of accessory muscles; no rhonchi, rales, or crackles  Heart:  regular rate and regular rhythm without murmur, rub, or gallop  Abdomen:  soft, nontender, nondistended; normoactive bowel sounds; no organomegaly  Extremities:  extremities normal, atraumatic, no cyanosis, BLE edema, cellulitis  Musculokeletal:  no joint swelling, no muscle tenderness. ROM normal in all joints of extremities.    Neurologic:  mental status A&Ox3, thought content appropriate; CN II-XII grossly intact; sensation intact, motor strength 5/5 globally; no slurred speech    Laboratory Data  Recent Results (from the past 24 hour(s))   EKG 12 Lead    Collection Time: 11/09/22 10:35 AM   Result Value Ref Range    Ventricular Rate 63 BPM    Atrial Rate 64 BPM    QRS Duration 74 ms    Q-T Interval 432 ms    QTc Calculation (Bazett) 442 ms    R Axis -41 degrees    T Axis 76 degrees   CBC with Auto Differential    Collection Time: 11/09/22 10:43 AM   Result Value Ref Range    WBC 9.0 4.5 - 11.5 E9/L    RBC 3.77 (L) 3.80 - 5.80 E12/L    Hemoglobin 12.0 (L) 12.5 - 16.5 g/dL    Hematocrit 34.8 (L) 37.0 - 54.0 %    MCV 92.3 80.0 - 99.9 fL    MCH 31.8 26.0 - 35.0 pg    MCHC 34.5 32.0 - 34.5 %    RDW 14.3 11.5 - 15.0 fL    Platelets 323 647 - 017 E9/L    MPV 11.6 7.0 - 12.0 fL    Neutrophils % 49.3 43.0 - 80.0 %    Immature Granulocytes % 0.9 0.0 - 5.0 %    Lymphocytes % 21.7 20.0 - 42.0 %    Monocytes % 11.9 2.0 - 12.0 %    Eosinophils % 14.4 (H) 0.0 - 6.0 %    Basophils % 1.8 0.0 - 2.0 %    Neutrophils Absolute 4.44 1.80 - 7.30 E9/L    Immature Granulocytes # 0.08 E9/L    Lymphocytes Absolute 1.95 1.50 - 4.00 E9/L    Monocytes Absolute 1.07 (H) 0.10 - 0.95 E9/L    Eosinophils Absolute 1.30 (H) 0.05 - 0.50 E9/L    Basophils Absolute 0.16 0.00 - 0.20 Z3/L   Basic Metabolic Panel w/ Reflex to MG    Collection Time: 11/09/22 10:43 AM   Result Value Ref Range    Sodium 128 (L) 132 - 146 mmol/L    Potassium reflex Magnesium 4.6 3.5 - 5.0 mmol/L    Chloride 99 98 - 107 mmol/L    CO2 21 (L) 22 - 29 mmol/L    Anion Gap 8 7 - 16 mmol/L    Glucose 139 (H) 74 - 99 mg/dL    BUN 37 (H) 6 - 23 mg/dL    Creatinine 2.0 (H) 0.7 - 1.2 mg/dL    Est, Glom Filt Rate 35 >=60 mL/min/1.73    Calcium 8.3 (L) 8.6 - 10.2 mg/dL   Brain Natriuretic Peptide    Collection Time: 11/09/22 10:43 AM   Result Value Ref Range    Pro-BNP 1,671 (H) 0 - 125 pg/mL   COVID-19, Rapid    Collection Time: 11/09/22 10:43 AM    Specimen: Nasopharyngeal Swab   Result Value Ref Range    SARS-CoV-2, NAAT Not Detected Not Detected   RAPID INFLUENZA A/B ANTIGENS    Collection Time: 11/09/22 10:43 AM    Specimen: Nasopharyngeal   Result Value Ref Range    Influenza A by PCR Not Detected Not Detected    Influenza B by PCR Not Detected Not Detected   Respiratory Panel, Molecular, with COVID-19 (Restricted: peds pts or suitable admitted adults)    Collection Time: 11/09/22 10:43 AM    Specimen: Nasopharyngeal   Result Value Ref Range    Adenovirus by PCR Not Detected Not Detected    Bordetella parapertussis by PCR Not Detected Not Detected    Bordetella pertussis by PCR Not Detected Not Detected    Chlamydophilia pneumoniae by PCR Not Detected Not Detected    Coronavirus 229E by PCR Not Detected Not Detected    Coronavirus HKU1 by PCR Not Detected Not Detected    Coronavirus NL63 by PCR Not Detected Not Detected    Coronavirus OC43 by PCR Not Detected Not Detected    SARS-CoV-2, PCR Not Detected Not Detected    Human Metapneumovirus by PCR Not Detected Not Detected    Human Rhinovirus/Enterovirus by PCR DETECTED (A) Not Detected    Influenza A by PCR Not Detected Not Detected Influenza B by PCR Not Detected Not Detected    Mycoplasma pneumoniae by PCR Not Detected Not Detected    Parainfluenza Virus 1 by PCR Not Detected Not Detected    Parainfluenza Virus 2 by PCR Not Detected Not Detected    Parainfluenza Virus 3 by PCR Not Detected Not Detected    Parainfluenza Virus 4 by PCR Not Detected Not Detected    Respiratory Syncytial Virus by PCR Not Detected Not Detected   Lactate, Sepsis    Collection Time: 11/09/22 11:30 AM   Result Value Ref Range    Lactic Acid, Sepsis 2.1 (H) 0.5 - 1.9 mmol/L   POCT Glucose    Collection Time: 11/09/22  9:55 PM   Result Value Ref Range    Meter Glucose 159 (H) 74 - 99 mg/dL   Comprehensive Metabolic Panel w/ Reflex to MG    Collection Time: 11/10/22  3:40 AM   Result Value Ref Range    Sodium 131 (L) 132 - 146 mmol/L    Potassium reflex Magnesium 4.5 3.5 - 5.0 mmol/L    Chloride 101 98 - 107 mmol/L    CO2 20 (L) 22 - 29 mmol/L    Anion Gap 10 7 - 16 mmol/L    Glucose 97 74 - 99 mg/dL    BUN 30 (H) 6 - 23 mg/dL    Creatinine 1.8 (H) 0.7 - 1.2 mg/dL    Est, Glom Filt Rate 39 >=60 mL/min/1.73    Calcium 8.0 (L) 8.6 - 10.2 mg/dL    Total Protein 6.1 (L) 6.4 - 8.3 g/dL    Albumin 2.5 (L) 3.5 - 5.2 g/dL    Total Bilirubin 1.7 (H) 0.0 - 1.2 mg/dL    Alkaline Phosphatase 134 (H) 40 - 129 U/L    ALT 33 0 - 40 U/L    AST 48 (H) 0 - 39 U/L   Hepatic function panel    Collection Time: 11/10/22  3:40 AM   Result Value Ref Range    Bilirubin, Direct 0.6 (H) 0.0 - 0.3 mg/dL    Bilirubin, Indirect 1.1 (H) 0.0 - 1.0 mg/dL   CBC with Auto Differential    Collection Time: 11/10/22  3:40 AM   Result Value Ref Range    WBC 10.7 4.5 - 11.5 E9/L    RBC 3.51 (L) 3.80 - 5.80 E12/L    Hemoglobin 11.3 (L) 12.5 - 16.5 g/dL    Hematocrit 32.0 (L) 37.0 - 54.0 %    MCV 91.2 80.0 - 99.9 fL    MCH 32.2 26.0 - 35.0 pg    MCHC 35.3 (H) 32.0 - 34.5 %    RDW 13.9 11.5 - 15.0 fL    Platelets 560 (L) 618 - 450 E9/L    MPV 12.0 7.0 - 12.0 fL    Neutrophils % 52.1 43.0 - 80.0 %    Immature Granulocytes % 0.7 0.0 - 5.0 %    Lymphocytes % 20.5 20.0 - 42.0 %    Monocytes % 13.5 (H) 2.0 - 12.0 %    Eosinophils % 11.4 (H) 0.0 - 6.0 %    Basophils % 1.8 0.0 - 2.0 %    Neutrophils Absolute 5.57 1.80 - 7.30 E9/L    Immature Granulocytes # 0.08 E9/L    Lymphocytes Absolute 2.20 1.50 - 4.00 E9/L    Monocytes Absolute 1.45 (H) 0.10 - 0.95 E9/L    Eosinophils Absolute 1.22 (H) 0.05 - 0.50 E9/L    Basophils Absolute 0.19 0.00 - 0.20 E9/L   Uric Acid    Collection Time: 11/10/22  3:40 AM   Result Value Ref Range    Uric Acid, Serum 6.0 3.4 - 7.0 mg/dL   Basic Metabolic Panel    Collection Time: 11/10/22  3:40 AM   Result Value Ref Range    Potassium 4.5 3.5 - 5.0 mmol/L       Imaging  XR CHEST PORTABLE    Result Date: 11/9/2022  EXAMINATION: ONE XRAY VIEW OF THE CHEST 11/9/2022 9:32 am COMPARISON: 20 August 2021 HISTORY: ORDERING SYSTEM PROVIDED HISTORY: cough TECHNOLOGIST PROVIDED HISTORY: Reason for exam:->cough FINDINGS: The lungs are without acute focal process. There is no effusion or pneumothorax. The cardiomediastinal silhouette is without acute process. The osseous structures are without acute process. No acute process. US DUP LOWER EXTREMITIES BILATERAL VENOUS    Result Date: 11/9/2022  EXAMINATION: DUPLEX VENOUS ULTRASOUND OF THE BILATERAL LOWER Shante Ovens 3:22 pm TECHNIQUE: Duplex ultrasound using B-mode/gray scaled imaging, Doppler spectral analysis and color flow Doppler was obtained of the deep venous structures of the lower bilateral extremities. COMPARISON: None. HISTORY: ORDERING SYSTEM PROVIDED HISTORY: discomfort TECHNOLOGIST PROVIDED HISTORY: Reason for exam:->discomfort What reading provider will be dictating this exam?->CRC FINDINGS: The visualized veins of the bilateral lower extremities are patent and free of echogenic thrombus. The veins demonstrate good compressibility with normal color flow study and spectral analysis. No evidence of DVT in either lower extremity. RECOMMENDATIONS: Unavailable         Assessment and Plan  Patient is a 66 y.o. male patient on consult for cirrhosis. Cirrhosis  -Will obtain appropriate labs and calculate MELD  -Negative viral liver serology  -Negative autoimmune liver serology  -History of A1AT deficiency - check phenotype - has seen hematology in the past for this  -History of elevated iron / ferritin - check HFE genes  -Elevated IgG  -Normal AFP 9 (5/16/2022) - repeat  -MRI abdomen 9/4/2022 without evidence of hepatic mass although low signal area in the right hepatic lobe noted  -Consider patient for EGD for variceal screening    2. Pancreatic lesion  -MRI abdomen 9/4/2022 showing pancreas and pancreatic duct with multiple sidebranch regularities increasing in size compared to prior exam 6/29/2021  -Elevated CA 19-9 =  53 (7/12/2021)  -Elevated CEA = 7.0 (7/12/2021)  -Normal AFP = 9 (5/16/2022)  -Repeat tumor markers  -Endoscopic ultrasound 8/6/2021 by Dr. Ulysses Rave showing 22 mm complex cystic lesion in the pancreatic tail adjacent to the pancreatic duct, no dilation of the pancreatic duct  -Biopsy consistent with gastric duodenal contaminant, cellblock was acellular - nondiagnostic biopsy    3. Anemia  -Mild / normocytic  -No overt GI bleeding  -No prior evidence of iron deficiency  -Check FOBT  -PPI daily  -Consider endoscopic evaluation - will require cardiac clearance prior to procedures    4. CAD / HFrEF  -Echo 8/20/2021 showing EF 25%  -Cardiac catheterization 8/23/2021 showing severe multivessel coronary artery disease  -Will require cardiac clearance prior to any endoscopic procedures    5. Comorbidities  -Rhinovirus, hypertension, dyslipidemia, diabetes, cellulitis  -Per admitting/consultants      Repeat tumor markers. Consider patient for repeat EUS/FNA. Prior procedure with acellular cellblock and nondiagnostic sample. Pending morning labs. Will calculate MELD score.   Further orders will depend on patient course and results of testing. Thank you for the opportunity to participate in the care of Mr. Malika Avila.     PIERCE Atkinson - CNP  6:39 AM  11/10/2022

## 2022-11-10 NOTE — DISCHARGE INSTRUCTIONS
HEART FAILURE  / CONGESTIVE HEART FAILURE  DISCHARGE INSTRUCTIONS:  GUIDELINES TO FOLLOW AT HOME    Self- Managed Care:     MEDICATIONS:  Take your medication as directed. If you are experiencing any side effects, inform your doctor, Do not stop taking any of your medications without letting your doctor know. Check with your doctor before taking any over-the-counter medications / herbal / or dietary supplements. They may interfere with your other medications. Do not take ibuprofen (Advil or Motrin) and naproxen (Aleve) without talking to your doctor first. They could make your heart failure worse. WEIGHT MONITORING:   Weigh yourself everyday (with the same scale) around the same time of the day and write it down. (you can chart them on a calendar or keep track of them on paper. Notify your doctor of a weight gain of 3 pounds or more in 1 day   OR a total of 5 pounds or more in 1 week    Take your weight record to your doctor visits  Also, the same goes if you loose more than 3# in one day, let your heart doctor know. DIET:   Cardiac heart healthy diet- Low saturated / low trans fat, no added salt, caffeine restricted, Low sodium diet-   No more than 2,000mg (2 grams) of salt / sodium per day (which equals to a little less than  a teaspoon of salt)  If your doctor wants you on a fluid restriction. ..it is usually recommended a fluid limit of 2,000cc -  Fluid restriction- 2,000 ml (milliliters) = 64 ounces = you can have 8 glasses of fluid per day (each glass 8 ounces)    Follow a low salt diet - avoid using salt at the table, avoid / limit use of canned soups, processed / packaged foods, salted snacks, olives and pickles. Do not use a salt substitute without checking with your doctor, they may contain a high amount of potassioum. (Mrs. Lethaniel Frankel is safe to use).     Limit the use of alcohol       CALL YOUR DOCTOR THE FIRST DAY YOU NOTICE ANY OF THESE   SYMPTOMS:  You have a weight gain of 3 pounds or more in 1 day         OR 5 pounds or more in one week  More shortness of breath  More swelling of your stomach, legs, ankles or feet  Feeling more tired, No energy  Dry hacky cough  Dizziness  More chest pain / discomfort       (CALL 911 IF ANY OF THE FOLLOWING OCCURS  Chest pain (not relieved with nitroglycerine, if you have been prescribed this medication)  Severe shortness of breath  Faint / Pass out  Confusion / cannot think clearly  If symptoms get worse           SMOKING - TOBACCO USE:  * IF YOU SMOKE - STOP! Kick the habit. [address] Program is offered at [facility] [address] and [address] [facility]. Call [phone] extension [phone] for more information. ACTIVITY:   (Ask your doctor when you will be able to return to work and before starting any exercise program.  Do not drive unless unless your doctor has given you permission to do so). Start light exercise. Even if you can only do a small amount, exercise will help you get stronger, have more energy, help manage your weight and decrease  stress. Walking is an easy way to get exercise. Start out slowly and  increase the amount you walk as tolerated  If you become short of breath, dizzy or have chest pain; stop, sit down, and rest.  If you feel \"wiped out\" the day after you exercise, walk at a slower pace or for a shorter distance. You can gradually increase the pace or amount of time. (Do not exercise right after a meal or in extreme temperatures, such as above 85 degrees, if the air is really humid, or wind chill is less than 20 degrees)                                             ADDITIONAL INFORMATION:  Avoid getting sick from colds and the flu. Stay away from friends or family that you know may have a contagious illness  Get plenty of rest   Get a flu shot each year. Get a pneumococcal vaccine shot.  If you have had one before, ask your doctor whether you need another dose. My Goal for Self-management of Heart Failure Includes 5 steps :    1. Notice a change in symptoms ( weight gain, short of breath, leg swelling, decreased activity level, bloating. ...)    2. Evaluate the change: (use the Heart Failure Zones )     3. Decide to take action: decide what your options are, such as: (call your doctor for an extra visit, take a prescribed medication, such as your water pill if your doctor has given you directions to do so, [address])    4. Come up with a strategy:  (now you call the doctor for advice / appointment. This is where you take action!!! Do not wait, catch the symptom early and treat it before it worsens. 5. Evaluate the response: The next day, check your Heart Failure Zones: are you in the GREEN ZONE (safe zone)? Worsening symptoms of YELLOW ZONE? Or have you moved to the RED ZONE and need to call 911 or go to the Emergency Room for evaluation? Call your doctor's office to update them on your symptoms of heart failure.

## 2022-11-11 LAB
ALBUMIN SERPL-MCNC: 2.4 G/DL (ref 3.5–5.2)
ALP BLD-CCNC: 142 U/L (ref 40–129)
ALT SERPL-CCNC: 35 U/L (ref 0–40)
ANION GAP SERPL CALCULATED.3IONS-SCNC: 9 MMOL/L (ref 7–16)
AST SERPL-CCNC: 43 U/L (ref 0–39)
BASOPHILS ABSOLUTE: 0.21 E9/L (ref 0–0.2)
BASOPHILS RELATIVE PERCENT: 1.6 % (ref 0–2)
BILIRUB SERPL-MCNC: 2.1 MG/DL (ref 0–1.2)
BILIRUBIN DIRECT: 0.7 MG/DL (ref 0–0.3)
BILIRUBIN, INDIRECT: 1.4 MG/DL (ref 0–1)
BUN BLDV-MCNC: 26 MG/DL (ref 6–23)
CALCIUM SERPL-MCNC: 8 MG/DL (ref 8.6–10.2)
CHLORIDE BLD-SCNC: 98 MMOL/L (ref 98–107)
CO2: 20 MMOL/L (ref 22–29)
CREAT SERPL-MCNC: 1.8 MG/DL (ref 0.7–1.2)
CREATININE URINE: 92 MG/DL (ref 40–278)
EOSINOPHILS ABSOLUTE: 1.71 E9/L (ref 0.05–0.5)
EOSINOPHILS RELATIVE PERCENT: 13.4 % (ref 0–6)
GFR SERPL CREATININE-BSD FRML MDRD: 39 ML/MIN/1.73
GLUCOSE BLD-MCNC: 154 MG/DL (ref 74–99)
HCT VFR BLD CALC: 33.4 % (ref 37–54)
HEMOGLOBIN: 12 G/DL (ref 12.5–16.5)
IMMATURE GRANULOCYTES #: 0.13 E9/L
IMMATURE GRANULOCYTES %: 1 % (ref 0–5)
LYMPHOCYTES ABSOLUTE: 2.88 E9/L (ref 1.5–4)
LYMPHOCYTES RELATIVE PERCENT: 22.6 % (ref 20–42)
MAGNESIUM: 1.9 MG/DL (ref 1.6–2.6)
MCH RBC QN AUTO: 32.6 PG (ref 26–35)
MCHC RBC AUTO-ENTMCNC: 35.9 % (ref 32–34.5)
MCV RBC AUTO: 90.8 FL (ref 80–99.9)
METER GLUCOSE: 158 MG/DL (ref 74–99)
METER GLUCOSE: 226 MG/DL (ref 74–99)
METER GLUCOSE: 229 MG/DL (ref 74–99)
METER GLUCOSE: 278 MG/DL (ref 74–99)
METER GLUCOSE: 318 MG/DL (ref 74–99)
MONOCYTES ABSOLUTE: 1.49 E9/L (ref 0.1–0.95)
MONOCYTES RELATIVE PERCENT: 11.7 % (ref 2–12)
NEUTROPHILS ABSOLUTE: 6.31 E9/L (ref 1.8–7.3)
NEUTROPHILS RELATIVE PERCENT: 49.7 % (ref 43–80)
PDW BLD-RTO: 13.8 FL (ref 11.5–15)
PLATELET # BLD: 152 E9/L (ref 130–450)
PMV BLD AUTO: 11.4 FL (ref 7–12)
POTASSIUM SERPL-SCNC: 4.2 MMOL/L (ref 3.5–5)
PROTEIN PROTEIN: 10 MG/DL (ref 0–12)
PROTEIN/CREAT RATIO: 0.1
PROTEIN/CREAT RATIO: 0.1 (ref 0–0.2)
RBC # BLD: 3.68 E12/L (ref 3.8–5.8)
SODIUM BLD-SCNC: 127 MMOL/L (ref 132–146)
TOTAL PROTEIN: 6.4 G/DL (ref 6.4–8.3)
WBC # BLD: 12.7 E9/L (ref 4.5–11.5)

## 2022-11-11 PROCEDURE — 99232 SBSQ HOSP IP/OBS MODERATE 35: CPT | Performed by: NURSE PRACTITIONER

## 2022-11-11 PROCEDURE — 36415 COLL VENOUS BLD VENIPUNCTURE: CPT

## 2022-11-11 PROCEDURE — 85025 COMPLETE CBC W/AUTO DIFF WBC: CPT

## 2022-11-11 PROCEDURE — 6370000000 HC RX 637 (ALT 250 FOR IP): Performed by: NURSE PRACTITIONER

## 2022-11-11 PROCEDURE — 80048 BASIC METABOLIC PNL TOTAL CA: CPT

## 2022-11-11 PROCEDURE — 83735 ASSAY OF MAGNESIUM: CPT

## 2022-11-11 PROCEDURE — 97165 OT EVAL LOW COMPLEX 30 MIN: CPT

## 2022-11-11 PROCEDURE — 99232 SBSQ HOSP IP/OBS MODERATE 35: CPT | Performed by: STUDENT IN AN ORGANIZED HEALTH CARE EDUCATION/TRAINING PROGRAM

## 2022-11-11 PROCEDURE — 6360000002 HC RX W HCPCS: Performed by: NURSE PRACTITIONER

## 2022-11-11 PROCEDURE — 2060000000 HC ICU INTERMEDIATE R&B

## 2022-11-11 PROCEDURE — 99233 SBSQ HOSP IP/OBS HIGH 50: CPT | Performed by: INTERNAL MEDICINE

## 2022-11-11 PROCEDURE — 80076 HEPATIC FUNCTION PANEL: CPT

## 2022-11-11 PROCEDURE — 2580000003 HC RX 258: Performed by: NURSE PRACTITIONER

## 2022-11-11 PROCEDURE — 6370000000 HC RX 637 (ALT 250 FOR IP): Performed by: STUDENT IN AN ORGANIZED HEALTH CARE EDUCATION/TRAINING PROGRAM

## 2022-11-11 PROCEDURE — 82962 GLUCOSE BLOOD TEST: CPT

## 2022-11-11 PROCEDURE — 82570 ASSAY OF URINE CREATININE: CPT

## 2022-11-11 PROCEDURE — 84156 ASSAY OF PROTEIN URINE: CPT

## 2022-11-11 RX ORDER — MIDODRINE HYDROCHLORIDE 5 MG/1
5 TABLET ORAL 2 TIMES DAILY WITH MEALS
Status: DISCONTINUED | OUTPATIENT
Start: 2022-11-11 | End: 2022-11-12

## 2022-11-11 RX ORDER — MIDODRINE HYDROCHLORIDE 5 MG/1
10 TABLET ORAL ONCE
Status: COMPLETED | OUTPATIENT
Start: 2022-11-11 | End: 2022-11-11

## 2022-11-11 RX ADMIN — PANTOPRAZOLE SODIUM 40 MG: 40 TABLET, DELAYED RELEASE ORAL at 06:32

## 2022-11-11 RX ADMIN — ATORVASTATIN CALCIUM 40 MG: 40 TABLET, FILM COATED ORAL at 21:18

## 2022-11-11 RX ADMIN — MIDODRINE HYDROCHLORIDE 10 MG: 5 TABLET ORAL at 20:11

## 2022-11-11 RX ADMIN — HEPARIN SODIUM 5000 UNITS: 10000 INJECTION INTRAVENOUS; SUBCUTANEOUS at 06:32

## 2022-11-11 RX ADMIN — ASPIRIN 81 MG: 81 TABLET, COATED ORAL at 07:55

## 2022-11-11 RX ADMIN — CEFUROXIME AXETIL 500 MG: 500 TABLET ORAL at 21:18

## 2022-11-11 RX ADMIN — Medication 1 TABLET: at 21:18

## 2022-11-11 RX ADMIN — INSULIN LISPRO 2 UNITS: 100 INJECTION, SOLUTION INTRAVENOUS; SUBCUTANEOUS at 18:41

## 2022-11-11 RX ADMIN — Medication: at 21:20

## 2022-11-11 RX ADMIN — HEPARIN SODIUM 5000 UNITS: 10000 INJECTION INTRAVENOUS; SUBCUTANEOUS at 21:19

## 2022-11-11 RX ADMIN — SODIUM CHLORIDE, PRESERVATIVE FREE 10 ML: 5 INJECTION INTRAVENOUS at 16:54

## 2022-11-11 RX ADMIN — MIDODRINE HYDROCHLORIDE 5 MG: 5 TABLET ORAL at 15:42

## 2022-11-11 RX ADMIN — Medication 10 ML: at 21:20

## 2022-11-11 RX ADMIN — Medication: at 15:23

## 2022-11-11 RX ADMIN — INSULIN LISPRO 1 UNITS: 100 INJECTION, SOLUTION INTRAVENOUS; SUBCUTANEOUS at 11:09

## 2022-11-11 RX ADMIN — HEPARIN SODIUM 5000 UNITS: 10000 INJECTION INTRAVENOUS; SUBCUTANEOUS at 13:32

## 2022-11-11 RX ADMIN — MONTELUKAST SODIUM 10 MG: 10 TABLET ORAL at 21:18

## 2022-11-11 RX ADMIN — CEFUROXIME AXETIL 500 MG: 500 TABLET ORAL at 07:55

## 2022-11-11 RX ADMIN — MIDODRINE HYDROCHLORIDE 2.5 MG: 5 TABLET ORAL at 07:55

## 2022-11-11 RX ADMIN — Medication 1 TABLET: at 07:55

## 2022-11-11 ASSESSMENT — PAIN SCALES - GENERAL
PAINLEVEL_OUTOF10: 0
PAINLEVEL_OUTOF10: 0

## 2022-11-11 NOTE — PROGRESS NOTES
[facility] Progress Note    Admitting Date and Time: 11/9/2022  9:42 AM  Admit Dx: MELE (acute kidney injury) ([facility] [address] [L08.4]  Chronic systolic congestive heart failure (HCC) [I50.22]  Cellulitis of lower extremity, unspecified laterality [L03.119]  Hypotension, unspecified hypotension type [I95.9]      Assessment:    Principal Problem:    Hypotension  Active Problems:    Chronic renal disease, stage III (HCC) [007061]    Acute kidney injury superimposed on chronic kidney disease (HCC)    MELE (acute kidney injury) ([facility] [address]    Venous stasis dermatitis of both lower extremities    Rhinovirus    Controlled type 2 diabetes mellitus with complication, without long-term current use of insulin (HCC)    Acute on chronic systolic CHF (congestive heart failure) ([facility])    Hepatic cirrhosis ([facility])    HFrEF (heart failure with reduced ejection fraction) ([facility] [address]    Portal hypertension ([facility] [address]  Resolved Problems:    * No resolved hospital problems. *      Plan:  1. Acute on chronic HFrEF  - Last ECHO LVEF 25%, moderate MR, mild TR, Pulm pressure of 40 mmHg    Per [facility] notes -> \"ischemic cardiomyopathy- LVEF of 25% with LVEDD 55 and 2+MR (per TTE 08/2021) and multivessel CAD (per cor angio 08/2021) ie., Left main 20%, LAD - ostial occlusion. Ramus - 90-95% ostial, Cx- 90-95% ostial, 80% at OM1, 95% mid RCA 60-70% prox, Posterolateral - 70-80% ostial and PDA- 60-70% ostial. He has pertinent hx of Liver cirrhosis ; unclear etio, possibly BE +/- alcoholic. He was deemed not a CAB candidate per multidisciplinary discussion at our institution\". 2nd opinion at [facility] and was deemed a surgical candidate but opted not to proceed with CABG due to risks for not surviving the procedure. Compliant with medications, drinks a large volume of water. States recently unable to elevate legs due to URI symptoms limiting ability to lay down. Cardiology consulted.   ECHO follow up pending  Lisinopril, aldactone, metoprolol -> all on States he has not been able to lie down and raise legs due to his sinus drainage, and thinks this is why he has increased edema. Went to an urgent care with these complaints and was directed to ED. Admitted with concern for acute HFrEF, also with cirrhosis, MELE on CKD, stasis dermatitis BLE.    11/10 -> no overnight events. He is on room air. He is still complaining primarily of sinus pain/pressure, thick purulent sinus drainage and post-nasal gtt causing him to cough. Denies chest pain or pressure. States legs more swollen than usual and he is not able to ambulate like usual     11/11 -> uneventful night. States he slept OK off and on. NO complaints of chest pain or SOB. States his sinus congestion is better, less coughing, less nasal drainage. BP is improved today      ROS: denies fever, chills,  n/v, HA unless stated above.       pantoprazole  40 mg Oral QAM AC    cefUROXime  500 mg Oral 2 times per day    sodium chloride flush  5-40 mL IntraVENous 2 times per day    midodrine  2.5 mg Oral BID WC    heparin (porcine)  5,000 Units SubCUTAneous Q8H    ammonium lactate   Topical BID    aspirin  81 mg Oral Daily    atorvastatin  40 mg Oral Nightly    montelukast  10 mg Oral Nightly    ocuvite-lutein  1 tablet Oral BID    [Held by provider] spironolactone  50 mg Oral BID    insulin lispro  0-4 Units SubCUTAneous TID WC    insulin lispro  0-4 Units SubCUTAneous Nightly     methocarbamol, 500 mg, 4x Daily PRN  guaiFENesin-dextromethorphan, 5 mL, Q4H PRN  sodium chloride flush, 5-40 mL, PRN  sodium chloride, , PRN  ondansetron, 4 mg, Q8H PRN   Or  ondansetron, 4 mg, Q6H PRN  polyethylene glycol, 17 g, Daily PRN  acetaminophen, 650 mg, Q6H PRN   Or  acetaminophen, 650 mg, Q6H PRN  perflutren lipid microspheres, 1.5 mL, ONCE PRN  glucose, 4 tablet, PRN  dextrose bolus, 125 mL, PRN   Or  dextrose bolus, 250 mL, PRN  glucagon (rDNA), 1 mg, PRN  dextrose, , Continuous PRN       Objective:    /74   Pulse 74 Temp 98.2 °F (36.8 °C) (Oral)   Resp 18   Ht 5' 11\" (1.803 m)   Wt 221 lb 11.2 oz (100.6 kg)   SpO2 98%   BMI 30.92 kg/m²     General Appearance: alert and oriented to person, place and time and in no acute distress  Skin: warm and dry  Head: normocephalic and atraumatic  Eyes: pupils equal, round, and reactive to light, extraocular eye movements intact, conjunctivae normal  Neck: neck supple and non tender without mass   Pulmonary/Chest: no wheeze or rhonchi  Cardiovascular: normal rate, normal S1 and S2 and no carotid bruits  Abdomen: obese, distended, ascites  Extremities: no cyanosis, no clubbing. 3+ edema - improved. ACE wraps applied  Neurologic: no cranial nerve deficit and speech normal        Recent Labs     11/09/22  1043 11/10/22  0340 11/11/22  0450   * 131* 127*   K 4.6 4.5  4.5 4.2   CL 99 101 98   CO2 21* 20* 20*   BUN 37* 30* 26*   CREATININE 2.0* 1.8* 1.8*   GLUCOSE 139* 97 154*   CALCIUM 8.3* 8.0* 8.0*         Recent Labs     11/09/22  1043 11/10/22  0340 11/11/22  0450   WBC 9.0 10.7 12.7*   RBC 3.77* 3.51* 3.68*   HGB 12.0* 11.3* 12.0*   HCT 34.8* 32.0* 33.4*   MCV 92.3 91.2 90.8   MCH 31.8 32.2 32.6   MCHC 34.5 35.3* 35.9*   RDW 14.3 13.9 13.8    127* 152   MPV 11.6 12.0 11.4         Radiology:     Time spent reviewing chart, clinical exam, discussing case and answering questions with staff/consultants/patient/family = 20 minutes      NOTE: This report was transcribed using voice recognition software. Every effort was made to ensure accuracy; however, inadvertent computerized transcription errors may be present. Electronically signed by PIERCE Vazquez CNP on 11/11/2022 at 8:23 AM     Addendum: I have personally participated in the history, exam, medical decision making with  Africa Narayanan NP  on the date of service and I agree with all of the pertinent clinical information unless otherwise noted.  I have also reviewed and agree with the past medical, family, and social history unless otherwise noted. Patient was admitted with acute on chronic systolic CHF, cirrhosis thought to be secondary to nonalcoholic fatty liver disease, rhinovirus positive with resultant secondary bacterial sinusitis, MELE on CKD, bilateral venous stasis dermatitis of the lower extremities. PHYSICAL EXAM:  Vitals:  /74   Pulse 74   Temp 98.2 °F (36.8 °C) (Oral)   Resp 18   Ht 5' 11\" (1.803 m)   Wt 221 lb 11.2 oz (100.6 kg)   SpO2 98%   BMI 30.92 kg/m²   Gen: awake, alert, NAD  Lungs: clear to auscultation bilaterally no crackles no wheezing. Heart: RRR, no murmur   Abdomen: soft nontender nondistended positive bowel sounds. Extremities: full range of motion, 2-3+ bilateral lower extremity peripheral edema, bilateral circumferential erythema of the lower extremities consistent with venous stasis dermatitis. Impression:  Principal Problem:    Hypotension  Active Problems:    Chronic renal disease, stage III (HCC) [822235]    Acute kidney injury superimposed on chronic kidney disease (HCC)    MELE (acute kidney injury) ([address]    Venous stasis dermatitis of both lower extremities    Rhinovirus    Controlled type 2 diabetes mellitus with complication, without long-term current use of insulin (HCC)    Acute on chronic systolic CHF (congestive heart failure) (HCC)    Hepatic cirrhosis (HCC)    HFrEF (heart failure with reduced ejection fraction) ([address]    Portal hypertension ([address]  Resolved Problems:    * No resolved hospital problems. *      My findings/plan include:  Patient is admitted with acute on chronic systolic CHF, cirrhosis likely secondary to nonalcoholic fatty liver disease, rhinovirus positive with resultant secondary bacterial sinusitis currently being treated with cefuroxime, MELE on CKD, bilateral lower extremity venous stasis dermatitis.   His blood pressures are improved on midodrine, will increase dose of midodrine to 5 mg twice daily with meals in hopes of restarting a diuretic. Cardiology and nephrology continue to follow. His lower extremities are wrapped in Ace bandages for the edema and venous stasis dermatitis. NOTE: This report was transcribed using voice recognition software. Every effort was made to ensure accuracy; however, inadvertent computerized transcription errors may be present.   Electronically signed by Benjamine Heimlich, MD on 11/11/2022 at 10:55 AM

## 2022-11-11 NOTE — PROGRESS NOTES
Wound / ostomy dept consulted for this Pt admitted with Hypotension. History of CHF, DM, VHD. The Pt has venous dermatitis of both legs. Few superficial open areas noted from broken blisters. Both legs washed with foam cleanser then ABDs placed over superficial open areas. Amlactin lotion to intact skin then Tubi- applied. Pt states he wears compression at home. R leg    L leg  **Informed Consent**    The patient has given verbal consent to have photos taken of legs and inserted into their chart as part of their permanent medical record for purposes of documentation, treatment management and/or medical review. All Images taken on 11/11/22 of patient name: Nadia Hitchcock were transmitted and stored on secured [employer] located within [employer] by a registered [employer] Device.

## 2022-11-11 NOTE — PROGRESS NOTES
Inpatient Cardiology Consultation follow-up visit. Reason for Consult: HFrEF with exacerbation    Consulting Physician: Dr. Guanako Shook    Requesting Physician: PIERCE Byrd    Date of Consultation: 11/11/2022    Interim: No significant event overnight. Past Medical and Surgical History:    Dilated cardiomyopathy/Chronic HFrEF  TTE 07/11/2019 (Dr. Jonah Singh): Limited echo for LV function. LV size is normal.  Apical dyskinesis, mid to distal septal hypokinesis. Overall EF mildly decreased, EF estimated about 45%. No recent study found in the local [facility]  TTE 08/24/2021 (Dr. Avtar Morris): Borderline dilated LV. Regional wall motion abnormalities with akinesis of the mid and distal anterior, anteroseptal, anterolateral, apical and inferior apical segment as well as basilar inferior segment hypokinesis of the inferior lateral and lateral segments. Severely reduced LV systolic function. EF 25%. The left atrium is mildly dilated. Interatrial septum appears intact. Structurally normal mitral valve. Moderate centrally directed MR. The aortic valve appears mildly sclerotic. Mild TR. Mild PHTN  Cardiac catheterization 08/26/2021 (Dr. Haider Burch) for NSTEMI: Severe multivessel CAD  Referral to [facility] [address] and advanced heart failure for evaluation for CABG versus high risk PCI per Dr. Jovita Mack office note 07/05/2022 (turndown for CABG from [facility])  Obesity  Remote tobacco abuse  T2DM  CKD  BPH  Cirrhosis per ultrasound of liver secondary to fatty liver disease with pancreatic body cyst and dilated pancreatic duct, portal hypertension s/p endoscopy/ultrasound with biopsy on 08/06/2021 and again on 08/19/2021 with EGD with EUS FNA  S/p cataract removal, bilateral hammertoe surgery, left heel cystectomy  Thrombocytopenia with platelet count 74,122 and 07/2021        Medications Prior to admit:  Prior to Admission medications    Medication Sig Start Date End Date Taking?  Authorizing Provider   glyBURIDE (DIABETA) 5 MG tablet Take 1 tablet by mouth daily (with breakfast) 10/21/22  Yes Linda Johnson MD   lisinopril (PRINIVIL;ZESTRIL) 2.5 MG tablet take 1 tablet by mouth once daily 8/29/22  Yes Rogelio Merlin, DO   spironolactone (ALDACTONE) 50 MG tablet take 1 tablet by mouth twice a day 6/19/22  Yes Historical Provider, MD   furosemide (LASIX) 40 MG tablet take 1 tablet by mouth once daily  Patient taking differently: Will take a 2nd pill if legs swelling 2/21/22  Yes Rogelio Merlin, DO   metoprolol succinate (TOPROL XL) 25 MG extended release tablet take 1 tablet by mouth once daily 1/28/22  Yes Rogelio Merlin, DO   atorvastatin (LIPITOR) 40 MG tablet Take 1 tablet by mouth nightly 12/7/21  Yes Rogelio Merlin, DO   aspirin 81 MG EC tablet Take 1 tablet by mouth daily 12/7/21  Yes Rogelio Merlin, DO   ammonium lactate (LAC-HYDRIN) 12 % lotion Apply topically twice daily 10/28/22   Edward Reaves DPM   montelukast (SINGULAIR) 10 MG tablet take 1 tablet by mouth nightly 8/30/22   Rogelio Merlin, DO   blood glucose test strips (ONETOUCH ULTRA) strip Inject 1 each into the skin daily As needed.  3/31/22   Linda Johnson MD   fluticasone (FLONASE) 50 MCG/ACT nasal spray 1 spray by Each Nostril route daily  Patient taking differently: 1 spray by Each Nostril route daily as needed 12/7/21   Rogelio Merlin, DO   cetirizine (ZYRTEC) 10 MG tablet Take 10 mg by mouth daily    Historical Provider, MD   Multiple Vitamins-Minerals (PRESERVISION AREDS 2+MULTI VIT) CAPS Take 1 capsule by mouth 2 times daily    Historical Provider, MD       Current Medications:    Current Facility-Administered Medications: midodrine (PROAMATINE) tablet 5 mg, 5 mg, Oral, BID WC  pantoprazole (PROTONIX) tablet 40 mg, 40 mg, Oral, QAM AC  cefUROXime (CEFTIN) tablet 500 mg, 500 mg, Oral, 2 times per day  methocarbamol (ROBAXIN) tablet 500 mg, 500 mg, Oral, 4x Daily PRN  guaiFENesin-dextromethorphan (ROBITUSSIN DM) 100-10 MG/5ML syrup 5 mL, 5 mL, Oral, Q4H PRN  sodium chloride flush 0.9 % injection 5-40 mL, 5-40 mL, IntraVENous, 2 times per day  sodium chloride flush 0.9 % injection 5-40 mL, 5-40 mL, IntraVENous, PRN  0.9 % sodium chloride infusion, , IntraVENous, PRN  ondansetron (ZOFRAN-ODT) disintegrating tablet 4 mg, 4 mg, Oral, Q8H PRN **OR** ondansetron (ZOFRAN) injection 4 mg, 4 mg, IntraVENous, Q6H PRN  polyethylene glycol (GLYCOLAX) packet 17 g, 17 g, Oral, Daily PRN  acetaminophen (TYLENOL) tablet 650 mg, 650 mg, Oral, Q6H PRN **OR** acetaminophen (TYLENOL) suppository 650 mg, 650 mg, Rectal, Q6H PRN  heparin (porcine) injection 5,000 Units, 5,000 Units, SubCUTAneous, Q8H  perflutren lipid microspheres (DEFINITY) injection 1.5 mL, 1.5 mL, IntraVENous, ONCE PRN  ammonium lactate (LAC-HYDRIN) 12 % lotion, , Topical, BID  aspirin EC tablet 81 mg, 81 mg, Oral, Daily  atorvastatin (LIPITOR) tablet 40 mg, 40 mg, Oral, Nightly  montelukast (SINGULAIR) tablet 10 mg, 10 mg, Oral, Nightly  antioxidant multivitamin (OCUVITE) tablet, 1 tablet, Oral, BID  [Held by provider] spironolactone (ALDACTONE) tablet 50 mg, 50 mg, Oral, BID  insulin lispro (HUMALOG) injection vial 0-4 Units, 0-4 Units, SubCUTAneous, TID WC  insulin lispro (HUMALOG) injection vial 0-4 Units, 0-4 Units, SubCUTAneous, Nightly  glucose chewable tablet 16 g, 4 tablet, Oral, PRN  dextrose bolus 10% 125 mL, 125 mL, IntraVENous, PRN **OR** dextrose bolus 10% 250 mL, 250 mL, IntraVENous, PRN  glucagon (rDNA) injection 1 mg, 1 mg, SubCUTAneous, PRN  dextrose 10 % infusion, , IntraVENous, Continuous PRN    Allergies:  Penicillins, Ambrosia artemisiifolia (ragweed) skin test, Cat hair extract, Egg lecithin, Eggs or egg-derived products, Grass pollen(k-o-r-t-swt arielle), Macadamia nut oil, Milk-related compounds, Mixed ragweed, Molds & smuts, Peanut-containing drug products, and Seasonal    Social History:    Denies tobacco, alcohol, illicit drug use. Denies routine caffeine intake.     Family History: Please note this information was not obtained at this time, as it is limited in nature due to the patient's advanced age. REVIEW OF SYSTEMS:     Constitutional: Denies fevers, chills, night sweats, and fatigue. Complains of productive cough with postnasal drip-see HPI  HEENT: Denies headaches, nose bleeds, and blurred vision,oral pain, abscess or lesion. Musculoskeletal: Denies falls, pain to BLE with ambulation and edema to BLE. Neurological: Denies dizziness and lightheadedness, numbness and tingling  Cardiovascular: Denies chest pain, palpitations, and feelings of heart racing. Respiratory: Complains of MALLORY, orthopnea and PND-see HPI  Gastrointestinal: Denies heartburn, nausea/vomiting, diarrhea and constipation, black/bloody, and tarry stools. Genitourinary: Denies dysuria and hematuria  Hematologic: Denies excessive bruising or bleeding  Lymphatic: Denies lumps and bumps to neck, axilla, breast, and groin  Endocrine: Denies excessive thirst. Denies intolerance to hot and cold  Psychiatric: Denies anxiety and depression. PHYSICAL EXAM:   BP (!) 86/40   Pulse 70   Temp 98.6 °F (37 °C) (Oral)   Resp 18   Ht 5' 11\" (1.803 m)   Wt 221 lb 11.2 oz (100.6 kg)   SpO2 98%   BMI 30.92 kg/m²   CONST:  Well developed, obese elderly  male who appears stated age. Awake, alert, cooperative, no apparent distress  HEENT:   Head- Normocephalic, atraumatic   Eyes- Conjunctivae pink, anicteric  Throat- Oral mucosa pink and moist  Neck-  No stridor, trachea midline, no jugular venous distention. No adenopathy   CHEST: Chest symmetrical and non-tender to palpation. No accessory muscle use or intercostal retractions  RESPIRATORY: Lung sounds -scattered crackles throughout fields   CARDIOVASCULAR:     No carotid bruit  Heart Inspection- shows no noted pulsations  Heart Palpation- no heaves or thrills; PMI is non-displaced   Heart Ausculation- Regular rate and rhythm, no murmur.  No s3, s4 or rub   PV: 1+ pitting bilateral lower extremity edema with left greater than right, erythema noted to BLE with scabbed areas and weeping areas with serous drainage. No varicosities. Pedal pulses palpable, no clubbing or cyanosis   ABDOMEN: Soft, obese, non-tender to light palpation. Bowel sounds present. No palpable masses no organomegaly; no abdominal bruit  MS: Good muscle strength and tone. No atrophy or abnormal movements. : Deferred  SKIN: Warm and dry no statis dermatitis or ulcers   NEURO / PSYCH: Oriented to person, place and time. Speech clear and appropriate. Follows all commands. Pleasant affect     DATA:    ECG: As above  Tele strips: As above  Diagnostic:  Labs:   CBC:   Recent Labs     11/10/22  0340 11/11/22  0450   WBC 10.7 12.7*   HGB 11.3* 12.0*   HCT 32.0* 33.4*   * 152     BMP:   Recent Labs     11/10/22  0340 11/11/22  0450   * 127*   K 4.5  4.5 4.2   CO2 20* 20*   BUN 30* 26*   CREATININE 1.8* 1.8*   LABGLOM 39 39   CALCIUM 8.0* 8.0*     Lab Results   Component Value Date    LABA1C 7.9 10/07/2022   FASTING LIPID PANEL:  Lab Results   Component Value Date/Time    CHOL 93 03/31/2022 01:36 PM    HDL 36 03/31/2022 01:36 PM    LDLCALC 38 03/31/2022 01:36 PM    TRIG 96 03/31/2022 01:36 PM     LIVER PROFILE:  Recent Labs     11/10/22  0340 11/11/22  0450   AST 48* 43*   ALT 33 35   LABALBU 2.5* 2.4*      Latest Reference Range & Units 9/30/21 09:22 10/22/21 11:32 3/31/22 13:36 11/9/22 10:43   Pro-BNP 0 - 125 pg/mL 1,300 (H) 755 (H) 1,428 (H) 1,671 (H)   (H): Data is abnormally high    11/09/2022 CXR:  No acute process    11/09/2022 BLE Ultrasound:  No evidence of DVT in either lower extremity. IMPRESSION and PLAN to follow per Dr. Kimo Jarrett    Electronically signed by Ramonita Blue MD on 11/11/2022 at 6:23 PM        I independently interviewed and examined the patient. I have reviewed the above documentation completed by the S[facility].  Please see my additional contributions to the HPI, physical exam, and assessment / medical decision making. I contributed more than 51% of the patient care. Briefly, 58-year-old male with complex cardiac history with known history of heart failure with depressed systolic function with last echo revealing EF of 25% in 2021, complex coronary artery disease noted to have multivessel disease but apparently deemed not a candidate for CABG at both [facility] and [facility] and subsequently has been medically managed. He has obesity, prior tobacco abuse, diabetes, chronic kidney disease, BPH, and cirrhosis as well as chronic thrombocytopenia. He presented with viral illness symptoms describing cough and postnasal drip and dyspnea on exertion and fatigue as well as lower extremity redness and swelling and now found to have RSV and being treated as such. BNP is noted to be over 1000 and liver enzymes are also elevated as well as noted to have acute on chronic kidney injury. Cardiology consulted for heart failure with depressed systolic function. It is noted patient was significantly hypotensive on presentation with systolic blood pressure noted to be in the upper 70s requiring normal saline bolus. He is currently on beta-blocker and spironolactone and given this hypotension as well as acute on chronic kidney injury I would recommend holding these medications.     Review of Systems:  Cardiac: As per HPI  General: No fever, chills  Pulmonary: As per HPI  GI: No nausea, vomiting  Musculoskeletal: ARIAS x 4, no focal motor deficits  Skin: Intact, no rashes  Neuro/Psych: No headache or seizures    Physical Exam:  BP (!) 86/40   Pulse 70   Temp 98.6 °F (37 °C) (Oral)   Resp 18   Ht 5' 11\" (1.803 m)   Wt 221 lb 11.2 oz (100.6 kg)   SpO2 98%   BMI 30.92 kg/m²   Appearance: Awake, alert, no acute respiratory distress  Skin: Intact, no rash  Head: Normocephalic, atraumatic  ENMT: MMM, no rhinorrhea  Neck: Supple, no carotid bruits  Lungs: Clear to auscultation bilaterally. No wheezes, rales, or rhonchi. Cardiac: Regular rate and rhythm, +S1S2, no murmurs apparent  Abdomen: Soft, +bowel sounds  Extremities: Moves all extremities x 4, no lower extremity edema  Neurologic: No focal motor deficits apparent, normal mood and affect    TTE 8/2021  Left Ventricle  Borderline dilated left ventricle. Micro-bubble contrast injected to enhance left ventricular visualization. Regional wall motion abnormalities with akinesis of mid and distal anterior, anteroseptal, anterolateral, apical and inferoapical  segment as well as basilar inferior segment and hypokinesis of inferolateral and lateral segments. Severely reduced left ventricular systolic function. Ejection fraction is visually estimated at 25%. Indeterminate diastolic function. Right Ventricle  Normal right ventricular size and function. Assessment/Plan:   Acute decompensated heart failure with depressed systolic function in the setting of acute on chronic kidney injury and hyponatremia in a patient with liver cirrhosis  Nephrology managing fluid management given acute on chronic kidney injury and hyponatremia with sodium noted to be 127 yesterday. Awaiting echocardiogram to guide therapy  Currently not on guideline directed medical therapy due to significant hypotension, acute kidney injury and hyponatremia  Once blood pressure, kidney function and electrolyte improves initiate  beta-blocker, Entresto, Aldactone and possibly [place] or [address]. If kidney function does not allow initiation of Entresto and Aldactone then initiate hydralazine and Imdur if blood pressure can tolerate this.     Complex coronary artery disease with known multivessel disease that is medically managed because patient apparently not a surgical candidate for CABG  Unable to initiate beta-blocker given hypotension  Platelet appears stable and initiate aspirin to see platelet can tolerate  Once blood pressure stabilized please initiate beta-blocker  Continue on statin therapy      RSV  Management per primary service and ID    Acute on chronic kidney injury with hyponatremia  Nephrology following      Hypotension  Rule out sepsis  Obtain orthostatic vital signs  Agree with midodrine and uptitrate to improved blood pressure to see if he can initiate guideline directed medical therapy    Liver cirrhosis  Management per primary and GI    Chronic thrombocytopenia  Monitor closely while on aspirin and if worsening hold aspirin      Pancreatic lesion  Management per primary service    History of anemia   Monitor closely    Diabetes  Drip per primary service    BPH    Cardiology will sign off. Please call with questions.       Ilda Hernandez MD  [facility] ([facility]) Cardiology

## 2022-11-11 NOTE — CARE COORDINATION
+ rhinovirus. Awaiting 2D echo. Continues on po abx. Has home nebulizer. PT am-pac 18. Plan remains to return home alone on discharge- son lives next door and assists as needed- declining [facility]- son will provide transportation home.  No needs Erika Dong RN case manager

## 2022-11-11 NOTE — PROGRESS NOTES
Occupational Therapy  OCCUPATIONAL THERAPY INITIAL EVALUATION     Anaid Neff [place], [place]        MUPH:                                                  Patient Name: Eduarda Lees    MRN: [id number]    : 1950    Room: [address] [place] [address]      Evaluating OT: Pamela Segura, OTR/L [id number]      Referring Provider: PIERCE Rhodes CNP    Specific Provider Orders/Date:OT eval and treat 11/10/22      Diagnosis:  MELE (acute kidney injury) ([facility] [G38.1]  Chronic systolic congestive heart failure ([facility] [address] [I50.22]  Cellulitis of lower extremity, unspecified laterality [L03.119]  Hypotension, unspecified hypotension type [I95.9]      Pertinent Medical History: CHF, DM, cardiomyopathy       Precautions:  Fall Risk, droplet     Assessment of current deficits    [x] Functional mobility  [x]ADLs  [x] Strength               []Cognition    [x] Functional transfers   [x] IADLs         [x] Safety Awareness   [x]Endurance    [] Fine Coordination              [x] Balance      [] Vision/perception   []Sensation     []Gross Motor Coordination  [] ROM  [] Delirium                   [] Motor Control     OT PLAN OF CARE   OT POC based on physician orders, patient diagnosis and results of clinical assessment    Frequency/Duration 2-4 days/wk for 2 weeks PRN   Specific OT Treatment Interventions to include:   * Instruction/training on adapted ADL techniques and AE recommendations to increase functional independence within precautions       * Training on energy conservation strategies, correct breathing pattern and techniques to improve independence/tolerance for self-care routine  * Functional transfer/mobility training/DME recommendations for increased independence, safety, and fall prevention  * Patient/Family education to increase follow through with safety techniques and functional independence  * Recommendation of environmental modifications for increased safety with functional transfers/mobility and ADLs  * Therapeutic exercise to improve motor endurance, ROM, and functional strength for ADLs/functional transfers  * Therapeutic activities to facilitate/challenge dynamic balance, stand tolerance for increased safety and independence with ADLs        Recommended Adaptive Equipment: TBD     Home Living: Pt lives alone in 1 story house with 3 AMARI and 0 hand rails. Laundry in basement. Bathroom setup: tub/shower with elevated commode   Equipment owned: none    Prior Level of Function: independent with ADLs , independent with IADLs; functional mobility: no device  Driving: yes    Pain Level: pt did not report pain this date; pt agreeable to therapy  Cognition: A&O: 4/4; WFL command follow demonstrated. Memory:  WFL   Sequencing:  WFL   Problem solving:  WFL   Judgement/safety:  WFL     Functional Assessment:  AM-PAC Daily Activity Raw Score: 19/24   Initial Eval Status  Date: 11/11/22 Treatment Status  Date: STGs = LTGs  Time frame: 10-14 days   Feeding Independent     Grooming SBA  Mod I/I   UB Dressing SBA  Mod I/I   LB Dressing SBA  To adjust socks    Mod I/I-with use of AD as appropriate/needed   Bathing SBA  Mod I/I -with use of AD as appropriate/needed   Toileting SBA  For clothing management  Pt requested to sit on commode for a while, so left on commode with instructions to pull cord when finished. PCA notified.    Mod I/I    Bed Mobility  Supine to sit: independent   Sit to supine: independent   Independent   Functional Transfers SBA with no device  Sit<>Stand from EOB  Stand to sit to commode  Independent    Functional Mobility SBA with no device  To bathroom   Independent -with device as needed to maximize independence with ADLs and functional task completion   Balance Sitting:     Static:  independent     Dynamic:SBA  Standing: SBA  I for dynamic sitting balance to maximize independence with ADLs and functional task completion    I for standing balance to maximize independence with ADLs and functional task completion   Activity Tolerance Good  with light activity  Good  with ADL activity. Pt will demonstrate good understanding of education provided on EC/WS techniques    Visual/  Perceptual Glasses: yes                Additional long-term goal: Pt will increase functional independence to PLOF to allow pt to live in least restrictive environment. Hand Dominance R   AROM (PROM) Strength Additional Info:    RUE  WFL WFL good  and wfl FMC/dexterity noted during ADL tasks     LUE WFL WFL good  and wfl FMC/dexterity noted during ADL tasks     Hearing: WFL   Sensation:  No c/o numbness or tingling   Tone: WFL   Edema: none noted    Comments: Upon arrival patient lying in bed. At end of session, patient sitting on commode with call light and phone within reach, all lines and tubes intact. Overall patient demonstrated decreased independence and safety during completion of ADL/functional transfer/mobility tasks. Pt would benefit from continued skilled OT to increase safety and independence with completion of ADL/IADL tasks for functional independence and quality of life. Rehab Potential: Good for established goals     Patient / Family Goal: to return home    Patient and/or family were instructed on functional diagnosis, prognosis/goals and OT plan of care. Demonstrated good understanding.      Eval Complexity: Low    Time In: 0849  Time Out: 4576    Min Units   OT Eval Low 97165  x  1   OT Eval Medium 96207      OT Eval High 32995      OT Re-Eval [id number]       Therapeutic Ex [id number]       Therapeutic Activities 15480       ADL/Self Care 36739       Orthotic Management 87533       Manual 99824     Neuro Re-Ed 43299       Non-Billable Time          Evaluation Time additionally includes thorough review of current medical information, gathering information on past medical history/social history and prior level of function, interpretation of standardized testing/informal observation of tasks, assessment of data and development of plan of care and goals.             Madison Jimenez, OTR/L, [id number]

## 2022-11-11 NOTE — PROGRESS NOTES
Department of Internal Medicine  Nephrology Progress Note      Events reviewed. SUBJECTIVE:  We are following for MELE on CKD. He has no complaints today.      PHYSICAL EXAM:      Vitals:    VITALS:  /74   Pulse 74   Temp 98.2 °F (36.8 °C) (Oral)   Resp 18   Ht 5' 11\" (1.803 m)   Wt 221 lb 11.2 oz (100.6 kg)   SpO2 98%   BMI 30.92 kg/m²   24HR BLOOD PRESSURE RANGE:  Systolic (63RDG), KEF:878 , Min:91 , YLR:644 ; Diastolic (55EAT), KLI:52, Min:53, Max:100  24HR INTAKE/OUTPUT:    Intake/Output Summary (Last 24 hours) at 11/11/2022 1359  Last data filed at 11/11/2022 0453  Gross per 24 hour   Intake --   Output 675 ml   Net -675 ml       Constitutional:  A&O, NAD  HEENT:  PERRLA  Respiratory:  CTA  Cardiovascular/Edema:  RRR  Gastrointestinal:  soft, nontender  Neurologic:  cranial nerves II-XII intact  Skin:  warm, dry  Other:  edema**    DATA:    CBC with Differential:    Lab Results   Component Value Date/Time    WBC 12.7 11/11/2022 04:50 AM    RBC 3.68 11/11/2022 04:50 AM    HGB 12.0 11/11/2022 04:50 AM    HCT 33.4 11/11/2022 04:50 AM     11/11/2022 04:50 AM    MCV 90.8 11/11/2022 04:50 AM    MCH 32.6 11/11/2022 04:50 AM    MCHC 35.9 11/11/2022 04:50 AM    RDW 13.8 11/11/2022 04:50 AM    SEGSPCT 35.6 04/25/2022 01:40 PM    METASPCT 1.7 08/23/2021 08:06 AM    LYMPHOPCT 22.6 11/11/2022 04:50 AM    MONOPCT 11.7 11/11/2022 04:50 AM    MYELOPCT 0.9 08/20/2021 10:38 AM    BASOPCT 1.6 11/11/2022 04:50 AM    MONOSABS 1.49 11/11/2022 04:50 AM    LYMPHSABS 2.88 11/11/2022 04:50 AM    EOSABS 1.71 11/11/2022 04:50 AM    BASOSABS 0.21 11/11/2022 04:50 AM     CMP:    Lab Results   Component Value Date/Time     11/11/2022 04:50 AM    K 4.2 11/11/2022 04:50 AM    K 4.5 11/10/2022 03:40 AM    CL 98 11/11/2022 04:50 AM    CO2 20 11/11/2022 04:50 AM    BUN 26 11/11/2022 04:50 AM    CREATININE 1.8 11/11/2022 04:50 AM    GFRAA 48 10/03/2022 08:48 AM    AGRATIO 0.6 04/25/2022 01:40 PM    LABGLOM 39 11/11/2022 04:50 AM    GLUCOSE 154 11/11/2022 04:50 AM    GLUCOSE 157 04/27/2011 11:06 AM    PROT 6.4 11/11/2022 04:50 AM    LABALBU 2.4 11/11/2022 04:50 AM    LABALBU 4.7 04/27/2011 11:06 AM    CALCIUM 8.0 11/11/2022 04:50 AM    BILITOT 2.1 11/11/2022 04:50 AM    ALKPHOS 142 11/11/2022 04:50 AM    AST 43 11/11/2022 04:50 AM    ALT 35 11/11/2022 04:50 AM     Magnesium:    Lab Results   Component Value Date/Time    MG 1.9 11/11/2022 04:50 AM     Phosphorus:    Lab Results   Component Value Date/Time    PHOS 2.8 06/18/2020 01:58 PM     Radiology Review:      MRI ABD 9/3/22   As seen on prior comparison 06/29/2021 MRI cystic changes involving the   pancreas throughout multiple segments of the pancreas when compared to prior   comparison demonstrate probable interval growth of the pancreatic tail lesion   now measure up to 3.6 cm previously this was around 3 cm at maximum measuring   the similar plane. Other areas including uncinate process lesion of cystic   involvement similar to prior all of which remaining nonenhancing. Continued   considerations for pseudocyst in the setting of prior pancreatitis versus   serous cystadenoma or IPMN. Given interval growth follow-up considered   within 6-12 months utilizing MRI to ensure stability. Cirrhotic hepatic morphology with trace perihepatic ascites. No obvious   abnormal enhancing lesion with areas of subcentimeter involvement right   hepatic lobe somewhat indeterminate with attention on follow-up     CXR 11/9/22   No acute process. BRIEF SUMMARY OF INITIAL CONSULT: Briefly Mr Greg Schilder is a 69-year-old male with a PMH of CKD stage IIIb, HFrEF 25%, HTN, Liver cirrhosis, Chronic thrombocytopenia, anemia, Pancreatic lesion, type 2 DM, and HLD who was admitted on 11/9/2022 after presenting to the ER with complaints of a URI x 2 weeks. His labs were significant for sodium 128, BUN 37, and creatinine 2.0, reason for this consultation.  His home medications

## 2022-11-12 ENCOUNTER — APPOINTMENT (OUTPATIENT)
Dept: ULTRASOUND IMAGING | Age: 72
DRG: 682 | End: 2022-11-12
Payer: MEDICARE

## 2022-11-12 LAB
AFP-TUMOR MARKER: 7 NG/ML (ref 0–9)
ALBUMIN SERPL-MCNC: 2.2 G/DL (ref 3.5–5.2)
ALP BLD-CCNC: 118 U/L (ref 40–129)
ALT SERPL-CCNC: 28 U/L (ref 0–40)
ANION GAP SERPL CALCULATED.3IONS-SCNC: 7 MMOL/L (ref 7–16)
AST SERPL-CCNC: 32 U/L (ref 0–39)
BASOPHILS ABSOLUTE: 0.12 E9/L (ref 0–0.2)
BASOPHILS RELATIVE PERCENT: 1.5 % (ref 0–2)
BILIRUB SERPL-MCNC: 1.6 MG/DL (ref 0–1.2)
BILIRUBIN DIRECT: 0.7 MG/DL (ref 0–0.3)
BILIRUBIN, INDIRECT: 0.9 MG/DL (ref 0–1)
BUN BLDV-MCNC: 25 MG/DL (ref 6–23)
CALCIUM IONIZED: 1.16 MMOL/L (ref 1.15–1.33)
CALCIUM SERPL-MCNC: 7.7 MG/DL (ref 8.6–10.2)
CHLORIDE BLD-SCNC: 103 MMOL/L (ref 98–107)
CO2: 19 MMOL/L (ref 22–29)
CREAT SERPL-MCNC: 1.6 MG/DL (ref 0.7–1.2)
EOSINOPHILS ABSOLUTE: 1.06 E9/L (ref 0.05–0.5)
EOSINOPHILS RELATIVE PERCENT: 13.3 % (ref 0–6)
GFR SERPL CREATININE-BSD FRML MDRD: 45 ML/MIN/1.73
GLUCOSE BLD-MCNC: 162 MG/DL (ref 74–99)
HCT VFR BLD CALC: 29.6 % (ref 37–54)
HEMOGLOBIN: 10.5 G/DL (ref 12.5–16.5)
IMMATURE GRANULOCYTES #: 0.08 E9/L
IMMATURE GRANULOCYTES %: 1 % (ref 0–5)
LV EF: 40 %
LVEF MODALITY: NORMAL
LYMPHOCYTES ABSOLUTE: 2.05 E9/L (ref 1.5–4)
LYMPHOCYTES RELATIVE PERCENT: 25.8 % (ref 20–42)
MAGNESIUM: 2 MG/DL (ref 1.6–2.6)
MCH RBC QN AUTO: 32 PG (ref 26–35)
MCHC RBC AUTO-ENTMCNC: 35.5 % (ref 32–34.5)
MCV RBC AUTO: 90.2 FL (ref 80–99.9)
METER GLUCOSE: 158 MG/DL (ref 74–99)
METER GLUCOSE: 159 MG/DL (ref 74–99)
METER GLUCOSE: 212 MG/DL (ref 74–99)
METER GLUCOSE: 215 MG/DL (ref 74–99)
MONOCYTES ABSOLUTE: 0.97 E9/L (ref 0.1–0.95)
MONOCYTES RELATIVE PERCENT: 12.2 % (ref 2–12)
NEUTROPHILS ABSOLUTE: 3.67 E9/L (ref 1.8–7.3)
NEUTROPHILS RELATIVE PERCENT: 46.2 % (ref 43–80)
PDW BLD-RTO: 13.8 FL (ref 11.5–15)
PH VENOUS: 7.46 (ref 7.35–7.45)
PLATELET # BLD: 100 E9/L (ref 130–450)
PMV BLD AUTO: 11.9 FL (ref 7–12)
POTASSIUM SERPL-SCNC: 4.4 MMOL/L (ref 3.5–5)
RBC # BLD: 3.28 E12/L (ref 3.8–5.8)
SODIUM BLD-SCNC: 129 MMOL/L (ref 132–146)
TOTAL PROTEIN: 5.6 G/DL (ref 6.4–8.3)
VITAMIN D 25-HYDROXY: 24 NG/ML (ref 30–100)
WBC # BLD: 8 E9/L (ref 4.5–11.5)

## 2022-11-12 PROCEDURE — 82306 VITAMIN D 25 HYDROXY: CPT

## 2022-11-12 PROCEDURE — 6370000000 HC RX 637 (ALT 250 FOR IP): Performed by: NURSE PRACTITIONER

## 2022-11-12 PROCEDURE — 2060000000 HC ICU INTERMEDIATE R&B

## 2022-11-12 PROCEDURE — 85025 COMPLETE CBC W/AUTO DIFF WBC: CPT

## 2022-11-12 PROCEDURE — 80076 HEPATIC FUNCTION PANEL: CPT

## 2022-11-12 PROCEDURE — 6360000002 HC RX W HCPCS: Performed by: NURSE PRACTITIONER

## 2022-11-12 PROCEDURE — 82962 GLUCOSE BLOOD TEST: CPT

## 2022-11-12 PROCEDURE — 80048 BASIC METABOLIC PNL TOTAL CA: CPT

## 2022-11-12 PROCEDURE — 76770 US EXAM ABDO BACK WALL COMP: CPT

## 2022-11-12 PROCEDURE — 82800 BLOOD PH: CPT

## 2022-11-12 PROCEDURE — 6370000000 HC RX 637 (ALT 250 FOR IP): Performed by: STUDENT IN AN ORGANIZED HEALTH CARE EDUCATION/TRAINING PROGRAM

## 2022-11-12 PROCEDURE — 82330 ASSAY OF CALCIUM: CPT

## 2022-11-12 PROCEDURE — 99232 SBSQ HOSP IP/OBS MODERATE 35: CPT | Performed by: NURSE PRACTITIONER

## 2022-11-12 PROCEDURE — 2580000003 HC RX 258: Performed by: NURSE PRACTITIONER

## 2022-11-12 PROCEDURE — 93306 TTE W/DOPPLER COMPLETE: CPT

## 2022-11-12 PROCEDURE — 99232 SBSQ HOSP IP/OBS MODERATE 35: CPT | Performed by: STUDENT IN AN ORGANIZED HEALTH CARE EDUCATION/TRAINING PROGRAM

## 2022-11-12 PROCEDURE — 83735 ASSAY OF MAGNESIUM: CPT

## 2022-11-12 PROCEDURE — P9046 ALBUMIN (HUMAN), 25%, 20 ML: HCPCS | Performed by: NURSE PRACTITIONER

## 2022-11-12 PROCEDURE — 36415 COLL VENOUS BLD VENIPUNCTURE: CPT

## 2022-11-12 RX ORDER — VITAMIN B COMPLEX
1000 TABLET ORAL DAILY
Status: DISCONTINUED | OUTPATIENT
Start: 2022-11-12 | End: 2022-11-13 | Stop reason: HOSPADM

## 2022-11-12 RX ORDER — FUROSEMIDE 40 MG/1
40 TABLET ORAL DAILY
Status: DISCONTINUED | OUTPATIENT
Start: 2022-11-12 | End: 2022-11-13 | Stop reason: HOSPADM

## 2022-11-12 RX ORDER — MIDODRINE HYDROCHLORIDE 5 MG/1
10 TABLET ORAL
Status: DISCONTINUED | OUTPATIENT
Start: 2022-11-12 | End: 2022-11-13 | Stop reason: HOSPADM

## 2022-11-12 RX ORDER — OXYMETAZOLINE HYDROCHLORIDE 0.05 G/100ML
2 SPRAY NASAL 2 TIMES DAILY
Status: DISCONTINUED | OUTPATIENT
Start: 2022-11-12 | End: 2022-11-13 | Stop reason: HOSPADM

## 2022-11-12 RX ADMIN — ALBUMIN HUMAN 25 G: 0.25 SOLUTION INTRAVENOUS at 17:12

## 2022-11-12 RX ADMIN — MONTELUKAST SODIUM 10 MG: 10 TABLET ORAL at 20:53

## 2022-11-12 RX ADMIN — MIDODRINE HYDROCHLORIDE 10 MG: 5 TABLET ORAL at 09:07

## 2022-11-12 RX ADMIN — HEPARIN SODIUM 5000 UNITS: 10000 INJECTION INTRAVENOUS; SUBCUTANEOUS at 13:49

## 2022-11-12 RX ADMIN — GUAIFENESIN AND DEXTROMETHORPHAN 5 ML: 100; 10 SYRUP ORAL at 09:07

## 2022-11-12 RX ADMIN — FUROSEMIDE 40 MG: 40 TABLET ORAL at 17:12

## 2022-11-12 RX ADMIN — CEFUROXIME AXETIL 500 MG: 500 TABLET ORAL at 09:07

## 2022-11-12 RX ADMIN — CEFUROXIME AXETIL 500 MG: 500 TABLET ORAL at 20:53

## 2022-11-12 RX ADMIN — Medication 2 SPRAY: at 13:50

## 2022-11-12 RX ADMIN — MIDODRINE HYDROCHLORIDE 10 MG: 5 TABLET ORAL at 13:48

## 2022-11-12 RX ADMIN — GUAIFENESIN AND DEXTROMETHORPHAN 5 ML: 100; 10 SYRUP ORAL at 20:53

## 2022-11-12 RX ADMIN — Medication 10 ML: at 20:53

## 2022-11-12 RX ADMIN — INSULIN LISPRO 1 UNITS: 100 INJECTION, SOLUTION INTRAVENOUS; SUBCUTANEOUS at 17:00

## 2022-11-12 RX ADMIN — ATORVASTATIN CALCIUM 40 MG: 40 TABLET, FILM COATED ORAL at 20:53

## 2022-11-12 RX ADMIN — Medication: at 09:08

## 2022-11-12 RX ADMIN — MIDODRINE HYDROCHLORIDE 10 MG: 5 TABLET ORAL at 17:12

## 2022-11-12 RX ADMIN — HEPARIN SODIUM 5000 UNITS: 10000 INJECTION INTRAVENOUS; SUBCUTANEOUS at 05:38

## 2022-11-12 RX ADMIN — Medication 1 TABLET: at 09:07

## 2022-11-12 RX ADMIN — PANTOPRAZOLE SODIUM 40 MG: 40 TABLET, DELAYED RELEASE ORAL at 05:38

## 2022-11-12 RX ADMIN — Medication: at 20:56

## 2022-11-12 RX ADMIN — Medication 10 ML: at 09:08

## 2022-11-12 RX ADMIN — ASPIRIN 81 MG: 81 TABLET, COATED ORAL at 09:07

## 2022-11-12 RX ADMIN — Medication 2 SPRAY: at 20:57

## 2022-11-12 RX ADMIN — Medication 1000 UNITS: at 17:12

## 2022-11-12 RX ADMIN — HEPARIN SODIUM 5000 UNITS: 10000 INJECTION INTRAVENOUS; SUBCUTANEOUS at 20:53

## 2022-11-12 RX ADMIN — Medication 1 TABLET: at 20:53

## 2022-11-12 NOTE — PROGRESS NOTES
[facility] Progress Note    Admitting Date and Time: 11/9/2022  9:42 AM  Admit Dx: MELE (acute kidney injury) ([facility] [address] [G34.5]  Chronic systolic congestive heart failure (HCC) [I50.22]  Cellulitis of lower extremity, unspecified laterality [L03.119]  Hypotension, unspecified hypotension type [I95.9]      Assessment:    Principal Problem:    Hypotension  Active Problems:    Chronic renal disease, stage III (HCC) [127117]    Acute kidney injury superimposed on chronic kidney disease (HCC)    MELE (acute kidney injury) ([facility] [address]    Venous stasis dermatitis of both lower extremities    Rhinovirus    Controlled type 2 diabetes mellitus with complication, without long-term current use of insulin (HCC)    Acute on chronic systolic CHF (congestive heart failure) ([facility])    Hepatic cirrhosis ([facility])    HFrEF (heart failure with reduced ejection fraction) ([facility] [address]    Portal hypertension ([facility] [address]  Resolved Problems:    * No resolved hospital problems. *      Plan:  1. Acute on chronic HFrEF  - Last ECHO LVEF 25%, moderate MR, mild TR, Pulm pressure of 40 mmHg    Per [facility] notes -> \"ischemic cardiomyopathy- LVEF of 25% with LVEDD 55 and 2+MR (per TTE 08/2021) and multivessel CAD (per cor angio 08/2021) ie., Left main 20%, LAD - ostial occlusion. Ramus - 90-95% ostial, Cx- 90-95% ostial, 80% at OM1, 95% mid RCA 60-70% prox, Posterolateral - 70-80% ostial and PDA- 60-70% ostial. He has pertinent hx of Liver cirrhosis ; unclear etio, possibly BE +/- alcoholic. He was deemed not a CAB candidate per multidisciplinary discussion at our institution\". 2nd opinion at [facility] and was deemed a surgical candidate but opted not to proceed with CABG due to risks for not surviving the procedure. Compliant with medications, drinks a large volume of water. States recently unable to elevate legs due to URI symptoms limiting ability to lay down. Cardiology consulted.   ECHO follow up pending  Lisinopril, aldactone, metoprolol, lasix  -> all on hold as BP will not support  Started midodrine -> BP improved, anticipate resuming above meds as pressures tolerate. Midodrine increased to 10 TID  Orthostatic pressure 93/48 supine, 80/48 standing    2. Cirrhosis  - felt due to fatty liver disease,   Negative viral studies and autoimmune workup in past  Hx of Alpha 1 antitrypsin deficiency and elevated iron    Has a pancreatic lesion that has increased in size over past 1 year, follows with HPB surgery  Elevated CA 19-9 and CEA, normal AFP  Hx of EUS August 2021, non-diagnostic biopsy  Considering repeat    3. Rhinovirus  - not hypoxic and does not have much coughing or SOB  Does have significant sinus congestion, maxillary pain/pressure, headache and purulent nasal drainage. Will give Cefuroxime course for secondary bacterial sinus infection as he has had symptoms > 14 days    4. MELE on CKD   - baseline Cr ~ 1.5  Presented with Cr of 2.0, appeared hypervolemic  Holding diuretics however, as above, blood pressure will not support. Possible renal hypoperfusion r/t low pressures  Nephrology consulted. 5. Bilateral stasis dermatitis  - he has symmetric appearing erythema and warmth to both lower legs. Not c/w cellulitis given distribution  Dopplers negative for DVTs or acute arterial occlusive process. Will apply topical emollients, ACE wraps, elevated legs as able  Edema is improved, he states legs feel better    6. DM   - continue sliding scale    7. Thrombocytopenia  - likely due to cirrhosis, chronic  ASA started, follow  Platelets back to normal range    VTE prophylaxis: SQH - follow platelets    Subjective:  Patient is being followed for MELE (acute kidney injury) ([facility] [address] [T87.4]  Chronic systolic congestive heart failure ([address] [I50.22]  Cellulitis of lower extremity, unspecified laterality [L03.119]  Hypotension, unspecified hypotension type [I95.9]     Patient states he has been sick for > 2 weeks with URI symptoms, coughing/congestion.  Primarily with rhinorrhea and post-nasal drainage causing him to gag if he lies supine. States he has not been able to lie down and raise legs due to his sinus drainage, and thinks this is why he has increased edema. Went to an urgent care with these complaints and was directed to ED. Admitted with concern for acute HFrEF, also with cirrhosis, MELE on CKD, stasis dermatitis BLE.    11/10 -> no overnight events. He is on room air. He is still complaining primarily of sinus pain/pressure, thick purulent sinus drainage and post-nasal gtt causing him to cough. Denies chest pain or pressure. States legs more swollen than usual and he is not able to ambulate like usual     11/11 -> uneventful night. States he slept OK off and on. NO complaints of chest pain or SOB. States his sinus congestion is better, less coughing, less nasal drainage. BP is improved today    11/12 -> he is feeling better. States breathing is at baseline. He states his legs feel much better and edema back to normal.  Still complaining of rhinorrhea and post-nasal gtt. No fevers or chills    Discussed with him barrier to discharge is hypotension -> pressures overnight in 82L systolic. Metoprolol, lisinopril, aldactone, lasix all on hold.     Midodrine increased to 10 mg TID.     ROS: denies fever, chills,  n/v, HA unless stated above.      midodrine  10 mg Oral TID WC    oxymetazoline  2 spray Each Nostril BID    pantoprazole  40 mg Oral QAM AC    cefUROXime  500 mg Oral 2 times per day    sodium chloride flush  5-40 mL IntraVENous 2 times per day    heparin (porcine)  5,000 Units SubCUTAneous Q8H    ammonium lactate   Topical BID    aspirin  81 mg Oral Daily    atorvastatin  40 mg Oral Nightly    montelukast  10 mg Oral Nightly    ocuvite-lutein  1 tablet Oral BID    [Held by provider] spironolactone  50 mg Oral BID    insulin lispro  0-4 Units SubCUTAneous TID     insulin lispro  0-4 Units SubCUTAneous Nightly     methocarbamol, 500 mg, 4x Daily PRN  guaiFENesin-dextromethorphan, 5 mL, Q4H PRN  sodium chloride flush, 5-40 mL, PRN  sodium chloride, , PRN  ondansetron, 4 mg, Q8H PRN   Or  ondansetron, 4 mg, Q6H PRN  polyethylene glycol, 17 g, Daily PRN  acetaminophen, 650 mg, Q6H PRN   Or  acetaminophen, 650 mg, Q6H PRN  perflutren lipid microspheres, 1.5 mL, ONCE PRN  glucose, 4 tablet, PRN  dextrose bolus, 125 mL, PRN   Or  dextrose bolus, 250 mL, PRN  glucagon (rDNA), 1 mg, PRN  dextrose, , Continuous PRN       Objective:    BP (!) 90/59   Pulse 64   Temp 98.2 °F (36.8 °C) (Oral)   Resp 18   Ht 5' 11\" (1.803 m)   Wt 245 lb 2 oz (111.2 kg)   SpO2 95%   BMI 34.19 kg/m²     General Appearance: alert and oriented to person, place and time and in no acute distress  Skin: warm and dry  Head: normocephalic and atraumatic  Eyes: pupils equal, round, and reactive to light, extraocular eye movements intact, conjunctivae normal  Neck: neck supple and non tender without mass   Pulmonary/Chest: no wheeze or rhonchi  Cardiovascular: normal rate, normal S1 and S2 and no carotid bruits  Abdomen: obese, distended, ascites  Extremities: no cyanosis, no clubbing. 3+ edema - improved.  Tubi- and dressings applied  Neurologic: no cranial nerve deficit and speech normal        Recent Labs     11/10/22  0340 11/11/22  0450 11/12/22  0423   * 127* 129*   K 4.5  4.5 4.2 4.4    98 103   CO2 20* 20* 19*   BUN 30* 26* 25*   CREATININE 1.8* 1.8* 1.6*   GLUCOSE 97 154* 162*   CALCIUM 8.0* 8.0* 7.7*         Recent Labs     11/10/22  0340 11/11/22 0450 11/12/22  0423   WBC 10.7 12.7* 8.0   RBC 3.51* 3.68* 3.28*   HGB 11.3* 12.0* 10.5*   HCT 32.0* 33.4* 29.6*   MCV 91.2 90.8 90.2   MCH 32.2 32.6 32.0   MCHC 35.3* 35.9* 35.5*   RDW 13.9 13.8 13.8   * 152 100*   MPV 12.0 11.4 11.9         Radiology:     Time spent reviewing chart, clinical exam, discussing case and answering questions with staff/consultants/patient/family = 20 minutes      NOTE: This report was transcribed using voice recognition software. Every effort was made to ensure accuracy; however, inadvertent computerized transcription errors may be present. Electronically signed by PIERCE Motley CNP on 11/12/2022 at 1:56 PM    Addendum: I have personally participated in the history, exam, medical decision making with  Steven Nicholson NP  on the date of service and I agree with all of the pertinent clinical information unless otherwise noted. I have also reviewed and agree with the past medical, family, and social history unless otherwise noted. Patient was admitted with acute on chronic systolic CHF, hypotension, rhinovirus positive with likely secondary bacterial sinusitis, MELE on CKD. PHYSICAL EXAM:  Vitals:  BP (!) 90/59   Pulse 64   Temp 98.2 °F (36.8 °C) (Oral)   Resp 18   Ht 5' 11\" (1.803 m)   Wt 245 lb 2 oz (111.2 kg)   SpO2 98%   BMI 34.19 kg/m²   Gen: awake, alert, NAD  Lungs: clear to auscultation bilaterally no crackles no wheezing. Heart: RRR, no murmur   Abdomen: soft nontender nondistended positive bowel sounds. Extremities: full range of motion, bilateral lower extremities wrapped      Impression:  Principal Problem:    Hypotension  Active Problems:    Chronic renal disease, stage III (HCC) [358975]    Acute kidney injury superimposed on chronic kidney disease (HCC)    MELE (acute kidney injury) ([address]    Venous stasis dermatitis of both lower extremities    Rhinovirus    Controlled type 2 diabetes mellitus with complication, without long-term current use of insulin (HCC)    Acute on chronic systolic CHF (congestive heart failure) (HCC)    Hepatic cirrhosis (HCC)    HFrEF (heart failure with reduced ejection fraction) ([address]    Portal hypertension ([address]  Resolved Problems:    * No resolved hospital problems.  *      My findings/plan include:  Patient is admitted with acute on chronic systolic CHF, cirrhosis felt due to nonalcoholic fatty liver disease, rhinovirus positive with likely secondary bacterial sinusitis, MELE on CKD. He is still hypotensive. Midodrine is being increased to 10 mg 3 times daily. His home diuretics are all held as well as other guideline directed medical therapy. Hopefully his blood pressures will improve on increased dosing of midodrine, he states his edema has improved with the wrap to bilateral lower extremities. He also states his breathing has improved. Barriers to discharge include continued hypotension. NOTE: This report was transcribed using voice recognition software. Every effort was made to ensure accuracy; however, inadvertent computerized transcription errors may be present.   Electronically signed by Andreas Archuleta MD on 11/12/2022 at 3:57 PM

## 2022-11-12 NOTE — PLAN OF CARE
Problem: Discharge Planning  Goal: Discharge to home or other facility with appropriate resources  11/11/2022 1305 by Bal Oliver RN  Outcome: Progressing

## 2022-11-12 NOTE — PROGRESS NOTES
Department of Internal Medicine  Nephrology Progress Note      Events reviewed. SUBJECTIVE:  We are following for MELE on CKD. He denies any complaints.      PHYSICAL EXAM:      Vitals:    VITALS:  BP (!) 90/59   Pulse 64   Temp 98.2 °F (36.8 °C) (Oral)   Resp 18   Ht 5' 11\" (1.803 m)   Wt 245 lb 2 oz (111.2 kg)   SpO2 98%   BMI 34.19 kg/m²   24HR BLOOD PRESSURE RANGE:  Systolic (57OKF), WBY:22 , Min:72 , HIJ:87 ; Diastolic (24RTC), GSY:64, Min:32, Max:60  24HR INTAKE/OUTPUT:  No intake or output data in the 24 hours ending 11/12/22 1508      Constitutional:  A&O, NAD  HEENT:  PERRLA  Respiratory:  CTA  Cardiovascular/Edema:  RRR  Gastrointestinal:  soft, nontender  Neurologic:  cranial nerves II-XII intact  Skin:  warm, dry  Other:  + edema BLE    Scheduled Meds:   midodrine  10 mg Oral TID WC    oxymetazoline  2 spray Each Nostril BID    pantoprazole  40 mg Oral QAM AC    cefUROXime  500 mg Oral 2 times per day    sodium chloride flush  5-40 mL IntraVENous 2 times per day    heparin (porcine)  5,000 Units SubCUTAneous Q8H    ammonium lactate   Topical BID    aspirin  81 mg Oral Daily    atorvastatin  40 mg Oral Nightly    montelukast  10 mg Oral Nightly    ocuvite-lutein  1 tablet Oral BID    [Held by provider] spironolactone  50 mg Oral BID    insulin lispro  0-4 Units SubCUTAneous TID WC    insulin lispro  0-4 Units SubCUTAneous Nightly     Continuous Infusions:   sodium chloride      dextrose       PRN Meds:.methocarbamol, guaiFENesin-dextromethorphan, sodium chloride flush, sodium chloride, ondansetron **OR** ondansetron, polyethylene glycol, acetaminophen **OR** acetaminophen, perflutren lipid microspheres, glucose, dextrose bolus **OR** dextrose bolus, glucagon (rDNA), dextrose    DATA:    CBC with Differential:    Lab Results   Component Value Date/Time    WBC 8.0 11/12/2022 04:23 AM    RBC 3.28 11/12/2022 04:23 AM    HGB 10.5 11/12/2022 04:23 AM    HCT 29.6 11/12/2022 04:23 AM     11/12/2022 04:23 AM    MCV 90.2 11/12/2022 04:23 AM    MCH 32.0 11/12/2022 04:23 AM    MCHC 35.5 11/12/2022 04:23 AM    RDW 13.8 11/12/2022 04:23 AM    SEGSPCT 35.6 04/25/2022 01:40 PM    METASPCT 1.7 08/23/2021 08:06 AM    LYMPHOPCT 25.8 11/12/2022 04:23 AM    MONOPCT 12.2 11/12/2022 04:23 AM    MYELOPCT 0.9 08/20/2021 10:38 AM    BASOPCT 1.5 11/12/2022 04:23 AM    MONOSABS 0.97 11/12/2022 04:23 AM    LYMPHSABS 2.05 11/12/2022 04:23 AM    EOSABS 1.06 11/12/2022 04:23 AM    BASOSABS 0.12 11/12/2022 04:23 AM     CMP:    Lab Results   Component Value Date/Time     11/12/2022 04:23 AM    K 4.4 11/12/2022 04:23 AM    K 4.5 11/10/2022 03:40 AM     11/12/2022 04:23 AM    CO2 19 11/12/2022 04:23 AM    BUN 25 11/12/2022 04:23 AM    CREATININE 1.6 11/12/2022 04:23 AM    GFRAA 48 10/03/2022 08:48 AM    AGRATIO 0.6 04/25/2022 01:40 PM    LABGLOM 45 11/12/2022 04:23 AM    GLUCOSE 162 11/12/2022 04:23 AM    GLUCOSE 157 04/27/2011 11:06 AM    PROT 5.6 11/12/2022 04:23 AM    LABALBU 2.2 11/12/2022 04:23 AM    LABALBU 4.7 04/27/2011 11:06 AM    CALCIUM 7.7 11/12/2022 04:23 AM    BILITOT 1.6 11/12/2022 04:23 AM    ALKPHOS 118 11/12/2022 04:23 AM    AST 32 11/12/2022 04:23 AM    ALT 28 11/12/2022 04:23 AM     Magnesium:    Lab Results   Component Value Date/Time    MG 2.0 11/12/2022 04:23 AM     Phosphorus:    Lab Results   Component Value Date/Time    PHOS 2.8 06/18/2020 01:58 PM     Radiology Review:      MRI ABD 9/3/22   As seen on prior comparison 06/29/2021 MRI cystic changes involving the   pancreas throughout multiple segments of the pancreas when compared to prior   comparison demonstrate probable interval growth of the pancreatic tail lesion   now measure up to 3.6 cm previously this was around 3 cm at maximum measuring   the similar plane. Other areas including uncinate process lesion of cystic   involvement similar to prior all of which remaining nonenhancing.   Continued   considerations for pseudocyst in the setting of prior pancreatitis versus   serous cystadenoma or IPMN. Given interval growth follow-up considered   within 6-12 months utilizing MRI to ensure stability. Cirrhotic hepatic morphology with trace perihepatic ascites. No obvious   abnormal enhancing lesion with areas of subcentimeter involvement right   hepatic lobe somewhat indeterminate with attention on follow-up     CXR 11/9/22   No acute process. BRIEF SUMMARY OF INITIAL CONSULT:     Briefly Mr Jeff Singleton is a 76-year-old male with a PMH of CKD stage IIIb, HFrEF 25%, HTN, Liver cirrhosis, Chronic thrombocytopenia, anemia, Pancreatic lesion, type 2 DM, and HLD who was admitted on 11/9/2022 after presenting to the ER with complaints of a URI x 2 weeks. His labs were significant for sodium 128, BUN 37, and creatinine 2.0, reason for this consultation. His home medications include aldactone, lasix, lisinopril, and metoprolol. IMPRESSION/RECOMMENDATIONS:      MELE stage I on CKD, likely volume responsive pre-renal MELE secondary to over diuresis. Renal function improving and appears to be back at baseline. He is with edema. We will resume Lasix. CKD stage IIIb, 2/2 diabetic and hypertensive nephrosclerosis, baseline creatinine 1.4-1.5 mg/dL  Hyponatremia hyponatremia, likely hemodynamically mediated secondary to HF and lack of free water excretion in the setting of excess free water intake. To restart Lasix and start on fluid restriction. Low bicarbonate level, likely metabolic acidosis vs respiratory alkalosis. To obtain venous pH to confirm diagnosis. HFrEF 25%, diuretics on hold   Vitamin D insufficiency, vitamin D 25 level 24, to start on cholecalciferol  Hx HTN, now with hypotension, BP medications on hold. On midodrine.   Liver cirrhosis, GI following  -------------------------------------------------------  Chronic thrombocytopenia  Anemia, normocytic  Pancreatic lesion  Type 2 DM, on glyburide, SSI  HLD, on statin  Rhinovirus, URI, on cefuroxime  Hypoalbuminemia/malnutrition      Plan:    Lasix 40 mg PO daily   Albumin 25 g IV x1 dose  Continue to hold BP meds  Cholecalciferol 1,000 units PO daily   Continue midodrine 10 mg PO TID  1L fluid restriction   Continue to monitor renal function   Monitor sodium level  Monitor bicarbonate level  Obtain venous pH    Case and plan discussed with Dr. Shaan Huynh    Electronically signed by PIERCE Wolfe CNP on 11/12/2022 at 4:28 PM

## 2022-11-12 NOTE — PROGRESS NOTES
Name:  Leigh Ann Arizmendi  :  1950  MRN:  [id number]  Room:  [address] [place] [address]  DOS:  2022    [address] [place] [address]  [facility]  Dr. Shon Ko M.D. Dr. José Miguel De La Paz M.D. Dr. Collins Watkins D.O. Dr. Simon Samuels M.D. Dr. Percy Schaefer D.O.    -NP Progress Note-    PCP:  Skyla Chester MD  Admitting Physician:  Zia Ochoa DO  Chief Complaint:    Chief Complaint   Patient presents with    Shortness of Breath     Has hx of    Leg Swelling     Bilat. Lower leg       Subjective  Patient sitting up in bed. Family present. Persistent cough. No abdominal pain.       Physical Examination  Vitals:  BP (!) 90/59   Pulse 64   Temp 98.2 °F (36.8 °C) (Oral)   Resp 18   Ht 5' 11\" (1.803 m)   Wt 245 lb 2 oz (111.2 kg)   SpO2 98%   BMI 34.19 kg/m²   General Appearance:  awake, alert, and oriented to person, place, time, and purpose; appears stated age and cooperative; no apparent distress no labored breathing  HEENT:  PERRL; EOMI; sclera clear; buccal mucosa moist  Neck:  supple; trachea midline; no thyromegaly; no JVD; no bruits  Heart:  rhythm regular; rate controlled; no murmurs  Lungs:  symmetrical; clear to auscultation bilaterally; no wheezes; no rhonchi; no rales  Abdomen:  soft, non-tender, non-distended; bowel sounds positive; no organomegaly or masses; no pain on palpation  Extremities:  peripheral pulses present; no peripheral edema; no ulcers  Neurologic:  alert and oriented x 3; no focal deficit; cranial nerves grossly intact  Skin:  no petechia; no hemorrhage; no wounds    Medications  Scheduled Meds    midodrine  10 mg Oral TID WC    oxymetazoline  2 spray Each Nostril BID    pantoprazole  40 mg Oral QAM AC    cefUROXime  500 mg Oral 2 times per day    sodium chloride flush  5-40 mL IntraVENous 2 times per day    heparin (porcine)  5,000 Units SubCUTAneous Q8H    ammonium lactate   Topical BID    aspirin  81 mg Oral Daily    atorvastatin  40 mg Oral Nightly montelukast  10 mg Oral Nightly    ocuvite-lutein  1 tablet Oral BID    [Held by provider] spironolactone  50 mg Oral BID    insulin lispro  0-4 Units SubCUTAneous TID     insulin lispro  0-4 Units SubCUTAneous Nightly     Infusion Meds    sodium chloride      dextrose       PRN Meds methocarbamol, guaiFENesin-dextromethorphan, sodium chloride flush, sodium chloride, ondansetron **OR** ondansetron, polyethylene glycol, acetaminophen **OR** acetaminophen, perflutren lipid microspheres, glucose, dextrose bolus **OR** dextrose bolus, glucagon (rDNA), dextrose    Laboratory Data  Recent Results (from the past 24 hour(s))   POCT Glucose    Collection Time: 11/11/22  4:34 PM   Result Value Ref Range    Meter Glucose 278 (H) 74 - 99 mg/dL   PROTEIN / CREATININE RATIO, URINE    Collection Time: 11/11/22  7:10 PM   Result Value Ref Range    Protein, Ur 10 0 - 12 mg/dL    Creatinine, Ur 92 40 - 278 mg/dL    Protein/Creat Ratio 0.1 0.0 - 0.2    Protein/Creat Ratio 0.1    POCT Glucose    Collection Time: 11/11/22  9:13 PM   Result Value Ref Range    Meter Glucose 226 (H) 74 - 99 mg/dL   Hepatic function panel    Collection Time: 11/12/22  4:23 AM   Result Value Ref Range    Total Protein 5.6 (L) 6.4 - 8.3 g/dL    Albumin 2.2 (L) 3.5 - 5.2 g/dL    Alkaline Phosphatase 118 40 - 129 U/L    ALT 28 0 - 40 U/L    AST 32 0 - 39 U/L    Total Bilirubin 1.6 (H) 0.0 - 1.2 mg/dL    Bilirubin, Direct 0.7 (H) 0.0 - 0.3 mg/dL    Bilirubin, Indirect 0.9 0.0 - 1.0 mg/dL   CBC with Auto Differential    Collection Time: 11/12/22  4:23 AM   Result Value Ref Range    WBC 8.0 4.5 - 11.5 E9/L    RBC 3.28 (L) 3.80 - 5.80 E12/L    Hemoglobin 10.5 (L) 12.5 - 16.5 g/dL    Hematocrit 29.6 (L) 37.0 - 54.0 %    MCV 90.2 80.0 - 99.9 fL    MCH 32.0 26.0 - 35.0 pg    MCHC 35.5 (H) 32.0 - 34.5 %    RDW 13.8 11.5 - 15.0 fL    Platelets 205 (L) 543 - 450 E9/L    MPV 11.9 7.0 - 12.0 fL    Neutrophils % 46.2 43.0 - 80.0 %    Immature Granulocytes % 1.0 0.0 - 5.0 %    Lymphocytes % 25.8 20.0 - 42.0 %    Monocytes % 12.2 (H) 2.0 - 12.0 %    Eosinophils % 13.3 (H) 0.0 - 6.0 %    Basophils % 1.5 0.0 - 2.0 %    Neutrophils Absolute 3.67 1.80 - 7.30 E9/L    Immature Granulocytes # 0.08 E9/L    Lymphocytes Absolute 2.05 1.50 - 4.00 E9/L    Monocytes Absolute 0.97 (H) 0.10 - 0.95 E9/L    Eosinophils Absolute 1.06 (H) 0.05 - 0.50 E9/L    Basophils Absolute 0.12 0.00 - 0.20 B6/M   Basic Metabolic Panel    Collection Time: 11/12/22  4:23 AM   Result Value Ref Range    Sodium 129 (L) 132 - 146 mmol/L    Potassium 4.4 3.5 - 5.0 mmol/L    Chloride 103 98 - 107 mmol/L    CO2 19 (L) 22 - 29 mmol/L    Anion Gap 7 7 - 16 mmol/L    Glucose 162 (H) 74 - 99 mg/dL    BUN 25 (H) 6 - 23 mg/dL    Creatinine 1.6 (H) 0.7 - 1.2 mg/dL    Est, Glom Filt Rate 45 >=60 mL/min/1.73    Calcium 7.7 (L) 8.6 - 10.2 mg/dL   Magnesium    Collection Time: 11/12/22  4:23 AM   Result Value Ref Range    Magnesium 2.0 1.6 - 2.6 mg/dL   Calcium, Ionized    Collection Time: 11/12/22  4:23 AM   Result Value Ref Range    Calcium, Ionized 1.16 1.15 - 1.33 mmol/L   Vitamin D 25 Hydroxy    Collection Time: 11/12/22  4:23 AM   Result Value Ref Range    Vit D, 25-Hydroxy 24 (L) 30 - 100 ng/mL   POCT Glucose    Collection Time: 11/12/22  6:42 AM   Result Value Ref Range    Meter Glucose 159 (H) 74 - 99 mg/dL   POCT Glucose    Collection Time: 11/12/22 11:05 AM   Result Value Ref Range    Meter Glucose 158 (H) 74 - 99 mg/dL       Imaging  XR CHEST PORTABLE    Result Date: 11/9/2022  EXAMINATION: ONE XRAY VIEW OF THE CHEST 11/9/2022 9:32 am COMPARISON: 20 August 2021 HISTORY: ORDERING SYSTEM PROVIDED HISTORY: cough TECHNOLOGIST PROVIDED HISTORY: Reason for exam:->cough FINDINGS: The lungs are without acute focal process. There is no effusion or pneumothorax. The cardiomediastinal silhouette is without acute process. The osseous structures are without acute process. No acute process.      US DUP LOWER ART/BYPASS GRAFTS BILATERAL COMPLETE    Result Date: 11/10/2022  EXAMINATION: ARTERIAL DUPLEX ULTRASOUND OF THE BILATERAL LOWER EXTREMITIES  11/9/2022 9:27 pm TECHNIQUE: Duplex ultrasound using B-mode/gray scaled imaging, Doppler spectral analysis and color flow Doppler was obtained of the bilateral lower extremities. COMPARISON: None. HISTORY: ORDERING SYSTEM PROVIDED HISTORY: Eval for PAD TECHNOLOGIST PROVIDED HISTORY: Reason for exam:->Eval for PAD What reading provider will be dictating this exam?->CRC FINDINGS: Multiphasic waveforms above the knee bilaterally. Flow turbulence and minimally biphasic waveforms below the knee on the right without occlusion. Primarily monophasic flow in the small vessels below the knee on the left. No occlusion. Mild-to-moderate atheromatous and calcific plaque in both lower extremity arterial structures. Right: Flow velocities were measured as follows: Com. Fem. 101 cm/sec Prof.           73 cm/sec SFA Prox. 96 cm/sec SFA Mid.     110 cm/sec SFA Dist.     83 cm/sec Pop. 123 cm/sec PTA           142 cm/sec Peron. 67 cm/sec JOEL           43 cm/sec Left: Flow velocities were measured as follows: Com. Fem. 133 cm/sec Prof.           51 cm/sec SFA Prox. 137 cm/sec SFA Mid.     140 cm/sec SFA Dist.     162 cm/sec Pop.           95 cm/sec PTA           89 cm/sec Peron. 123 cm/sec JOEL           41 cm/sec     Moderate PAD. No evidence of vessel occlusion. Abnormal waveforms and flow turbulence in the small vessels below the knee bilaterally. US DUP LOWER EXTREMITIES BILATERAL VENOUS    Result Date: 11/9/2022  EXAMINATION: DUPLEX VENOUS ULTRASOUND OF THE BILATERAL LOWER Yoselinreid Johnsonluis fernando 3:22 pm TECHNIQUE: Duplex ultrasound using B-mode/gray scaled imaging, Doppler spectral analysis and color flow Doppler was obtained of the deep venous structures of the lower bilateral extremities. COMPARISON: None.  HISTORY: ORDERING SYSTEM PROVIDED HISTORY: discomfort TECHNOLOGIST PROVIDED HISTORY: Reason for exam:->discomfort What reading provider will be dictating this exam?->CRC FINDINGS: The visualized veins of the bilateral lower extremities are patent and free of echogenic thrombus. The veins demonstrate good compressibility with normal color flow study and spectral analysis. No evidence of DVT in either lower extremity. RECOMMENDATIONS: Unavailable           Assessment and Plan  Patient is a 66 y.o. male patient on consult for cirrhosis. Cirrhosis  -Initial MELD = 20, MELD-Na = 24  -Negative viral liver serology  -Negative autoimmune liver serology  -History of A1AT deficiency - pending phenotype - has seen hematology in the past  -History of elevated iron / ferritin - pending HFE genes  -Elevated IgG  -Normal AFP 9 (5/16/2022) - repeat  -MRI abdomen 9/4/2022 without evidence of hepatic mass although low signal area in the right hepatic lobe noted  -Consider patient for EGD for variceal screening     2. Pancreatic lesion  -MRI abdomen 9/4/2022 showing pancreas and pancreatic duct with multiple sidebranch regularities increasing in size compared to prior exam 6/29/2021  -Elevated CA 19-9 =  53 (7/12/2021)  -Elevated CEA = 7.0 (7/12/2021)  -Normal AFP = 9 (5/16/2022)  -Repeat tumor markers  -Endoscopic ultrasound 8/6/2021 by Dr. Moraima Avilez showing 22 mm complex cystic lesion in the pancreatic tail adjacent to the pancreatic duct, no dilation of the pancreatic duct  -Biopsy consistent with gastric duodenal contaminant, cellblock was acellular - nondiagnostic biopsy     3. Anemia  -Mild / normocytic  -No overt GI bleeding  -No prior evidence of iron deficiency  -Check FOBT  -PPI daily  -Consider endoscopic evaluation - will require cardiac clearance prior to procedures     4. CAD / HFrEF  -Echo 8/20/2021 showing EF 25%  -Cardiac catheterization 8/23/2021 showing severe multivessel coronary artery disease  -Will require cardiac clearance prior to any endoscopic procedures     5. Comorbidities  -Rhinovirus, hypertension, dyslipidemia, diabetes, cellulitis  -Per admitting/consultants      Labs fairly stable. No immediate interventions planned while inpatient from GI POV. We will follow as needed while in the hospital.  Patient should follow with our group on discharge. Patient to call for appointment. Thank you for the opportunity to participate in the care of Mr. Jaskaran Valdivia.     Jaya Bhagats, APRN - CNP  3:14 PM  11/12/2022

## 2022-11-13 VITALS
HEIGHT: 71 IN | BODY MASS INDEX: 30.9 KG/M2 | SYSTOLIC BLOOD PRESSURE: 101 MMHG | DIASTOLIC BLOOD PRESSURE: 89 MMHG | TEMPERATURE: 97.8 F | OXYGEN SATURATION: 98 % | RESPIRATION RATE: 16 BRPM | HEART RATE: 63 BPM | WEIGHT: 220.7 LBS

## 2022-11-13 LAB
ACINETOBACTER CALCOAC BAUMANNII COMPLEX BY PCR: NOT DETECTED
ANION GAP SERPL CALCULATED.3IONS-SCNC: 7 MMOL/L (ref 7–16)
ANION GAP SERPL CALCULATED.3IONS-SCNC: 9 MMOL/L (ref 7–16)
BACTEROIDES FRAGILIS BY PCR: NOT DETECTED
BOTTLE TYPE: NORMAL
BUN BLDV-MCNC: 24 MG/DL (ref 6–23)
BUN BLDV-MCNC: 24 MG/DL (ref 6–23)
CA 19-9: 63 U/ML
CALCIUM SERPL-MCNC: 7.7 MG/DL (ref 8.6–10.2)
CALCIUM SERPL-MCNC: 8.2 MG/DL (ref 8.6–10.2)
CANDIDA ALBICANS BY PCR: NOT DETECTED
CANDIDA AURIS BY PCR: NOT DETECTED
CANDIDA GLABRATA BY PCR: NOT DETECTED
CANDIDA KRUSEI BY PCR: NOT DETECTED
CANDIDA PARAPSILOSIS BY PCR: NOT DETECTED
CANDIDA TROPICALIS BY PCR: NOT DETECTED
CHLORIDE BLD-SCNC: 100 MMOL/L (ref 98–107)
CHLORIDE BLD-SCNC: 101 MMOL/L (ref 98–107)
CO2: 18 MMOL/L (ref 22–29)
CO2: 19 MMOL/L (ref 22–29)
CREAT SERPL-MCNC: 1.6 MG/DL (ref 0.7–1.2)
CREAT SERPL-MCNC: 1.7 MG/DL (ref 0.7–1.2)
CRYPTOCOCCUS NEOFORMANS/GATTII BY PCR: NOT DETECTED
ENTEROBACTER CLOACAE COMPLEX BY PCR: NOT DETECTED
ENTEROBACTERALES BY PCR: NOT DETECTED
ENTEROCOCCUS FAECALIS BY PCR: NOT DETECTED
ENTEROCOCCUS FAECIUM BY PCR: NOT DETECTED
ESCHERICHIA COLI BY PCR: NOT DETECTED
GFR SERPL CREATININE-BSD FRML MDRD: 42 ML/MIN/1.73
GFR SERPL CREATININE-BSD FRML MDRD: 45 ML/MIN/1.73
GLUCOSE BLD-MCNC: 181 MG/DL (ref 74–99)
GLUCOSE BLD-MCNC: 218 MG/DL (ref 74–99)
HAEMOPHILUS INFLUENZAE BY PCR: NOT DETECTED
KLEBSIELLA AEROGENES BY PCR: NOT DETECTED
KLEBSIELLA OXYTOCA BY PCR: NOT DETECTED
KLEBSIELLA PNEUMONIAE GROUP BY PCR: NOT DETECTED
LISTERIA MONOCYTOGENES BY PCR: NOT DETECTED
MAGNESIUM: 1.9 MG/DL (ref 1.6–2.6)
METER GLUCOSE: 160 MG/DL (ref 74–99)
METER GLUCOSE: 317 MG/DL (ref 74–99)
NEISSERIA MENINGITIDIS BY PCR: NOT DETECTED
ORDER NUMBER: NORMAL
POTASSIUM SERPL-SCNC: 4.2 MMOL/L (ref 3.5–5)
POTASSIUM SERPL-SCNC: 4.3 MMOL/L (ref 3.5–5)
PROTEUS SPECIES BY PCR: NOT DETECTED
PSEUDOMONAS AERUGINOSA BY PCR: NOT DETECTED
SALMONELLA SPECIES BY PCR: NOT DETECTED
SERRATIA MARCESCENS BY PCR: NOT DETECTED
SODIUM BLD-SCNC: 126 MMOL/L (ref 132–146)
SODIUM BLD-SCNC: 128 MMOL/L (ref 132–146)
SOURCE OF BLOOD CULTURE: NORMAL
STAPHYLOCOCCUS AUREUS BY PCR: NOT DETECTED
STAPHYLOCOCCUS EPIDERMIDIS BY PCR: NOT DETECTED
STAPHYLOCOCCUS LUGDUNENSIS BY PCR: NOT DETECTED
STAPHYLOCOCCUS SPECIES BY PCR: NOT DETECTED
STENOTROPHOMONAS MALTOPHILIA BY PCR: NOT DETECTED
STREPTOCOCCUS AGALACTIAE BY PCR: NOT DETECTED
STREPTOCOCCUS PNEUMONIAE BY PCR: NOT DETECTED
STREPTOCOCCUS PYOGENES  BY PCR: NOT DETECTED
STREPTOCOCCUS SPECIES BY PCR: NOT DETECTED

## 2022-11-13 PROCEDURE — 6370000000 HC RX 637 (ALT 250 FOR IP): Performed by: NURSE PRACTITIONER

## 2022-11-13 PROCEDURE — 99239 HOSP IP/OBS DSCHRG MGMT >30: CPT | Performed by: STUDENT IN AN ORGANIZED HEALTH CARE EDUCATION/TRAINING PROGRAM

## 2022-11-13 PROCEDURE — 99232 SBSQ HOSP IP/OBS MODERATE 35: CPT | Performed by: NURSE PRACTITIONER

## 2022-11-13 PROCEDURE — 6370000000 HC RX 637 (ALT 250 FOR IP): Performed by: STUDENT IN AN ORGANIZED HEALTH CARE EDUCATION/TRAINING PROGRAM

## 2022-11-13 PROCEDURE — 36415 COLL VENOUS BLD VENIPUNCTURE: CPT

## 2022-11-13 PROCEDURE — 6360000002 HC RX W HCPCS: Performed by: NURSE PRACTITIONER

## 2022-11-13 PROCEDURE — 82962 GLUCOSE BLOOD TEST: CPT

## 2022-11-13 PROCEDURE — 83735 ASSAY OF MAGNESIUM: CPT

## 2022-11-13 PROCEDURE — 87040 BLOOD CULTURE FOR BACTERIA: CPT

## 2022-11-13 PROCEDURE — 80048 BASIC METABOLIC PNL TOTAL CA: CPT

## 2022-11-13 PROCEDURE — 2580000003 HC RX 258: Performed by: NURSE PRACTITIONER

## 2022-11-13 RX ORDER — PANTOPRAZOLE SODIUM 40 MG/1
40 TABLET, DELAYED RELEASE ORAL
Qty: 30 TABLET | Refills: 0 | Status: SHIPPED | OUTPATIENT
Start: 2022-11-14

## 2022-11-13 RX ORDER — MIDODRINE HYDROCHLORIDE 10 MG/1
10 TABLET ORAL
Qty: 90 TABLET | Refills: 0 | Status: SHIPPED | OUTPATIENT
Start: 2022-11-13

## 2022-11-13 RX ORDER — CHOLECALCIFEROL (VITAMIN D3) 25 MCG
1000 TABLET ORAL DAILY
Qty: 60 TABLET | COMMUNITY
Start: 2022-11-14

## 2022-11-13 RX ADMIN — MIDODRINE HYDROCHLORIDE 10 MG: 5 TABLET ORAL at 08:29

## 2022-11-13 RX ADMIN — HEPARIN SODIUM 5000 UNITS: 10000 INJECTION INTRAVENOUS; SUBCUTANEOUS at 06:18

## 2022-11-13 RX ADMIN — GUAIFENESIN AND DEXTROMETHORPHAN 5 ML: 100; 10 SYRUP ORAL at 17:11

## 2022-11-13 RX ADMIN — INSULIN LISPRO 3 UNITS: 100 INJECTION, SOLUTION INTRAVENOUS; SUBCUTANEOUS at 11:30

## 2022-11-13 RX ADMIN — Medication 10 ML: at 08:32

## 2022-11-13 RX ADMIN — Medication 1 TABLET: at 08:30

## 2022-11-13 RX ADMIN — Medication: at 08:30

## 2022-11-13 RX ADMIN — FUROSEMIDE 40 MG: 40 TABLET ORAL at 08:29

## 2022-11-13 RX ADMIN — PANTOPRAZOLE SODIUM 40 MG: 40 TABLET, DELAYED RELEASE ORAL at 06:18

## 2022-11-13 RX ADMIN — Medication 2 SPRAY: at 08:30

## 2022-11-13 RX ADMIN — MIDODRINE HYDROCHLORIDE 10 MG: 5 TABLET ORAL at 11:35

## 2022-11-13 RX ADMIN — MIDODRINE HYDROCHLORIDE 10 MG: 5 TABLET ORAL at 16:48

## 2022-11-13 RX ADMIN — Medication 1000 UNITS: at 08:29

## 2022-11-13 RX ADMIN — ASPIRIN 81 MG: 81 TABLET, COATED ORAL at 08:29

## 2022-11-13 RX ADMIN — CEFUROXIME AXETIL 500 MG: 500 TABLET ORAL at 08:30

## 2022-11-13 NOTE — DISCHARGE SUMMARY
[facility] Physician Discharge Summary       Brenda Jackson, [address] [phone]    Call  follow up in 2-4 weeks   continue 1L fluid restriction    Emery Morales DO [address]  [address] [facility],[address] [address] [phone]    Follow up      [facility] [address] MD ANUSHA  [address] [facility]  [phone]    Follow up      Nieves Wilcox MD  [address] [address] [phone]    Follow up      Nieves Wilcox MD  [address] [facility] [address] [address] [phone]          Activity level: As tolerated     Dispo: Home      Condition on discharge: Stable     Patient ID:  Lydia Edvin  [id number]  66 y.o.  1950    Admit date: 11/9/2022    Discharge date and time:  11/13/2022  4:29 PM    Admission Diagnoses: Principal Problem:    Hypotension  Active Problems:    Chronic renal disease, stage III ([address] [825033]    Acute kidney injury superimposed on chronic kidney disease ([address]    MELE (acute kidney injury) ([address]    Venous stasis dermatitis of both lower extremities    Rhinovirus    Controlled type 2 diabetes mellitus with complication, without long-term current use of insulin (HCC)    Acute on chronic systolic CHF (congestive heart failure) (HCC)    Hepatic cirrhosis (HCC)    HFrEF (heart failure with reduced ejection fraction) ([address]    Portal hypertension ([address]  Resolved Problems:    * No resolved hospital problems.  *      Discharge Diagnoses: Principal Problem:    Hypotension  Active Problems:    Chronic renal disease, stage III (HCC) [449981]    Acute kidney injury superimposed on chronic kidney disease (HCC)    MELE (acute kidney injury) ([address]    Venous stasis dermatitis of both lower extremities    Rhinovirus    Controlled type 2 diabetes mellitus with complication, without long-term current use of insulin (HCC)    Acute on chronic systolic CHF (congestive heart failure) ([address] Hepatic cirrhosis (HCC)    HFrEF (heart failure with reduced ejection fraction) (HCC)    Portal hypertension ([facility]  Resolved Problems:    * No resolved hospital problems. *      Consults:  IP CONSULT TO DIETITIAN  IP CONSULT TO CARDIOLOGY  IP CONSULT TO GI  IP CONSULT TO NEPHROLOGY  IP CONSULT TO HEART FAILURE NURSE/COORDINATOR  IP CONSULT TO CARDIOLOGY  IP CONSULT TO IV TEAM  IP CONSULT TO INFECTIOUS DISEASES    Hospital Course:   Patient Rosa Isela Mcarthur is a 66 y.o. presented with MELE (acute kidney injury) ([facility] [Q77.8]  Chronic systolic congestive heart failure ([facility] [I50.22]  Cellulitis of lower extremity, unspecified laterality [L03.119]  Hypotension, unspecified hypotension type [I95.9]    Rosa Isela Mcarthur is a 66 y.o. male with a history of CHF, CKD, HTN, cirrhosis, & DM    Patient states he has been sick for > 2 weeks with URI symptoms, coughing/congestion. Primarily with rhinorrhea and post-nasal drainage causing him to gag if he lies supine. States he has not been able to lie down and raise legs due to his sinus drainage, and thinks this is why he has increased edema. Went to an urgent care with these complaints and was directed to ED. Admitted with concern for acute HFrEF, also with cirrhosis, MELE on CKD, stasis dermatitis BLE. Cardiology and Nephrology both consulted. In regards to acute CHF - his lasix, lisinopril and metoprolol were all held as blood pressure would not support providing these medications. ECHO indicated improvement in LVEF to 40%. He was started on midodrine with hopes of improved blood pressure permitting resumption of above medications. This never occurred, BP remained borderline and around 710 systolic. Cardiology had signed off, but stated consideration of Isosorbide, Entresto, Hydralazine, Jardiance and Aldactone. As noted blood pressure remained at or below 90 systolic. Lasix was only medication able to be resumed.     Nephrology followed, renal function improved to 1.6 with a single dose Albumin and resuming Lasix. His legs were treated with compression and emollients, was not felt to have cellulitis. Edema resolved. Treated with 5 days Cefuroxime for bacterial sinusitis. Note he was Rhinovirus positive. He was afebrile throughout hospital stay. He did have 1 set of blood cultures come positive on day #5, but no organisms identified. Felt to be contaminate. Discharged home - will need outpatient follow up with PCP, GI, Cardiology, Nephrology. Discharge Exam:    See progress note same date. I/O last 3 completed shifts: In: 960 [P.O.:960]  Out: 2850 [Urine:2850]  I/O this shift:  In: 240 [P.O.:240]  Out: 900 [Urine:900]      LABS:  Recent Labs     11/12/22  0423 11/13/22  0304 11/13/22  1546   * 126* 128*   K 4.4 4.2 4.3    100 101   CO2 19* 19* 18*   BUN 25* 24* 24*   CREATININE 1.6* 1.7* 1.6*   GLUCOSE 162* 181* 218*   CALCIUM 7.7* 7.7* 8.2*       Recent Labs     11/11/22  0450 11/12/22  0423   WBC 12.7* 8.0   RBC 3.68* 3.28*   HGB 12.0* 10.5*   HCT 33.4* 29.6*   MCV 90.8 90.2   MCH 32.6 32.0   MCHC 35.9* 35.5*   RDW 13.8 13.8    100*   MPV 11.4 11.9       No results for input(s): POCGLU in the last 72 hours. Imaging:  XR CHEST PORTABLE    Result Date: 11/9/2022  EXAMINATION: ONE XRAY VIEW OF THE CHEST 11/9/2022 9:32 am COMPARISON: 20 August 2021 HISTORY: ORDERING SYSTEM PROVIDED HISTORY: cough TECHNOLOGIST PROVIDED HISTORY: Reason for exam:->cough FINDINGS: The lungs are without acute focal process. There is no effusion or pneumothorax. The cardiomediastinal silhouette is without acute process. The osseous structures are without acute process. No acute process.      US DUP LOWER ART/BYPASS GRAFTS BILATERAL COMPLETE    Result Date: 11/10/2022  EXAMINATION: ARTERIAL DUPLEX ULTRASOUND OF THE BILATERAL LOWER EXTREMITIES  11/9/2022 9:27 pm TECHNIQUE: Duplex ultrasound using B-mode/gray scaled imaging, Doppler spectral analysis and color flow Doppler was obtained of the bilateral lower extremities. COMPARISON: None. HISTORY: ORDERING SYSTEM PROVIDED HISTORY: Eval for PAD TECHNOLOGIST PROVIDED HISTORY: Reason for exam:->Eval for PAD What reading provider will be dictating this exam?->CRC FINDINGS: Multiphasic waveforms above the knee bilaterally. Flow turbulence and minimally biphasic waveforms below the knee on the right without occlusion. Primarily monophasic flow in the small vessels below the knee on the left. No occlusion. Mild-to-moderate atheromatous and calcific plaque in both lower extremity arterial structures. Right: Flow velocities were measured as follows: Com. Fem. 101 cm/sec Prof.           73 cm/sec SFA Prox. 96 cm/sec SFA Mid.     110 cm/sec SFA Dist.     83 cm/sec Pop. 123 cm/sec PTA           142 cm/sec Peron. 67 cm/sec JOEL           43 cm/sec Left: Flow velocities were measured as follows: Com. Fem. 133 cm/sec Prof.           51 cm/sec SFA Prox. 137 cm/sec SFA Mid.     140 cm/sec SFA Dist.     162 cm/sec Pop.           95 cm/sec PTA           89 cm/sec Peron. 123 cm/sec JOEL           41 cm/sec     Moderate PAD. No evidence of vessel occlusion. Abnormal waveforms and flow turbulence in the small vessels below the knee bilaterally. US DUP LOWER EXTREMITIES BILATERAL VENOUS    Result Date: 11/9/2022  EXAMINATION: DUPLEX VENOUS ULTRASOUND OF THE BILATERAL LOWER Morrie Samaniego 3:22 pm TECHNIQUE: Duplex ultrasound using B-mode/gray scaled imaging, Doppler spectral analysis and color flow Doppler was obtained of the deep venous structures of the lower bilateral extremities. COMPARISON: None. HISTORY: ORDERING SYSTEM PROVIDED HISTORY: discomfort TECHNOLOGIST PROVIDED HISTORY: Reason for exam:->discomfort What reading provider will be dictating this exam?->CRC FINDINGS: The visualized veins of the bilateral lower extremities are patent and free of echogenic thrombus.  The veins demonstrate good compressibility with normal color flow study and spectral analysis. No evidence of DVT in either lower extremity. RECOMMENDATIONS: Unavailable       Patient Instructions:      Medication List        START taking these medications      midodrine 10 MG tablet  Commonly known as: PROAMATINE  Take 1 tablet by mouth 3 times daily (with meals)     pantoprazole 40 MG tablet  Commonly known as: PROTONIX  Take 1 tablet by mouth every morning (before breakfast)  Start taking on: November 14, 2022     Vitamin D3 25 MCG Tabs  Take 1 tablet by mouth daily  Start taking on: November 14, 2022            CHANGE how you take these medications      fluticasone 50 MCG/ACT nasal spray  Commonly known as: FLONASE  1 spray by Each Nostril route daily  What changed:   when to take this  reasons to take this     furosemide 40 MG tablet  Commonly known as: LASIX  take 1 tablet by mouth once daily  What changed: See the new instructions. CONTINUE taking these medications      ammonium lactate 12 % lotion  Commonly known as: LAC-HYDRIN  Apply topically twice daily     aspirin 81 MG EC tablet  Take 1 tablet by mouth daily     atorvastatin 40 MG tablet  Commonly known as: LIPITOR  Take 1 tablet by mouth nightly     cetirizine 10 MG tablet  Commonly known as: ZYRTEC     glyBURIDE 5 MG tablet  Commonly known as: DIABETA  Take 1 tablet by mouth daily (with breakfast)     montelukast 10 MG tablet  Commonly known as: SINGULAIR  take 1 tablet by mouth nightly     OneTouch Ultra strip  Generic drug: blood glucose test strips  Inject 1 each into the skin daily As needed.      PreserVision AREDS 2+Multi Vit Caps            STOP taking these medications      lisinopril 2.5 MG tablet  Commonly known as: PRINIVIL;ZESTRIL     metoprolol succinate 25 MG extended release tablet  Commonly known as: TOPROL XL     spironolactone 50 MG tablet  Commonly known as: ALDACTONE               Where to Get Your Medications These medications were sent to [address], [facility] at [phone]  [address] [facility] [address]      Phone: [phone]   midodrine 10 MG tablet  pantoprazole 40 MG tablet       You can get these medications from any pharmacy    You don't need a prescription for these medications  Vitamin D3 25 MCG Tabs           Note that more than 30 minutes was spent in preparing discharge papers, discussing discharge with patient, medication review, etc.    Signed:  Electronically signed by PIERCE Aguilar CNP on 11/13/2022 at 4:29 PM      Addendum: I have personally participated in the history, exam, medical decision making with  Divya Meraz NP  on the date of service and I agree with all of the pertinent clinical information unless otherwise noted. I have also reviewed and agree with the past medical, family, and social history unless otherwise noted. Patient was admitted with acute on chronic systolic CHF complicated by hypotension requiring midodrine, MELE on CKD, bilateral lower extremities venous stasis dermatitis, rhinovirus positive with secondary bacterial sinusitis. PHYSICAL EXAM:  Vitals:  /89   Pulse 63   Temp 97.8 °F (36.6 °C) (Oral)   Resp 16   Ht 5' 11\" (1.803 m)   Wt 220 lb 11.2 oz (100.1 kg)   SpO2 98%   BMI 30.78 kg/m²   Gen: awake, alert, NAD  Lungs: clear to auscultation bilaterally no crackles no wheezing. Heart: RRR, no murmur   Abdomen: soft nontender nondistended positive bowel sounds. Extremities: full range of motion, lower extremities wrapped.       Impression:  Principal Problem:    Hypotension  Active Problems:    Chronic renal disease, stage III ([facility]) [920730]    Acute kidney injury superimposed on chronic kidney disease ([facility])    MELE (acute kidney injury) ([facility])    Venous stasis dermatitis of both lower extremities    Rhinovirus    Controlled type 2 diabetes mellitus with complication, without long-term current use of insulin (HCC)    Acute on chronic systolic CHF (congestive heart failure) (HCC)    Hepatic cirrhosis (HCC)    HFrEF (heart failure with reduced ejection fraction) (HCC)    Portal hypertension ([facility]  Resolved Problems:    * No resolved hospital problems. *      My findings/plan include:  See hospital course above. Sodium stable. Blood pressures stable on midodrine 10 mg 3 times daily. We will hold guideline directed medical therapy with exception of Lasix 40 mg p.o. daily. He will require close follow-up with PCP, cardiology, GI, nephrology. Recommend continued blood pressure monitoring while on midodrine. NOTE: This report was transcribed using voice recognition software. Every effort was made to ensure accuracy; however, inadvertent computerized transcription errors may be present.   Electronically signed by Herberth Mandujano MD on 11/13/2022 at 4:47 PM

## 2022-11-13 NOTE — CONSULTS
[address] [place] [address] [facility]    Consultation Note     Admit Date: 11/9/2022  9:42 AM    Reason for Consult:   positive blood cx    Attending Physician:  Andrew Hall, *     Chief Complaint: cough and congestion    HISTORY OF PRESENT ILLNESS:   The patient is a 66 y.o.  male not known to the Infectious Diseases service. The patient has hx of CHF presented to the hospital 4 days ago with congestion and cough. No shortness of breath or chest pain or abdominal pain he has bilateral lower extremity swelling all the time from his heart failure. Since admission he is afebrile hemodynamically stable currently saturating well on room air. Last CBC from 1111 showed white count of 8 hemoglobin of 10.5 platelet count of 229. Blood cultures 1 out of 2 from 11/9 turned positive on day 5 with gram-positive cocci in clusters and gram-positive cocci diphtheroid likely. BC ID did not show any organism. He has CKD with creatinine of 1.7/BUN of 24. RVP showed rhinovirus/enterovirus. Chest x-ray on admission did not show any acute process. Patient is not on any antibiotics I got consult for further recommendations.     Past Medical History:        Diagnosis Date    Allergic rhinitis     Seasonal    Cardiomyopathy ([facility]     CHF (congestive heart failure) (HCC)     DM (diabetes mellitus) ([facility]     Enlarged prostate     Thrombocytopenia (HCC)     Type 2 diabetes mellitus without complication (HCC)     VHD (valvular heart disease)      Past Surgical History:        Procedure Laterality Date    CATARACT REMOVAL Bilateral 2017    CYST REMOVAL      from left heel    HAMMER TOE SURGERY Bilateral 1979    REFRACTIVE SURGERY Bilateral     UPPER GASTROINTESTINAL ENDOSCOPY N/A 8/6/2021    EGD W/EUS FNA performed by Maddison Esparza MD at [facility] ENDOSCOPY     Current Medications:   Scheduled Meds:   midodrine  10 mg Oral TID WC    oxymetazoline  2 spray Each Nostril BID    Vitamin D  1,000 Units Oral Daily furosemide  40 mg Oral Daily    pantoprazole  40 mg Oral QAM AC    cefUROXime  500 mg Oral 2 times per day    sodium chloride flush  5-40 mL IntraVENous 2 times per day    heparin (porcine)  5,000 Units SubCUTAneous Q8H    ammonium lactate   Topical BID    aspirin  81 mg Oral Daily    atorvastatin  40 mg Oral Nightly    montelukast  10 mg Oral Nightly    ocuvite-lutein  1 tablet Oral BID    [Held by provider] spironolactone  50 mg Oral BID    insulin lispro  0-4 Units SubCUTAneous TID WC    insulin lispro  0-4 Units SubCUTAneous Nightly     Continuous Infusions:   sodium chloride      dextrose       PRN Meds:methocarbamol, guaiFENesin-dextromethorphan, sodium chloride flush, sodium chloride, ondansetron **OR** ondansetron, polyethylene glycol, acetaminophen **OR** acetaminophen, perflutren lipid microspheres, glucose, dextrose bolus **OR** dextrose bolus, glucagon (rDNA), dextrose    Allergies:  Penicillins, Ambrosia artemisiifolia (ragweed) skin test, Cat hair extract, Egg lecithin, Eggs or egg-derived products, Grass pollen(k-o-r-t-swt arielle), Macadamia nut oil, Milk-related compounds, Mixed ragweed, Molds & smuts, Peanut-containing drug products, and Seasonal    Social History:   Social History     Socioeconomic History    Marital status:      Spouse name: None    Number of children: None    Years of education: None    Highest education level: None   Occupational History    Occupation: retired   Tobacco Use    Smoking status: Former     Packs/day: 1.00     Years: 20.00     Pack years: 20.00     Types: Cigarettes     Quit date: 1993     Years since quittin.8    Smokeless tobacco: Former     Types: Chew     Quit date: 1970   Vaping Use    Vaping Use: Never used   Substance and Sexual Activity    Alcohol use: Not Currently     Comment: rarely beer    Drug use: Never   Social History Narrative    Drinks decaf coffee & occ pop.       Social Determinants of Health     Physical Activity: Inactive Days of Exercise per Week: 0 days    Minutes of Exercise per Session: 0 min     Tobacco: No  Alcohol: No  Pets: No  Travel: No    Family History:       Problem Relation Age of Onset    Alcohol Abuse Father     No Known Problems Sister     Diabetes Brother    . Otherwise non-pertinent to the chief complaint. REVIEW OF SYSTEMS:    As mentioned in HPI, all other systems negative. PHYSICAL EXAM:    Vitals:    /89   Pulse 63   Temp 97.8 °F (36.6 °C) (Oral)   Resp 16   Ht 5' 11\" (1.803 m)   Wt 220 lb 11.2 oz (100.1 kg)   SpO2 98%   BMI 30.78 kg/m²   Constitutional: The patient is awake, alert, and oriented. Skin: Warm and dry. No rashes were noted. No jaundice. HEENT: Eyes show round, and reactive pupils. Moist mucous membranes, no ulcerations, no thrush. Neck: Supple to movements. No lymphadenopathy. Chest: Clear to auscultation  Cardiovascular: S1 and S2 are rhythmic and regular. No murmurs appreciated. Abdomen: Soft nontender  Extremities: No clubbing, no cyanosis,  Musculoskeletal: Tubigrips bilateral lower extremities with mild swelling at the ankle.   Neurological: Alert and oriented x3   Lines: peripheral      CBC+dif:  Recent Labs     11/11/22  0450 11/12/22  0423   WBC 12.7* 8.0   HGB 12.0* 10.5*   HCT 33.4* 29.6*   MCV 90.8 90.2    100*   NEUTROABS 6.31 3.67     No results found for: CRP  No results found for: CRPHS  No results found for: SEDRATE  Lab Results   Component Value Date    ALT 28 11/12/2022    AST 32 11/12/2022    ALKPHOS 118 11/12/2022    BILITOT 1.6 (H) 11/12/2022     Lab Results   Component Value Date/Time     11/13/2022 03:04 AM    K 4.2 11/13/2022 03:04 AM    K 4.5 11/10/2022 03:40 AM     11/13/2022 03:04 AM    CO2 19 11/13/2022 03:04 AM    BUN 24 11/13/2022 03:04 AM    CREATININE 1.7 11/13/2022 03:04 AM    GFRAA 48 10/03/2022 08:48 AM    AGRATIO 0.6 04/25/2022 01:40 PM    LABGLOM 42 11/13/2022 03:04 AM    GLUCOSE 181 11/13/2022 03:04 AM GLUCOSE 157 04/27/2011 11:06 AM    PROT 5.6 11/12/2022 04:23 AM    LABALBU 2.2 11/12/2022 04:23 AM    LABALBU 4.7 04/27/2011 11:06 AM    CALCIUM 7.7 11/13/2022 03:04 AM    BILITOT 1.6 11/12/2022 04:23 AM    ALKPHOS 118 11/12/2022 04:23 AM    AST 32 11/12/2022 04:23 AM    ALT 28 11/12/2022 04:23 AM       Lab Results   Component Value Date/Time    PROTIME 19.2 11/10/2022 08:41 AM    INR 1.7 11/10/2022 08:41 AM       Lab Results   Component Value Date/Time    TSH 4.46 04/25/2022 01:40 PM       No results found for: NITRITE, COLORU, PHUR, LABCAST, WBCUA, RBCUA, MUCUS, TRICHOMONAS, YEAST, BACTERIA, CLARITYU, SPECGRAV, LEUKOCYTESUR, UROBILINOGEN, BILIRUBINUR, BLOODU, GLUCOSEU, AMORPHOUS    No results found for: HDO5HWQ, BEART, F1KLJKGX, PHART, THGBART, GNJ9AWV, PO2ART, FTD6ICY  Radiology:  [address] [facility] 157   Final Result   1. No renal obstruction   2. Otherwise limited exam         US DUP LOWER ART/BYPASS GRAFTS BILATERAL COMPLETE   Final Result   Moderate PAD. No evidence of vessel occlusion. Abnormal waveforms and flow   turbulence in the small vessels below the knee bilaterally. US DUP LOWER EXTREMITIES BILATERAL VENOUS   Final Result   No evidence of DVT in either lower extremity. RECOMMENDATIONS:   Unavailable         XR CHEST PORTABLE   Final Result   No acute process. Microbiology:  blood cultures from 7/9 showed 1 out of 2 gram-positive cocci in clusters, gram-positive cocci diphtheroid like, BC ID did not show any organisms turned positive on day 5    Assessment:  Blood culture contamination: Patient does not have any hardware in place. No Clinical signs of infection  Heart failure and CKD: Management per primary and nephrology    Plan:    No need of antibiotics at this time. Patient can be discharged from ID standpoint. Will follow with you    Thank you for having us see this patient in consultation.   Discussed with nursing staff    Electronically signed by Aurora Lopez Kathy Melissa MD on 11/13/2022 at 3:40 PM

## 2022-11-13 NOTE — PROGRESS NOTES
metoprolol  -> all on hold as BP will not support  Started midodrine -> BP improved, anticipate resuming above meds as pressures tolerate. Midodrine increased to 10 TID on 11/12  Orthostatic pressures are unremarkable today    2. Cirrhosis  - felt due to fatty liver disease,   Negative viral studies and autoimmune workup in past  Hx of Alpha 1 antitrypsin deficiency and elevated iron    Has a pancreatic lesion that has increased in size over past 1 year, follows with HPB surgery  Elevated CA 19-9 and CEA, normal AFP  Hx of EUS August 2021, non-diagnostic biopsy  Considering repeat    3. Rhinovirus  - not hypoxic and does not have much coughing or SOB  Does have significant sinus congestion, maxillary pain/pressure, headache and purulent nasal drainage. 5 days Cefuroxime for secondary bacterial sinus infection as he had symptoms > 14 days    4. MELE on CKD   - baseline Cr ~ 1.5  Presented with Cr of 2.0, appeared hypervolemic  Lasix resumed, had dose IV Albumin  Nephrology consulted. 5. Bilateral stasis dermatitis  - he has symmetric appearing erythema and warmth to both lower legs. Not c/w cellulitis given distribution  Dopplers negative for DVTs or acute arterial occlusive process. Will apply topical emollients, ACE wraps, elevated legs as able  Edema is improved, he states legs feel better    6. DM   - continue sliding scale    7. Thrombocytopenia  - likely due to cirrhosis, chronic  ASA started, follow  Platelets back to normal range    8. Positive Blood cultures  - 2 of 4 bottles show growth. Were negative at 24 hours.   No organisms identified on molecular  Will repeat cultures  Suspect probable contaminate  Ask for ID opinion    VTE prophylaxis: SQH - follow platelets    Subjective:  Patient is being followed for MELE (acute kidney injury) ([facility] [A72.3]  Chronic systolic congestive heart failure ([facility] [address] [I50.22]  Cellulitis of lower extremity, unspecified laterality [L03.119]  Hypotension, unspecified hypotension type [I95.9]     Patient states he has been sick for > 2 weeks with URI symptoms, coughing/congestion. Primarily with rhinorrhea and post-nasal drainage causing him to gag if he lies supine. States he has not been able to lie down and raise legs due to his sinus drainage, and thinks this is why he has increased edema. Went to an urgent care with these complaints and was directed to ED. Admitted with concern for acute HFrEF, also with cirrhosis, MELE on CKD, stasis dermatitis BLE.    11/10 -> no overnight events. He is on room air. He is still complaining primarily of sinus pain/pressure, thick purulent sinus drainage and post-nasal gtt causing him to cough. Denies chest pain or pressure. States legs more swollen than usual and he is not able to ambulate like usual     11/11 -> uneventful night. States he slept OK off and on. NO complaints of chest pain or SOB. States his sinus congestion is better, less coughing, less nasal drainage. BP is improved today    11/12 -> he is feeling better. States breathing is at baseline. He states his legs feel much better and edema back to normal.  Still complaining of rhinorrhea and post-nasal gtt. No fevers or chills    Discussed with him barrier to discharge is hypotension -> pressures overnight in 03W systolic. Metoprolol, lisinopril, aldactone, lasix all on hold. Midodrine increased to 10 mg TID.     11/13 -> had IV Albumin plus restarted Lasix yesterday. Orthostatic vitals today much improved without drop. He feels great and would like to go home. States breathing at baseline, leg edema better than usual  Sinus symptoms improved. Blood cultures that were negative at 24 hrs, result positive today 2 of 4 bottles GPCs and GPRs.  Will repeat blood cultures    ROS: denies fever, chills,  n/v, HA unless stated above.      midodrine  10 mg Oral TID WC    oxymetazoline  2 spray Each Nostril BID    Vitamin D  1,000 Units Oral Daily    furosemide  40 mg Oral Daily    pantoprazole  40 mg Oral QAM AC    cefUROXime  500 mg Oral 2 times per day    sodium chloride flush  5-40 mL IntraVENous 2 times per day    heparin (porcine)  5,000 Units SubCUTAneous Q8H    ammonium lactate   Topical BID    aspirin  81 mg Oral Daily    atorvastatin  40 mg Oral Nightly    montelukast  10 mg Oral Nightly    ocuvite-lutein  1 tablet Oral BID    [Held by provider] spironolactone  50 mg Oral BID    insulin lispro  0-4 Units SubCUTAneous TID WC    insulin lispro  0-4 Units SubCUTAneous Nightly     methocarbamol, 500 mg, 4x Daily PRN  guaiFENesin-dextromethorphan, 5 mL, Q4H PRN  sodium chloride flush, 5-40 mL, PRN  sodium chloride, , PRN  ondansetron, 4 mg, Q8H PRN   Or  ondansetron, 4 mg, Q6H PRN  polyethylene glycol, 17 g, Daily PRN  acetaminophen, 650 mg, Q6H PRN   Or  acetaminophen, 650 mg, Q6H PRN  perflutren lipid microspheres, 1.5 mL, ONCE PRN  glucose, 4 tablet, PRN  dextrose bolus, 125 mL, PRN   Or  dextrose bolus, 250 mL, PRN  glucagon (rDNA), 1 mg, PRN  dextrose, , Continuous PRN       Objective:    BP (!) 93/51   Pulse 65   Temp 99 °F (37.2 °C) (Oral)   Resp 16   Ht 5' 11\" (1.803 m)   Wt 220 lb 11.2 oz (100.1 kg)   SpO2 99%   BMI 30.78 kg/m²     General Appearance: alert and oriented to person, place and time and in no acute distress  Skin: warm and dry  Head: normocephalic and atraumatic  Eyes: pupils equal, round, and reactive to light, extraocular eye movements intact, conjunctivae normal  Neck: neck supple and non tender without mass   Pulmonary/Chest: no wheeze or rhonchi  Cardiovascular: normal rate, normal S1 and S2 and no carotid bruits  Abdomen: obese, distended, ascites  Extremities: no cyanosis, no clubbing. 3+ edema - improved.  Tubi- and dressings applied  Neurologic: no cranial nerve deficit and speech normal        Recent Labs     11/11/22  0450 11/12/22  0423 11/13/22  0304   * 129* 126*   K 4.2 4.4 4.2   CL 98 103 100   CO2 20* 19* 19*   BUN 26* 25* 24*   CREATININE 1.8* 1.6* 1.7*   GLUCOSE 154* 162* 181*   CALCIUM 8.0* 7.7* 7.7*         Recent Labs     11/11/22  0450 11/12/22  0423   WBC 12.7* 8.0   RBC 3.68* 3.28*   HGB 12.0* 10.5*   HCT 33.4* 29.6*   MCV 90.8 90.2   MCH 32.6 32.0   MCHC 35.9* 35.5*   RDW 13.8 13.8    100*   MPV 11.4 11.9         Radiology:     Time spent reviewing chart, clinical exam, discussing case and answering questions with staff/consultants/patient/family = 20 minutes      NOTE: This report was transcribed using voice recognition software. Every effort was made to ensure accuracy; however, inadvertent computerized transcription errors may be present. Electronically signed by PIERCE Garcia CNP on 11/13/2022 at 1:07 PM    Addendum: I have personally participated in the history, exam, medical decision making with  Sabi Joaquin NP  on the date of service and I agree with all of the pertinent clinical information unless otherwise noted. I have also reviewed and agree with the past medical, family, and social history unless otherwise noted. Patient was admitted with acute on chronic systolic CHF complicated by hypotension on midodrine, rhinovirus positive with secondary bacterial sinusitis, MELE on CKD, bilateral venous stasis dermatitis. PHYSICAL EXAM:  Vitals:  BP (!) 93/51   Pulse 65   Temp 99 °F (37.2 °C) (Oral)   Resp 16   Ht 5' 11\" (1.803 m)   Wt 220 lb 11.2 oz (100.1 kg)   SpO2 99%   BMI 30.78 kg/m²   Gen: awake, alert, NAD  Lungs: clear to auscultation bilaterally no crackles no wheezing. Heart: RRR, no murmur   Abdomen: soft nontender nondistended positive bowel sounds. Extremities: full range of motion, bilateral lower extremities wrapped.       Impression:  Principal Problem:    Hypotension  Active Problems:    Chronic renal disease, stage III ([facility] [address] [814412]    Acute kidney injury superimposed on chronic kidney disease (HCC)    MELE (acute kidney injury) (HCC)    Venous stasis dermatitis of both lower extremities    Rhinovirus    Controlled type 2 diabetes mellitus with complication, without long-term current use of insulin (HCC)    Acute on chronic systolic CHF (congestive heart failure) (HCC)    Hepatic cirrhosis (HCC)    HFrEF (heart failure with reduced ejection fraction) ([facility] [address]    Portal hypertension ([facility] [address]  Resolved Problems:    * No resolved hospital problems. *      My findings/plan include:  Patient is admitted with acute on chronic systolic CHF complicated by hypotension requiring midodrine, rhinovirus positive with secondary bacterial sinusitis, MELE on CKD, bilateral venous stasis dermatitis. He continues on midodrine for hypotension that has required us to hold his guideline directed medical therapy including diuretics. His blood pressures have improved on midodrine 10 mg 3 times daily and we have restarted Lasix 40 mg p.o. daily after dose of IV albumin for acute on chronic systolic CHF. Blood cultures have resulted 2 out of 4 bottles positive for gram-positive cocci in clusters and diphtheroids. Diphtheroids are likely contaminant. We will consult infectious diseases for opinion regarding the gram-positive cocci in clusters which may also be a skin contaminant, he is not septic appearing however he has been hypotensive. His hyponatremia has worsened as his sodium is now down to 126. Appreciate nephrology recommendations. If we can continue to diurese him as his blood pressure will allow and his sodium improves to near 130 or greater, anticipate discharge from the hospital in 24-48 hours. NOTE: This report was transcribed using voice recognition software. Every effort was made to ensure accuracy; however, inadvertent computerized transcription errors may be present.   Electronically signed by Jonny Montalvo MD on 11/13/2022 at 1:31 PM

## 2022-11-13 NOTE — PROGRESS NOTES
Discharge instructions given to patient and son at bedside, verbalized understanding and able to teach back, prescriptions at [facility], IV and tele removed

## 2022-11-13 NOTE — PROGRESS NOTES
Department of Internal Medicine  Nephrology Progress Note      Events reviewed. SUBJECTIVE:  We are following for MELE on CKD. He denies any complaints.  Wants to go home    PHYSICAL EXAM:      Vitals:    VITALS:  /89   Pulse 63   Temp 97.8 °F (36.6 °C) (Oral)   Resp 16   Ht 5' 11\" (1.803 m)   Wt 220 lb 11.2 oz (100.1 kg)   SpO2 98%   BMI 30.78 kg/m²   24HR BLOOD PRESSURE RANGE:  Systolic (44DKK), QUC:75 , Min:90 , UGY:587 ; Diastolic (27ZCW), JVS:97, Min:51, Max:89  24HR INTAKE/OUTPUT:    Intake/Output Summary (Last 24 hours) at 11/13/2022 1551  Last data filed at 11/13/2022 0815  Gross per 24 hour   Intake 600 ml   Output 3750 ml   Net -3150 ml         Constitutional:  A&O, NAD  HEENT:  PERRLA  Respiratory:  CTA  Cardiovascular/Edema:  RRR  Gastrointestinal:  soft, nontender  Neurologic:  cranial nerves II-XII intact  Skin:  warm, dry  Other:  + edema BLE    Scheduled Meds:   midodrine  10 mg Oral TID WC    oxymetazoline  2 spray Each Nostril BID    Vitamin D  1,000 Units Oral Daily    furosemide  40 mg Oral Daily    pantoprazole  40 mg Oral QAM AC    cefUROXime  500 mg Oral 2 times per day    sodium chloride flush  5-40 mL IntraVENous 2 times per day    heparin (porcine)  5,000 Units SubCUTAneous Q8H    ammonium lactate   Topical BID    aspirin  81 mg Oral Daily    atorvastatin  40 mg Oral Nightly    montelukast  10 mg Oral Nightly    ocuvite-lutein  1 tablet Oral BID    [Held by provider] spironolactone  50 mg Oral BID    insulin lispro  0-4 Units SubCUTAneous TID WC    insulin lispro  0-4 Units SubCUTAneous Nightly     Continuous Infusions:   sodium chloride      dextrose       PRN Meds:.methocarbamol, guaiFENesin-dextromethorphan, sodium chloride flush, sodium chloride, ondansetron **OR** ondansetron, polyethylene glycol, acetaminophen **OR** acetaminophen, perflutren lipid microspheres, glucose, dextrose bolus **OR** dextrose bolus, glucagon (rDNA), dextrose    DATA:    CBC with Differential: Lab Results   Component Value Date/Time    WBC 8.0 11/12/2022 04:23 AM    RBC 3.28 11/12/2022 04:23 AM    HGB 10.5 11/12/2022 04:23 AM    HCT 29.6 11/12/2022 04:23 AM     11/12/2022 04:23 AM    MCV 90.2 11/12/2022 04:23 AM    MCH 32.0 11/12/2022 04:23 AM    MCHC 35.5 11/12/2022 04:23 AM    RDW 13.8 11/12/2022 04:23 AM    SEGSPCT 35.6 04/25/2022 01:40 PM    METASPCT 1.7 08/23/2021 08:06 AM    LYMPHOPCT 25.8 11/12/2022 04:23 AM    MONOPCT 12.2 11/12/2022 04:23 AM    MYELOPCT 0.9 08/20/2021 10:38 AM    BASOPCT 1.5 11/12/2022 04:23 AM    MONOSABS 0.97 11/12/2022 04:23 AM    LYMPHSABS 2.05 11/12/2022 04:23 AM    EOSABS 1.06 11/12/2022 04:23 AM    BASOSABS 0.12 11/12/2022 04:23 AM     CMP:    Lab Results   Component Value Date/Time     11/13/2022 03:04 AM    K 4.2 11/13/2022 03:04 AM    K 4.5 11/10/2022 03:40 AM     11/13/2022 03:04 AM    CO2 19 11/13/2022 03:04 AM    BUN 24 11/13/2022 03:04 AM    CREATININE 1.7 11/13/2022 03:04 AM    GFRAA 48 10/03/2022 08:48 AM    AGRATIO 0.6 04/25/2022 01:40 PM    LABGLOM 42 11/13/2022 03:04 AM    GLUCOSE 181 11/13/2022 03:04 AM    GLUCOSE 157 04/27/2011 11:06 AM    PROT 5.6 11/12/2022 04:23 AM    LABALBU 2.2 11/12/2022 04:23 AM    LABALBU 4.7 04/27/2011 11:06 AM    CALCIUM 7.7 11/13/2022 03:04 AM    BILITOT 1.6 11/12/2022 04:23 AM    ALKPHOS 118 11/12/2022 04:23 AM    AST 32 11/12/2022 04:23 AM    ALT 28 11/12/2022 04:23 AM     Magnesium:    Lab Results   Component Value Date/Time    MG 1.9 11/13/2022 03:04 AM     Phosphorus:    Lab Results   Component Value Date/Time    PHOS 2.8 06/18/2020 01:58 PM     Radiology Review:      MRI ABD 9/3/22   As seen on prior comparison 06/29/2021 MRI cystic changes involving the   pancreas throughout multiple segments of the pancreas when compared to prior   comparison demonstrate probable interval growth of the pancreatic tail lesion   now measure up to 3.6 cm previously this was around 3 cm at maximum measuring   the similar plane. Other areas including uncinate process lesion of cystic   involvement similar to prior all of which remaining nonenhancing. Continued   considerations for pseudocyst in the setting of prior pancreatitis versus   serous cystadenoma or IPMN. Given interval growth follow-up considered   within 6-12 months utilizing MRI to ensure stability. Cirrhotic hepatic morphology with trace perihepatic ascites. No obvious   abnormal enhancing lesion with areas of subcentimeter involvement right   hepatic lobe somewhat indeterminate with attention on follow-up     CXR 11/9/22   No acute process. BRIEF SUMMARY OF INITIAL CONSULT:     Briefly Mr Cynthia Banuelos is a 65-year-old male with a PMH of CKD stage IIIb, HFrEF 25%, HTN, Liver cirrhosis, Chronic thrombocytopenia, anemia, Pancreatic lesion, type 2 DM, and HLD who was admitted on 11/9/2022 after presenting to the ER with complaints of a URI x 2 weeks. His labs were significant for sodium 128, BUN 37, and creatinine 2.0, reason for this consultation. His home medications include aldactone, lasix, lisinopril, and metoprolol. IMPRESSION/RECOMMENDATIONS:      MELE stage I on CKD, likely volume responsive pre-renal MELE secondary to over diuresis. Renal function improving and appears to be back at baseline. He is with edema. We will resume Lasix. CKD stage IIIb, 2/2 diabetic and hypertensive nephrosclerosis, baseline creatinine 1.4-1.5 mg/dL  Hyponatremia hyponatremia, likely hemodynamically mediated secondary to HF and lack of free water excretion in the setting of excess free water intake. To restart Lasix and start on fluid restriction. Low bicarbonate level, likely metabolic acidosis vs respiratory alkalosis. To obtain venous pH to confirm diagnosis. HFrEF 25%, diuretics on hold   Vitamin D insufficiency, vitamin D 25 level 24, to start on cholecalciferol  Hx HTN, now with hypotension, BP medications on hold. On midodrine.   Liver cirrhosis, GI following  -------------------------------------------------------  Chronic thrombocytopenia  Anemia, normocytic  Pancreatic lesion  Type 2 DM, on glyburide, SSI  HLD, on statin  Rhinovirus, URI, on cefuroxime  Hypoalbuminemia/malnutrition      Plan:    Lasix 40 mg PO daily   Ok for discharge home today  Cholecalciferol 1,000 units PO daily   Continue midodrine 10 mg PO TID  1L fluid restriction   Continue to monitor renal function in 2-3 weeks   Follow up in office in 2-4 weeks      Electronically signed by Shabnam Benites MD on 11/13/2022 at 3:51 PM

## 2022-11-14 LAB
ACINETOBACTER CALCOAC BAUMANNII COMPLEX BY PCR: NOT DETECTED
BACTEROIDES FRAGILIS BY PCR: NOT DETECTED
BOTTLE TYPE: NORMAL
CANDIDA ALBICANS BY PCR: NOT DETECTED
CANDIDA AURIS BY PCR: NOT DETECTED
CANDIDA GLABRATA BY PCR: NOT DETECTED
CANDIDA KRUSEI BY PCR: NOT DETECTED
CANDIDA PARAPSILOSIS BY PCR: NOT DETECTED
CANDIDA TROPICALIS BY PCR: NOT DETECTED
CRYPTOCOCCUS NEOFORMANS/GATTII BY PCR: NOT DETECTED
ENTEROBACTER CLOACAE COMPLEX BY PCR: NOT DETECTED
ENTEROBACTERALES BY PCR: NOT DETECTED
ENTEROCOCCUS FAECALIS BY PCR: NOT DETECTED
ENTEROCOCCUS FAECIUM BY PCR: NOT DETECTED
ESCHERICHIA COLI BY PCR: NOT DETECTED
HAEMOPHILUS INFLUENZAE BY PCR: NOT DETECTED
KLEBSIELLA AEROGENES BY PCR: NOT DETECTED
KLEBSIELLA OXYTOCA BY PCR: NOT DETECTED
KLEBSIELLA PNEUMONIAE GROUP BY PCR: NOT DETECTED
LISTERIA MONOCYTOGENES BY PCR: NOT DETECTED
NEISSERIA MENINGITIDIS BY PCR: NOT DETECTED
ORDER NUMBER: NORMAL
PROTEUS SPECIES BY PCR: NOT DETECTED
PSEUDOMONAS AERUGINOSA BY PCR: NOT DETECTED
SALMONELLA SPECIES BY PCR: NOT DETECTED
SERRATIA MARCESCENS BY PCR: NOT DETECTED
SOURCE OF BLOOD CULTURE: NORMAL
STAPHYLOCOCCUS AUREUS BY PCR: NOT DETECTED
STAPHYLOCOCCUS EPIDERMIDIS BY PCR: NOT DETECTED
STAPHYLOCOCCUS LUGDUNENSIS BY PCR: NOT DETECTED
STAPHYLOCOCCUS SPECIES BY PCR: NOT DETECTED
STENOTROPHOMONAS MALTOPHILIA BY PCR: NOT DETECTED
STREPTOCOCCUS AGALACTIAE BY PCR: NOT DETECTED
STREPTOCOCCUS PNEUMONIAE BY PCR: NOT DETECTED
STREPTOCOCCUS PYOGENES  BY PCR: NOT DETECTED
STREPTOCOCCUS SPECIES BY PCR: NOT DETECTED

## 2022-11-15 ENCOUNTER — CARE COORDINATION (OUTPATIENT)
Dept: CASE MANAGEMENT | Age: 72
End: 2022-11-15

## 2022-11-15 DIAGNOSIS — N17.9 AKI (ACUTE KIDNEY INJURY) (HCC): Primary | ICD-10-CM

## 2022-11-15 PROCEDURE — 1111F DSCHRG MED/CURRENT MED MERGE: CPT | Performed by: FAMILY MEDICINE

## 2022-11-15 NOTE — CARE COORDINATION
[facility] Care Transitions Initial Follow Up Call    Call within 2 business days of discharge: Yes    Care Transition Nurse contacted the patient by telephone to perform post hospital discharge assessment. Provided introduction to self, and explanation of the Care Transition Nurse role. Patient: Amberly Harp Patient : 1950   MRN: [id number]  Reason for Admission: MELE  Discharge Date: 22 RARS: Readmission Risk Score: 15.5      Last Discharge  Street       Date Complaint Diagnosis Description Type Department Provider    22 Shortness of Breath; Leg Swelling MELE (acute kidney injury) ([address] . .. ED to Hosp-Admission (Discharged) (ADMITTED) BITA Montalvo MD; Blaine Roman. .. Challenges to be reviewed by the provider   Additional needs identified to be addressed with provider: No  none               Method of communication with provider: none. Called and spoke with the pt for an initial care transition call post hospital discharge. Pt admitted on 22 with dx MELE and +rhinovirus. Pt presented to ED with c/o URI symptoms x 2wks and increased BLE edema. Pt states that he is doing better today and had a restful night last night. Pt reports to an occasional productive cough. Pt denies any chest congestion, chest tightness, wheezing, fever/chills, or sob. Pt states that he is taking Robitussin DM for his cough which he feels is helping. Pt reports that he has no BLE edema this morning. Pt reports that his BLE always has a \"pinkish/red\" color to them and feels the color is at his baseline. Pt has a dx CHF. Reviewed CHF zones with pt. Discussed worsening s/sx of his condition and when to contact his cardiologist with issues. Noted pt had declined to f/u with the CHF clinic d/t distance. CTN encouraged pt to engage in daily wts. Pt states that he does have a scale at home that he can monitor his wts.  This CTN will have the [facility] mail out a copy of the CHF zones to pt for his reference and will review on upcoming calls. CTN reviewed the pt's medications in full. CTN confirmed new, changed, and stopped medications as per AVS with patient. 1111F entered. Confirmed pt has all rxs at home with no need for refills. Reviewed HFU appts. Pt advised to f/u with his pcp in 1 wk, Dr. Marifer Moore (card), Dr. Mejia Flores (renal) in 2-4 wks, and GI (Pt established with [facility] and has recently seen Willard Bejarano NP on 10/6/22). Pt declined assistance with scheduling appts. Pt prefers to call on his own. CTN will route a message to [facility] requesting they outreach to pt to offer a 7d HFU appt. Pt states that he typically drives himself to appts but if the weather is bad he will have family take him. Pt voiced no current needs/concerns at this time. Pt agreeable to continued outreaches from this CTN. Care Transition Nurse reviewed discharge instructions, medical action plan, and red flags with patient who verbalized understanding. The patient was given an opportunity to ask questions and does not have any further questions or concerns at this time. Were discharge instructions available to patient? Yes. Reviewed appropriate site of care based on symptoms and resources available to patient including: PCP  Specialist  When to call 911. The patient agrees to contact the PCP office for questions related to their healthcare. Advance Care Planning:   Does patient have an Advance Directive:  Reviewed health care decision maker information . Medication reconciliation was performed with patient, who verbalizes understanding of administration of home medications. Medications reviewed, 1111F entered: yes    Was patient discharged with a pulse oximeter?   No    Non-face-to-face services provided:  Scheduled appointment with PCP-See above  Scheduled appointment with Specialist-See above  Obtained and reviewed discharge summary and/or continuity of care documents  Education of patient/family/caregiver/guardian to support self-management-Pt encouraged to engage in daily wts and BP monitoring at home. Pt did have low BP during his IP stay and some medications were stopped at discharge. Pt on Midodrine. Assessment and support for treatment adherence and medication management-1111F entered. Offered patient enrollment in the Remote Patient Monitoring (RPM) program for in-home monitoring: Patient declined. Pt states that he does not feel comfortable operating smart devices. Care Transitions 24 Hour Call    Schedule Follow Up Appointment with PCP: Quintin Comment you have a copy of your discharge instructions?: Yes  Do you have all of your prescriptions and are they filled?: Yes  Have you been contacted by a [address]?: No  Have you scheduled your follow up appointment?: No  Do you feel like you have everything you need to keep you well at home?: Yes  Care Transitions Interventions     Other Services: Completed (Comment: Declined RPM 11/15/22)     Registered Dietician: Completed    Disease Specific Clinic: Declined               Follow Up  Future Appointments   Date Time Provider Edis Garces   12/20/2022  1:30 PM Tripp Melo  [address]   12/30/2022 10:00 AM Unique Natarajan DPM [facility] Podiat[facility]       Care Transition Nurse provided contact information. Plan for follow-up call in 5-7 days based on severity of symptoms and risk factors. Plan for next call: symptom management-Any worsening s/sx of CHF? URI symptoms improving?  self management-Wts? BPs? WNL? follow-up appointment-Has pt scheduled appts for pcp, GI, card, and renal?  Review CHF zones? Did pt receive?     Elliott Lee RN

## 2022-11-16 ENCOUNTER — TELEPHONE (OUTPATIENT)
Dept: FAMILY MEDICINE CLINIC | Age: 72
End: 2022-11-16

## 2022-11-16 LAB
ALPHA-1 ANTITRYPSIN PHENOTYPE: NORMAL
ALPHA-1 ANTITRYPSIN: 97 MG/DL (ref 90–200)
BLOOD CULTURE, ROUTINE: ABNORMAL
CULTURE, BLOOD 2: ABNORMAL
CULTURE, BLOOD 2: ABNORMAL
ORGANISM: ABNORMAL

## 2022-11-16 NOTE — TELEPHONE ENCOUNTER
----- Message from [facility] sent at 11/15/2022  4:46 PM EST -----  Subject: Hospital Follow Up    QUESTIONS  What hospital was the Patient Discharged from? [facility]   Date of Discharge? 2022-11-13  Discharge Location? Home  Reason for hospitalization as patient stated? Blood Pressure, leg   swelling, patient went in for a common cold and was hospitalized for 5   days because of the cold. What question does the patient have, if applicable?   ---------------------------------------------------------------------------  --------------  CALL BACK INFO  What is the best way for the office to contact you? OK to leave message on   voicemail  Preferred Call Back Phone Number? [phone]  ---------------------------------------------------------------------------  --------------  SCRIPT ANSWERS  Relationship to Patient? Other  Representative Name? Glenroy Merritt  Additional information verified (besides Name and Date of Birth)?  Phone   Number

## 2022-11-18 ENCOUNTER — CARE COORDINATION (OUTPATIENT)
Dept: CASE MANAGEMENT | Age: 72
End: 2022-11-18

## 2022-11-18 LAB
BLOOD CULTURE, ROUTINE: NORMAL
C282Y HEMOCHROMATOSIS MUT: NORMAL
CULTURE, BLOOD 2: NORMAL
H63D HEMOCHROMATOSIS MUT: NEGATIVE
HEMOCHROMATOSIS GENE ANALYSIS: NORMAL
HFE PCR SPECIMEN: NORMAL
S65C HEMOCHROMATOSIS MUT: NEGATIVE

## 2022-11-18 NOTE — CARE COORDINATION
[facility] Care Transitions Follow Up Call    Care Transition Nurse contacted the patient by telephone to follow up after admission on 22. Patient: Bessie Chavarria  Patient : 1950   MRN: [id number]  Reason for Admission: MELE  Discharge Date: 22 RARS: Readmission Risk Score: 15.5      Needs to be reviewed by the provider   Additional needs identified to be addressed with provider: No  none             Method of communication with provider: none. CTN called and spoke with the pt for a sub care transition call. Pt states that he is doing better today. Pt reports to an occasional cough and states that it is starting to dry up now. Pt reports to baseline sob with increased exertion. Pt has a nebulizer at home, but has not needed to use it. Pt denies any wheezing, chest tightness, chest congestion, or fever/chills. Pt states that his appetite is good and he is watching his fluids closely. Pt denies any worrisome s/sx of CHF today. He denies any BLE edema. Pt has a scale at home but has not started to engage in daily wts. CTN again educated the pt on importance of daily wts and monitoring for any worsening symptoms of CHF on a daily basis. Pt voiced understanding, but then stated he knows when he starts to retain fluid as his pants start to get tighter. CHF zones are being mailed out to pt. Pt states that he does monitor his BP at home. Noted BP during his IP was low and is on Midodrine TID. Pt states that his SBP ranges anywhere between . He denies any dizziness, lightheadedness, palpitations, or CP/chest discomfort. CTN reviewed pt's f/u appts. Pt has a HFU with his pcp on 22 @ 2pm and with cardiology next month. He was encouraged to schedule f/u appts with Dr. López Bassett and GI on last encounter. Pt has declined CTN's assistance with scheduling stating he prefers to do it on his own. Pt denies any needs/concerns today. He is agreeable to continued outreaches.          Addressed changes since last contact:  none    Follow Up  Future Appointments   Date Time Provider Edis Garces   11/29/2022  2:00 PM MD LEESA Fernández [facility]   12/20/2022  1:30 PM DO JOYCE [facility]   12/30/2022 10:00 AM Jayjay Cough, DPM [facility] Pod[facility]       Care Transition Nurse reviewed medical action plan and red flags with patient and discussed any barriers to care and/or understanding of plan of care after discharge. Discussed appropriate site of care based on symptoms and resources available to patient including: PCP  Specialist  When to call 911. The patient agrees to contact the PCP office for questions related to their healthcare. Patients top risk factors for readmission: lack of knowledge about disease, level of motivation, medical condition-pulm Htn, portal htn, CHF, DM, hx MI, Htn, cirrhosis, anemia, medication management, and multiple health system providers  Interventions to address risk factors: Scheduled appointment with PCP-Keep f/u with pcp on 11/29 and Scheduled appointment with Specialist-Keep f/u with cardiology on 12/20/22. Pt has been encouraged to schedule a f/u with Dr. Ariella Garza (renal) and GI (NP @ [address])       Care Transitions Subsequent and Final Call    Schedule Follow Up Appointment with PCP: Completed  Subsequent and Final Calls  Do you have any ongoing symptoms?: No  Have your medications changed?: No  Do you have any questions related to your medications?: No  Do you currently have any active services?: No  Do you have any needs or concerns that I can assist you with?: No  Identified Barriers: None  Care Transitions Interventions     Other Services: Completed (Comment: Declined RPM 11/15/22)     Registered Dietician: Completed    Disease Specific Clinic: Declined      Other Interventions:             Care Transition Nurse provided contact information for future needs.  Plan for follow-up call in 10-14 days based on severity of symptoms and risk factors. Plan for next call: symptom management-Any worsening in condition r/t low BP or CHF sx?  self management-Is pt engaging in daily wts? BPs wnl?  follow-up appointment-Review HFU visit with pcp for any new orders/changes   Has pt scheduled a f/u with renal or GI as advised at discharge? CHF zones received? Review with pt.     Yoselin Lyles RN

## 2022-11-25 ENCOUNTER — APPOINTMENT (OUTPATIENT)
Dept: GENERAL RADIOLOGY | Age: 72
DRG: 315 | End: 2022-11-25
Payer: MEDICARE

## 2022-11-25 ENCOUNTER — TELEPHONE (OUTPATIENT)
Dept: OTHER | Facility: CLINIC | Age: 72
End: 2022-11-25

## 2022-11-25 ENCOUNTER — HOSPITAL ENCOUNTER (INPATIENT)
Age: 72
LOS: 5 days | Discharge: HOME | DRG: 315 | End: 2022-12-01
Attending: EMERGENCY MEDICINE | Admitting: STUDENT IN AN ORGANIZED HEALTH CARE EDUCATION/TRAINING PROGRAM
Payer: MEDICARE

## 2022-11-25 DIAGNOSIS — R53.1 GENERAL WEAKNESS: ICD-10-CM

## 2022-11-25 DIAGNOSIS — E87.20 LACTIC ACIDOSIS: Primary | ICD-10-CM

## 2022-11-25 DIAGNOSIS — R42 LIGHTHEADEDNESS: ICD-10-CM

## 2022-11-25 DIAGNOSIS — E11.8 CONTROLLED TYPE 2 DIABETES MELLITUS WITH COMPLICATION, WITHOUT LONG-TERM CURRENT USE OF INSULIN (HCC): ICD-10-CM

## 2022-11-25 DIAGNOSIS — I21.4 NSTEMI (NON-ST ELEVATED MYOCARDIAL INFARCTION) (HCC): ICD-10-CM

## 2022-11-25 DIAGNOSIS — R04.2 HEMOPTYSIS: ICD-10-CM

## 2022-11-25 DIAGNOSIS — I95.89 OTHER SPECIFIED HYPOTENSION: ICD-10-CM

## 2022-11-25 LAB
ALBUMIN SERPL-MCNC: 2.7 G/DL (ref 3.5–5.2)
ALP BLD-CCNC: 125 U/L (ref 40–129)
ALT SERPL-CCNC: 35 U/L (ref 0–40)
ANION GAP SERPL CALCULATED.3IONS-SCNC: 12 MMOL/L (ref 7–16)
AST SERPL-CCNC: 51 U/L (ref 0–39)
BASOPHILS ABSOLUTE: 0.06 E9/L (ref 0–0.2)
BASOPHILS RELATIVE PERCENT: 0.9 % (ref 0–2)
BILIRUB SERPL-MCNC: 2.5 MG/DL (ref 0–1.2)
BILIRUBIN DIRECT: 0.9 MG/DL (ref 0–0.3)
BILIRUBIN URINE: NEGATIVE
BILIRUBIN, INDIRECT: 1.6 MG/DL (ref 0–1)
BLOOD, URINE: NEGATIVE
BUN BLDV-MCNC: 36 MG/DL (ref 6–23)
CALCIUM SERPL-MCNC: 8.9 MG/DL (ref 8.6–10.2)
CHLORIDE BLD-SCNC: 98 MMOL/L (ref 98–107)
CLARITY: CLEAR
CO2: 22 MMOL/L (ref 22–29)
COLOR: YELLOW
CREAT SERPL-MCNC: 1.8 MG/DL (ref 0.7–1.2)
EOSINOPHILS ABSOLUTE: 0.24 E9/L (ref 0.05–0.5)
EOSINOPHILS RELATIVE PERCENT: 3.6 % (ref 0–6)
GFR SERPL CREATININE-BSD FRML MDRD: 39 ML/MIN/1.73
GLUCOSE BLD-MCNC: 228 MG/DL (ref 74–99)
GLUCOSE URINE: NEGATIVE MG/DL
HCT VFR BLD CALC: 35 % (ref 37–54)
HEMOGLOBIN: 11.9 G/DL (ref 12.5–16.5)
IMMATURE GRANULOCYTES #: 0.03 E9/L
IMMATURE GRANULOCYTES %: 0.4 % (ref 0–5)
KETONES, URINE: NEGATIVE MG/DL
LACTIC ACID, SEPSIS: 3.8 MMOL/L (ref 0.5–1.9)
LEUKOCYTE ESTERASE, URINE: NEGATIVE
LIPASE: 147 U/L (ref 13–60)
LYMPHOCYTES ABSOLUTE: 1.39 E9/L (ref 1.5–4)
LYMPHOCYTES RELATIVE PERCENT: 20.8 % (ref 20–42)
MCH RBC QN AUTO: 31.2 PG (ref 26–35)
MCHC RBC AUTO-ENTMCNC: 34 % (ref 32–34.5)
MCV RBC AUTO: 91.6 FL (ref 80–99.9)
MONOCYTES ABSOLUTE: 0.51 E9/L (ref 0.1–0.95)
MONOCYTES RELATIVE PERCENT: 7.6 % (ref 2–12)
NEUTROPHILS ABSOLUTE: 4.44 E9/L (ref 1.8–7.3)
NEUTROPHILS RELATIVE PERCENT: 66.7 % (ref 43–80)
NITRITE, URINE: NEGATIVE
PDW BLD-RTO: 16.7 FL (ref 11.5–15)
PH UA: 6 (ref 5–9)
PLATELET # BLD: 71 E9/L (ref 130–450)
PLATELET CONFIRMATION: NORMAL
PMV BLD AUTO: 12.3 FL (ref 7–12)
POTASSIUM SERPL-SCNC: 4.2 MMOL/L (ref 3.5–5)
PROTEIN UA: NEGATIVE MG/DL
RBC # BLD: 3.82 E12/L (ref 3.8–5.8)
SODIUM BLD-SCNC: 132 MMOL/L (ref 132–146)
SPECIFIC GRAVITY UA: 1.01 (ref 1–1.03)
TOTAL PROTEIN: 6.7 G/DL (ref 6.4–8.3)
TROPONIN, HIGH SENSITIVITY: 61 NG/L (ref 0–11)
TROPONIN, HIGH SENSITIVITY: 82 NG/L (ref 0–11)
UROBILINOGEN, URINE: 1 E.U./DL
WBC # BLD: 6.7 E9/L (ref 4.5–11.5)

## 2022-11-25 PROCEDURE — 87040 BLOOD CULTURE FOR BACTERIA: CPT

## 2022-11-25 PROCEDURE — 81003 URINALYSIS AUTO W/O SCOPE: CPT

## 2022-11-25 PROCEDURE — 87150 DNA/RNA AMPLIFIED PROBE: CPT

## 2022-11-25 PROCEDURE — 96361 HYDRATE IV INFUSION ADD-ON: CPT

## 2022-11-25 PROCEDURE — 80076 HEPATIC FUNCTION PANEL: CPT

## 2022-11-25 PROCEDURE — 96360 HYDRATION IV INFUSION INIT: CPT

## 2022-11-25 PROCEDURE — 83690 ASSAY OF LIPASE: CPT

## 2022-11-25 PROCEDURE — 71045 X-RAY EXAM CHEST 1 VIEW: CPT

## 2022-11-25 PROCEDURE — 93005 ELECTROCARDIOGRAM TRACING: CPT | Performed by: STUDENT IN AN ORGANIZED HEALTH CARE EDUCATION/TRAINING PROGRAM

## 2022-11-25 PROCEDURE — 2580000003 HC RX 258: Performed by: STUDENT IN AN ORGANIZED HEALTH CARE EDUCATION/TRAINING PROGRAM

## 2022-11-25 PROCEDURE — 80048 BASIC METABOLIC PNL TOTAL CA: CPT

## 2022-11-25 PROCEDURE — 84484 ASSAY OF TROPONIN QUANT: CPT

## 2022-11-25 PROCEDURE — 83605 ASSAY OF LACTIC ACID: CPT

## 2022-11-25 PROCEDURE — 87186 SC STD MICRODIL/AGAR DIL: CPT

## 2022-11-25 PROCEDURE — 85025 COMPLETE CBC W/AUTO DIFF WBC: CPT

## 2022-11-25 PROCEDURE — 99285 EMERGENCY DEPT VISIT HI MDM: CPT

## 2022-11-25 RX ORDER — 0.9 % SODIUM CHLORIDE 0.9 %
500 INTRAVENOUS SOLUTION INTRAVENOUS ONCE
Status: COMPLETED | OUTPATIENT
Start: 2022-11-25 | End: 2022-11-25

## 2022-11-25 RX ORDER — MIDODRINE HYDROCHLORIDE 5 MG/1
10 TABLET ORAL ONCE
Status: DISCONTINUED | OUTPATIENT
Start: 2022-11-25 | End: 2022-12-01 | Stop reason: HOSPADM

## 2022-11-25 RX ADMIN — SODIUM CHLORIDE 500 ML: 9 INJECTION, SOLUTION INTRAVENOUS at 21:18

## 2022-11-25 ASSESSMENT — PAIN - FUNCTIONAL ASSESSMENT: PAIN_FUNCTIONAL_ASSESSMENT: NONE - DENIES PAIN

## 2022-11-26 ENCOUNTER — APPOINTMENT (OUTPATIENT)
Dept: CT IMAGING | Age: 72
DRG: 315 | End: 2022-11-26
Payer: MEDICARE

## 2022-11-26 PROBLEM — R42 LIGHTHEADEDNESS: Status: ACTIVE | Noted: 2022-11-26

## 2022-11-26 PROBLEM — R04.2 HEMOPTYSIS: Status: ACTIVE | Noted: 2022-11-26

## 2022-11-26 PROBLEM — R53.1 GENERAL WEAKNESS: Status: ACTIVE | Noted: 2022-11-26

## 2022-11-26 PROBLEM — E87.20 LACTIC ACIDOSIS: Status: ACTIVE | Noted: 2022-11-26

## 2022-11-26 LAB
EKG ATRIAL RATE: 73 BPM
EKG ATRIAL RATE: 75 BPM
EKG ATRIAL RATE: 79 BPM
EKG P AXIS: 27 DEGREES
EKG P AXIS: 37 DEGREES
EKG P AXIS: 49 DEGREES
EKG P-R INTERVAL: 176 MS
EKG P-R INTERVAL: 180 MS
EKG P-R INTERVAL: 184 MS
EKG Q-T INTERVAL: 456 MS
EKG Q-T INTERVAL: 478 MS
EKG Q-T INTERVAL: 484 MS
EKG QRS DURATION: 102 MS
EKG QRS DURATION: 104 MS
EKG QRS DURATION: 96 MS
EKG QTC CALCULATION (BAZETT): 526 MS
EKG QTC CALCULATION (BAZETT): 540 MS
EKG QTC CALCULATION (BAZETT): 551 MS
EKG R AXIS: -36 DEGREES
EKG R AXIS: -48 DEGREES
EKG R AXIS: -49 DEGREES
EKG T AXIS: 86 DEGREES
EKG T AXIS: 88 DEGREES
EKG T AXIS: 90 DEGREES
EKG VENTRICULAR RATE: 73 BPM
EKG VENTRICULAR RATE: 75 BPM
EKG VENTRICULAR RATE: 88 BPM
LACTIC ACID: 2.4 MMOL/L (ref 0.5–2.2)
LACTIC ACID: 2.9 MMOL/L (ref 0.5–2.2)
METER GLUCOSE: 79 MG/DL (ref 74–99)
METER GLUCOSE: 88 MG/DL (ref 74–99)
METER GLUCOSE: 92 MG/DL (ref 74–99)
METER GLUCOSE: 95 MG/DL (ref 74–99)
TROPONIN, HIGH SENSITIVITY: 101 NG/L (ref 0–11)

## 2022-11-26 PROCEDURE — A4216 STERILE WATER/SALINE, 10 ML: HCPCS | Performed by: STUDENT IN AN ORGANIZED HEALTH CARE EDUCATION/TRAINING PROGRAM

## 2022-11-26 PROCEDURE — 6360000004 HC RX CONTRAST MEDICATION: Performed by: RADIOLOGY

## 2022-11-26 PROCEDURE — 6370000000 HC RX 637 (ALT 250 FOR IP): Performed by: STUDENT IN AN ORGANIZED HEALTH CARE EDUCATION/TRAINING PROGRAM

## 2022-11-26 PROCEDURE — C9113 INJ PANTOPRAZOLE SODIUM, VIA: HCPCS | Performed by: STUDENT IN AN ORGANIZED HEALTH CARE EDUCATION/TRAINING PROGRAM

## 2022-11-26 PROCEDURE — 36415 COLL VENOUS BLD VENIPUNCTURE: CPT

## 2022-11-26 PROCEDURE — 93005 ELECTROCARDIOGRAM TRACING: CPT | Performed by: EMERGENCY MEDICINE

## 2022-11-26 PROCEDURE — 6370000000 HC RX 637 (ALT 250 FOR IP): Performed by: NURSE PRACTITIONER

## 2022-11-26 PROCEDURE — 84484 ASSAY OF TROPONIN QUANT: CPT

## 2022-11-26 PROCEDURE — 83605 ASSAY OF LACTIC ACID: CPT

## 2022-11-26 PROCEDURE — 2580000003 HC RX 258: Performed by: STUDENT IN AN ORGANIZED HEALTH CARE EDUCATION/TRAINING PROGRAM

## 2022-11-26 PROCEDURE — 82962 GLUCOSE BLOOD TEST: CPT

## 2022-11-26 PROCEDURE — 6360000002 HC RX W HCPCS: Performed by: STUDENT IN AN ORGANIZED HEALTH CARE EDUCATION/TRAINING PROGRAM

## 2022-11-26 PROCEDURE — 51798 US URINE CAPACITY MEASURE: CPT

## 2022-11-26 PROCEDURE — 71260 CT THORAX DX C+: CPT

## 2022-11-26 PROCEDURE — 74176 CT ABD & PELVIS W/O CONTRAST: CPT

## 2022-11-26 PROCEDURE — 2060000000 HC ICU INTERMEDIATE R&B

## 2022-11-26 PROCEDURE — 93010 ELECTROCARDIOGRAM REPORT: CPT | Performed by: INTERNAL MEDICINE

## 2022-11-26 PROCEDURE — 99222 1ST HOSP IP/OBS MODERATE 55: CPT | Performed by: STUDENT IN AN ORGANIZED HEALTH CARE EDUCATION/TRAINING PROGRAM

## 2022-11-26 RX ORDER — FLUTICASONE PROPIONATE 50 MCG
1 SPRAY, SUSPENSION (ML) NASAL DAILY
Status: DISCONTINUED | OUTPATIENT
Start: 2022-11-26 | End: 2022-11-28

## 2022-11-26 RX ORDER — INSULIN LISPRO 100 [IU]/ML
0-4 INJECTION, SOLUTION INTRAVENOUS; SUBCUTANEOUS
Status: DISCONTINUED | OUTPATIENT
Start: 2022-11-26 | End: 2022-11-30

## 2022-11-26 RX ORDER — ONDANSETRON 2 MG/ML
4 INJECTION INTRAMUSCULAR; INTRAVENOUS EVERY 6 HOURS PRN
Status: DISCONTINUED | OUTPATIENT
Start: 2022-11-26 | End: 2022-11-26

## 2022-11-26 RX ORDER — MONTELUKAST SODIUM 10 MG/1
10 TABLET ORAL NIGHTLY
Status: DISCONTINUED | OUTPATIENT
Start: 2022-11-26 | End: 2022-12-01 | Stop reason: HOSPADM

## 2022-11-26 RX ORDER — SODIUM CHLORIDE 0.9 % (FLUSH) 0.9 %
5-40 SYRINGE (ML) INJECTION EVERY 12 HOURS SCHEDULED
Status: DISCONTINUED | OUTPATIENT
Start: 2022-11-26 | End: 2022-12-01 | Stop reason: HOSPADM

## 2022-11-26 RX ORDER — DEXTROSE MONOHYDRATE 100 MG/ML
INJECTION, SOLUTION INTRAVENOUS CONTINUOUS PRN
Status: DISCONTINUED | OUTPATIENT
Start: 2022-11-26 | End: 2022-11-30

## 2022-11-26 RX ORDER — PANTOPRAZOLE SODIUM 40 MG/1
40 TABLET, DELAYED RELEASE ORAL
Status: DISCONTINUED | OUTPATIENT
Start: 2022-11-26 | End: 2022-11-26 | Stop reason: SDUPTHER

## 2022-11-26 RX ORDER — PANTOPRAZOLE SODIUM 40 MG/10ML
40 INJECTION, POWDER, LYOPHILIZED, FOR SOLUTION INTRAVENOUS 2 TIMES DAILY
Status: DISCONTINUED | OUTPATIENT
Start: 2022-11-26 | End: 2022-12-01 | Stop reason: ALTCHOICE

## 2022-11-26 RX ORDER — SODIUM CHLORIDE 0.9 % (FLUSH) 0.9 %
5-40 SYRINGE (ML) INJECTION PRN
Status: DISCONTINUED | OUTPATIENT
Start: 2022-11-26 | End: 2022-12-01 | Stop reason: HOSPADM

## 2022-11-26 RX ORDER — ACETAMINOPHEN 650 MG/1
650 SUPPOSITORY RECTAL EVERY 6 HOURS PRN
Status: DISCONTINUED | OUTPATIENT
Start: 2022-11-26 | End: 2022-12-01 | Stop reason: HOSPADM

## 2022-11-26 RX ORDER — ONDANSETRON 4 MG/1
4 TABLET, ORALLY DISINTEGRATING ORAL EVERY 8 HOURS PRN
Status: DISCONTINUED | OUTPATIENT
Start: 2022-11-26 | End: 2022-11-26

## 2022-11-26 RX ORDER — MIDODRINE HYDROCHLORIDE 5 MG/1
10 TABLET ORAL
Status: DISCONTINUED | OUTPATIENT
Start: 2022-11-26 | End: 2022-12-01 | Stop reason: HOSPADM

## 2022-11-26 RX ORDER — AMMONIUM LACTATE 12 G/100G
LOTION TOPICAL PRN
Status: DISCONTINUED | OUTPATIENT
Start: 2022-11-26 | End: 2022-12-01 | Stop reason: HOSPADM

## 2022-11-26 RX ORDER — SODIUM CHLORIDE, SODIUM LACTATE, POTASSIUM CHLORIDE, CALCIUM CHLORIDE 600; 310; 30; 20 MG/100ML; MG/100ML; MG/100ML; MG/100ML
INJECTION, SOLUTION INTRAVENOUS CONTINUOUS
Status: DISCONTINUED | OUTPATIENT
Start: 2022-11-26 | End: 2022-11-29

## 2022-11-26 RX ORDER — INSULIN LISPRO 100 [IU]/ML
0-4 INJECTION, SOLUTION INTRAVENOUS; SUBCUTANEOUS NIGHTLY
Status: DISCONTINUED | OUTPATIENT
Start: 2022-11-26 | End: 2022-11-30

## 2022-11-26 RX ORDER — ACETAMINOPHEN 325 MG/1
650 TABLET ORAL EVERY 6 HOURS PRN
Status: DISCONTINUED | OUTPATIENT
Start: 2022-11-26 | End: 2022-12-01 | Stop reason: HOSPADM

## 2022-11-26 RX ORDER — MIDODRINE HYDROCHLORIDE 5 MG/1
10 TABLET ORAL ONCE
Status: COMPLETED | OUTPATIENT
Start: 2022-11-26 | End: 2022-11-26

## 2022-11-26 RX ORDER — POLYETHYLENE GLYCOL 3350 17 G/17G
17 POWDER, FOR SOLUTION ORAL DAILY PRN
Status: DISCONTINUED | OUTPATIENT
Start: 2022-11-26 | End: 2022-12-01 | Stop reason: HOSPADM

## 2022-11-26 RX ORDER — ATORVASTATIN CALCIUM 40 MG/1
40 TABLET, FILM COATED ORAL NIGHTLY
Status: DISCONTINUED | OUTPATIENT
Start: 2022-11-26 | End: 2022-12-01 | Stop reason: HOSPADM

## 2022-11-26 RX ORDER — SODIUM CHLORIDE 9 MG/ML
INJECTION, SOLUTION INTRAVENOUS PRN
Status: DISCONTINUED | OUTPATIENT
Start: 2022-11-26 | End: 2022-12-01 | Stop reason: HOSPADM

## 2022-11-26 RX ORDER — CETIRIZINE HYDROCHLORIDE 10 MG/1
10 TABLET ORAL DAILY
Status: DISCONTINUED | OUTPATIENT
Start: 2022-11-26 | End: 2022-12-01 | Stop reason: HOSPADM

## 2022-11-26 RX ADMIN — MIDODRINE HYDROCHLORIDE 10 MG: 5 TABLET ORAL at 04:31

## 2022-11-26 RX ADMIN — PANTOPRAZOLE SODIUM 40 MG: 40 INJECTION, POWDER, FOR SOLUTION INTRAVENOUS at 11:31

## 2022-11-26 RX ADMIN — WATER 1000 MG: 1 INJECTION INTRAMUSCULAR; INTRAVENOUS; SUBCUTANEOUS at 02:37

## 2022-11-26 RX ADMIN — MIDODRINE HYDROCHLORIDE 10 MG: 5 TABLET ORAL at 11:31

## 2022-11-26 RX ADMIN — SODIUM CHLORIDE, POTASSIUM CHLORIDE, SODIUM LACTATE AND CALCIUM CHLORIDE: 600; 310; 30; 20 INJECTION, SOLUTION INTRAVENOUS at 02:39

## 2022-11-26 RX ADMIN — CETIRIZINE HYDROCHLORIDE 10 MG: 10 TABLET, FILM COATED ORAL at 11:31

## 2022-11-26 RX ADMIN — IOPAMIDOL 75 ML: 755 INJECTION, SOLUTION INTRAVENOUS at 13:03

## 2022-11-26 RX ADMIN — FLUTICASONE PROPIONATE 1 SPRAY: 50 SPRAY, METERED NASAL at 11:31

## 2022-11-26 RX ADMIN — ATORVASTATIN CALCIUM 40 MG: 40 TABLET, FILM COATED ORAL at 20:40

## 2022-11-26 RX ADMIN — SODIUM CHLORIDE 80 MG: 9 INJECTION, SOLUTION INTRAMUSCULAR; INTRAVENOUS; SUBCUTANEOUS at 02:38

## 2022-11-26 RX ADMIN — PANTOPRAZOLE SODIUM 40 MG: 40 INJECTION, POWDER, FOR SOLUTION INTRAVENOUS at 21:44

## 2022-11-26 RX ADMIN — MONTELUKAST SODIUM 10 MG: 10 TABLET ORAL at 20:40

## 2022-11-26 RX ADMIN — OCTREOTIDE ACETATE 50 MCG/HR: 500 INJECTION, SOLUTION INTRAVENOUS; SUBCUTANEOUS at 13:40

## 2022-11-26 RX ADMIN — MIDODRINE HYDROCHLORIDE 10 MG: 5 TABLET ORAL at 17:17

## 2022-11-26 RX ADMIN — SODIUM CHLORIDE, POTASSIUM CHLORIDE, SODIUM LACTATE AND CALCIUM CHLORIDE: 600; 310; 30; 20 INJECTION, SOLUTION INTRAVENOUS at 13:40

## 2022-11-26 RX ADMIN — OCTREOTIDE ACETATE 50 MCG/HR: 500 INJECTION, SOLUTION INTRAVENOUS; SUBCUTANEOUS at 03:50

## 2022-11-26 RX ADMIN — SODIUM CHLORIDE, PRESERVATIVE FREE 10 ML: 5 INJECTION INTRAVENOUS at 21:49

## 2022-11-26 RX ADMIN — MIDODRINE HYDROCHLORIDE 10 MG: 5 TABLET ORAL at 06:21

## 2022-11-26 RX ADMIN — SODIUM CHLORIDE, PRESERVATIVE FREE 10 ML: 5 INJECTION INTRAVENOUS at 11:32

## 2022-11-26 ASSESSMENT — ENCOUNTER SYMPTOMS
CHEST TIGHTNESS: 0
BACK PAIN: 0
ABDOMINAL PAIN: 0
CONSTIPATION: 0
VOMITING: 0
SORE THROAT: 0
SHORTNESS OF BREATH: 1
COUGH: 0
EYE REDNESS: 0
SINUS PAIN: 0
DIARRHEA: 0
EYE PAIN: 0
NAUSEA: 0
SINUS PRESSURE: 0

## 2022-11-26 ASSESSMENT — PAIN SCALES - GENERAL
PAINLEVEL_OUTOF10: 0
PAINLEVEL_OUTOF10: 0

## 2022-11-26 NOTE — PLAN OF CARE
Patient is a 50-year-old male who was admitted overnight due to hypotension and hemoptysis. Awaiting GI input. Remains on LR at 75 cc/h and octreotide drip. Awaiting CT chest to rule out endobronchial lesions. Lactic acidosis improving. Troponin is uptrending 61>82>101. 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 Unable to initiate GDMT due to hypotension. He was seen and evaluated by cardiology during his last admission 11/9 - 11/12, no changes were done at that time due to hypotension. Midodrine was added to help with BP. Patient is asymptomatic at this point. Continue to monitor for now, and if he develops any cardiac symptoms we will consult cardiology.

## 2022-11-26 NOTE — CONSULTS
Name:  Lydia Pardo  :  1950  MRN:  [id number]  Room:  [address] [place] [address]  DOS:  2022    [facility]  [facility]  Dr. Jan Demarco M.D. Dr. Theresa Dawn M.D. GERONIMO Colon.O. Dr. Angel Argueta M.D. Dr. Rei Davis D.O.     -NP Consult Note-    PCP:  Jonny Gannon MD  Admitting Physician:  Perez Nielsen MD  Consultants: GI  Chief Complaint:    Chief Complaint   Patient presents with    Dizziness     Reason for Consult: Hemoptysis    History of Present Illness  Lydia Pardo is a 66 y.o. male patient on consult for hemoptysis. Seen by our group on recent admission. Was admitted with rhinovirus. Main complaint was a harsh cough at that time. Patient reports persistent cough currently. Reported hemoptysis upon arrival to the hospital.  Initially with a small volume, then a moderate volume of bright red blood expectorated. Patient specifically reports that he did not vomit up any blood. He has had some nausea. Denies any vomiting at all. Specifically denies hematemesis or emesis of coffee-ground material.  He has had some loose stools. Denies any blood or melena. At baseline, bowel movements generally 1-2 times per day, brown and formed. He denies chest pain. Complains of shortness of breath, worse with exertion. He denies any fever, chills, or diaphoresis. PMH: Cirrhosis, heart failure, CAD, MI, hypertension, dyslipidemia, diabetes, pancreatic lesion, cellulitis, lung disease. PSH: Cardiac catheterization, skin resection, foot surgery. Endoscopic ultrasound. No prior EGD or colonoscopy. Family history: Father with cirrhosis secondary to alcohol. Brother with diabetes. Maternal uncle with intestinal cancer. Maternal cousin with prostate cancer. Paternal uncle with heart disease. He is unaware of any other personal or family history of GI issues or malignancy. Social history: Patient quit smoking about 30 years ago.   He denies significant alcohol use. He denies current or past recreational or illicit drug use. On arrival, WBC 6.7, hemoglobin 11.9, hematocrit 35.0. Normocytic. Platelets 71. BUN 36, creatinine 1.8, sodium 132, potassium 4.2, chloride 98, CO2 22, calcium 8.9. Blood glucose 228. Elevated lactic acid 3.8 -> 2.9 -> 2.4. Elevated troponin 61 -> 82 -> 101. Urinalysis unremarkable. Initial chest x-ray with no acute findings. CT abdomen pelvis showing mild airspace disease left posterior basilar segments, moderate splenomegaly, cirrhotic liver, unchanged lesion in the pancreatic tail, unremarkable bile ducts and adrenals, unremarkable GI tract, bladder dome diverticulum with otherwise unremarkable  tract. CT chest has been completed and is pending results.       TRIAGE VITALS  BP: (!) 88/50 (Manual), Temp: 97.9 °F (36.6 °C), Heart Rate: 67, Resp: 18, SpO2: 98 %    Vitals:    11/26/22 0600 11/26/22 0742 11/26/22 0908 11/26/22 1000   BP: (!) 87/51 (!) 83/52 (!) 76/45 (!) 88/50   Pulse:  69 67    Resp:  14 18    Temp:  97.9 °F (36.6 °C) 97.9 °F (36.6 °C)    TempSrc:  Oral Oral    SpO2:  98%     Weight:       Height:             Histories  Past Medical History:   Diagnosis Date    Allergic rhinitis     Seasonal    Cardiomyopathy (HCC)     CHF (congestive heart failure) (HCC)     DM (diabetes mellitus) (HCC)     Enlarged prostate     Thrombocytopenia (HCC)     Type 2 diabetes mellitus without complication (HCC)     VHD (valvular heart disease)      Past Surgical History:   Procedure Laterality Date    CATARACT REMOVAL Bilateral 2017    CYST REMOVAL      from left heel    HAMMER TOE SURGERY Bilateral 1979    REFRACTIVE SURGERY Bilateral     UPPER GASTROINTESTINAL ENDOSCOPY N/A 8/6/2021    EGD W/EUS FNA performed by Bossman Dela Cruz MD at [facility] ENDOSCOPY     Family History   Problem Relation Age of Onset    Alcohol Abuse Father     No Known Problems Sister     Diabetes Brother        Home Medications  Prior to Admission medications    Medication Sig Start Date End Date Taking? Authorizing Provider   pantoprazole (PROTONIX) 40 MG tablet Take 1 tablet by mouth every morning (before breakfast) 11/14/22   PIERCE Acosta CNP   Cholecalciferol (VITAMIN D) 25 MCG TABS Take 1 tablet by mouth daily 11/14/22   PIERCE Acosta CNP   midodrine (PROAMATINE) 10 MG tablet Take 1 tablet by mouth 3 times daily (with meals) 11/13/22   PIERCE Acosta CNP   ammonium lactate (LAC-HYDRIN) 12 % lotion Apply topically twice daily 10/28/22   Consuelo Chand DPM   glyBURIDE (DIABETA) 5 MG tablet Take 1 tablet by mouth daily (with breakfast) 10/21/22   Mihaela Leong MD   montelukast (SINGULAIR) 10 MG tablet take 1 tablet by mouth nightly 8/30/22   Daniel Corona DO   blood glucose test strips (ONETOUCH ULTRA) strip Inject 1 each into the skin daily As needed. 3/31/22   Mihaela Leong MD   furosemide (LASIX) 40 MG tablet take 1 tablet by mouth once daily  Patient taking differently: Will take a 2nd pill if legs swelling 2/21/22   Daniel Corona DO   atorvastatin (LIPITOR) 40 MG tablet Take 1 tablet by mouth nightly 12/7/21   Daniel Corona DO   aspirin 81 MG EC tablet Take 1 tablet by mouth daily 12/7/21   Daniel Corona DO   fluticasone [facility]) 50 MCG/ACT nasal spray 1 spray by Each Nostril route daily 12/7/21   Daniel oCrona DO   cetirizine (ZYRTEC) 10 MG tablet Take 10 mg by mouth daily    Historical Provider, MD   Multiple Vitamins-Minerals (PRESERVISION AREDS 2+MULTI VIT) CAPS Take 1 capsule by mouth 2 times daily    Historical Provider, MD       Allergies  Penicillins, Ambrosia artemisiifolia (ragweed) skin test, Cat hair extract, Egg lecithin, Eggs or egg-derived products, Grass pollen([id number]-o-r-t-swt arielle), Macadamia nut oil, Milk-related compounds, Mixed ragweed, Molds & smuts, Peanut-containing drug products, and Seasonal    Social Hx  Social History     Socioeconomic History    Marital status:       Spouse name: Not on file    Number of children: Not on file    Years of education: Not on file    Highest education level: Not on file   Occupational History    Occupation: retired   Tobacco Use    Smoking status: Former     Packs/day: 1.00     Years: 20.00     Pack years: 20.00     Types: Cigarettes     Quit date: 1993     Years since quittin.8    Smokeless tobacco: Former     Types: Chew     Quit date: 1970   Vaping Use    Vaping Use: Never used   Substance and Sexual Activity    Alcohol use: Not Currently     Comment: rarely beer    Drug use: Never    Sexual activity: Not on file   Other Topics Concern    Not on file   Social History Narrative    Drinks decaf coffee & occ pop. Social Determinants of Health     Financial Resource Strain: Not on file   Food Insecurity: Not on file   Transportation Needs: Not on file   Physical Activity: Inactive    Days of Exercise per Week: 0 days    Minutes of Exercise per Session: 0 min   Stress: Not on file   Social Connections: Not on file   Intimate Partner Violence: Not on file   Housing Stability: Not on file       Review of Systems  All bolded are positive; please see HPI  General:  Fever, chills, diaphoresis, fatigue, malaise, night sweats, weight loss  Psychological:  Anxiety, disorientation, hallucinations. ENT:  Epistaxis, headaches, vertigo, visual changes. Cardiovascular:  Chest pain, irregular heartbeats, palpitations, paroxysmal nocturnal dyspnea. Respiratory:  Shortness of breath, coughing, sputum production, hemoptysis, wheezing, orthopnea.   Gastrointestinal:  Nausea, vomiting, diarrhea, heartburn, constipation, abdominal pain, hematemesis, hematochezia, melena, acholic stools  Genito-Urinary:  Dysuria, urgency, frequency, hematuria  Musculoskeletal:  Joint pain, joint stiffness, joint swelling, muscle pain  Neurology:  Headache, focal neurological deficits, weakness, numbness, paresthesia  Derm:  Rashes, ulcers, excoriations, bruising  Extremities:  Decreased ROM, peripheral edema, mottling    Physical Examination  Vitals:  BP (!) 88/50 Comment: Manual  Pulse 67   Temp 97.9 °F (36.6 °C) (Oral)   Resp 18   Ht 5' 11\" (1.803 m)   Wt 210 lb (95.3 kg)   SpO2 98%   BMI 29.29 kg/m²   General Appearance:  awake, alert, and oriented to person, place, time, and purpose; appears stated age and cooperative; no apparent distress no labored breathing  HEENT:  NCAT; PERRL; conjunctiva pink, sclera clear  Neck:  no adenopathy, bruit, JVD, tenderness, masses, or nodules; supple, symmetrical, trachea midline, thyroid not enlarged  Lung:  clear to auscultation bilaterally; no use of accessory muscles; no rhonchi, rales, or crackles  Heart:  regular rate and regular rhythm without murmur, rub, or gallop  Abdomen:  soft, nontender, nondistended; normoactive bowel sounds; no organomegaly  Extremities:  extremities normal, atraumatic, BLE edema  Musculokeletal:  no joint swelling, no muscle tenderness. ROM normal in all joints of extremities.    Neurologic:  mental status A&Ox3, thought content appropriate; CN II-XII grossly intact; sensation intact, motor strength 5/5 globally; no slurred speech    Laboratory Data  Recent Results (from the past 24 hour(s))   Lactate, Sepsis    Collection Time: 11/25/22  9:02 PM   Result Value Ref Range    Lactic Acid, Sepsis 3.8 (HH) 0.5 - 1.9 mmol/L   CBC with Auto Differential    Collection Time: 11/25/22  9:02 PM   Result Value Ref Range    WBC 6.7 4.5 - 11.5 E9/L    RBC 3.82 3.80 - 5.80 E12/L    Hemoglobin 11.9 (L) 12.5 - 16.5 g/dL    Hematocrit 35.0 (L) 37.0 - 54.0 %    MCV 91.6 80.0 - 99.9 fL    MCH 31.2 26.0 - 35.0 pg    MCHC 34.0 32.0 - 34.5 %    RDW 16.7 (H) 11.5 - 15.0 fL    Platelets 71 (L) 183 - 450 E9/L    MPV 12.3 (H) 7.0 - 12.0 fL    Neutrophils % 66.7 43.0 - 80.0 %    Immature Granulocytes % 0.4 0.0 - 5.0 %    Lymphocytes % 20.8 20.0 - 42.0 %    Monocytes % 7.6 2.0 - 12.0 %    Eosinophils % 3.6 0.0 - 6.0 %    Basophils % 0.9 0.0 - 2.0 %    Neutrophils Absolute 4.44 1.80 - 7.30 E9/L    Immature Granulocytes # 0.03 E9/L    Lymphocytes Absolute 1.39 (L) 1.50 - 4.00 E9/L    Monocytes Absolute 0.51 0.10 - 0.95 E9/L    Eosinophils Absolute 0.24 0.05 - 0.50 E9/L    Basophils Absolute 0.06 0.00 - 0.20 E9/L   BMP    Collection Time: 11/25/22  9:02 PM   Result Value Ref Range    Sodium 132 132 - 146 mmol/L    Potassium 4.2 3.5 - 5.0 mmol/L    Chloride 98 98 - 107 mmol/L    CO2 22 22 - 29 mmol/L    Anion Gap 12 7 - 16 mmol/L    Glucose 228 (H) 74 - 99 mg/dL    BUN 36 (H) 6 - 23 mg/dL    Creatinine 1.8 (H) 0.7 - 1.2 mg/dL    Est, Glom Filt Rate 39 >=60 mL/min/1.73    Calcium 8.9 8.6 - 10.2 mg/dL   Hepatic Function Panel    Collection Time: 11/25/22  9:02 PM   Result Value Ref Range    Total Protein 6.7 6.4 - 8.3 g/dL    Albumin 2.7 (L) 3.5 - 5.2 g/dL    Alkaline Phosphatase 125 40 - 129 U/L    ALT 35 0 - 40 U/L    AST 51 (H) 0 - 39 U/L    Total Bilirubin 2.5 (H) 0.0 - 1.2 mg/dL    Bilirubin, Direct 0.9 (H) 0.0 - 0.3 mg/dL    Bilirubin, Indirect 1.6 (H) 0.0 - 1.0 mg/dL   Lipase    Collection Time: 11/25/22  9:02 PM   Result Value Ref Range    Lipase 147 (H) 13 - 60 U/L   Troponin    Collection Time: 11/25/22  9:02 PM   Result Value Ref Range    Troponin, High Sensitivity 61 (H) 0 - 11 ng/L   Urinalysis    Collection Time: 11/25/22  9:02 PM   Result Value Ref Range    Color, UA Yellow Straw/Yellow    Clarity, UA Clear Clear    Glucose, Ur Negative Negative mg/dL    Bilirubin Urine Negative Negative    Ketones, Urine Negative Negative mg/dL    Specific Gravity, UA 1.010 1.005 - 1.030    Blood, Urine Negative Negative    pH, UA 6.0 5.0 - 9.0    Protein, UA Negative Negative mg/dL    Urobilinogen, Urine 1.0 <2.0 E.U./dL    Nitrite, Urine Negative Negative    Leukocyte Esterase, Urine Negative Negative   Platelet Confirmation    Collection Time: 11/25/22  9:02 PM   Result Value Ref Range    Platelet Confirmation CONFIRMED    EKG 12 Lead    Collection Time: 11/25/22  9:22 PM   Result Value Ref Range    Ventricular Rate 88 BPM    Atrial Rate 79 BPM    P-R Interval 184 ms    QRS Duration 96 ms    Q-T Interval 456 ms    QTc Calculation (Bazett) 551 ms    P Axis 27 degrees    R Axis -36 degrees    T Axis 86 degrees   Troponin    Collection Time: 11/25/22 11:30 PM   Result Value Ref Range    Troponin, High Sensitivity 82 (H) 0 - 11 ng/L   EKG 12 Lead    Collection Time: 11/26/22 12:11 AM   Result Value Ref Range    Ventricular Rate 73 BPM    Atrial Rate 73 BPM    P-R Interval 176 ms    QRS Duration 104 ms    Q-T Interval 478 ms    QTc Calculation (Bazett) 526 ms    P Axis 37 degrees    R Axis -49 degrees    T Axis 90 degrees   EKG 12 Lead    Collection Time: 11/26/22 12:14 AM   Result Value Ref Range    Ventricular Rate 75 BPM    Atrial Rate 75 BPM    P-R Interval 180 ms    QRS Duration 102 ms    Q-T Interval 484 ms    QTc Calculation (Bazett) 540 ms    P Axis 49 degrees    R Axis -48 degrees    T Axis 88 degrees   Lactic Acid    Collection Time: 11/26/22 12:20 AM   Result Value Ref Range    Lactic Acid 2.9 (H) 0.5 - 2.2 mmol/L   Troponin    Collection Time: 11/26/22  2:30 AM   Result Value Ref Range    Troponin, High Sensitivity 101 (H) 0 - 11 ng/L   Lactic Acid    Collection Time: 11/26/22  2:30 AM   Result Value Ref Range    Lactic Acid 2.4 (H) 0.5 - 2.2 mmol/L   POCT Glucose    Collection Time: 11/26/22  5:54 AM   Result Value Ref Range    Meter Glucose 92 74 - 99 mg/dL   POCT Glucose    Collection Time: 11/26/22 11:40 AM   Result Value Ref Range    Meter Glucose 95 74 - 99 mg/dL       Imaging  CT ABDOMEN PELVIS WO CONTRAST Additional Contrast? None    Result Date: 11/26/2022  EXAMINATION: CT OF THE ABDOMEN AND PELVIS WITHOUT CONTRAST 11/26/2022 12:19 am TECHNIQUE: CT of the abdomen and pelvis was performed without the administration of intravenous contrast. Multiplanar reformatted images are provided for review.  Automated exposure control, iterative reconstruction, and/or weight based adjustment of the mA/kV was utilized to reduce the radiation dose to as low as reasonably achievable. COMPARISON: MRI examination September 3, 2022. HISTORY: ORDERING SYSTEM PROVIDED HISTORY: abdominal pain TECHNOLOGIST PROVIDED HISTORY: Reason for exam:->abdominal pain Additional Contrast?->None Decision Support Exception - unselect if not a suspected or confirmed emergency medical condition->Emergency Medical Condition (MA) FINDINGS: Lower Chest: Mild airspace disease posterior basilar segments left lower lobe may represent aspiration or other pneumonitis. No effusions. . Organs: Moderate splenomegaly, 14.6 cm, and shrunken 8.5 cm liver with nodular surface contour and recanalized paraumbilical vein compatible with hepatic cirrhosis/portal hypertension. Cholelithiasis. 3.2 cm cystic lesion of the pancreatic tail, unchanged from prior MRI examination of September 3, 2022. Liver, biliary tree, bilateral adrenal glands, bilateral kidneys, spleen and pancreas are otherwise normal. GI/Bowel: No ileus or obstruction. Normal appendix right lower quadrant. No inflammatory bowel process. Pelvis: No adenopathy or fluid. Bladder dome diverticulum noted. Peritoneum/Retroperitoneum: Negative. Bones/Soft Tissues: No acute osseous lesions. 1. No acute disease. 2. Normal appendix right lower quadrant. 3. Cholelithiasis and findings consistent with hepatic cirrhosis. 4. 3.2 cm cystic lesion of the pancreatic tail, unchanged from MRI examination of 3 months prior. RECOMMENDATIONS: Careful clinical correlation and follow up recommended.      XR CHEST PORTABLE    Result Date: 11/25/2022  EXAMINATION: ONE XRAY VIEW OF THE CHEST 11/25/2022 9:18 pm COMPARISON: 9 November 2022 HISTORY: ORDERING SYSTEM PROVIDED HISTORY: shortness of breath TECHNOLOGIST PROVIDED HISTORY: Reason for exam:->shortness of breath FINDINGS: Single AP erect portable chest demonstrates satisfactory expansion lungs with mild increased markings at lung bases. There are no focal alveolar infiltrates or effusions. The cardiac silhouette appears unremarkable. There is no evidence of a pneumothorax. No acute cardiopulmonary process. XR CHEST PORTABLE    Result Date: 11/9/2022  EXAMINATION: ONE XRAY VIEW OF THE CHEST 11/9/2022 9:32 am COMPARISON: 20 August 2021 HISTORY: ORDERING SYSTEM PROVIDED HISTORY: cough TECHNOLOGIST PROVIDED HISTORY: Reason for exam:->cough FINDINGS: The lungs are without acute focal process. There is no effusion or pneumothorax. The cardiomediastinal silhouette is without acute process. The osseous structures are without acute process. No acute process. US RETROPERITONEAL COMPLETE    Result Date: 11/12/2022  EXAMINATION: RETROPERITONEAL ULTRASOUND OF THE KIDNEYS AND URINARY BLADDER 11/12/2022 COMPARISON: None HISTORY: ORDERING SYSTEM PROVIDED HISTORY: MELE on CKD TECHNOLOGIST PROVIDED HISTORY: Reason for exam:->MELE on CKD What reading provider will be dictating this exam?->CRC FINDINGS: Kidneys: There is limited visualization on exam.  Renal sizes are probably adequate. Echogenicity is difficult to assess. No hydronephrosis. Bladder: Not distended for evaluation, volume at 10 cc measured. 1. No renal obstruction 2. Otherwise limited exam     US DUP LOWER ART/BYPASS GRAFTS BILATERAL COMPLETE    Result Date: 11/10/2022  EXAMINATION: ARTERIAL DUPLEX ULTRASOUND OF THE BILATERAL LOWER EXTREMITIES  11/9/2022 9:27 pm TECHNIQUE: Duplex ultrasound using B-mode/gray scaled imaging, Doppler spectral analysis and color flow Doppler was obtained of the bilateral lower extremities. COMPARISON: None. HISTORY: ORDERING SYSTEM PROVIDED HISTORY: Eval for PAD TECHNOLOGIST PROVIDED HISTORY: Reason for exam:->Eval for PAD What reading provider will be dictating this exam?->CRC FINDINGS: Multiphasic waveforms above the knee bilaterally.   Flow turbulence and minimally biphasic waveforms below the knee on the right without occlusion. Primarily monophasic flow in the small vessels below the knee on the left. No occlusion. Mild-to-moderate atheromatous and calcific plaque in both lower extremity arterial structures. Right: Flow velocities were measured as follows: Com. Fem. 101 cm/sec Prof.           73 cm/sec SFA Prox. 96 cm/sec SFA Mid.     110 cm/sec SFA Dist.     83 cm/sec Pop. 123 cm/sec PTA           142 cm/sec Peron. 67 cm/sec JOEL           43 cm/sec Left: Flow velocities were measured as follows: Com. Fem. 133 cm/sec Prof.           51 cm/sec SFA Prox. 137 cm/sec SFA Mid.     140 cm/sec SFA Dist.     162 cm/sec Pop.           95 cm/sec PTA           89 cm/sec Peron. 123 cm/sec JOEL           41 cm/sec     Moderate PAD. No evidence of vessel occlusion. Abnormal waveforms and flow turbulence in the small vessels below the knee bilaterally. US DUP LOWER EXTREMITIES BILATERAL VENOUS    Result Date: 11/9/2022  EXAMINATION: DUPLEX VENOUS ULTRASOUND OF THE BILATERAL LOWER Cee Gearing 3:22 pm TECHNIQUE: Duplex ultrasound using B-mode/gray scaled imaging, Doppler spectral analysis and color flow Doppler was obtained of the deep venous structures of the lower bilateral extremities. COMPARISON: None. HISTORY: ORDERING SYSTEM PROVIDED HISTORY: discomfort TECHNOLOGIST PROVIDED HISTORY: Reason for exam:->discomfort What reading provider will be dictating this exam?->CRC FINDINGS: The visualized veins of the bilateral lower extremities are patent and free of echogenic thrombus. The veins demonstrate good compressibility with normal color flow study and spectral analysis. No evidence of DVT in either lower extremity. RECOMMENDATIONS: Unavailable         Assessment and Plan  Patient is a 66 y.o. male patient on consult for hemoptysis.      Cirrhosis  -Prior MELD = 20, MELD-Na = 24 (11/10/2022)  -Negative viral liver serology  -Negative autoimmune liver serology  -History of A1AT deficiency - pending phenotype - has seen hematology in the past  -History of elevated iron / ferritin - pending HFE genes  -Elevated IgG  -Normal AFP 7 (11/10/2022)  -MRI abdomen 9/4/2022 without evidence of hepatic mass although low signal area in the right hepatic lobe noted  -Consider patient for EGD for variceal screening     2. Pancreatic lesion  -MRI abdomen 9/4/2022 showing pancreas and pancreatic duct with multiple sidebranch regularities increasing in size compared to prior exam 6/29/2021  -Elevated CA 19-9 =  53 (7/12/2021) => 63 (11/10/2022)  -Elevated CEA = 7.0 (7/12/2021) => 5.7 (11/10/2022)  -Normal AFP = 9 (5/16/2022) => 7 (11/10/2022)  -Endoscopic ultrasound 8/6/2021 by Dr. Royden Boast showing 22 mm complex cystic lesion in the pancreatic tail adjacent to the pancreatic duct, no dilation of the pancreatic duct  -Biopsy consistent with gastric duodenal contaminant, cellblock was acellular - nondiagnostic biopsy     3. Anemia  -Mild / normocytic  -No overt GI bleeding  -Reported hemoptysis  -Possibility of bleeding varices - octreotide per admitting  -No prior evidence of iron deficiency  -Monitor hgb q12 hours  -Repeat FOBT  -PPI twice daily  -Consider endoscopic evaluation - will require cardiac clearance prior to procedures     4. Diarrhea  -Obtain stool studies  -Colonoscopy likely outpatient    5. CAD / HFrEF / elevated troponin  -Elevated / increased troponin since arrival  -Echo 8/20/2021 showing EF 25%  -Cardiac catheterization 8/23/2021 showing severe multivessel coronary artery disease  -Will require cardiac clearance prior to any endoscopic procedures     6. Comorbidities  -Rhinovirus, hypertension, dyslipidemia, diabetes, cellulitis  -Per admitting/consultants      Hemoglobin overall stable. Pending CT chest.  Higher suspicion that hemoptysis is respiratory in nature rather than GI. Continue octreotide. Twice daily PPI. Consider EGD.   Will require cardiac clearance prior to endoscopic evaluation given elevated/increasing troponins. Further orders will depend on patient course and results of testing. Thank you for the opportunity to participate in the care of Mr. Seema Sarmiento.     PIERCE Munoz - CNP  1:52 PM  11/26/2022

## 2022-11-26 NOTE — H&P
[facility] History and Physical      CHIEF COMPLAINT: Hypotension, dizziness    History of Present Illness:    Mr. 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 In talking with Mr. Darya Topete, he was at home this morning and forgot to take his morning medications including his morning midodrine. He proceeded to get up to the bathroom and developed dizziness. When his sister-in-law checked his blood pressure was noted to be very low and she administered his morning dose of midodrine. He then called EMS for further evaluation. When EMS arrived BP was in the upper 80s. They decided to seek further evaluation at [facility] emergency department. He denies fevers or chills. In the ED, vitals on presentation were temp 96.7, RR 16, HR 77, /57, O2 sat 99% on room air. Lab work was obtained which revealed serum sodium 132, creatinine 1.8, lactic acid 3.8 followed by 2.9, blood glucose 228, high-sensitivity troponin 61 followed by 82. CBC was obtained which revealed WBC 6.7, hemoglobin 11.9, platelets 48T. A chest x-ray was obtained which revealed no acute cardiopulmonary process. Due to abdominal discomfort a CT abdomen/pelvis without contrast was obtained which revealed no acute disease. Normal appendix right lower quadrant. Cholelithiasis and findings assistant with hepatic cirrhosis. 3.2 cm cystic lesion of the pancreatic tail, unchanged from MRI examination 3 months prior. He was administered midodrine and given a 500 cc bolus of normal saline. He proceeded to have an episode of hemoptysis with bright red blood per sister-in-law. Decision was made to hospitalize the patient and internal medicine was contacted for hospitalization.     Upon ROS, he reports lightheadedness, dizziness with diarrhea. Denies fevers, chills, seizure, loss consciousness, vision change, chest pain, shortness of breath, nausea, vomiting, abdominal pain, constipation, dysuria, increased urinary frequency, myalgias, joint pain, bleeding, new skin rash. Informant(s) for H&P: Patient    REVIEW OF SYSTEMS:  A comprehensive review of systems was negative except for: what is in the HPI      PMH:  Past Medical History:   Diagnosis Date    Allergic rhinitis     Seasonal    Cardiomyopathy ([facility]     CHF (congestive heart failure) (HCC)     DM (diabetes mellitus) ([facility]     Enlarged prostate     Thrombocytopenia (HCC)     Type 2 diabetes mellitus without complication (HCC)     VHD (valvular heart disease)        Surgical History:  Past Surgical History:   Procedure Laterality Date    CATARACT REMOVAL Bilateral 2017    CYST REMOVAL      from left heel    HAMMER TOE SURGERY Bilateral 1979    REFRACTIVE SURGERY Bilateral     UPPER GASTROINTESTINAL ENDOSCOPY N/A 8/6/2021    EGD W/EUS FNA performed by Manish Salomon MD at [address] [place] [address]       Medications Prior to Admission:    Prior to Admission medications    Medication Sig Start Date End Date Taking?  Authorizing Provider   pantoprazole (PROTONIX) 40 MG tablet Take 1 tablet by mouth every morning (before breakfast) 11/14/22   PIERCE Vazquez CNP   Cholecalciferol (VITAMIN D) 25 MCG TABS Take 1 tablet by mouth daily 11/14/22   PIERCE Vazquez CNP   midodrine (PROAMATINE) 10 MG tablet Take 1 tablet by mouth 3 times daily (with meals) 11/13/22   PIERCE Vazquez CNP   ammonium lactate (LAC-HYDRIN) 12 % lotion Apply topically twice daily 10/28/22   Henretta Dural, DPM   glyBURIDE (DIABETA) 5 MG tablet Take 1 tablet by mouth daily (with breakfast) 10/21/22   Linda Johnson MD   montelukast (SINGULAIR) 10 MG tablet take 1 tablet by mouth nightly 8/30/22   Gemma Prater DO   blood glucose test strips (ONETOUCH ULTRA) strip Inject 1 each into the skin daily As needed. 3/31/22   Adriana Jain MD   furosemide (LASIX) 40 MG tablet take 1 tablet by mouth once daily  Patient taking differently: Will take a 2nd pill if legs swelling 2/21/22   Gerry Mosley, DO   atorvastatin (LIPITOR) 40 MG tablet Take 1 tablet by mouth nightly 12/7/21   Gerry Mosley, DO   aspirin 81 MG EC tablet Take 1 tablet by mouth daily 12/7/21   Gerry Mosley, DO   fluticasone [place]) 50 MCG/ACT nasal spray 1 spray by Each Nostril route daily 12/7/21   Gerry Mosley, DO   cetirizine (ZYRTEC) 10 MG tablet Take 10 mg by mouth daily    Historical Provider, MD   Multiple Vitamins-Minerals (PRESERVISION AREDS 2+MULTI VIT) CAPS Take 1 capsule by mouth 2 times daily    Historical Provider, MD       Allergies:    Penicillins, Ambrosia artemisiifolia (ragweed) skin test, Cat hair extract, Egg lecithin, Eggs or egg-derived products, Grass pollen(k-o-r-t-swt arielle), Macadamia nut oil, Milk-related compounds, Mixed ragweed, Molds & smuts, Peanut-containing drug products, and Seasonal    Social History:    reports that he quit smoking about 29 years ago. His smoking use included cigarettes. He has a 20.00 pack-year smoking history. He quit smokeless tobacco use about 52 years ago. His smokeless tobacco use included chew. He reports that he does not currently use alcohol. He reports that he does not use drugs. Family History:   family history includes Alcohol Abuse in his father; Diabetes in his brother; No Known Problems in his sister.        PHYSICAL EXAM:  Vitals:  BP (!) 153/86   Pulse 82   Temp 97.3 °F (36.3 °C) (Oral)   Resp 16   Ht 5' 11\" (1.803 m)   Wt 210 lb (95.3 kg)   SpO2 98%   BMI 29.29 kg/m²     General Appearance: alert and oriented to person, place and time and in no acute distress, weak appearing  Skin: warm and dry  Head: normocephalic and atraumatic  Eyes: pupils equal, round, and reactive to light, extraocular eye movements intact, conjunctivae normal  Neck: neck supple and non tender without mass   Pulmonary/Chest: clear to auscultation bilaterally- no wheezes, rales or rhonchi, normal air movement, no respiratory distress  Cardiovascular: normal rate, normal S1 and S2 and no carotid bruits  Abdomen: soft, non-tender, non-distended, normal bowel sounds, no masses or organomegaly  Extremities: no cyanosis, no clubbing and no edema  Neurologic: no cranial nerve deficit and speech normal        LABS:  Recent Labs     22      K 4.2   CL 98   CO2 22   BUN 36*   CREATININE 1.8*   GLUCOSE 228*   CALCIUM 8.9       Recent Labs     22   WBC 6.7   RBC 3.82   HGB 11.9*   HCT 35.0*   MCV 91.6   MCH 31.2   MCHC 34.0   RDW 16.7*   PLT 71*   MPV 12.3*       No results for input(s): POCGLU in the last 72 hours. Radiology:   CT ABDOMEN PELVIS WO CONTRAST Additional Contrast? None   Final Result   1. No acute disease. 2. Normal appendix right lower quadrant. 3. Cholelithiasis and findings consistent with hepatic cirrhosis. 4. 3.2 cm cystic lesion of the pancreatic tail, unchanged from MRI   examination of 3 months prior. RECOMMENDATIONS:   Careful clinical correlation and follow up recommended. XR CHEST PORTABLE   Final Result   No acute cardiopulmonary process. CT CHEST W CONTRAST    (Results Pending)       EK2022 sinus rhythm with occasional PVCs. Left anterior fascicular block. HR 73, QTc 526 MS.    ASSESSMENT:      Principal Problem:    Lactic acidosis  Active Problems:    Hemoptysis  Resolved Problems:    * No resolved hospital problems. *      PLAN:    1. Hypotension-patient presents from home with hypotension with associated lightheadedness and dizziness. Patient's blood pressure has improved with 500 cc bolus in the emergency department as well as midodrine.   Continue midodrine, start on gentle IV fluid hydration and LR at 75 cc/h.  2.  Hemoptysis, concern for bleeding esophageal varices given his history of cirrhosis versus endobronchial bleeding lesion-start PPI, start octreotide infusion, administer IV ceftriaxone, obtain CT chest with contrast to evaluate for endobronchial lesion. 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 Consult GI.  3.  Lactic acidosis-repeat lactic acid, continue IV fluids  4. Nonzero troponin-repeat troponin  5. Chronic systolic CHF-holding home diuretics given hypotension, administering gentle IV fluids  6. CKD stage IIIb and monitor with daily BMP  7. Chronic normocytic anemia-monitor with daily CBC, transfuse for hemoglobin less than 7  8. Thrombocytopenia monitor daily CBC  9. Cirrhosis secondary to NAFLD-consulting GI  10.  Bilateral lower extremity stasis dermatitis  11. DM type [id number], insulin lispro subcu, hypoglycemic precautions    Code Status: DNR CCA  DVT prophylaxis: None due to concern for GI bleed      NOTE: This report was transcribed using voice recognition software. Every effort was made to ensure accuracy; however, inadvertent computerized transcription errors may be present.   Electronically signed by Karen Talamantes MD on 11/26/2022 at 1:33 AM

## 2022-11-26 NOTE — TELEPHONE ENCOUNTER
Writer contacted Dr. Michael Otero to inform of 30 day readmission risk. Writer's attempt to contact Dr. Michael Otero was unsuccessful.      Call Back: If you need to call back to inform of disposition you can contact me at [phone]

## 2022-11-26 NOTE — ED PROVIDER NOTES
[facility]  Department of Emergency Medicine     Written by: Gaurav Obrien DO  Patient Name: Yuliana Almendarez  Attending Provider: Maximilian Nieto DO  Admit Date: 2022  8:21 PM  MRN: [id number]                   : 1950        Chief Complaint   Patient presents with    Dizziness    - Chief complaint    Mr. Seema Sarmiento is a 66year old male who presents to the ED due to weakness and dizziness. Patient was recently mated to the hospital for a CHF exacerbation and sinusitis earlier this month. States that tonight he was home when he went to go to the bathroom and got increasingly dizzy was unable to make it there at which point he called EMS. Patient has felt increasingly weak over the past several days. Also states that he has had mild shortness of breath during this time. He has had associated generalized weakness with his symptoms. Patient symptoms have been constant since onset. He also began having several episodes of hemoptysis while in the ED but notes that he has not had any prior to his arrival.  Symptoms have been constant since onset. Feels as though his symptoms seem to be aggravated with exertion and relieved by nothing. Denies any recent fever, chills, nausea, vomiting, chest pain, abdominal pain, bowel or urinary changes, headaches or vision changes. Review of Systems   Constitutional:  Positive for fatigue. Negative for chills and fever. HENT:  Negative for congestion, sinus pressure, sinus pain and sore throat. Eyes:  Negative for pain, redness and visual disturbance. Respiratory:  Positive for shortness of breath. Negative for cough and chest tightness. Hemoptysis   Cardiovascular:  Negative for chest pain, palpitations and leg swelling. Gastrointestinal:  Negative for abdominal pain, constipation, diarrhea, nausea and vomiting. Genitourinary:  Negative for dysuria, flank pain, frequency, hematuria, penile discharge and urgency. Musculoskeletal:  Negative for arthralgias, back pain, gait problem, joint swelling and myalgias. Skin:  Negative for rash. Neurological:  Positive for dizziness, weakness and light-headedness. Negative for tremors, syncope, numbness and headaches. Physical Exam  Constitutional:       General: He is not in acute distress. Appearance: Normal appearance. He is normal weight. He is ill-appearing. He is not toxic-appearing. HENT:      Head: Normocephalic and atraumatic. Right Ear: External ear normal.      Left Ear: External ear normal.      Nose: Nose normal.      Mouth/Throat:      Mouth: Mucous membranes are moist.      Pharynx: Oropharynx is clear. Eyes:      Extraocular Movements: Extraocular movements intact. Conjunctiva/sclera: Conjunctivae normal.   Cardiovascular:      Rate and Rhythm: Normal rate and regular rhythm. Pulses: Normal pulses. Heart sounds: Normal heart sounds. Pulmonary:      Effort: Pulmonary effort is normal. No respiratory distress. Breath sounds: No wheezing. Comments: Decreased throughout  Abdominal:      General: Abdomen is flat. Bowel sounds are normal. There is no distension. Palpations: Abdomen is soft. Tenderness: There is abdominal tenderness (Generalized). There is no guarding or rebound. Musculoskeletal:         General: No swelling or tenderness. Normal range of motion. Cervical back: Normal range of motion and neck supple. No rigidity or tenderness. Skin:     General: Skin is warm and dry. Findings: No erythema or rash. Neurological:      General: No focal deficit present. Mental Status: He is alert and oriented to person, place, and time. Mental status is at baseline. Sensory: No sensory deficit. Motor: No weakness.    Psychiatric:         Mood and Affect: Mood normal.         Behavior: Behavior normal.        Procedures       MDM  Number of Diagnoses or Management Options  General weakness  Hemoptysis  Lactic acidosis  Lightheadedness  NSTEMI (non-ST elevated myocardial infarction) [facility])  Diagnosis management comments: This is a 66year old male who presents to the ED due to weakness and dizziness. Patient had generalized abdominal tenderness on exam and was sent for CT abdomen pelvis. He was mildly hypotensive on arrival and given 1 L normal saline bolus and his home dose of midodrine which he states he did not take. CBC revealed a hemoglobin of 11.9. BMP revealed elevated creatinine 1.8 which appears to be mildly elevated from his normal limits. Hepatic function panel was markedly benign for the patient. Lipase was mildly elevated at 147. Initial troponin was 61 with a repeat of 82 with suspected and related to NSTEMI. Urinalysis did not reveal any signs of infection. Initial lactic was 3.8 with repeat of 2.9 following fluid administration. Chest x-ray revealed no acute cardiopulmonary process. CT abdomen pelvis showed no acute disease with a normal appendix in the right lower quadrant and cholelithiasis as well as hepatic cirrhosis with a cyst on his pancreatic tail. Patient will be admitted for further work-up and treatment of his hemoptysis, lactic acidosis and generalized malaise by Dr. [place] Brooks at this time. --------------------------------------------- PAST HISTORY ---------------------------------------------  Past Medical History:  has a past medical history of Allergic rhinitis, Cardiomyopathy ([facility] CHF (congestive heart failure) ([facility] DM (diabetes mellitus) ([facility] Enlarged prostate, Thrombocytopenia ([facility] Type 2 diabetes mellitus without complication ([facility] and VHD (valvular heart disease). Past Surgical History:  has a past surgical history that includes Refractive surgery (Bilateral); Hammer toe surgery (Bilateral, 1979); cyst removal; Cataract removal (Bilateral, 2017); and Upper gastrointestinal endoscopy (N/A, 8/6/2021).     Social History: reports that he quit smoking about 29 years ago. His smoking use included cigarettes. He has a 20.00 pack-year smoking history. He quit smokeless tobacco use about 52 years ago. His smokeless tobacco use included chew. He reports that he does not currently use alcohol. He reports that he does not use drugs. Family History: family history includes Alcohol Abuse in his father; Diabetes in his brother; No Known Problems in his sister. The patients home medications have been reviewed.     Allergies: Penicillins, Ambrosia artemisiifolia (ragweed) skin test, Cat hair extract, Egg lecithin, Eggs or egg-derived products, Grass pollen(k-o-r-t-swt arielle), Macadamia nut oil, Milk-related compounds, Mixed ragweed, Molds & smuts, Peanut-containing drug products, and Seasonal    -------------------------------------------------- RESULTS -------------------------------------------------    LABS:  Results for orders placed or performed during the hospital encounter of 11/25/22   Lactate, Sepsis   Result Value Ref Range    Lactic Acid, Sepsis 3.8 (HH) 0.5 - 1.9 mmol/L   CBC with Auto Differential   Result Value Ref Range    WBC 6.7 4.5 - 11.5 E9/L    RBC 3.82 3.80 - 5.80 E12/L    Hemoglobin 11.9 (L) 12.5 - 16.5 g/dL    Hematocrit 35.0 (L) 37.0 - 54.0 %    MCV 91.6 80.0 - 99.9 fL    MCH 31.2 26.0 - 35.0 pg    MCHC 34.0 32.0 - 34.5 %    RDW 16.7 (H) 11.5 - 15.0 fL    Platelets 71 (L) 331 - 450 E9/L    MPV 12.3 (H) 7.0 - 12.0 fL    Neutrophils % 66.7 43.0 - 80.0 %    Immature Granulocytes % 0.4 0.0 - 5.0 %    Lymphocytes % 20.8 20.0 - 42.0 %    Monocytes % 7.6 2.0 - 12.0 %    Eosinophils % 3.6 0.0 - 6.0 %    Basophils % 0.9 0.0 - 2.0 %    Neutrophils Absolute 4.44 1.80 - 7.30 E9/L    Immature Granulocytes # 0.03 E9/L    Lymphocytes Absolute 1.39 (L) 1.50 - 4.00 E9/L    Monocytes Absolute 0.51 0.10 - 0.95 E9/L    Eosinophils Absolute 0.24 0.05 - 0.50 E9/L    Basophils Absolute 0.06 0.00 - 0.20 E9/L   BMP   Result Value Ref Range 478 ms    QTc Calculation (Bazett) 526 ms    P Axis 37 degrees    R Axis -49 degrees    T Axis 90 degrees       RADIOLOGY:  CT ABDOMEN PELVIS WO CONTRAST Additional Contrast? None   Final Result   1. No acute disease. 2. Normal appendix right lower quadrant. 3. Cholelithiasis and findings consistent with hepatic cirrhosis. 4. 3.2 cm cystic lesion of the pancreatic tail, unchanged from MRI   examination of 3 months prior. RECOMMENDATIONS:   Careful clinical correlation and follow up recommended. XR CHEST PORTABLE   Final Result   No acute cardiopulmonary process. CT CHEST W CONTRAST    (Results Pending)       EKG #1:  This EKG is signed and interpreted by me. Rate: 88  Rhythm: Sinus with frequent PVCs  Interpretation: prolonged QT interval  Comparison: changes compared to previous EKG and no previous EKG available      EKG #2:  This EKG is signed and interpreted by me. Rate: 75  Rhythm: Sinus  Interpretation: left anterior fascicular block and prolonged QT interval  Comparison: stable as compared to patient's most recent EKG      ------------------------- NURSING NOTES AND VITALS REVIEWED ---------------------------  Date / Time Roomed:  11/25/2022  8:21 PM  ED Bed Assignment:  04/04    The nursing notes within the ED encounter and vital signs as below have been reviewed.      Patient Vitals for the past 24 hrs:   BP Temp Temp src Pulse Resp SpO2 Height Weight   11/25/22 2252 (!) 98/43 -- -- 74 16 99 % -- --   11/25/22 2129 106/63 -- -- -- -- -- -- --   11/25/22 2036 (!) 104/57 (!) 96.7 °F (35.9 °C) Axillary 77 16 99 % 5' 11\" (1.803 m) 210 lb (95.3 kg)       Oxygen Saturation Interpretation: Normal    ------------------------------------------ PROGRESS NOTES ------------------------------------------  Re-evaluation(s):  Time: 0030  Patients symptoms show no change  Repeat physical examination is not changed    Counseling:  I have spoken with the patient and discussed todays results, in addition to providing specific details for the plan of care and counseling regarding the diagnosis and prognosis. Their questions are answered at this time and they are agreeable with the plan of admission.    --------------------------------- ADDITIONAL PROVIDER NOTES ---------------------------------  Consultations:  Time: 0045. Spoke with Dr. Daniel Siddiqui. Discussed case. They will admit the patient. This patient's ED course included: a personal history and physicial examination, re-evaluation prior to disposition, multiple bedside re-evaluations, IV medications, cardiac monitoring, and continuous pulse oximetry    This patient has remained hemodynamically stable during their ED course. Diagnosis:  1. Lactic acidosis    2. Lightheadedness    3. General weakness    4. Hemoptysis    5. NSTEMI (non-ST elevated myocardial infarction) ([facility]        Disposition:  Patient's disposition: Admit to telemetry  Patient's condition is stable. Patient was seen and evaluated by myself and my attending Gale Whelan DO. Assessment and Plan discussed with attending provider, please see attestation for final plan of care.      Meg Ortega, [address] DO  Resident  11/26/22 2306

## 2022-11-26 NOTE — PROGRESS NOTES
Radiology Procedure Waiver   Name: Luis Okeefe  : 1950  MRN: [id number]    Date:  22    Time: 11:11 PM EST    Benefits of immediately proceeding with Radiology exam(s) without pre-testing outweigh the risks or are not indicated as specified below and therefore the following is/are being waived:    [] Pregnancy test   [] Patients LMP on-time and regular.   [] Patient had Tubal Ligation or has other Contraception Device. [] Patient  is Menopausal or Premenarcheal.    [] Patient had Full or Partial Hysterectomy. [] Protocol for Iodine allergy    [] MRI Questionnaire     [x] BUN/Creatinine   [] Patient age w/no hx of renal dysfunction. [] Patient on Dialysis. [] Recent Normal Labs.   Electronically signed by Lin Ibarra DO on 22 at 11:11 PM EST

## 2022-11-27 PROBLEM — R77.8 ELEVATED TROPONIN: Status: ACTIVE | Noted: 2022-11-27

## 2022-11-27 PROBLEM — R79.89 ELEVATED TROPONIN: Status: ACTIVE | Noted: 2022-11-27

## 2022-11-27 PROBLEM — Z01.810 PREOPERATIVE CARDIOVASCULAR EXAMINATION: Status: ACTIVE | Noted: 2022-11-27

## 2022-11-27 PROBLEM — E11.22 TYPE 2 DIABETES MELLITUS WITH STAGE 3A CHRONIC KIDNEY DISEASE, WITHOUT LONG-TERM CURRENT USE OF INSULIN (HCC): Status: ACTIVE | Noted: 2019-06-14

## 2022-11-27 PROBLEM — I21.4 NSTEMI (NON-ST ELEVATED MYOCARDIAL INFARCTION) (HCC): Status: ACTIVE | Noted: 2022-11-27

## 2022-11-27 PROBLEM — N18.31 TYPE 2 DIABETES MELLITUS WITH STAGE 3A CHRONIC KIDNEY DISEASE, WITHOUT LONG-TERM CURRENT USE OF INSULIN (HCC): Status: ACTIVE | Noted: 2019-06-14

## 2022-11-27 PROBLEM — E78.00 PURE HYPERCHOLESTEROLEMIA: Status: ACTIVE | Noted: 2022-11-27

## 2022-11-27 PROBLEM — I25.5 ISCHEMIC CARDIOMYOPATHY: Status: ACTIVE | Noted: 2022-11-27

## 2022-11-27 LAB
ACINETOBACTER CALCOAC BAUMANNII COMPLEX BY PCR: NOT DETECTED
ADENOVIRUS BY PCR: NOT DETECTED
ANION GAP SERPL CALCULATED.3IONS-SCNC: 9 MMOL/L (ref 7–16)
ANISOCYTOSIS: ABNORMAL
BACTEROIDES FRAGILIS BY PCR: NOT DETECTED
BASOPHILS ABSOLUTE: 0.11 E9/L (ref 0–0.2)
BASOPHILS RELATIVE PERCENT: 1.8 % (ref 0–2)
BORDETELLA PARAPERTUSSIS BY PCR: NOT DETECTED
BORDETELLA PERTUSSIS BY PCR: NOT DETECTED
BOTTLE TYPE: ABNORMAL
BUN BLDV-MCNC: 28 MG/DL (ref 6–23)
BURR CELLS: ABNORMAL
CALCIUM SERPL-MCNC: 8.9 MG/DL (ref 8.6–10.2)
CANDIDA ALBICANS BY PCR: NOT DETECTED
CANDIDA AURIS BY PCR: NOT DETECTED
CANDIDA GLABRATA BY PCR: NOT DETECTED
CANDIDA KRUSEI BY PCR: NOT DETECTED
CANDIDA PARAPSILOSIS BY PCR: NOT DETECTED
CANDIDA TROPICALIS BY PCR: NOT DETECTED
CHLAMYDOPHILIA PNEUMONIAE BY PCR: NOT DETECTED
CHLORIDE BLD-SCNC: 102 MMOL/L (ref 98–107)
CO2: 25 MMOL/L (ref 22–29)
CORONAVIRUS 229E BY PCR: NOT DETECTED
CORONAVIRUS HKU1 BY PCR: NOT DETECTED
CORONAVIRUS NL63 BY PCR: NOT DETECTED
CORONAVIRUS OC43 BY PCR: NOT DETECTED
CREAT SERPL-MCNC: 1.6 MG/DL (ref 0.7–1.2)
CRYPTOCOCCUS NEOFORMANS/GATTII BY PCR: NOT DETECTED
ENTEROBACTER CLOACAE COMPLEX BY PCR: NOT DETECTED
ENTEROBACTERALES BY PCR: NOT DETECTED
ENTEROCOCCUS FAECALIS BY PCR: DETECTED
ENTEROCOCCUS FAECIUM BY PCR: NOT DETECTED
EOSINOPHILS ABSOLUTE: 0.34 E9/L (ref 0.05–0.5)
EOSINOPHILS RELATIVE PERCENT: 5.4 % (ref 0–6)
ESCHERICHIA COLI BY PCR: NOT DETECTED
GFR SERPL CREATININE-BSD FRML MDRD: 45 ML/MIN/1.73
GLUCOSE BLD-MCNC: 85 MG/DL (ref 74–99)
HAEMOPHILUS INFLUENZAE BY PCR: NOT DETECTED
HCT VFR BLD CALC: 34.7 % (ref 37–54)
HEMOGLOBIN: 11.6 G/DL (ref 12.5–16.5)
HUMAN METAPNEUMOVIRUS BY PCR: NOT DETECTED
HUMAN RHINOVIRUS/ENTEROVIRUS BY PCR: NOT DETECTED
INFLUENZA A BY PCR: NOT DETECTED
INFLUENZA B BY PCR: NOT DETECTED
KLEBSIELLA AEROGENES BY PCR: NOT DETECTED
KLEBSIELLA OXYTOCA BY PCR: NOT DETECTED
KLEBSIELLA PNEUMONIAE GROUP BY PCR: NOT DETECTED
L. PNEUMOPHILA SEROGP 1 UR AG: NORMAL
LACTIC ACID: 2.8 MMOL/L (ref 0.5–2.2)
LISTERIA MONOCYTOGENES BY PCR: NOT DETECTED
LYMPHOCYTES ABSOLUTE: 1.13 E9/L (ref 1.5–4)
LYMPHOCYTES RELATIVE PERCENT: 17.9 % (ref 20–42)
MAGNESIUM: 2.1 MG/DL (ref 1.6–2.6)
MCH RBC QN AUTO: 31.4 PG (ref 26–35)
MCHC RBC AUTO-ENTMCNC: 33.4 % (ref 32–34.5)
MCV RBC AUTO: 93.8 FL (ref 80–99.9)
METER GLUCOSE: 78 MG/DL (ref 74–99)
METER GLUCOSE: 82 MG/DL (ref 74–99)
METER GLUCOSE: 88 MG/DL (ref 74–99)
METER GLUCOSE: 91 MG/DL (ref 74–99)
MONOCYTES ABSOLUTE: 0.44 E9/L (ref 0.1–0.95)
MONOCYTES RELATIVE PERCENT: 7.1 % (ref 2–12)
MYCOPLASMA PNEUMONIAE BY PCR: NOT DETECTED
NEISSERIA MENINGITIDIS BY PCR: NOT DETECTED
NEUTROPHILS ABSOLUTE: 4.28 E9/L (ref 1.8–7.3)
NEUTROPHILS RELATIVE PERCENT: 67.8 % (ref 43–80)
NUCLEATED RED BLOOD CELLS: 0 /100 WBC
ORDER NUMBER: ABNORMAL
PARAINFLUENZA VIRUS 1 BY PCR: NOT DETECTED
PARAINFLUENZA VIRUS 2 BY PCR: NOT DETECTED
PARAINFLUENZA VIRUS 3 BY PCR: NOT DETECTED
PARAINFLUENZA VIRUS 4 BY PCR: NOT DETECTED
PDW BLD-RTO: 17.3 FL (ref 11.5–15)
PLATELET # BLD: 74 E9/L (ref 130–450)
PLATELET CONFIRMATION: NORMAL
PMV BLD AUTO: 12.4 FL (ref 7–12)
POIKILOCYTES: ABNORMAL
POLYCHROMASIA: ABNORMAL
POTASSIUM REFLEX MAGNESIUM: 3.5 MMOL/L (ref 3.5–5)
PROCALCITONIN: 0.08 NG/ML (ref 0–0.08)
PROTEUS SPECIES BY PCR: NOT DETECTED
PSEUDOMONAS AERUGINOSA BY PCR: NOT DETECTED
RBC # BLD: 3.7 E12/L (ref 3.8–5.8)
RESPIRATORY SYNCYTIAL VIRUS BY PCR: NOT DETECTED
SALMONELLA SPECIES BY PCR: NOT DETECTED
SARS-COV-2, PCR: NOT DETECTED
SERRATIA MARCESCENS BY PCR: NOT DETECTED
SODIUM BLD-SCNC: 136 MMOL/L (ref 132–146)
SOURCE OF BLOOD CULTURE: ABNORMAL
STAPHYLOCOCCUS AUREUS BY PCR: NOT DETECTED
STAPHYLOCOCCUS EPIDERMIDIS BY PCR: NOT DETECTED
STAPHYLOCOCCUS LUGDUNENSIS BY PCR: NOT DETECTED
STAPHYLOCOCCUS SPECIES BY PCR: NOT DETECTED
STENOTROPHOMONAS MALTOPHILIA BY PCR: NOT DETECTED
STREP PNEUMONIAE ANTIGEN, URINE: NORMAL
STREPTOCOCCUS AGALACTIAE BY PCR: NOT DETECTED
STREPTOCOCCUS PNEUMONIAE BY PCR: NOT DETECTED
STREPTOCOCCUS PYOGENES  BY PCR: NOT DETECTED
STREPTOCOCCUS SPECIES BY PCR: NOT DETECTED
VANCOMYCIN RESISTANT BY PCR: NOT DETECTED
WBC # BLD: 6.3 E9/L (ref 4.5–11.5)

## 2022-11-27 PROCEDURE — 84145 PROCALCITONIN (PCT): CPT

## 2022-11-27 PROCEDURE — 36415 COLL VENOUS BLD VENIPUNCTURE: CPT

## 2022-11-27 PROCEDURE — 87040 BLOOD CULTURE FOR BACTERIA: CPT

## 2022-11-27 PROCEDURE — 2060000000 HC ICU INTERMEDIATE R&B

## 2022-11-27 PROCEDURE — 83605 ASSAY OF LACTIC ACID: CPT

## 2022-11-27 PROCEDURE — 85025 COMPLETE CBC W/AUTO DIFF WBC: CPT

## 2022-11-27 PROCEDURE — APPSS30 APP SPLIT SHARED TIME 16-30 MINUTES: Performed by: CLINICAL NURSE SPECIALIST

## 2022-11-27 PROCEDURE — 0202U NFCT DS 22 TRGT SARS-COV-2: CPT

## 2022-11-27 PROCEDURE — 82962 GLUCOSE BLOOD TEST: CPT

## 2022-11-27 PROCEDURE — 87449 NOS EACH ORGANISM AG IA: CPT

## 2022-11-27 PROCEDURE — 2580000003 HC RX 258: Performed by: STUDENT IN AN ORGANIZED HEALTH CARE EDUCATION/TRAINING PROGRAM

## 2022-11-27 PROCEDURE — 83735 ASSAY OF MAGNESIUM: CPT

## 2022-11-27 PROCEDURE — 87077 CULTURE AEROBIC IDENTIFY: CPT

## 2022-11-27 PROCEDURE — 6370000000 HC RX 637 (ALT 250 FOR IP): Performed by: STUDENT IN AN ORGANIZED HEALTH CARE EDUCATION/TRAINING PROGRAM

## 2022-11-27 PROCEDURE — 80048 BASIC METABOLIC PNL TOTAL CA: CPT

## 2022-11-27 PROCEDURE — 87425 ROTAVIRUS AG IA: CPT

## 2022-11-27 PROCEDURE — 87045 FECES CULTURE AEROBIC BACT: CPT

## 2022-11-27 PROCEDURE — 87328 CRYPTOSPORIDIUM AG IA: CPT

## 2022-11-27 PROCEDURE — 99223 1ST HOSP IP/OBS HIGH 75: CPT | Performed by: INTERNAL MEDICINE

## 2022-11-27 PROCEDURE — C9113 INJ PANTOPRAZOLE SODIUM, VIA: HCPCS | Performed by: STUDENT IN AN ORGANIZED HEALTH CARE EDUCATION/TRAINING PROGRAM

## 2022-11-27 PROCEDURE — 99233 SBSQ HOSP IP/OBS HIGH 50: CPT | Performed by: INTERNAL MEDICINE

## 2022-11-27 PROCEDURE — 87329 GIARDIA AG IA: CPT

## 2022-11-27 PROCEDURE — 6360000002 HC RX W HCPCS: Performed by: STUDENT IN AN ORGANIZED HEALTH CARE EDUCATION/TRAINING PROGRAM

## 2022-11-27 RX ADMIN — SODIUM CHLORIDE, PRESERVATIVE FREE 10 ML: 5 INJECTION INTRAVENOUS at 08:29

## 2022-11-27 RX ADMIN — PANTOPRAZOLE SODIUM 40 MG: 40 INJECTION, POWDER, FOR SOLUTION INTRAVENOUS at 21:05

## 2022-11-27 RX ADMIN — SODIUM CHLORIDE, PRESERVATIVE FREE 10 ML: 5 INJECTION INTRAVENOUS at 21:04

## 2022-11-27 RX ADMIN — MONTELUKAST SODIUM 10 MG: 10 TABLET ORAL at 21:04

## 2022-11-27 RX ADMIN — DEXTROSE MONOHYDRATE: 100 INJECTION, SOLUTION INTRAVENOUS at 02:30

## 2022-11-27 RX ADMIN — ATORVASTATIN CALCIUM 40 MG: 40 TABLET, FILM COATED ORAL at 21:04

## 2022-11-27 RX ADMIN — WATER 1000 MG: 1 INJECTION INTRAMUSCULAR; INTRAVENOUS; SUBCUTANEOUS at 02:30

## 2022-11-27 RX ADMIN — MIDODRINE HYDROCHLORIDE 10 MG: 5 TABLET ORAL at 16:04

## 2022-11-27 RX ADMIN — MIDODRINE HYDROCHLORIDE 10 MG: 5 TABLET ORAL at 08:28

## 2022-11-27 RX ADMIN — CETIRIZINE HYDROCHLORIDE 10 MG: 10 TABLET, FILM COATED ORAL at 08:28

## 2022-11-27 RX ADMIN — PANTOPRAZOLE SODIUM 40 MG: 40 INJECTION, POWDER, FOR SOLUTION INTRAVENOUS at 08:28

## 2022-11-27 RX ADMIN — MIDODRINE HYDROCHLORIDE 10 MG: 5 TABLET ORAL at 12:11

## 2022-11-27 RX ADMIN — OCTREOTIDE ACETATE 50 MCG/HR: 500 INJECTION, SOLUTION INTRAVENOUS; SUBCUTANEOUS at 12:12

## 2022-11-27 ASSESSMENT — PAIN SCALES - GENERAL
PAINLEVEL_OUTOF10: 0

## 2022-11-27 NOTE — PROGRESS NOTES
Stool specimen does not qualify to send for CDiff - Cdiff Isolation and CDiff specimen order discontinued per protocol

## 2022-11-27 NOTE — CONSULTS
Inpatient Cardiology Consultation      Reason for Consult:  Patient with known multivessel disease - needs EGD cardiac clearance     Consulting Physician: Dr. Vero Voss    Requesting Physician:  Dr. Rex Oliver     Date of Consultation: 11/27/2022    HISTORY OF PRESENT ILLNESS:   Mr. Barby Diaz is a 66year old  male who follows with Dr. Dominic Galvez. He was recently seen in the office with Dr. Sheri Robin on 07/05/2022. Seen in hospital consultation for HFrEF with exacerbation on 11/10/22 by Dr. Saira Covarrubias. Pro-BNP 1671 - GDMT limited due to hypotension. Discharged on Lasix PO 40mg, Midodrine 10 mg TID & Lipitor 40 mg     PMH:  CAD / Dilated Cardiomyopathy (not considered a candidate for CABG at [address] [place], [place] [address]can Dr or [facility]),  HFrEF LVEF 40% , DM II, CKD,Liver Cirrhosis, thrombocytopenia     [facility] ED 11/25/22 2020 via EMS - SOB & Dizziness / weakness - in ED had Hemoptysis   ED Vitals: T 96.7 HR 77 RR 16 /57 - 79/48 SPO2 99% RA  ED Treatment: NS IV Bolus 500 mL &  Midodrine 10 mg     Significant Labs: HS Trop 82 >>101>>61 Lactic Acid 2.9 >>2.4>>2.8  BUN 36 Cr 1.8 >> BUN 28 Cr 1.6 Alb 2.7 AST 51 Bilirubin 2.5 Lipase 147 H/H 11.6 / 34.7 PLT 71 >> 74   Respiratory Panel - negative   Blood cultures pending - preliminary - Gram positive cocci - Positive Enterococcus faecalis by PCR  Stool cultures pending   Significant RAD:   CT Chest Left lower lobe infiltrates/consolidation, suggestion of early right lower lobe infiltrate. CT ABD:  Cholelithiasis and findings consistent with hepatic cirrhosis.   3.2 cm cystic lesion of the pancreatic tail, unchanged from MRI examination of 3 months prior    GI Consult: Hemoptysis, concern for bleeding esophageal varices given his history of cirrhosis versus endobronchial bleeding lesion - GI felt probably respiratory in nature rather than GI - considering EGD - requiring cardiac clearance - per note 11/26/22 Tadeo Storm Masters, APRN - CNP  (Per PCP note - CT chest to evaluate for endobronchial lesions was negative). Patient seen & examined on 12   Mr Shaun Naidu tells me that the day of admit (11/25/22) he had diarrhea and had not taken medication all day. He was getting up off of toilet, got dizzy and felt off balance / disoriented / \"little\" SOB. He denies fall or syncope. Denies chest pain, palpations, lower extremity swelling, MALLORY, PND. Due to concern for this, he called EMS. Once in ED - he had hemoptysis. Currently, at the bedside are 2 tissues with small amount of dark red blood noted mixed with white sputum. He continues to deny chest pain and has had no further episodes of dizziness or SOB since receiving IV fluids & midodrine. He is in no acute distress at rest.      Please note: past medical records were reviewed per electronic medical record (EMR) - see detailed reports under Past Medical/ Surgical History. Past Medical History:    CAD / Dilated cardiomyopathy/Chronic HFrEF   TTE 07/11/2019 (Dr. Melanie Daugherty): Limited echo for LV function. LV size is normal.  Apical dyskinesis, mid to distal septal hypokinesis. Overall EF mildly decreased, EF estimated about 45%. No recent study found in the local [facility]  TTE 08/24/2021 (Dr. Arely Redding): Borderline dilated LV. Regional wall motion abnormalities with akinesis of the mid and distal anterior, anteroseptal, anterolateral, apical and inferior apical segment as well as basilar inferior segment hypokinesis of the inferior lateral and lateral segments. Severely reduced LV systolic function. EF 25%. The left atrium is mildly dilated. Interatrial septum appears intact. Structurally normal mitral valve. Moderate centrally directed MR. The aortic valve appears mildly sclerotic. Mild TR. Mild PHTN  Cardiac catheterization 08/26/2021 (Dr. Rachael Shelby) for NSTEMI: Severe multivessel CAD   CORONARY ANGIOGRAPHY:  LEFT MAIN:  The left main artery has around 20% luminal narrowing.      LAD:  The left anterior descending artery is occluded at its origin. The distal LAD filled faintly from right-to-left collaterals. INTERMEDIATE RAMUS:  This is a moderate-sized vessel. It divides  shortly after its origin into multiple subdivisions. It is small  caliber measuring around 1 mm in diameter. It had a 90% to 95% ostial  stenosis. LCX:  This is a large, but nondominant vessel. It has a 90% to 95%  ostial narrowing followed by heavily calcified 80% narrowing which  involves the origin of the first marginal branch and the mid left  circumflex. The mid circumflex has a 95% lesion before the AV groove  branch and a small terminal lateral branch: This is a bifurcation  lesion that starts at the origin of the large marginal branch. The  large marginal branch has an eccentric 95% stenosis in its proximal  segment. RCA:  The right coronary artery is heavily calcified along its course. There is a somewhat hazy 60% to 70% stenosis in the proximal vessel. There is around 50% to 60% disease in the mid vessel. There appeared to  be around 70% to 80% ostial stenosis of the posterolateral branch and  also around 60% to 70% stenosis of the ostium of the posterior  descending artery branch. Referral to [facility] CTS and advanced heart failure for evaluation for CABG versus high risk PCI per Dr. Ale Baugh office note 07/05/2022 (turndown for CABG from [facility] and [facility])  TTE 11/12/22 (Dr. Kiesha Chavarria):   Summary  Technically suboptimal and limited study. Left ventricle was not well visualized. Regional wall motion abnormalities with akinesis of distal anteroseptal, anterior, anterolateral, inferoapical and apical segments. Moderately reduced left ventricular systolic dysfunction. LVEF 40% The left atrium is mildly dilated. Mild centrally directed mitral regurgitation.  The aortic valve appears mildly sclerotic  Hx Obesity - resolved - BMI 29.29 11/2022  Remote tobacco abuse  T2DM  CKD IIIb - baseline creatinine 1.3-1.7 mg/dL  Chronic Anemia BPH  Cirrhosis per ultrasound of liver secondary to fatty liver disease with pancreatic body cyst and dilated pancreatic duct, portal hypertension s/p endoscopy/ultrasound with biopsy on 08/06/2021 and again on 08/19/2021 with EGD with EUS FNA  Pancreatic Lesion - MRI abdomen 9/4/2022 showing pancreas and pancreatic duct with multiple sidebranch regularities increasing in size compared to prior exam 6/29/2021/ -Biopsy consistent with gastric duodenal contaminant, cellblock was acellular - nondiagnostic biopsy  Bilateral lower extremity stasis dermatitis   Thrombocytopenia with platelet count 84,044 and 07/2021 / 127 on 11/10/22   Admit 11/9/22 - 11/13/22 HFrEF, Rhinovirus - sinusitis, EVITA on CKD, hypotension SBP 90's limiting GDMT  S/p cataract removal, bilateral hammertoe surgery, left heel cystectomy  DNRCCA      Medications Prior to admit:  Prior to Admission medications    Medication Sig Start Date End Date Taking? Authorizing Provider   pantoprazole (PROTONIX) 40 MG tablet Take 1 tablet by mouth every morning (before breakfast) 11/14/22   PIERCE Acosta CNP   Cholecalciferol (VITAMIN D) 25 MCG TABS Take 1 tablet by mouth daily 11/14/22   PIERCE Acosta CNP   midodrine (PROAMATINE) 10 MG tablet Take 1 tablet by mouth 3 times daily (with meals) 11/13/22   PIERCE Acosta CNP   ammonium lactate (LAC-HYDRIN) 12 % lotion Apply topically twice daily 10/28/22   Consuelo Chand DPM   glyBURIDE (DIABETA) 5 MG tablet Take 1 tablet by mouth daily (with breakfast) 10/21/22   Mihaela Leong MD   montelukast (SINGULAIR) 10 MG tablet take 1 tablet by mouth nightly 8/30/22   Daniel Corona DO   blood glucose test strips (ONETOUCH ULTRA) strip Inject 1 each into the skin daily As needed. 3/31/22   Mihaela Leong MD   furosemide (LASIX) 40 MG tablet take 1 tablet by mouth once daily  Patient taking differently:  Will take a 2nd pill if legs swelling 2/21/22   Daniel Corona DO   atorvastatin (LIPITOR) 40 MG tablet Take 1 tablet by mouth nightly 12/7/21   Lucy Ramirez DO   aspirin 81 MG EC tablet Take 1 tablet by mouth daily 12/7/21   Lucy Ramirez DO   fluticasone [facility]) 50 MCG/ACT nasal spray 1 spray by Each Nostril route daily 12/7/21   Lucy Ramirez,    cetirizine (ZYRTEC) 10 MG tablet Take 10 mg by mouth daily    Historical Provider, MD   Multiple Vitamins-Minerals (PRESERVISION AREDS 2+MULTI VIT) CAPS Take 1 capsule by mouth 2 times daily    Historical Provider, MD       Current Medications:    Current Facility-Administered Medications: atorvastatin (LIPITOR) tablet 40 mg, 40 mg, Oral, Nightly  ammonium lactate (LAC-HYDRIN) 12 % lotion, , Topical, PRN  cetirizine (ZYRTEC) tablet 10 mg, 10 mg, Oral, Daily  fluticasone (FLONASE) 50 MCG/ACT nasal spray 1 spray, 1 spray, Each Nostril, Daily  midodrine (PROAMATINE) tablet 10 mg, 10 mg, Oral, TID WC  montelukast (SINGULAIR) tablet 10 mg, 10 mg, Oral, Nightly  sodium chloride flush 0.9 % injection 5-40 mL, 5-40 mL, IntraVENous, 2 times per day  sodium chloride flush 0.9 % injection 5-40 mL, 5-40 mL, IntraVENous, PRN  0.9 % sodium chloride infusion, , IntraVENous, PRN  polyethylene glycol (GLYCOLAX) packet 17 g, 17 g, Oral, Daily PRN  acetaminophen (TYLENOL) tablet 650 mg, 650 mg, Oral, Q6H PRN **OR** acetaminophen (TYLENOL) suppository 650 mg, 650 mg, Rectal, Q6H PRN  pantoprazole (PROTONIX) injection 40 mg, 40 mg, IntraVENous, BID  octreotide (SANDOSTATIN) 500 mcg in sodium chloride 0.9 % 100 mL infusion, 50 mcg/hr, IntraVENous, Continuous  cefTRIAXone (ROCEPHIN) 1,000 mg in sterile water 10 mL IV syringe, 1,000 mg, IntraVENous, Q24H  lactated ringers infusion, , IntraVENous, Continuous  glucose chewable tablet 16 g, 4 tablet, Oral, PRN  dextrose bolus 10% 125 mL, 125 mL, IntraVENous, PRN **OR** dextrose bolus 10% 250 mL, 250 mL, IntraVENous, PRN  glucagon (rDNA) injection 1 mg, 1 mg, SubCUTAneous, PRN  dextrose 10 % infusion, , IntraVENous, Continuous PRN  insulin lispro (HUMALOG) injection vial 0-4 Units, 0-4 Units, SubCUTAneous, TID WC  insulin lispro (HUMALOG) injection vial 0-4 Units, 0-4 Units, SubCUTAneous, Nightly  iopamidol (ISOVUE-370) 76 % injection 785 mL, 785 mL, IntraVENous, ONCE PRN  midodrine (PROAMATINE) tablet 10 mg, 10 mg, Oral, Once    LR 75 per hour   Sandostatin at 50 mcg/hr     Allergies:  Penicillins, Ambrosia artemisiifolia (ragweed) skin test, Cat hair extract, Egg lecithin, Eggs or egg-derived products, Grass pollen([id number]-o-r-t-swt arielle), Macadamia nut oil, Milk-related compounds, Mixed ragweed, Molds & smuts, Peanut-containing drug products, and Seasonal    Social History:    TOBACCO:   reports that he quit smoking about 29 years ago. His smoking use included cigarettes. He has a 20.00 pack-year smoking history. He quit smokeless tobacco use (chew) about 52 years ago. ETOH:   reports that he does not currently use alcohol. Illicit: Denies all     Lives at home        Family History:   Noncontributory secondary to age    REVIEW OF SYSTEMS:     Constitutional:  + fatigue & chills / Denies fevers night sweats  Eyes: Denies visual changes or drainage  ENT: Denies headaches or hearing loss. No mouth sores or sore throat. No epistaxis   Cardiovascular: Denies chest pain, pressure or palpitations. No lower extremity swelling. Respiratory: Denies MALLORY, orthopnea or PND. Sleeps in recliner chair  + cough + hemoptysis See HPI  Gastrointestinal: Denies hematemesis or anorexia. No hematochezia or melena  + diarrhea pta + nausea   Genitourinary: Denies urgency, dysuria or hematuria. Musculoskeletal: Denies gait disturbance,  joint complaints / feels weak  Integumentary: Denies rash, hives or pruritis / ecchymosis upper extremities   Neurological: + dizziness  Denies headaches or seizures. No numbness or tingling  Psychiatric: Denies anxiety or depression. Endocrine: Denies temperature intolerance. Unknown  recent weight change. .  Hematologic/Lymphatic: Denies abnormal bruising. No swollen lymph nodes    PHYSICAL EXAM:      BP (!) 93/46   Pulse 57   Temp 97.6 °F (36.4 °C) (Oral)   Resp 16   Ht 5' 11\" (1.803 m)   Wt 210 lb (95.3 kg)   SpO2 95%   BMI 29.29 kg/m²   CONST:  Well developed, elderly ill appearing white male who appears of stated age. Awake, alert and cooperative. No apparent distress at rest.   HEENT:   Head- Normocephalic, atraumatic   Eyes- Conjunctivae pink  Throat- Oral mucosa pink and dry - cracked tongue  Neck-  No stridor, trachea midline, RODNEY jugular venous distention due to anatomy. No carotid bruit. CHEST: Chest symmetrical and non-tender to palpation. No accessory muscle use or intercostal retractions  RESPIRATORY: Lung sounds - diminished in posterior bases - remaining clear throughout   CARDIOVASCULAR:     Heart Inspection- shows no noted pulsations  Heart Palpation- no heaves or thrills  Heart Ausculation- Regular rate and rhythm, no murmur. No s3, s4 or rub   PV: No lower extremity edema. BLE venous stasis changes noted No varicosities. Pedal pulses palpable, no clubbing or cyanosis   ABDOMEN: Soft, mild tender to light palpation X 4 quad. Bowel sounds present. No palpable masses   MS: Good muscle strength and tone. No atrophy or abnormal movements. : Deferred  SKIN: Warm and dry / ecchymosis to BUE s/p blood draws  NEURO / PSYCH: Oriented to person, place and time. Speech clear and appropriate. Follows all commands.  Pleasant affect     DATA:    12 lead ECG: SR PVC remaining interpretation as per Dr. Tressa Crandall strips: SB - SR PVCs 57 - 70    Diagnostic: See HPI      Intake/Output Summary (Last 24 hours) at 11/27/2022 1106  Last data filed at 11/26/2022 1458  Gross per 24 hour   Intake --   Output 900 ml   Net -900 ml       Labs:   CBC:   Recent Labs     11/25/22 2102 11/27/22  0742   WBC 6.7 6.3   HGB 11.9* 11.6*   HCT 35.0* 34.7*   PLT 71* 74*     BMP:   Recent Labs     11/25/22 2102 11/27/22  0742    136   K 4.2 3.5   CO2 22 25   BUN 36* 28*   CREATININE 1.8* 1.6*   LABGLOM 39 45   CALCIUM 8.9 8.9     Mag:   Recent Labs     11/27/22  0742   MG 2.1     HgA1c:   Lab Results   Component Value Date    LABA1C 7.9 10/07/2022     CARDIAC ENZYMES:  Recent Labs     11/25/22 2102 11/25/22  2330 11/26/22  0230   TROPHS 61* 82* 101*          FASTING LIPID PANEL:  Lab Results   Component Value Date/Time    CHOL 93 03/31/2022 01:36 PM    HDL 36 03/31/2022 01:36 PM    LDLCALC 38 03/31/2022 01:36 PM    TRIG 96 03/31/2022 01:36 PM     LIVER PROFILE:  Recent Labs     11/25/22 2102   AST 51*   ALT 35   LABALBU 2.7*     ACC RCRI Periop Tool Score: 3  Class IV Risk - 11% risk of major cardiac event     Assessment & Plan:  Per Dr. Noreen Loera      Electronically signed by PIERCE Marquez on 11/27/2022 at 11:06 AM      The above documentation has been prepared under my direction and personally reviewed by me in its entirety. I confirm that the note above accurately reflects all work, treatment, procedures, and medical decision making performed by me. The patient's history was independently obtained. The patient was independently examined. Electrocardiogram, prior and present cardiovascular assessment, and laboratory studies were reviewed. The patient is a 41-year-old white male known to [facility] cardiology with primary cardiovascular care provided by Geoff Man.   He has a known history of coronary atherosclerosis and ischemic cardiomyopathy with coronary angiography in August, 2021 demonstrating total occlusion of the left anterior descending at its origin with subtotal occlusion of the ostial circumflex and mid circumflex with a circumflex marginal subtotally occluded in its proximal portion and extensive calcification of the right coronary artery with a 60 to 70% proximal stenosis and diffuse disease inclusive of a 60 to 70% ostial stenosis of the posterior descending branch with associated severe left ventricular systolic dysfunction. He was turned down for revascularization both locally and at [facility] on the basis of multiple comorbidities with continued medical management. Most recently undergone objective assessment with an echocardiogram earlier this month which was technically suboptimal with the left ventricle not well visualized and regional wall motion abnormalities of the distal anteroseptal, anterior, anterolateral, inferoapical and apical segments with moderate left ventricular systolic dysfunction estimated ejection fraction 40% with mild centrally directed mitral regurgitation. He additionally has a history of hypertension, diabetes with associated stage IIIb chronic kidney disease, hyperlipidemia, nonalcoholic cirrhosis with associated thrombocytopenia and chronic obstructive lung disease. In the past month, he was hospitalized with evidence of acute superimposed upon chronic systolic heart failure with medical management extremely limited by persistent relative hypotension and spite of concomitant therapy with midodrine. At the time of discharge, his cardiovascular medical regimen was limited to that of diuretics as well as that of aspirin and high-dose atorvastatin to assist in attempted atherosclerotic stabilization. Subsequent to discharge, he remained compensated from a cardiovascular standpoint with no symptoms of anginal-like chest discomfort or other ischemic equivalents and symptoms of chronic (functional class II-III) exertional dyspnea without additional significant manifestations of overt decompensation or volume overload. He is present hospitalization was predominately prompted by generalized weakness and an episode of lightheadedness and near syncope upon attempting to transition from his bedroom to bathroom.   Upon presentation to the emergency room, he was relatively hypotensive with systolic blood pressures of approximately 100 mmHg and acceptable heart rates and saturations but while in the emergency room developed recurrent episodes of hemoptysis and time of evaluation evidence of blood-tinged oral secretions were noted. Resting electrocardiogram reviewed time evaluation demonstrated evidence of sinus rhythm with left axis deviation, and intraventricular conduction delay and delayed precordial transition zone with an anterior infarct pattern not significantly changed from that previously noted and a chest x-ray and reviewed demonstrated evidence of cardiomegaly with mild interstitial changes unchanged from that of previous assessment and a CT scan suggested potential evidence of a left lower lobe infiltrate. Oratory studies demonstrated evidence of mild elevation of high-sensitivity troponin levels in a pattern not exclusive myocardial injury likely on the basis of hypotension and myocardial oxygen demands in the face of his chronic coronary atherosclerosis with a mild anemia and no significant decrease during the course of hospitalization in addition to that of a chronic thrombocytopenia. Stage IIIa chronic kidney disease was noted with serum creatinine levels stable from that of his recent baseline. Throughout hospitalization, relative hypotension has been documented with adequate oxygen saturations in the absence of supplemental oxygen. At time of evaluation the patient's medications and allergies were reviewed as well as that of his past medical history and review of systems as documented. On examination, he appears chronically ill and in no present discomfort nor distress. He is hemodynamically stable with vital signs as documented. Jugular venous pressure appears grossly normal and lung fields are clear to auscultation. Cardiac examination is notable for a regular rate and rhythm with no gallop rhythm in the presence of a soft systolic murmur at the left sternal border.   A benign abdominal examination is present with no significant lower extremity edema and chronic stasis changes. Diagnostic Assessment and Plan: On a clinical basis, the patient presently appears compensated from a cardiovascular standpoint in the face of his known diffuse coronary atherosclerosis and ischemic cardiomyopathy with no evidence of clinically decompensated combined systolic and diastolic heart failure. As outlined above, his abnormal high-sensitivity troponin levels likely represent the effects of increased myocardial oxygen demands in the face of his chronic coronary atherosclerosis in part related to his persistent relative hypotension limiting further optimal medical management of his coronary atherosclerosis or ventricular function. At present, the gastroenterology service has been consulted in regards to his hemoptysis with suggestion of consideration of endoscopy to exclude a variceal component in the face of his known cirrhosis. Presently, he appears as acceptably compensated is possible in the face of the low inherent risk of the procedure would recommend proceeding as planned. At the time of evaluation this is been discussed with the patient and family with needs of ongoing careful monitoring of his volume status with present hydration as well as ongoing nutritional support to assist fluid mobilization reducing risk of progressive debilitation. In light of his concomitant thrombocytopenia, a temporary withholding of his antiplatelet therapy is advisable until stabilization of his hemoptysis is achieved. As possible, aggressive risk factor modification of blood pressure, diabetes and serum lipids will be essential to reducing risk of future atherosclerotic development and if not contraindicated the consideration of the use of a SGLT2 inhibitor would be beneficial to assist management both of his diabetes and heart failure. His overall prognosis appears guarded.     I have participated in a substantive portion of the present encounter inclusive of a component of independent history and examination in addition to that of medical decision making regarding patient care. Thank you for allowing me to participate in your patient's care. Please feel free to contact me if you have any questions or concerns. Da Obrien.  Carla Ward, [address] [facility]

## 2022-11-27 NOTE — PROGRESS NOTES
[facility] Progress Note    Admitting Date and Time: 11/25/2022  8:21 PM  Admit Dx: Lactic acidosis [E87.20]  Lightheadedness [R42]  General weakness [R53.1]  NSTEMI (non-ST elevated myocardial infarction) ([facility] [I21.4]  Hemoptysis [R04.2]    Subjective:  Patient is being followed for Lactic acidosis [E87.20]  Lightheadedness [R42]  General weakness [R53.1]  NSTEMI (non-ST elevated myocardial infarction) ([facility] [I21.4]  Hemoptysis [R04.2]   Pt seen and examined this morning. Still complaining of blood-tinged sputum and had to napkins on a stable with bloody sputum. Otherwise denies any fevers, chills, chest pain, shortness of breath. ROS: denies fever, chills, cp, sob, n/v, HA unless stated above.       atorvastatin  40 mg Oral Nightly    cetirizine  10 mg Oral Daily    fluticasone  1 spray Each Nostril Daily    midodrine  10 mg Oral TID WC    montelukast  10 mg Oral Nightly    sodium chloride flush  5-40 mL IntraVENous 2 times per day    pantoprazole  40 mg IntraVENous BID    cefTRIAXone (ROCEPHIN) IV  1,000 mg IntraVENous Q24H    insulin lispro  0-4 Units SubCUTAneous TID WC    insulin lispro  0-4 Units SubCUTAneous Nightly    midodrine  10 mg Oral Once     ammonium lactate, , PRN  sodium chloride flush, 5-40 mL, PRN  sodium chloride, , PRN  polyethylene glycol, 17 g, Daily PRN  acetaminophen, 650 mg, Q6H PRN   Or  acetaminophen, 650 mg, Q6H PRN  glucose, 4 tablet, PRN  dextrose bolus, 125 mL, PRN   Or  dextrose bolus, 250 mL, PRN  glucagon (rDNA), 1 mg, PRN  dextrose, , Continuous PRN  iopamidol, 785 mL, ONCE PRN         Objective:    BP (!) 93/46   Pulse 57   Temp 97.6 °F (36.4 °C) (Oral)   Resp 16   Ht 5' 11\" (1.803 m)   Wt 210 lb (95.3 kg)   SpO2 95%   BMI 29.29 kg/m²     General Appearance: alert and oriented to person, place and time and in no acute distress  Skin: warm and dry  Head: normocephalic and atraumatic  Eyes: pupils equal, round, and reactive to light,  Neck: neck supple and non tender without mass   Pulmonary/Chest: clear to auscultation bilaterally- no wheezes, rales or rhonchi, normal air movement, no respiratory distress  Cardiovascular: normal rate, normal S1 and S2 and no carotid bruits  Abdomen: soft, non-tender, non-distended, normal bowel sounds, no masses or organomegaly  Extremities: no cyanosis, no clubbing and no edema  Neurologic: no cranial nerve deficit and speech normal        Recent Labs     11/25/22 2102 11/27/22  0742    136   K 4.2 3.5   CL 98 102   CO2 22 25   BUN 36* 28*   CREATININE 1.8* 1.6*   GLUCOSE 228* 85   CALCIUM 8.9 8.9       Recent Labs     11/25/22 2102 11/27/22  0742   WBC 6.7 6.3   RBC 3.82 3.70*   HGB 11.9* 11.6*   HCT 35.0* 34.7*   MCV 91.6 93.8   MCH 31.2 31.4   MCHC 34.0 33.4   RDW 16.7* 17.3*   PLT 71* 74*   MPV 12.3* 12.4*         Assessment and Plan:     Principal Problem:    Lactic acidosis  Active Problems:    Hemoptysis    General weakness    Lightheadedness  Resolved Problems:    * No resolved hospital problems.  *      Hemoptysis, concern for bleeding esophageal varices given history of cirrhosis, continue PPI, octreotide, Rocephin, CT chest to evaluate for endobronchial lesions was negative, GI following, plan for EGD but will need cardiac clearance  Hypotension, likely due to missed midodrine dose, came in with lightheadedness and dizziness, BP improved with 500 cc bolus, restart home midodrine, continue LR at 75 cc an hour  LLL infiltrate, seen on CT chest done to rule out endobronchial lesions, Pro-Tien 0.08, pending strep, Legionella, patient on Rocephin for possible bleeding varices, continue Rocephin  Elevated troponin, patient with known multivessel disease, being medically managed as he is not a surgical candidate for CABG, unable to initiate GDMT due to hypotension, currently asymptomatic, cardiology consulted for cardiac clearance for EGD  Cirrhosis, GI following  Chronic anemia, monitor daily, transfuse for hemoglobin less than 7  Thrombocytopenia, likely 2/2 #4, continue to monitor  Lactic acidosis, continue IVF  MELE on CKD stage IIIb, creatinine trending down, baseline 1.3-1.5, continue IVF         NOTE: This report was transcribed using voice recognition software. Every effort was made to ensure accuracy; however, inadvertent computerized transcription errors may be present.   Electronically signed by Ileana Tamez MD on 11/27/2022 at 10:49 AM

## 2022-11-28 ENCOUNTER — APPOINTMENT (OUTPATIENT)
Dept: CT IMAGING | Age: 72
DRG: 315 | End: 2022-11-28
Payer: MEDICARE

## 2022-11-28 LAB
ANION GAP SERPL CALCULATED.3IONS-SCNC: 8 MMOL/L (ref 7–16)
BASOPHILS ABSOLUTE: 0.09 E9/L (ref 0–0.2)
BASOPHILS RELATIVE PERCENT: 1.7 % (ref 0–2)
BUN BLDV-MCNC: 23 MG/DL (ref 6–23)
CALCIUM SERPL-MCNC: 8.5 MG/DL (ref 8.6–10.2)
CHLORIDE BLD-SCNC: 105 MMOL/L (ref 98–107)
CO2: 24 MMOL/L (ref 22–29)
CREAT SERPL-MCNC: 1.4 MG/DL (ref 0.7–1.2)
CRYPTOSPORIDIUM ANTIGEN STOOL: NORMAL
EOSINOPHILS ABSOLUTE: 0.48 E9/L (ref 0.05–0.5)
EOSINOPHILS RELATIVE PERCENT: 9.2 % (ref 0–6)
GFR SERPL CREATININE-BSD FRML MDRD: 53 ML/MIN/1.73
GIARDIA ANTIGEN STOOL: NORMAL
GLUCOSE BLD-MCNC: 77 MG/DL (ref 74–99)
HCT VFR BLD CALC: 32.1 % (ref 37–54)
HEMOGLOBIN: 11.1 G/DL (ref 12.5–16.5)
IMMATURE GRANULOCYTES #: 0.03 E9/L
IMMATURE GRANULOCYTES %: 0.6 % (ref 0–5)
LYMPHOCYTES ABSOLUTE: 1.82 E9/L (ref 1.5–4)
LYMPHOCYTES RELATIVE PERCENT: 35.1 % (ref 20–42)
MCH RBC QN AUTO: 32.6 PG (ref 26–35)
MCHC RBC AUTO-ENTMCNC: 34.6 % (ref 32–34.5)
MCV RBC AUTO: 94.1 FL (ref 80–99.9)
METER GLUCOSE: 195 MG/DL (ref 74–99)
METER GLUCOSE: 198 MG/DL (ref 74–99)
METER GLUCOSE: 87 MG/DL (ref 74–99)
MONOCYTES ABSOLUTE: 0.78 E9/L (ref 0.1–0.95)
MONOCYTES RELATIVE PERCENT: 15 % (ref 2–12)
NEUTROPHILS ABSOLUTE: 1.99 E9/L (ref 1.8–7.3)
NEUTROPHILS RELATIVE PERCENT: 38.4 % (ref 43–80)
PDW BLD-RTO: 17.2 FL (ref 11.5–15)
PLATELET # BLD: 55 E9/L (ref 130–450)
PLATELET CONFIRMATION: NORMAL
PMV BLD AUTO: 11.5 FL (ref 7–12)
POTASSIUM REFLEX MAGNESIUM: 3.8 MMOL/L (ref 3.5–5)
RBC # BLD: 3.41 E12/L (ref 3.8–5.8)
ROTAVIRUS ANTIGEN: NORMAL
SODIUM BLD-SCNC: 137 MMOL/L (ref 132–146)
WBC # BLD: 5.2 E9/L (ref 4.5–11.5)

## 2022-11-28 PROCEDURE — C9113 INJ PANTOPRAZOLE SODIUM, VIA: HCPCS | Performed by: STUDENT IN AN ORGANIZED HEALTH CARE EDUCATION/TRAINING PROGRAM

## 2022-11-28 PROCEDURE — 6360000002 HC RX W HCPCS: Performed by: STUDENT IN AN ORGANIZED HEALTH CARE EDUCATION/TRAINING PROGRAM

## 2022-11-28 PROCEDURE — 6370000000 HC RX 637 (ALT 250 FOR IP): Performed by: STUDENT IN AN ORGANIZED HEALTH CARE EDUCATION/TRAINING PROGRAM

## 2022-11-28 PROCEDURE — 82962 GLUCOSE BLOOD TEST: CPT

## 2022-11-28 PROCEDURE — 2060000000 HC ICU INTERMEDIATE R&B

## 2022-11-28 PROCEDURE — 70486 CT MAXILLOFACIAL W/O DYE: CPT

## 2022-11-28 PROCEDURE — 2580000003 HC RX 258: Performed by: STUDENT IN AN ORGANIZED HEALTH CARE EDUCATION/TRAINING PROGRAM

## 2022-11-28 PROCEDURE — 99233 SBSQ HOSP IP/OBS HIGH 50: CPT | Performed by: INTERNAL MEDICINE

## 2022-11-28 PROCEDURE — 36415 COLL VENOUS BLD VENIPUNCTURE: CPT

## 2022-11-28 PROCEDURE — 99233 SBSQ HOSP IP/OBS HIGH 50: CPT | Performed by: FAMILY MEDICINE

## 2022-11-28 PROCEDURE — 80048 BASIC METABOLIC PNL TOTAL CA: CPT

## 2022-11-28 PROCEDURE — 85025 COMPLETE CBC W/AUTO DIFF WBC: CPT

## 2022-11-28 RX ADMIN — FLUTICASONE PROPIONATE 1 SPRAY: 50 SPRAY, METERED NASAL at 09:18

## 2022-11-28 RX ADMIN — SODIUM CHLORIDE, PRESERVATIVE FREE 10 ML: 5 INJECTION INTRAVENOUS at 09:19

## 2022-11-28 RX ADMIN — WATER 1000 MG: 1 INJECTION INTRAMUSCULAR; INTRAVENOUS; SUBCUTANEOUS at 03:00

## 2022-11-28 RX ADMIN — MONTELUKAST SODIUM 10 MG: 10 TABLET ORAL at 20:16

## 2022-11-28 RX ADMIN — PANTOPRAZOLE SODIUM 40 MG: 40 INJECTION, POWDER, FOR SOLUTION INTRAVENOUS at 20:16

## 2022-11-28 RX ADMIN — OCTREOTIDE ACETATE 50 MCG/HR: 500 INJECTION, SOLUTION INTRAVENOUS; SUBCUTANEOUS at 11:48

## 2022-11-28 RX ADMIN — MIDODRINE HYDROCHLORIDE 10 MG: 5 TABLET ORAL at 11:46

## 2022-11-28 RX ADMIN — OCTREOTIDE ACETATE 50 MCG/HR: 500 INJECTION, SOLUTION INTRAVENOUS; SUBCUTANEOUS at 00:23

## 2022-11-28 RX ADMIN — MIDODRINE HYDROCHLORIDE 10 MG: 5 TABLET ORAL at 09:18

## 2022-11-28 RX ADMIN — PANTOPRAZOLE SODIUM 40 MG: 40 INJECTION, POWDER, FOR SOLUTION INTRAVENOUS at 09:18

## 2022-11-28 RX ADMIN — MIDODRINE HYDROCHLORIDE 10 MG: 5 TABLET ORAL at 16:49

## 2022-11-28 RX ADMIN — CETIRIZINE HYDROCHLORIDE 10 MG: 10 TABLET, FILM COATED ORAL at 09:18

## 2022-11-28 RX ADMIN — SODIUM CHLORIDE, POTASSIUM CHLORIDE, SODIUM LACTATE AND CALCIUM CHLORIDE: 600; 310; 30; 20 INJECTION, SOLUTION INTRAVENOUS at 13:53

## 2022-11-28 RX ADMIN — ATORVASTATIN CALCIUM 40 MG: 40 TABLET, FILM COATED ORAL at 20:16

## 2022-11-28 ASSESSMENT — PAIN SCALES - GENERAL
PAINLEVEL_OUTOF10: 0
PAINLEVEL_OUTOF10: 0

## 2022-11-28 NOTE — PROGRESS NOTES
AMYLASE      ASSESSMENT/PLAN:  Patient Active Problem List   Diagnosis    Dilated cardiomyopathy ([address]    Non-rheumatic mitral regurgitation    Absolute anemia    SOBOE (shortness of breath on exertion)    Pulmonary HTN (HCC)    Type 2 diabetes mellitus with stage 3a chronic kidney disease, without long-term current use of insulin (HCC)    HTN (hypertension)    Pancytopenia ([address]    Coagulation defect ([address]    Pancreatic cyst    Community acquired bacterial pneumonia    Bronchopneumonia    Chronic systolic congestive heart failure ([address]    Coronary artery disease involving native coronary artery of native heart without angina pectoris    Left ventricular dysfunction    Hepatic cirrhosis (HCC)    HFrEF (heart failure with reduced ejection fraction) ([address]    Mitral valve disease    Portal hypertension (HCC)    Allergic rhinitis    Hypoxia    Thrombocytopenia (HCC)    Chronic renal disease, stage III (HCC) [768605]    Hypotension    Acute kidney injury superimposed on chronic kidney disease (HCC)    MELE (acute kidney injury) ([address]    Venous stasis dermatitis of both lower extremities    Rhinovirus    Lactic acidosis    Hemoptysis    General weakness    Lightheadedness    NSTEMI (non-ST elevated myocardial infarction) ([address]    Ischemic cardiomyopathy    Elevated troponin    Pure hypercholesterolemia    Preoperative cardiovascular examination     Cirrhosis  -Prior MELD = 20, MELD-Na = 24 (11/10/2022)  -Negative viral liver serology  -Negative autoimmune liver serology  -History of A1AT deficiency - pending phenotype - has seen hematology in the past  -History of elevated iron / ferritin - pending HFE genes  -Elevated IgG  -Normal AFP 7 (11/10/2022)  -MRI abdomen 9/4/2022 without evidence of hepatic mass although low signal area in the right hepatic lobe noted  -Consider patient for EGD for variceal screening     2.   Pancreatic lesion  -MRI abdomen 9/4/2022 showing pancreas and pancreatic duct with multiple sidebranch regularities increasing in size compared to prior exam 6/29/2021  -Elevated CA 19-9 =  53 (7/12/2021) => 63 (11/10/2022)  -Elevated CEA = 7.0 (7/12/2021) => 5.7 (11/10/2022)  -Normal AFP = 9 (5/16/2022) => 7 (11/10/2022)  -Endoscopic ultrasound 8/6/2021 by Dr. Dyson Session showing 22 mm complex cystic lesion in the pancreatic tail adjacent to the pancreatic duct, no dilation of the pancreatic duct  -Biopsy consistent with gastric duodenal contaminant, cellblock was acellular - nondiagnostic biopsy     3. Anemia  -Mild / normocytic  -No overt GI bleeding  -Reported hemoptysis  -Cannot completely exclude varices - octreotide per admitting  -No prior evidence of iron deficiency  -Monitor hgb q12 hours  -Repeat FOBT  -Continue PPI twice daily  -Consider endoscopic evaluation - will require cardiac clearance prior to procedures -cardiology input appreciated     4. Diarrhea  -Obtain stool studies  -Colonoscopy likely outpatient     5. CAD / HFrEF / elevated troponin  -Elevated / increased troponin since arrival  -Echo 8/20/2021 showing EF 25%  -Cardiac catheterization 8/23/2021 showing severe multivessel coronary artery disease  -Cardiology input appreciated with recommendation to proceed with procedures     6. Comorbidities  -Rhinovirus, hypertension, dyslipidemia, diabetes, cellulitis  -Per admitting/consultants     Minimally decreased H&H. May consider EGD inpatient possibly tomorrow or later this week-patient is however concerned regarding hospital stay and possible costs -we will defer discussion to admitting [address] . At this time, would recommend if patient is discharged to have close follow-up with upper endoscopy as outpatient. Kael Cavanaugh MD  11/28/2022  2:59 PM    NOTE:  This report was transcribed using voice recognition software. Every effort was made to ensure accuracy; however, inadvertent computerized transcription errors may be present.

## 2022-11-28 NOTE — CARE COORDINATION
Transition of Care-Met with patient at the bedside, he lives alone in a one story home, three steps to gain entry, son lives right next door. PCP is Dr. Rah Doyle, preferred pharmacy is [facility] in [facility]. Patient reports independence with ADL's, DME equipment is nebulizer , no history of skilled nursing stay or home health care. Plan at discharge is home, son to transport home. PT/OT ordered to assist with discharge planning.     Stefania Celestin BSN, RN  [facility]

## 2022-11-28 NOTE — PROGRESS NOTES
CT sinus completed and reviewed. Recommend treatment of acute sinusitis with antibiotics and steroids - IV abx while inpatient with transition to po upon discharge. Recommend Decadron 10 mg IV Q8H x 3 doses followed by taper and transition to po upon discharge if needed. No immediate plans for surgical intervention at this time. Will determine need for MRI and/or surgical intervention on an outpatient basis. ENT signing off.      Electronically signed by Jeff Schaeffer DO on 11/28/2022 at 6:07 PM

## 2022-11-28 NOTE — DISCHARGE INSTRUCTIONS
HEART FAILURE  / CONGESTIVE HEART FAILURE  DISCHARGE INSTRUCTIONS:  GUIDELINES TO FOLLOW AT HOME    Self- Managed Care:     MEDICATIONS:  Take your medication as directed. If you are experiencing any side effects, inform your doctor, Do not stop taking any of your medications without letting your doctor know. Check with your doctor before taking any over-the-counter medications / herbal / or dietary supplements. They may interfere with your other medications. Do not take ibuprofen (Advil or Motrin) and naproxen (Aleve) without talking to your doctor first. They could make your heart failure worse. WEIGHT MONITORING:   Weigh yourself everyday (with the same scale) around the same time of the day and write it down. (you can chart them on a calendar or keep track of them on paper. Notify your doctor of a weight gain of 3 pounds or more in 1 day   OR a total of 5 pounds or more in 1 week    Take your weight record to your doctor visits  Also, the same goes if you loose more than 3# in one day, let your heart doctor know. DIET:   Cardiac heart healthy diet- Low saturated / low trans fat, no added salt, caffeine restricted, Low sodium diet-   No more than 2,000mg (2 grams) of salt / sodium per day (which equals to a little less than  a teaspoon of salt)  If your doctor wants you on a fluid restriction. ..it is usually recommended a fluid limit of 2,000cc -  Fluid restriction- 2,000 ml (milliliters) = 64 ounces = you can have 8 glasses of fluid per day (each glass 8 ounces)    Follow a low salt diet - avoid using salt at the table, avoid / limit use of canned soups, processed / packaged foods, salted snacks, olives and pickles. Do not use a salt substitute without checking with your doctor, they may contain a high amount of potassioum. (Mrs. Shruthi Russo is safe to use).     Limit the use of alcohol       CALL YOUR DOCTOR THE FIRST DAY YOU NOTICE ANY OF THESE   SYMPTOMS:  You have a weight gain of 3 pounds or more in 1 day         OR 5 pounds or more in one week  More shortness of breath  More swelling of your stomach, legs, ankles or feet  Feeling more tired, No energy  Dry hacky cough  Dizziness  More chest pain / discomfort       (CALL 911 IF ANY OF THE FOLLOWING OCCURS  Chest pain (not relieved with nitroglycerine, if you have been prescribed this medication)  Severe shortness of breath  Faint / Pass out  Confusion / cannot think clearly  If symptoms get worse           SMOKING - TOBACCO USE:  * IF YOU SMOKE - STOP! Kick the habit. [address] Program is offered at [address] [facility] and [address] [facility]. Call [phone] extension [phone] for more information. ACTIVITY:   (Ask your doctor when you will be able to return to work and before starting any exercise program.  Do not drive unless unless your doctor has given you permission to do so). Start light exercise. Even if you can only do a small amount, exercise will help you get stronger, have more energy, help manage your weight and decrease  stress. Walking is an easy way to get exercise. Start out slowly and  increase the amount you walk as tolerated  If you become short of breath, dizzy or have chest pain; stop, sit down, and rest.  If you feel \"wiped out\" the day after you exercise, walk at a slower pace or for a shorter distance. You can gradually increase the pace or amount of time. (Do not exercise right after a meal or in extreme temperatures, such as above 85 degrees, if the air is really humid, or wind chill is less than 20 degrees)                                             ADDITIONAL INFORMATION:  Avoid getting sick from colds and the flu. Stay away from friends or family that you know may have a contagious illness  Get plenty of rest   Get a flu shot each year. Get a pneumococcal vaccine shot.  If you have had one before, ask your doctor whether you need another dose. My Goal for Self-management of Heart Failure Includes 5 steps :    1. Notice a change in symptoms ( weight gain, short of breath, leg swelling, decreased activity level, bloating. ...)    2. Evaluate the change: (use the Heart Failure Zones )     3. Decide to take action: decide what your options are, such as: (call your doctor for an extra visit, take a prescribed medication, such as your water pill if your doctor has given you directions to do so, [address])    4. Come up with a strategy:  (now you call the doctor for advice / appointment. This is where you take action!!! Do not wait, catch the symptom early and treat it before it worsens. 5. Evaluate the response: The next day, check your Heart Failure Zones: are you in the GREEN ZONE (safe zone)? Worsening symptoms of YELLOW ZONE? Or have you moved to the RED ZONE and need to call 911 or go to the Emergency Room for evaluation? Call your doctor's office to update them on your symptoms of heart failure. 6.  Notify PCP if blood sugars > 299 or < 80;  Notify if systolic BP < 85.

## 2022-11-28 NOTE — CONSULTS
OTOLARYNGOLOGY  CONSULT NOTE  11/28/2022    Physician Consulted: Dr. Vania Perez  Reason for Consult: epistaxis  Referring Physician: Dr. Elayne Phan  Emerson Murillo is a 66 y.o. male who presents for evaluation of consult for epistaxis. Patient admitted with rhinovirus with productive cough. Having a lot of post nasal drainage and when he coughs he noted some blood tinged sputum. He has not had any anterior nasal bleeding during admission. He does not have hx of nose bleeds. He is on ASA 81mg and uses flonase daily for allergic rhinitis. He more complains of thick productive cough and rhinorrhea, feeling as though he has a sinus infection coming on. He has hx of septal deviation since childhood. No hx of recurrent epistaxis. No hx of anticoagulation. He does have chronic thrombocytopenia, lung disease, cirrhosis. Past Medical History:   Diagnosis Date    Allergic rhinitis     Seasonal    Cardiomyopathy ([facility]     CHF (congestive heart failure) (HCC)     DM (diabetes mellitus) ([facility]     Enlarged prostate     Thrombocytopenia (HCC)     Type 2 diabetes mellitus without complication (HCC)     VHD (valvular heart disease)        Past Surgical History:   Procedure Laterality Date    CATARACT REMOVAL Bilateral 2017    CYST REMOVAL      from left heel    HAMMER TOE SURGERY Bilateral 1979    REFRACTIVE SURGERY Bilateral     UPPER GASTROINTESTINAL ENDOSCOPY N/A 8/6/2021    EGD W/EUS FNA performed by Pranay Mack MD at [address]       Medications Prior to Admission:    Prior to Admission medications    Medication Sig Start Date End Date Taking?  Authorizing Provider   pantoprazole (PROTONIX) 40 MG tablet Take 1 tablet by mouth every morning (before breakfast) 11/14/22   PIERCE Polo CNP   Cholecalciferol (VITAMIN D) 25 MCG TABS Take 1 tablet by mouth daily 11/14/22   PIERCE Polo CNP   midodrine (PROAMATINE) 10 MG tablet Take 1 tablet by mouth 3 times daily (with meals) 11/13/22   Kenzie Willoughby Hunter Villarreal, APRN - CNP   ammonium lactate (LAC-HYDRIN) 12 % lotion Apply topically twice daily 10/28/22   Tiajuana Bernheim, DPM   glyBURIDE (DIABETA) 5 MG tablet Take 1 tablet by mouth daily (with breakfast) 10/21/22   Auther Litten, MD   montelukast (SINGULAIR) 10 MG tablet take 1 tablet by mouth nightly 22   [facility], DO   blood glucose test strips (ONETOUCH ULTRA) strip Inject 1 each into the skin daily As needed. 3/31/22   Auther Litten, MD   furosemide (LASIX) 40 MG tablet take 1 tablet by mouth once daily  Patient taking differently:  Will take a 2nd pill if legs swelling [facility] , DO   atorvastatin (LIPITOR) 40 MG tablet Take 1 tablet by mouth nightly 21   [facility] , DO   aspirin 81 MG EC tablet Take 1 tablet by mouth daily 21   [facility], DO   fluticasone [facility]) 50 MCG/ACT nasal spray 1 spray by Each Nostril route daily 21   [facility] , DO   cetirizine (ZYRTEC) 10 MG tablet Take 10 mg by mouth daily    Historical Provider, MD   Multiple Vitamins-Minerals (PRESERVISION AREDS 2+MULTI VIT) CAPS Take 1 capsule by mouth 2 times daily    Historical Provider, MD       Allergies   Allergen Reactions    Penicillins Hives    Ambrosia Artemisiifolia (Ragweed) Skin Test Other (See Comments)    Cat Hair Extract Other (See Comments)    Egg Lecithin Hives    Eggs Or Egg-Derived Products     Grass Pollen(K-O-R-T-Swt Oscar)     Macadamia Nut Oil Hives    Milk-Related Compounds     Mixed Ragweed     Molds & Smuts Other (See Comments)    Peanut-Containing Drug Products     Seasonal      Mold ragweed       Family History   Problem Relation Age of Onset    Alcohol Abuse Father     No Known Problems Sister     Diabetes Brother        Social History     Tobacco Use    Smoking status: Former     Packs/day: 1.00     Years: 20.00     Pack years: 20.00     Types: Cigarettes     Quit date: 1993     Years since quittin.8    Smokeless tobacco: Former     Types: Chew Quit date: 1/25/1970   Vaping Use    Vaping Use: Never used   Substance Use Topics    Alcohol use: Not Currently     Comment: rarely beer    Drug use: Never         Review of Systems   General ROS: positive for  - fatigue  Hematological and Lymphatic ROS: positive for - bleeding problems  Respiratory ROS: positive for - cough  Cardiovascular ROS: positive for - shortness of breath  Gastrointestinal ROS: positive for - diarrhea  Genito-Urinary ROS: no dysuria, trouble voiding, or hematuria  Musculoskeletal ROS: negative      PHYSICAL EXAM:    Vitals:    11/28/22 0741   BP: (!) 95/49   Pulse: 66   Resp: 16   Temp: 98.2 °F (36.8 °C)   SpO2: 98%       General Appearance:  Laying bed, awake, alert, no apparent distress  Head/face:  NC/AT  Eyes: PERRL, EOMI  ENT: right septal deviation, clear rhinorrhea. Minor excoriation on left anterior nasal septum. No prominent vessels on septum bilaterally. No evidence of bleeding or  clots in bilateral nasal cavities. Posterior oropharynx clear. External ears normal. Neck soft nontender no masses   Lungs:  Non-labored, good respiratory effort, no stridor  Heart:  RR      LABS:    CBC  Recent Labs     11/28/22  0400   WBC 5.2   HGB 11.1*   HCT 32.1*   PLT 55*     BMP  Recent Labs     11/28/22  0400      K 3.8      CO2 24   BUN 23   CREATININE 1.4*   CALCIUM 8.5*     COAG's  No results for input(s): INR, PTT in the last 72 hours. Invalid input(s): PT  CALCIUM  Recent Labs     11/25/22  2102 11/27/22  0742 11/28/22  0400   CALCIUM 8.9 8.9 8.5*     PTH  No results for input(s): PTH in the last 72 hours. RADIOLOGY    CT ABDOMEN PELVIS WO CONTRAST Additional Contrast? None    Result Date: 11/26/2022  EXAMINATION: CT OF THE ABDOMEN AND PELVIS WITHOUT CONTRAST 11/26/2022 12:19 am TECHNIQUE: CT of the abdomen and pelvis was performed without the administration of intravenous contrast. Multiplanar reformatted images are provided for review.  Automated exposure control, iterative reconstruction, and/or weight based adjustment of the mA/kV was utilized to reduce the radiation dose to as low as reasonably achievable. COMPARISON: MRI examination September 3, 2022. HISTORY: ORDERING SYSTEM PROVIDED HISTORY: abdominal pain TECHNOLOGIST PROVIDED HISTORY: Reason for exam:->abdominal pain Additional Contrast?->None Decision Support Exception - unselect if not a suspected or confirmed emergency medical condition->Emergency Medical Condition (MA) FINDINGS: Lower Chest: Mild airspace disease posterior basilar segments left lower lobe may represent aspiration or other pneumonitis. No effusions. . Organs: Moderate splenomegaly, 14.6 cm, and shrunken 8.5 cm liver with nodular surface contour and recanalized paraumbilical vein compatible with hepatic cirrhosis/portal hypertension. Cholelithiasis. 3.2 cm cystic lesion of the pancreatic tail, unchanged from prior MRI examination of September 3, 2022. Liver, biliary tree, bilateral adrenal glands, bilateral kidneys, spleen and pancreas are otherwise normal. GI/Bowel: No ileus or obstruction. Normal appendix right lower quadrant. No inflammatory bowel process. Pelvis: No adenopathy or fluid. Bladder dome diverticulum noted. Peritoneum/Retroperitoneum: Negative. Bones/Soft Tissues: No acute osseous lesions. 1. No acute disease. 2. Normal appendix right lower quadrant. 3. Cholelithiasis and findings consistent with hepatic cirrhosis. 4. 3.2 cm cystic lesion of the pancreatic tail, unchanged from MRI examination of 3 months prior. RECOMMENDATIONS: Careful clinical correlation and follow up recommended. CT CHEST W CONTRAST    Result Date: 11/26/2022  EXAMINATION: CT OF THE CHEST WITH CONTRAST 11/26/2022 1:00 pm TECHNIQUE: CT of the chest was performed with the administration of intravenous contrast. Multiplanar reformatted images are provided for review.  Automated exposure control, iterative reconstruction, and/or weight based adjustment of lung bases. There are no focal alveolar infiltrates or effusions. The cardiac silhouette appears unremarkable. There is no evidence of a pneumothorax. No acute cardiopulmonary process. ASSESSMENT:  66 y.o. male with hx of epistaxis and allergic rhinitis. Patient with no active epistaxis or prominent vessels, he is more concerned about sinus congestion and productive cough. Fibrillar placed on left nasal cavity to assist with any future bleeding . PLAN:  -CT sinus pending   -advised to discontinue flonase to prevent future epistaxis  -use nasal saline spray 3-4 times daily bilaterally for nasal moisturization   -he likely has allergic rhinitis and vasomotor rhinitis causing post nasal drainage contributing to his cough, we can address this on an outpatient basis with out medical nasal modailities once he medical condition is acutely improved   -continue to monitor platelets   -no nasal cannula in the nose, if needs oxygen please put in mouth and with humidification   -follow up outpt for reevaluation and further allergy management       Patient seen and discussed with Attending.        Electronically signed by Reyes Nicely, DO on 11/28/22 at 1:36 PM EST

## 2022-11-28 NOTE — PROGRESS NOTES
[address] [facility]        CARDIOLOGY                 INPATIENT PROGRESS NOTE          PATIENT SEEN IN FOLLOW UP FOR: Pre-op clearance    Hospital Day: 4     Kelly Quinones is a 66year old patient known to Dr. Vidal Ran: Denies any CP or SOB, No prthopnea    ROS: Review of rest of 10 systems negative except as mentioned above    OBJECTIVE: No acute distress. See Assessment     Diagnostics:       Telemetry: Reviewed    Echo 11/12/2022   Summary   Technically suboptimal and limited study. Left ventricle was not well visualized. Regional wall motion abnormalities with akinesis of distal anteroseptal,   anterior, anterolateral, inferoapical and apical segments. Moderately reduced left ventricular systolic dysfunction. EF 40%   The left atrium is mildly dilated. Mild centrally directed mitral regurgitation. The aortic valve appears mildly sclerotic. [facility] - 8/2021 - noted      Intake/Output Summary (Last 24 hours) at 11/28/2022 0834  Last data filed at 11/28/2022 0656  Gross per 24 hour   Intake 977.5 ml   Output --   Net 977.5 ml       Labs:   CBC:   Recent Labs     11/27/22  0742 11/28/22  0400   WBC 6.3 5.2   HGB 11.6* 11.1*   HCT 34.7* 32.1*   PLT 74* 55*     BMP:   Recent Labs     11/27/22  0742 11/28/22  0400    137   K 3.5 3.8   CO2 25 24   BUN 28* 23   CREATININE 1.6* 1.4*   LABGLOM 45 53   CALCIUM 8.9 8.5*     Mag:   Recent Labs     11/27/22  0742   MG 2.1     Phos: No results for input(s): PHOS in the last 72 hours. TSH: No results for input(s): TSH in the last 72 hours. HgA1c:     BNP: No results for input(s): BNP in the last 72 hours. PT/INR: No results for input(s): PROTIME, INR in the last 72 hours. APTT:No results for input(s): APTT in the last 72 hours.   CARDIAC ENZYMES:  Recent Labs     11/25/22  2102 11/25/22  2330 11/26/22  0230   TROPHS 61* 82* 101*     FASTING LIPID PANEL:  Lab Results   Component Value Date/Time    CHOL 93 03/31/2022 01:36 PM HDL 36 2022 01:36 PM    LDLCALC 38 2022 01:36 PM    TRIG 96 2022 01:36 PM     LIVER PROFILE:  Recent Labs     22   AST 51*   ALT 35   LABALBU 2.7*       Current Inpatient Medications:   atorvastatin  40 mg Oral Nightly    cetirizine  10 mg Oral Daily    fluticasone  1 spray Each Nostril Daily    midodrine  10 mg Oral TID WC    montelukast  10 mg Oral Nightly    sodium chloride flush  5-40 mL IntraVENous 2 times per day    pantoprazole  40 mg IntraVENous BID    cefTRIAXone (ROCEPHIN) IV  1,000 mg IntraVENous Q24H    insulin lispro  0-4 Units SubCUTAneous TID WC    insulin lispro  0-4 Units SubCUTAneous Nightly    midodrine  10 mg Oral Once       IV Infusions (if any):   sodium chloride      octreotide (SandoSTATIN) infusion 50 mcg/hr (22 0023)    lactated ringers 75 mL/hr at 22 1340    dextrose 100 mL/hr at 22 0230         PHYSICAL EXAM:     CONSTITUTIONAL:   BP (!) 95/49   Pulse 66   Temp 97.9 °F (36.6 °C) (Oral)   Resp 16   Ht 5' 11\" (1.803 m)   Wt 210 lb (95.3 kg)   SpO2 98%   BMI 29.29 kg/m²   Pulse  Av  Min: 61  Max: 66  Systolic (92BCV), HQS:13 , Min:92 , ARB:66    Diastolic (38BMX), YEW:07, Min:49, Max:56    In general, this is a well developed, well nourished who appears stated age. awake, alert, no apparent distress  HEENT: eyes -conjunctivae pink,  Throat - Oral mucosa moist.   Neck-  no stridor, no noted enlargement of the thyroid, no carotid bruit no jugular venous distention   RESPIRATORY: Chest symmetrical  No accessory muscle use. Lung auscultation - few rhonchi  CARDIOVASCULAR:     Heart Palpation - no palpable thrills  Heart Ausculation - Regular rate and rhythm, 2/6 systolic murmur, No s3 or rub.   trace lower extremity edema, Distal pulses palpable, no cyanosis   ABDOMEN: Soft, Bowel sounds present.   MS:n/a  : Deferred  Rectal Exam: Deferred  SKIN: warm and dry   NEURO / PSYCH: oriented to person, place        ASSESSMENT/PLAN: Preoperative cardiovascular examination - No CP. Cleared for his surgical procedures by Dr Dl Oliveros. Avoid hypotension. Hemoptysis - ENT/Gi following    Anemia - GI following    Elevated Troponin - No CP; No further cardiac testing; Low dose Nitrates/BB if his BP tolerates    CAD - Multivessel CAD per [facility] 8/2021- Not candidate for PCI or CABG -  Medical Rx limited due to low BP - Continue Statin, resume Aspirin when Plt counts OK. Ischemic CMP - EF 40%; GDMT limited due to low BP    Hypotension - On Midodrine    TCP - Monitor Plt counts    Mild MR            Cardiology will follow as needed    F/u by Dr Rebeca Harrison 2-3 weeks after discharge    Above recommendations discussed with him.       Electronically signed by Theresa Powers MD on 11/28/2022 at 8:34 AM  [facility]) Cardiology

## 2022-11-28 NOTE — PLAN OF CARE
Patient's chart updated to reflect:      . - HF care plan, HF education points and HF discharge instructions.  -Orders: 2 gram sodium diet, daily weights, I/O.  -PCP and cardiology follow up appointments to be scheduled within 7 days of hospital discharge. -CHF education session will be provided to the patient prior to hospital discharge.     Сергей Raymond RN BSN  Heart Failure Navigator

## 2022-11-28 NOTE — PROGRESS NOTES
[facility] Progress Note    Admitting Date and Time: 11/25/2022  8:21 PM  Admit Dx: Lactic acidosis [E87.20]  Lightheadedness [R42]  General weakness [R53.1]  NSTEMI (non-ST elevated myocardial infarction) ([facility] [address] [I21.4]  Hemoptysis [R04.2]    Subjective:  Patient is being followed for Lactic acidosis [E87.20]  Lightheadedness [R42]  General weakness [R53.1]  NSTEMI (non-ST elevated myocardial infarction) ([facility] [address] [I21.4]  Hemoptysis [R04.2]   Pt feels happy able to eat. Concerned for epistaxis. Has history of sinus infections and requesting ENT consult. CT of sinuses ordered. GI is not planning EGD. H/H counts stable. Per RN:   no new concerns. ROS: denies fever, chills, cp, sob, n/v, HA unless stated above.       atorvastatin  40 mg Oral Nightly    cetirizine  10 mg Oral Daily    fluticasone  1 spray Each Nostril Daily    midodrine  10 mg Oral TID WC    montelukast  10 mg Oral Nightly    sodium chloride flush  5-40 mL IntraVENous 2 times per day    pantoprazole  40 mg IntraVENous BID    cefTRIAXone (ROCEPHIN) IV  1,000 mg IntraVENous Q24H    insulin lispro  0-4 Units SubCUTAneous TID WC    insulin lispro  0-4 Units SubCUTAneous Nightly    midodrine  10 mg Oral Once     ammonium lactate, , PRN  sodium chloride flush, 5-40 mL, PRN  sodium chloride, , PRN  polyethylene glycol, 17 g, Daily PRN  acetaminophen, 650 mg, Q6H PRN   Or  acetaminophen, 650 mg, Q6H PRN  glucose, 4 tablet, PRN  dextrose bolus, 125 mL, PRN   Or  dextrose bolus, 250 mL, PRN  glucagon (rDNA), 1 mg, PRN  dextrose, , Continuous PRN  iopamidol, 785 mL, ONCE PRN    Objective:    BP (!) 95/49   Pulse 66   Temp 98.2 °F (36.8 °C) (Oral)   Resp 16   Ht 5' 11\" (1.803 m)   Wt 210 lb (95.3 kg)   SpO2 98%   BMI 29.29 kg/m²     General Appearance: alert and oriented to person, place and time and in no acute distress  Skin: warm and dry  Head: normocephalic and atraumatic  Eyes: pupils equal, round, and reactive to light, extraocular eye movements intact, conjunctivae normal  Neck: neck supple and non tender without mass   Pulmonary/Chest: clear to auscultation bilaterally- no wheezes, rales or rhonchi, normal air movement, no respiratory distress  Cardiovascular: normal rate, normal S1 and S2 and no carotid bruits  Abdomen: soft, non-tender, non-distended, normal bowel sounds, no masses or organomegaly  Extremities: no cyanosis, no clubbing and no edema  Neurologic: no cranial nerve deficit and speech normal    Recent Labs     11/25/22 2102 11/27/22  0742 11/28/22  0400    136 137   K 4.2 3.5 3.8   CL 98 102 105   CO2 22 25 24   BUN 36* 28* 23   CREATININE 1.8* 1.6* 1.4*   GLUCOSE 228* 85 77   CALCIUM 8.9 8.9 8.5*       Recent Labs     11/25/22 2102 11/27/22  0742 11/28/22  0400   WBC 6.7 6.3 5.2   RBC 3.82 3.70* 3.41*   HGB 11.9* 11.6* 11.1*   HCT 35.0* 34.7* 32.1*   MCV 91.6 93.8 94.1   MCH 31.2 31.4 32.6   MCHC 34.0 33.4 34.6*   RDW 16.7* 17.3* 17.2*   PLT 71* 74* 55*   MPV 12.3* 12.4* 11.5     Radiology:   CT ABDOMEN PELVIS WO CONTRAST Additional Contrast? None    Result Date: 11/26/2022  EXAMINATION: CT OF THE ABDOMEN AND PELVIS WITHOUT CONTRAST 11/26/2022 12:19 am TECHNIQUE: CT of the abdomen and pelvis was performed without the administration of intravenous contrast. Multiplanar reformatted images are provided for review. Automated exposure control, iterative reconstruction, and/or weight based adjustment of the mA/kV was utilized to reduce the radiation dose to as low as reasonably achievable. COMPARISON: MRI examination September 3, 2022.  HISTORY: ORDERING SYSTEM PROVIDED HISTORY: abdominal pain TECHNOLOGIST PROVIDED HISTORY: Reason for exam:->abdominal pain Additional Contrast?->None Decision Support Exception - unselect if not a suspected or confirmed emergency medical condition->Emergency Medical Condition (MA) FINDINGS: Lower Chest: Mild airspace disease posterior basilar segments left lower lobe may represent aspiration or other pneumonitis. No effusions. . Organs: Moderate splenomegaly, 14.6 cm, and shrunken 8.5 cm liver with nodular surface contour and recanalized paraumbilical vein compatible with hepatic cirrhosis/portal hypertension. Cholelithiasis. 3.2 cm cystic lesion of the pancreatic tail, unchanged from prior MRI examination of September 3, 2022. Liver, biliary tree, bilateral adrenal glands, bilateral kidneys, spleen and pancreas are otherwise normal. GI/Bowel: No ileus or obstruction. Normal appendix right lower quadrant. No inflammatory bowel process. Pelvis: No adenopathy or fluid. Bladder dome diverticulum noted. Peritoneum/Retroperitoneum: Negative. Bones/Soft Tissues: No acute osseous lesions. 1. No acute disease. 2. Normal appendix right lower quadrant. 3. Cholelithiasis and findings consistent with hepatic cirrhosis. 4. 3.2 cm cystic lesion of the pancreatic tail, unchanged from MRI examination of 3 months prior. RECOMMENDATIONS: Careful clinical correlation and follow up recommended. CT CHEST W CONTRAST    Result Date: 11/26/2022  EXAMINATION: CT OF THE CHEST WITH CONTRAST 11/26/2022 1:00 pm TECHNIQUE: CT of the chest was performed with the administration of intravenous contrast. Multiplanar reformatted images are provided for review. Automated exposure control, iterative reconstruction, and/or weight based adjustment of the mA/kV was utilized to reduce the radiation dose to as low as reasonably achievable. COMPARISON: 11/25/2022. HISTORY: ORDERING SYSTEM PROVIDED HISTORY: Hemoptysis TECHNOLOGIST PROVIDED HISTORY: Reason for exam:->Hemoptysis Decision Support Exception - unselect if not a suspected or confirmed emergency medical condition->Emergency Medical Condition (MA) FINDINGS: The heart is unremarkable in size, no evidence of pericardial effusion is seen. Atherosclerotic calcifications visualized in the coronary vessels. No significant hilar or mediastinal lymphadenopathy is seen. Subtle scattered calcifications visualized in the aortic arch, no evidence for aneurysmal dilatation or arterial dissection is seen. Mild bronchial wall thickening is visualized. Opacification visualized in the left lower lobe associated with the marked thickening of the bronchial walls air and subtle layering of fluid suggestive of consolidation/left lower lobe infiltrate. 2 less extent subtle streaky opacities visualized in the right lower lung field with small right pleural effusion. No evidence of pneumothorax or parenchymal lung mass. No evidence of endobronchial or endoluminal lesions are seen. Limited evaluation of the upper abdomen demonstrates no acute pathology, distended gallbladder with no evidence of wall thickening or pericholecystic stranding. Prominence of the spleen. . Degenerative bone changes are seen. Left lower lobe infiltrates/consolidation, suggestion of early right lower lobe infiltrate. Splenomegaly and distension of the gallbladder visualized. XR CHEST PORTABLE    Result Date: 11/25/2022  EXAMINATION: ONE XRAY VIEW OF THE CHEST 11/25/2022 9:18 pm COMPARISON: 9 November 2022 HISTORY: ORDERING SYSTEM PROVIDED HISTORY: shortness of breath TECHNOLOGIST PROVIDED HISTORY: Reason for exam:->shortness of breath FINDINGS: Single AP erect portable chest demonstrates satisfactory expansion lungs with mild increased markings at lung bases. There are no focal alveolar infiltrates or effusions. The cardiac silhouette appears unremarkable. There is no evidence of a pneumothorax. No acute cardiopulmonary process. XR CHEST PORTABLE    Result Date: 11/9/2022  EXAMINATION: ONE XRAY VIEW OF THE CHEST 11/9/2022 9:32 am COMPARISON: 20 August 2021 HISTORY: ORDERING SYSTEM PROVIDED HISTORY: cough TECHNOLOGIST PROVIDED HISTORY: Reason for exam:->cough FINDINGS: The lungs are without acute focal process. There is no effusion or pneumothorax.  The cardiomediastinal silhouette is without acute process. The osseous structures are without acute process. No acute process. US RETROPERITONEAL COMPLETE    Result Date: 11/12/2022  EXAMINATION: RETROPERITONEAL ULTRASOUND OF THE KIDNEYS AND URINARY BLADDER 11/12/2022 COMPARISON: None HISTORY: ORDERING SYSTEM PROVIDED HISTORY: MELE on CKD TECHNOLOGIST PROVIDED HISTORY: Reason for exam:->MELE on CKD What reading provider will be dictating this exam?->CRC FINDINGS: Kidneys: There is limited visualization on exam.  Renal sizes are probably adequate. Echogenicity is difficult to assess. No hydronephrosis. Bladder: Not distended for evaluation, volume at 10 cc measured. 1. No renal obstruction 2. Otherwise limited exam     US DUP LOWER ART/BYPASS GRAFTS BILATERAL COMPLETE    Result Date: 11/10/2022  EXAMINATION: ARTERIAL DUPLEX ULTRASOUND OF THE BILATERAL LOWER EXTREMITIES  11/9/2022 9:27 pm TECHNIQUE: Duplex ultrasound using B-mode/gray scaled imaging, Doppler spectral analysis and color flow Doppler was obtained of the bilateral lower extremities. COMPARISON: None. HISTORY: ORDERING SYSTEM PROVIDED HISTORY: Eval for PAD TECHNOLOGIST PROVIDED HISTORY: Reason for exam:->Eval for PAD What reading provider will be dictating this exam?->CRC FINDINGS: Multiphasic waveforms above the knee bilaterally. Flow turbulence and minimally biphasic waveforms below the knee on the right without occlusion. Primarily monophasic flow in the small vessels below the knee on the left. No occlusion. Mild-to-moderate atheromatous and calcific plaque in both lower extremity arterial structures. Right: Flow velocities were measured as follows: Com. Fem. 101 cm/sec Prof.           73 cm/sec SFA Prox. 96 cm/sec SFA Mid.     110 cm/sec SFA Dist.     83 cm/sec Pop. 123 cm/sec PTA           142 cm/sec Peron. 67 cm/sec JOEL           43 cm/sec Left: Flow velocities were measured as follows: Com. Fem. 133 cm/sec Prof.           51 cm/sec SFA Prox.     137 cm/sec SFA Mid.     140 cm/sec SFA Dist.     162 cm/sec Pop.           95 cm/sec PTA           89 cm/sec Peron. 123 cm/sec JOEL           41 cm/sec     Moderate PAD. No evidence of vessel occlusion. Abnormal waveforms and flow turbulence in the small vessels below the knee bilaterally. US DUP LOWER EXTREMITIES BILATERAL VENOUS    Result Date: 11/9/2022  EXAMINATION: DUPLEX VENOUS ULTRASOUND OF THE BILATERAL LOWER Bette Li 3:22 pm TECHNIQUE: Duplex ultrasound using B-mode/gray scaled imaging, Doppler spectral analysis and color flow Doppler was obtained of the deep venous structures of the lower bilateral extremities. COMPARISON: None. HISTORY: ORDERING SYSTEM PROVIDED HISTORY: discomfort TECHNOLOGIST PROVIDED HISTORY: Reason for exam:->discomfort What reading provider will be dictating this exam?->CRC FINDINGS: The visualized veins of the bilateral lower extremities are patent and free of echogenic thrombus. The veins demonstrate good compressibility with normal color flow study and spectral analysis. No evidence of DVT in either lower extremity. RECOMMENDATIONS: Unavailable        Assessment:    Principal Problem:    Lactic acidosis  Active Problems:    Hemoptysis    General weakness    Lightheadedness    NSTEMI (non-ST elevated myocardial infarction) (HCC)    Ischemic cardiomyopathy    Elevated troponin    Pure hypercholesterolemia    Preoperative cardiovascular examination    Type 2 diabetes mellitus with stage 3a chronic kidney disease, without long-term current use of insulin (HCC)    Chronic systolic congestive heart failure (HCC)    Coronary artery disease involving native coronary artery of native heart without angina pectoris  Resolved Problems:    * No resolved hospital problems. *    Plan:  1. Epistaxis/Hemetemesis - H/Hs have been stable. No plans for EGD by GI. Plts low at 55 K. CT of sinuses ordered. ENT consult.   Await recommendations regarding inpatient or outpatient work up.  2.    CKD, stage 3a - avoid renal toxins. Trend labs and treat accordingly. 3.    Positive blood cultures - GPC in chains and Enterococcus fecalis. Molecular ID negative. Repeat blood cultures negative thus far. Total care time:  20 minutes    NOTE: This report was transcribed using voice recognition software. Every effort was made to ensure accuracy; however, inadvertent computerized transcription errors may be present.     Electronically signed by Grant Grant MD on 11/28/2022 at 1:08 PM

## 2022-11-28 NOTE — PROGRESS NOTES
Name:  Phoenix Chong  :  1950  MRN:  [id number]  Room:  [address] [place] [address]  DOS:  2022    [facility]  [facility]  Dr. Nancy Rico M.D. Dr. Isi Butt M.D. AMANDA Rhodes Dr., M.D. Dr. Raghavendra Nunes D.O.    -NP Progress Note-    PCP:  Ricarda Jain MD  Admitting Physician:  Terence Chowdhury MD  Chief Complaint:    Chief Complaint   Patient presents with    Dizziness       Subjective  Patient sitting up in bed. Denies abdominal pain, nausea, or vomiting. Persistent hemoptysis. No nausea or vomiting.   Specifically denies hematemesis or emesis of coffee-ground material.      Physical Examination  Vitals:  BP (!) 92/56   Pulse 59   Temp 97.9 °F (36.6 °C) (Oral)   Resp 18   Ht 5' 11\" (1.803 m)   Wt 210 lb (95.3 kg)   SpO2 99%   BMI 29.29 kg/m²   General Appearance:  awake, alert, and oriented to person, place, time, and purpose; appears stated age and cooperative; no apparent distress no labored breathing  HEENT:  PERRL; EOMI; sclera clear; buccal mucosa moist  Neck:  supple; trachea midline; no thyromegaly; no JVD; no bruits  Heart:  rhythm regular; rate controlled; no murmurs  Lungs:  symmetrical; clear to auscultation bilaterally; no wheezes; no rhonchi; no rales  Abdomen:  soft, non-tender, non-distended; bowel sounds positive; no organomegaly or masses  Extremities:  peripheral pulses present; no peripheral edema; no ulcers  Neurologic:  alert and oriented x 3; no focal deficit; cranial nerves grossly intact  Skin:  no petechia; no hemorrhage; no wounds    Medications  Scheduled Meds    atorvastatin  40 mg Oral Nightly    cetirizine  10 mg Oral Daily    fluticasone  1 spray Each Nostril Daily    midodrine  10 mg Oral TID WC    montelukast  10 mg Oral Nightly    sodium chloride flush  5-40 mL IntraVENous 2 times per day    pantoprazole  40 mg IntraVENous BID    cefTRIAXone (ROCEPHIN) IV  1,000 mg IntraVENous Q24H    insulin lispro  0-4 Units SubCUTAneous TID WC    insulin lispro  0-4 Units SubCUTAneous Nightly    midodrine  10 mg Oral Once     Infusion Meds    sodium chloride      octreotide (SandoSTATIN) infusion 50 mcg/hr (11/27/22 1212)    lactated ringers 75 mL/hr at 11/26/22 1340    dextrose 100 mL/hr at 11/27/22 0230     PRN Meds ammonium lactate, sodium chloride flush, sodium chloride, polyethylene glycol, acetaminophen **OR** acetaminophen, glucose, dextrose bolus **OR** dextrose bolus, glucagon (rDNA), dextrose, iopamidol    Laboratory Data  Recent Results (from the past 24 hour(s))   POCT Glucose    Collection Time: 11/26/22  9:51 PM   Result Value Ref Range    Meter Glucose 79 74 - 99 mg/dL   POCT Glucose    Collection Time: 11/27/22  6:12 AM   Result Value Ref Range    Meter Glucose 82 74 - 99 mg/dL   Basic Metabolic Panel w/ Reflex to MG    Collection Time: 11/27/22  7:42 AM   Result Value Ref Range    Sodium 136 132 - 146 mmol/L    Potassium reflex Magnesium 3.5 3.5 - 5.0 mmol/L    Chloride 102 98 - 107 mmol/L    CO2 25 22 - 29 mmol/L    Anion Gap 9 7 - 16 mmol/L    Glucose 85 74 - 99 mg/dL    BUN 28 (H) 6 - 23 mg/dL    Creatinine 1.6 (H) 0.7 - 1.2 mg/dL    Est, Glom Filt Rate 45 >=60 mL/min/1.73    Calcium 8.9 8.6 - 10.2 mg/dL   CBC with Auto Differential    Collection Time: 11/27/22  7:42 AM   Result Value Ref Range    WBC 6.3 4.5 - 11.5 E9/L    RBC 3.70 (L) 3.80 - 5.80 E12/L    Hemoglobin 11.6 (L) 12.5 - 16.5 g/dL    Hematocrit 34.7 (L) 37.0 - 54.0 %    MCV 93.8 80.0 - 99.9 fL    MCH 31.4 26.0 - 35.0 pg    MCHC 33.4 32.0 - 34.5 %    RDW 17.3 (H) 11.5 - 15.0 fL    Platelets 74 (L) 113 - 450 E9/L    MPV 12.4 (H) 7.0 - 12.0 fL    Neutrophils % 67.8 43.0 - 80.0 %    Lymphocytes % 17.9 (L) 20.0 - 42.0 %    Monocytes % 7.1 2.0 - 12.0 %    Eosinophils % 5.4 0.0 - 6.0 %    Basophils % 1.8 0.0 - 2.0 %    Neutrophils Absolute 4.28 1.80 - 7.30 E9/L    Lymphocytes Absolute 1.13 (L) 1.50 - 4.00 E9/L    Monocytes Absolute 0.44 0.10 - 0.95 E9/L Eosinophils Absolute 0.34 0.05 - 0.50 E9/L    Basophils Absolute 0.11 0.00 - 0.20 E9/L    nRBC 0.0 /100 WBC    Anisocytosis 1+     Polychromasia 1+     Poikilocytes 1+     Bolivar Cells 1+    Lactic Acid    Collection Time: 11/27/22  7:42 AM   Result Value Ref Range    Lactic Acid 2.8 (H) 0.5 - 2.2 mmol/L   Procalcitonin    Collection Time: 11/27/22  7:42 AM   Result Value Ref Range    Procalcitonin 0.08 0.00 - 0.08 ng/mL   Platelet Confirmation    Collection Time: 11/27/22  7:42 AM   Result Value Ref Range    Platelet Confirmation CONFIRMED    Magnesium    Collection Time: 11/27/22  7:42 AM   Result Value Ref Range    Magnesium 2.1 1.6 - 2.6 mg/dL   Strep Pneumoniae Antigen    Collection Time: 11/27/22  7:45 AM    Specimen: Urine, clean catch   Result Value Ref Range    STREP PNEUMONIAE ANTIGEN, URINE       Presumptive negative- suggests no current or recent  pneumococcal infection. Infection due to Strep pneumoniae cannot be  ruled out since the antigen present in the sample  may be below the detection limit of the test.  Normal Range:Presumptive Negative     LEGIONELLA ANTIGEN, URINE    Collection Time: 11/27/22  7:45 AM    Specimen: Urine, clean catch   Result Value Ref Range    L. pneumophila Serogp 1 Ur Ag       Presumptive Negative -suggesting no recent or current infections  with Legionella pneumophila serogroup 1. Infection to Legionella cannot be ruled out since other serogroups  and species may cause infection, antigen may not be present in  early infection, or level of antigen may be below the  detection limit.   Normal Range: Presumptive Negative     Respiratory Panel, Molecular, with COVID-19 (Restricted: peds pts or suitable admitted adults)    Collection Time: 11/27/22  7:45 AM    Specimen: Nasopharyngeal   Result Value Ref Range    Adenovirus by PCR Not Detected Not Detected    Bordetella parapertussis by PCR Not Detected Not Detected    Bordetella pertussis by PCR Not Detected Not Detected Chlamydophilia pneumoniae by PCR Not Detected Not Detected    Coronavirus 229E by PCR Not Detected Not Detected    Coronavirus HKU1 by PCR Not Detected Not Detected    Coronavirus NL63 by PCR Not Detected Not Detected    Coronavirus OC43 by PCR Not Detected Not Detected    SARS-CoV-2, PCR Not Detected Not Detected    Human Metapneumovirus by PCR Not Detected Not Detected    Human Rhinovirus/Enterovirus by PCR Not Detected Not Detected    Influenza A by PCR Not Detected Not Detected    Influenza B by PCR Not Detected Not Detected    Mycoplasma pneumoniae by PCR Not Detected Not Detected    Parainfluenza Virus 1 by PCR Not Detected Not Detected    Parainfluenza Virus 2 by PCR Not Detected Not Detected    Parainfluenza Virus 3 by PCR Not Detected Not Detected    Parainfluenza Virus 4 by PCR Not Detected Not Detected    Respiratory Syncytial Virus by PCR Not Detected Not Detected   POCT Glucose    Collection Time: 11/27/22 12:16 PM   Result Value Ref Range    Meter Glucose 88 74 - 99 mg/dL   POCT Glucose    Collection Time: 11/27/22  4:02 PM   Result Value Ref Range    Meter Glucose 91 74 - 99 mg/dL   POCT Glucose    Collection Time: 11/27/22  9:00 PM   Result Value Ref Range    Meter Glucose 78 74 - 99 mg/dL       Imaging  CT ABDOMEN PELVIS WO CONTRAST Additional Contrast? None    Result Date: 11/26/2022  EXAMINATION: CT OF THE ABDOMEN AND PELVIS WITHOUT CONTRAST 11/26/2022 12:19 am TECHNIQUE: CT of the abdomen and pelvis was performed without the administration of intravenous contrast. Multiplanar reformatted images are provided for review. Automated exposure control, iterative reconstruction, and/or weight based adjustment of the mA/kV was utilized to reduce the radiation dose to as low as reasonably achievable. COMPARISON: MRI examination September 3, 2022.  HISTORY: ORDERING SYSTEM PROVIDED HISTORY: abdominal pain TECHNOLOGIST PROVIDED HISTORY: Reason for exam:->abdominal pain Additional Contrast?->None Decision Support Exception - unselect if not a suspected or confirmed emergency medical condition->Emergency Medical Condition (MA) FINDINGS: Lower Chest: Mild airspace disease posterior basilar segments left lower lobe may represent aspiration or other pneumonitis. No effusions. . Organs: Moderate splenomegaly, 14.6 cm, and shrunken 8.5 cm liver with nodular surface contour and recanalized paraumbilical vein compatible with hepatic cirrhosis/portal hypertension. Cholelithiasis. 3.2 cm cystic lesion of the pancreatic tail, unchanged from prior MRI examination of September 3, 2022. Liver, biliary tree, bilateral adrenal glands, bilateral kidneys, spleen and pancreas are otherwise normal. GI/Bowel: No ileus or obstruction. Normal appendix right lower quadrant. No inflammatory bowel process. Pelvis: No adenopathy or fluid. Bladder dome diverticulum noted. Peritoneum/Retroperitoneum: Negative. Bones/Soft Tissues: No acute osseous lesions. 1. No acute disease. 2. Normal appendix right lower quadrant. 3. Cholelithiasis and findings consistent with hepatic cirrhosis. 4. 3.2 cm cystic lesion of the pancreatic tail, unchanged from MRI examination of 3 months prior. RECOMMENDATIONS: Careful clinical correlation and follow up recommended. CT CHEST W CONTRAST    Result Date: 11/26/2022  EXAMINATION: CT OF THE CHEST WITH CONTRAST 11/26/2022 1:00 pm TECHNIQUE: CT of the chest was performed with the administration of intravenous contrast. Multiplanar reformatted images are provided for review. Automated exposure control, iterative reconstruction, and/or weight based adjustment of the mA/kV was utilized to reduce the radiation dose to as low as reasonably achievable. COMPARISON: 11/25/2022.  HISTORY: ORDERING SYSTEM PROVIDED HISTORY: Hemoptysis TECHNOLOGIST PROVIDED HISTORY: Reason for exam:->Hemoptysis Decision Support Exception - unselect if not a suspected or confirmed emergency medical condition->Emergency Medical Condition (MA) FINDINGS: The heart is unremarkable in size, no evidence of pericardial effusion is seen. Atherosclerotic calcifications visualized in the coronary vessels. No significant hilar or mediastinal lymphadenopathy is seen. Subtle scattered calcifications visualized in the aortic arch, no evidence for aneurysmal dilatation or arterial dissection is seen. Mild bronchial wall thickening is visualized. Opacification visualized in the left lower lobe associated with the marked thickening of the bronchial walls air and subtle layering of fluid suggestive of consolidation/left lower lobe infiltrate. 2 less extent subtle streaky opacities visualized in the right lower lung field with small right pleural effusion. No evidence of pneumothorax or parenchymal lung mass. No evidence of endobronchial or endoluminal lesions are seen. Limited evaluation of the upper abdomen demonstrates no acute pathology, distended gallbladder with no evidence of wall thickening or pericholecystic stranding. Prominence of the spleen. . Degenerative bone changes are seen. Left lower lobe infiltrates/consolidation, suggestion of early right lower lobe infiltrate. Splenomegaly and distension of the gallbladder visualized. XR CHEST PORTABLE    Result Date: 11/25/2022  EXAMINATION: ONE XRAY VIEW OF THE CHEST 11/25/2022 9:18 pm COMPARISON: 9 November 2022 HISTORY: ORDERING SYSTEM PROVIDED HISTORY: shortness of breath TECHNOLOGIST PROVIDED HISTORY: Reason for exam:->shortness of breath FINDINGS: Single AP erect portable chest demonstrates satisfactory expansion lungs with mild increased markings at lung bases. There are no focal alveolar infiltrates or effusions. The cardiac silhouette appears unremarkable. There is no evidence of a pneumothorax. No acute cardiopulmonary process.      XR CHEST PORTABLE    Result Date: 11/9/2022  EXAMINATION: ONE XRAY VIEW OF THE CHEST 11/9/2022 9:32 am COMPARISON: 20 August 2021 HISTORY: [address] PROVIDED HISTORY: cough TECHNOLOGIST PROVIDED HISTORY: Reason for exam:->cough FINDINGS: The lungs are without acute focal process. There is no effusion or pneumothorax. The cardiomediastinal silhouette is without acute process. The osseous structures are without acute process. No acute process. US RETROPERITONEAL COMPLETE    Result Date: 11/12/2022  EXAMINATION: RETROPERITONEAL ULTRASOUND OF THE KIDNEYS AND URINARY BLADDER 11/12/2022 COMPARISON: None HISTORY: ORDERING SYSTEM PROVIDED HISTORY: MELE on CKD TECHNOLOGIST PROVIDED HISTORY: Reason for exam:->MELE on CKD What reading provider will be dictating this exam?->CRC FINDINGS: Kidneys: There is limited visualization on exam.  Renal sizes are probably adequate. Echogenicity is difficult to assess. No hydronephrosis. Bladder: Not distended for evaluation, volume at 10 cc measured. 1. No renal obstruction 2. Otherwise limited exam     US DUP LOWER ART/BYPASS GRAFTS BILATERAL COMPLETE    Result Date: 11/10/2022  EXAMINATION: ARTERIAL DUPLEX ULTRASOUND OF THE BILATERAL LOWER EXTREMITIES  11/9/2022 9:27 pm TECHNIQUE: Duplex ultrasound using B-mode/gray scaled imaging, Doppler spectral analysis and color flow Doppler was obtained of the bilateral lower extremities. COMPARISON: None. HISTORY: ORDERING SYSTEM PROVIDED HISTORY: Eval for PAD TECHNOLOGIST PROVIDED HISTORY: Reason for exam:->Eval for PAD What reading provider will be dictating this exam?->CRC FINDINGS: Multiphasic waveforms above the knee bilaterally. Flow turbulence and minimally biphasic waveforms below the knee on the right without occlusion. Primarily monophasic flow in the small vessels below the knee on the left. No occlusion. Mild-to-moderate atheromatous and calcific plaque in both lower extremity arterial structures. Right: Flow velocities were measured as follows: Com. Fem. 101 cm/sec Prof.           73 cm/sec SFA Prox. 96 cm/sec SFA Mid.     110 cm/sec SFA Dist.     83 cm/sec Pop. 123 cm/sec PTA           142 cm/sec Peron. 67 cm/sec JOEL           43 cm/sec Left: Flow velocities were measured as follows: Com. Fem. 133 cm/sec Prof.           51 cm/sec SFA Prox. 137 cm/sec SFA Mid.     140 cm/sec SFA Dist.     162 cm/sec Pop.           95 cm/sec PTA           89 cm/sec Peron. 123 cm/sec JOEL           41 cm/sec     Moderate PAD. No evidence of vessel occlusion. Abnormal waveforms and flow turbulence in the small vessels below the knee bilaterally. US DUP LOWER EXTREMITIES BILATERAL VENOUS    Result Date: 11/9/2022  EXAMINATION: DUPLEX VENOUS ULTRASOUND OF THE BILATERAL LOWER Alfred Dorita 3:22 pm TECHNIQUE: Duplex ultrasound using B-mode/gray scaled imaging, Doppler spectral analysis and color flow Doppler was obtained of the deep venous structures of the lower bilateral extremities. COMPARISON: None. HISTORY: ORDERING SYSTEM PROVIDED HISTORY: discomfort TECHNOLOGIST PROVIDED HISTORY: Reason for exam:->discomfort What reading provider will be dictating this exam?->CRC FINDINGS: The visualized veins of the bilateral lower extremities are patent and free of echogenic thrombus. The veins demonstrate good compressibility with normal color flow study and spectral analysis. No evidence of DVT in either lower extremity. RECOMMENDATIONS: Unavailable           Assessment and Plan  Patient is a 66 y.o. male patient on consult for hemoptysis. Cirrhosis  -Prior MELD = 20, MELD-Na = 24 (11/10/2022)  -Negative viral liver serology  -Negative autoimmune liver serology  -History of A1AT deficiency - pending phenotype - has seen hematology in the past  -History of elevated iron / ferritin - pending HFE genes  -Elevated IgG  -Normal AFP 7 (11/10/2022)  -MRI abdomen 9/4/2022 without evidence of hepatic mass although low signal area in the right hepatic lobe noted  -Consider patient for EGD for variceal screening     2.   Pancreatic lesion  -MRI abdomen 9/4/2022 showing pancreas and pancreatic duct with multiple sidebranch regularities increasing in size compared to prior exam 6/29/2021  -Elevated CA 19-9 =  53 (7/12/2021) => 63 (11/10/2022)  -Elevated CEA = 7.0 (7/12/2021) => 5.7 (11/10/2022)  -Normal AFP = 9 (5/16/2022) => 7 (11/10/2022)  -Endoscopic ultrasound 8/6/2021 by Dr. Nica Harris showing 22 mm complex cystic lesion in the pancreatic tail adjacent to the pancreatic duct, no dilation of the pancreatic duct  -Biopsy consistent with gastric duodenal contaminant, cellblock was acellular - nondiagnostic biopsy     3. Anemia  -Mild / normocytic  -No overt GI bleeding  -Reported hemoptysis  -Possibility of bleeding varices - octreotide per admitting  -No prior evidence of iron deficiency  -Monitor hgb q12 hours  -Repeat FOBT  -PPI twice daily  -Consider endoscopic evaluation - will require cardiac clearance prior to procedures     4. Diarrhea  -Obtain stool studies  -Colonoscopy likely outpatient     5. CAD / HFrEF / elevated troponin  -Elevated / increased troponin since arrival  -Echo 8/20/2021 showing EF 25%  -Cardiac catheterization 8/23/2021 showing severe multivessel coronary artery disease  -Will require cardiac clearance prior to any endoscopic procedures     6. Comorbidities  -Rhinovirus, hypertension, dyslipidemia, diabetes, cellulitis  -Per admitting/consultants      No evidence of GI bleeding. Hemoglobin stable. Can continue octreotide for another day and then wean. No immediate plans for EGD. If overt GI bleeding or significant decrease in hemoglobin, can consider emergent procedure. Monitor labs. Will follow.     Phyllis Romero, APRN - CNP  9:20 PM  11/27/2022

## 2022-11-29 LAB
ANION GAP SERPL CALCULATED.3IONS-SCNC: 8 MMOL/L (ref 7–16)
ANISOCYTOSIS: ABNORMAL
BASOPHILS ABSOLUTE: 0.08 E9/L (ref 0–0.2)
BASOPHILS RELATIVE PERCENT: 1.7 % (ref 0–2)
BUN BLDV-MCNC: 23 MG/DL (ref 6–23)
BURR CELLS: ABNORMAL
CALCIUM SERPL-MCNC: 7.9 MG/DL (ref 8.6–10.2)
CHLORIDE BLD-SCNC: 104 MMOL/L (ref 98–107)
CO2: 22 MMOL/L (ref 22–29)
CREAT SERPL-MCNC: 1.5 MG/DL (ref 0.7–1.2)
EOSINOPHILS ABSOLUTE: 0.37 E9/L (ref 0.05–0.5)
EOSINOPHILS RELATIVE PERCENT: 7.7 % (ref 0–6)
GFR SERPL CREATININE-BSD FRML MDRD: 49 ML/MIN/1.73
GLUCOSE BLD-MCNC: 161 MG/DL (ref 74–99)
HCT VFR BLD CALC: 29.7 % (ref 37–54)
HCT VFR BLD CALC: 33.8 % (ref 37–54)
HEMOGLOBIN: 10.1 G/DL (ref 12.5–16.5)
HEMOGLOBIN: 11.7 G/DL (ref 12.5–16.5)
IMMATURE GRANULOCYTES #: 0.02 E9/L
IMMATURE GRANULOCYTES %: 0.4 % (ref 0–5)
LYMPHOCYTES ABSOLUTE: 1.64 E9/L (ref 1.5–4)
LYMPHOCYTES RELATIVE PERCENT: 34.2 % (ref 20–42)
MCH RBC QN AUTO: 32 PG (ref 26–35)
MCHC RBC AUTO-ENTMCNC: 34 % (ref 32–34.5)
MCV RBC AUTO: 94 FL (ref 80–99.9)
METER GLUCOSE: 169 MG/DL (ref 74–99)
METER GLUCOSE: 304 MG/DL (ref 74–99)
METER GLUCOSE: 335 MG/DL (ref 74–99)
METER GLUCOSE: 352 MG/DL (ref 74–99)
MONOCYTES ABSOLUTE: 0.71 E9/L (ref 0.1–0.95)
MONOCYTES RELATIVE PERCENT: 14.8 % (ref 2–12)
NEUTROPHILS ABSOLUTE: 1.97 E9/L (ref 1.8–7.3)
NEUTROPHILS RELATIVE PERCENT: 41.2 % (ref 43–80)
PDW BLD-RTO: 17 FL (ref 11.5–15)
PLATELET # BLD: 50 E9/L (ref 130–450)
PLATELET CONFIRMATION: NORMAL
PMV BLD AUTO: 12.2 FL (ref 7–12)
POIKILOCYTES: ABNORMAL
POTASSIUM REFLEX MAGNESIUM: 3.6 MMOL/L (ref 3.5–5)
RBC # BLD: 3.16 E12/L (ref 3.8–5.8)
SODIUM BLD-SCNC: 134 MMOL/L (ref 132–146)
WBC # BLD: 4.8 E9/L (ref 4.5–11.5)

## 2022-11-29 PROCEDURE — 2580000003 HC RX 258

## 2022-11-29 PROCEDURE — 85025 COMPLETE CBC W/AUTO DIFF WBC: CPT

## 2022-11-29 PROCEDURE — 6370000000 HC RX 637 (ALT 250 FOR IP): Performed by: FAMILY MEDICINE

## 2022-11-29 PROCEDURE — 85018 HEMOGLOBIN: CPT

## 2022-11-29 PROCEDURE — 82962 GLUCOSE BLOOD TEST: CPT

## 2022-11-29 PROCEDURE — 6360000002 HC RX W HCPCS: Performed by: STUDENT IN AN ORGANIZED HEALTH CARE EDUCATION/TRAINING PROGRAM

## 2022-11-29 PROCEDURE — 6370000000 HC RX 637 (ALT 250 FOR IP)

## 2022-11-29 PROCEDURE — 36415 COLL VENOUS BLD VENIPUNCTURE: CPT

## 2022-11-29 PROCEDURE — 2580000003 HC RX 258: Performed by: STUDENT IN AN ORGANIZED HEALTH CARE EDUCATION/TRAINING PROGRAM

## 2022-11-29 PROCEDURE — 2060000000 HC ICU INTERMEDIATE R&B

## 2022-11-29 PROCEDURE — 6360000002 HC RX W HCPCS: Performed by: FAMILY MEDICINE

## 2022-11-29 PROCEDURE — 93005 ELECTROCARDIOGRAM TRACING: CPT

## 2022-11-29 PROCEDURE — C9113 INJ PANTOPRAZOLE SODIUM, VIA: HCPCS | Performed by: STUDENT IN AN ORGANIZED HEALTH CARE EDUCATION/TRAINING PROGRAM

## 2022-11-29 PROCEDURE — 93005 ELECTROCARDIOGRAM TRACING: CPT | Performed by: FAMILY MEDICINE

## 2022-11-29 PROCEDURE — 6370000000 HC RX 637 (ALT 250 FOR IP): Performed by: STUDENT IN AN ORGANIZED HEALTH CARE EDUCATION/TRAINING PROGRAM

## 2022-11-29 PROCEDURE — 99233 SBSQ HOSP IP/OBS HIGH 50: CPT | Performed by: FAMILY MEDICINE

## 2022-11-29 PROCEDURE — 2580000003 HC RX 258: Performed by: FAMILY MEDICINE

## 2022-11-29 PROCEDURE — 85014 HEMATOCRIT: CPT

## 2022-11-29 PROCEDURE — 80048 BASIC METABOLIC PNL TOTAL CA: CPT

## 2022-11-29 RX ORDER — SODIUM CHLORIDE, SODIUM LACTATE, POTASSIUM CHLORIDE, CALCIUM CHLORIDE 600; 310; 30; 20 MG/100ML; MG/100ML; MG/100ML; MG/100ML
INJECTION, SOLUTION INTRAVENOUS CONTINUOUS
Status: DISCONTINUED | OUTPATIENT
Start: 2022-11-29 | End: 2022-12-01 | Stop reason: HOSPADM

## 2022-11-29 RX ORDER — SIMETHICONE 80 MG
80 TABLET,CHEWABLE ORAL 4 TIMES DAILY PRN
Status: DISCONTINUED | OUTPATIENT
Start: 2022-11-29 | End: 2022-12-01 | Stop reason: HOSPADM

## 2022-11-29 RX ORDER — DEXAMETHASONE SODIUM PHOSPHATE 10 MG/ML
10 INJECTION INTRAMUSCULAR; INTRAVENOUS EVERY 8 HOURS
Status: COMPLETED | OUTPATIENT
Start: 2022-11-29 | End: 2022-11-30

## 2022-11-29 RX ORDER — SODIUM CHLORIDE, SODIUM LACTATE, POTASSIUM CHLORIDE, AND CALCIUM CHLORIDE .6; .31; .03; .02 G/100ML; G/100ML; G/100ML; G/100ML
500 INJECTION, SOLUTION INTRAVENOUS ONCE
Status: COMPLETED | OUTPATIENT
Start: 2022-11-29 | End: 2022-11-29

## 2022-11-29 RX ORDER — MIDODRINE HYDROCHLORIDE 5 MG/1
5 TABLET ORAL ONCE
Status: COMPLETED | OUTPATIENT
Start: 2022-11-29 | End: 2022-11-29

## 2022-11-29 RX ORDER — LACTOBACILLUS RHAMNOSUS GG 10B CELL
1 CAPSULE ORAL DAILY
Status: DISCONTINUED | OUTPATIENT
Start: 2022-11-29 | End: 2022-12-01 | Stop reason: HOSPADM

## 2022-11-29 RX ORDER — 0.9 % SODIUM CHLORIDE 0.9 %
500 INTRAVENOUS SOLUTION INTRAVENOUS ONCE
Status: COMPLETED | OUTPATIENT
Start: 2022-11-29 | End: 2022-11-29

## 2022-11-29 RX ADMIN — MIDODRINE HYDROCHLORIDE 5 MG: 5 TABLET ORAL at 19:16

## 2022-11-29 RX ADMIN — PANTOPRAZOLE SODIUM 40 MG: 40 INJECTION, POWDER, FOR SOLUTION INTRAVENOUS at 20:49

## 2022-11-29 RX ADMIN — MIDODRINE HYDROCHLORIDE 10 MG: 5 TABLET ORAL at 06:00

## 2022-11-29 RX ADMIN — SODIUM CHLORIDE, POTASSIUM CHLORIDE, SODIUM LACTATE AND CALCIUM CHLORIDE 500 ML: 600; 310; 30; 20 INJECTION, SOLUTION INTRAVENOUS at 16:46

## 2022-11-29 RX ADMIN — MIDODRINE HYDROCHLORIDE 10 MG: 5 TABLET ORAL at 11:25

## 2022-11-29 RX ADMIN — INSULIN LISPRO 4 UNITS: 100 INJECTION, SOLUTION INTRAVENOUS; SUBCUTANEOUS at 20:59

## 2022-11-29 RX ADMIN — CETIRIZINE HYDROCHLORIDE 10 MG: 10 TABLET, FILM COATED ORAL at 09:57

## 2022-11-29 RX ADMIN — SODIUM CHLORIDE, PRESERVATIVE FREE 10 ML: 5 INJECTION INTRAVENOUS at 20:49

## 2022-11-29 RX ADMIN — PANTOPRAZOLE SODIUM 40 MG: 40 INJECTION, POWDER, FOR SOLUTION INTRAVENOUS at 11:25

## 2022-11-29 RX ADMIN — MIDODRINE HYDROCHLORIDE 10 MG: 5 TABLET ORAL at 16:43

## 2022-11-29 RX ADMIN — WATER 1000 MG: 1 INJECTION INTRAMUSCULAR; INTRAVENOUS; SUBCUTANEOUS at 03:09

## 2022-11-29 RX ADMIN — SODIUM CHLORIDE, POTASSIUM CHLORIDE, SODIUM LACTATE AND CALCIUM CHLORIDE: 600; 310; 30; 20 INJECTION, SOLUTION INTRAVENOUS at 05:59

## 2022-11-29 RX ADMIN — MONTELUKAST SODIUM 10 MG: 10 TABLET ORAL at 20:49

## 2022-11-29 RX ADMIN — SIMETHICONE 80 MG: 80 TABLET, CHEWABLE ORAL at 22:00

## 2022-11-29 RX ADMIN — DEXAMETHASONE SODIUM PHOSPHATE 10 MG: 10 INJECTION INTRAMUSCULAR; INTRAVENOUS at 11:26

## 2022-11-29 RX ADMIN — ATORVASTATIN CALCIUM 40 MG: 40 TABLET, FILM COATED ORAL at 20:49

## 2022-11-29 RX ADMIN — SODIUM CHLORIDE, POTASSIUM CHLORIDE, SODIUM LACTATE AND CALCIUM CHLORIDE: 600; 310; 30; 20 INJECTION, SOLUTION INTRAVENOUS at 22:00

## 2022-11-29 RX ADMIN — INSULIN LISPRO 3 UNITS: 100 INJECTION, SOLUTION INTRAVENOUS; SUBCUTANEOUS at 16:22

## 2022-11-29 RX ADMIN — DEXAMETHASONE SODIUM PHOSPHATE 10 MG: 10 INJECTION INTRAMUSCULAR; INTRAVENOUS at 18:07

## 2022-11-29 RX ADMIN — INSULIN LISPRO 3 UNITS: 100 INJECTION, SOLUTION INTRAVENOUS; SUBCUTANEOUS at 11:31

## 2022-11-29 RX ADMIN — OCTREOTIDE ACETATE 50 MCG/HR: 500 INJECTION, SOLUTION INTRAVENOUS; SUBCUTANEOUS at 01:03

## 2022-11-29 RX ADMIN — Medication 1 CAPSULE: at 11:26

## 2022-11-29 RX ADMIN — SODIUM CHLORIDE 500 ML: 9 INJECTION, SOLUTION INTRAVENOUS at 19:12

## 2022-11-29 ASSESSMENT — PAIN DESCRIPTION - ORIENTATION: ORIENTATION: MID

## 2022-11-29 ASSESSMENT — PAIN SCALES - GENERAL
PAINLEVEL_OUTOF10: 0
PAINLEVEL_OUTOF10: 3

## 2022-11-29 ASSESSMENT — PAIN DESCRIPTION - DESCRIPTORS: DESCRIPTORS: BURNING;PRESSURE

## 2022-11-29 ASSESSMENT — PAIN DESCRIPTION - LOCATION: LOCATION: CHEST

## 2022-11-29 NOTE — PROGRESS NOTES
Call placed to the cardiology office in [facility] for post hospital follow up, they currently have no appointments available. He is scheduled for 12/20 and will keep that appointment.      Lulu CASILLAS, RN  Heart Failure Navigator

## 2022-11-29 NOTE — PROGRESS NOTES
PROGRESS NOTE  By Claudia Garcia M.D. [facility]  Dr. Nancy Rico M.D.,  Dr. Isi Butt M.D.,   Dr. Arnaldo Laird D.O.,  Dr. Mariana Regalado M.D.,  Dr. Raghavendra Nunes D.O.,          Phoenix Chong  66 y.o.  male    SUBJECTIVE:  Denies abdominal pain. Denies nausea or vomiting    OBJECTIVE:    BP (!) 90/44   Pulse 59   Temp 97.9 °F (36.6 °C) (Oral)   Resp 16   Ht 5' 11\" (1.803 m)   Wt 210 lb (95.3 kg)   SpO2 97%   BMI 29.29 kg/m²     General: NAD/ male. AAOx3  HEENT: Anicteric sclera/moist oral mucosa.   No epistaxis  Neck: Supple/trachea midline  Chest: Symmetric excursion/labored respirations  Abd.: Soft and nontender  Extr.:  Decreased muscle tone and bulk, consistent with age and condition  Skin: Warm and dry/anicteric      DATA:       Lab Results   Component Value Date/Time    WBC 4.8 11/29/2022 03:15 AM    RBC 3.16 11/29/2022 03:15 AM    HGB 10.1 11/29/2022 03:15 AM    HCT 29.7 11/29/2022 03:15 AM    MCV 94.0 11/29/2022 03:15 AM    MCH 32.0 11/29/2022 03:15 AM    MCHC 34.0 11/29/2022 03:15 AM    RDW 17.0 11/29/2022 03:15 AM    PLT 50 11/29/2022 03:15 AM    MPV 12.2 11/29/2022 03:15 AM     Lab Results   Component Value Date/Time     11/29/2022 03:15 AM    K 3.6 11/29/2022 03:15 AM     11/29/2022 03:15 AM    CO2 22 11/29/2022 03:15 AM    BUN 23 11/29/2022 03:15 AM    CREATININE 1.5 11/29/2022 03:15 AM    CALCIUM 7.9 11/29/2022 03:15 AM    PROT 6.7 11/25/2022 09:02 PM    LABALBU 2.7 11/25/2022 09:02 PM    LABALBU 4.7 04/27/2011 11:06 AM    BILITOT 2.5 11/25/2022 09:02 PM    ALKPHOS 125 11/25/2022 09:02 PM    AST 51 11/25/2022 09:02 PM    ALT 35 11/25/2022 09:02 PM     Lab Results   Component Value Date/Time    LIPASE 147 11/25/2022 09:02 PM     No results found for: AMYLASE      ASSESSMENT/PLAN:  Patient Active Problem List   Diagnosis    Dilated cardiomyopathy ([facility]    Non-rheumatic mitral regurgitation    Absolute anemia    SOBOE (shortness of breath on exertion)    Pulmonary HTN (HCC)    Type 2 diabetes mellitus with stage 3a chronic kidney disease, without long-term current use of insulin (HCC)    HTN (hypertension)    Pancytopenia ([address]    Coagulation defect ([address]    Pancreatic cyst    Community acquired bacterial pneumonia    Bronchopneumonia    Chronic systolic congestive heart failure ([address]    Coronary artery disease involving native coronary artery of native heart without angina pectoris    Left ventricular dysfunction    Hepatic cirrhosis (HCC)    HFrEF (heart failure with reduced ejection fraction) (HCC)    Mitral valve disease    Portal hypertension (HCC)    Allergic rhinitis    Hypoxia    Thrombocytopenia (HCC)    Chronic renal disease, stage III (HCC) [970291]    Hypotension    Acute kidney injury superimposed on chronic kidney disease (HCC)    MELE (acute kidney injury) ([address]    Venous stasis dermatitis of both lower extremities    Rhinovirus    Lactic acidosis    Hemoptysis    General weakness    Lightheadedness    NSTEMI (non-ST elevated myocardial infarction) ([address]    Ischemic cardiomyopathy    Elevated troponin    Pure hypercholesterolemia    Preoperative cardiovascular examination     Cirrhosis  -Prior MELD = 20, MELD-Na = 24 (11/10/2022)  -Negative viral liver serology  -Negative autoimmune liver serology  -History of A1AT deficiency - pending phenotype - has seen hematology in the past  -History of elevated iron / ferritin - pending HFE genes  -Elevated IgG  -Normal AFP 7 (11/10/2022)  -MRI abdomen 9/4/2022 without evidence of hepatic mass although low signal area in the right hepatic lobe noted  -Consider patient for EGD for variceal screening     2.   Pancreatic lesion  -MRI abdomen 9/4/2022 showing pancreas and pancreatic duct with multiple sidebranch regularities increasing in size compared to prior exam 6/29/2021  -Elevated CA 19-9 =  53 (7/12/2021) => 63 (11/10/2022)  -Elevated CEA = 7.0 (7/12/2021) => 5.7 (11/10/2022)  -Normal AFP = 9 (5/16/2022) => 7 (11/10/2022)  -Endoscopic ultrasound 8/6/2021 by Dr. Venancio Garrett showing 22 mm complex cystic lesion in the pancreatic tail adjacent to the pancreatic duct, no dilation of the pancreatic duct  -Biopsy consistent with gastric duodenal contaminant, cellblock was acellular - nondiagnostic biopsy     3. Anemia  -Mild / normocytic  -No overt GI bleeding  -Reported hemoptysis  -Cannot completely exclude varices - octreotide per admitting  -No prior evidence of iron deficiency  -Monitor hgb q12 hours  -Repeat FOBT  -Continue PPI twice daily  -Consider endoscopic evaluation - will require cardiac clearance prior to procedures -cardiology input appreciated     4. Diarrhea  -Obtain stool studies  -Colonoscopy likely outpatient     5. CAD / HFrEF / elevated troponin  -Elevated / increased troponin since arrival  -Echo 8/20/2021 showing EF 25%  -Cardiac catheterization 8/23/2021 showing severe multivessel coronary artery disease  -Cardiology input appreciated with recommendation to proceed with procedures     6. Comorbidities  -Rhinovirus, hypertension, dyslipidemia, diabetes, cellulitis  -Per admitting/consultants     No evidence of overt bleed  Plan for EGD inpatient or outpatient depending on H&H dynamics  -if patient's H&H remained stable, he can be discharged tomorrow with plan for outpatient endoscopy. Above has been discussed with the patient and he verbalized understanding and agreement with the plan as delineated. Abby Blackman MD  11/29/2022  4:23 PM    NOTE:  This report was transcribed using voice recognition software. Every effort was made to ensure accuracy; however, inadvertent computerized transcription errors may be present.

## 2022-11-29 NOTE — PROGRESS NOTES
[facility] Progress Note    Admitting Date and Time: 11/25/2022  8:21 PM  Admit Dx: Lactic acidosis [E87.20]  Lightheadedness [R42]  General weakness [R53.1]  NSTEMI (non-ST elevated myocardial infarction) ([facility] [address] [I21.4]  Hemoptysis [R04.2]    Subjective:  Patient is being followed for Lactic acidosis [E87.20]  Lightheadedness [R42]  General weakness [R53.1]  NSTEMI (non-ST elevated myocardial infarction) ([facility] [address] [I21.4]  Hemoptysis [R04.2]   Pt feels good. No GI procedure. ENT note and recommendations appreciated. Decadron 10 mg IV q 8 hrs x 3 doses. Then Prednisone 40 mg daily  2 days, 20 mg daily x 2 days and 10 mg daily x 2 days. Per RN:  IV infiltrated. Needs resumed for Decadron. Sandostatin discontinued. ROS: denies fever, chills, cp, sob, n/v, HA unless stated above.       dexamethasone  10 mg IntraVENous Q8H    lactobacillus  1 capsule Oral Daily    atorvastatin  40 mg Oral Nightly    cetirizine  10 mg Oral Daily    midodrine  10 mg Oral TID WC    montelukast  10 mg Oral Nightly    sodium chloride flush  5-40 mL IntraVENous 2 times per day    pantoprazole  40 mg IntraVENous BID    cefTRIAXone (ROCEPHIN) IV  1,000 mg IntraVENous Q24H    insulin lispro  0-4 Units SubCUTAneous TID WC    insulin lispro  0-4 Units SubCUTAneous Nightly    midodrine  10 mg Oral Once     ammonium lactate, , PRN  sodium chloride flush, 5-40 mL, PRN  sodium chloride, , PRN  polyethylene glycol, 17 g, Daily PRN  acetaminophen, 650 mg, Q6H PRN   Or  acetaminophen, 650 mg, Q6H PRN  glucose, 4 tablet, PRN  dextrose bolus, 125 mL, PRN   Or  dextrose bolus, 250 mL, PRN  glucagon (rDNA), 1 mg, PRN  dextrose, , Continuous PRN  iopamidol, 785 mL, ONCE PRN    Objective:    BP (!) 94/48   Pulse 66   Temp 98.4 °F (36.9 °C) (Oral)   Resp 16   Ht 5' 11\" (1.803 m)   Wt 210 lb (95.3 kg)   SpO2 100%   BMI 29.29 kg/m²     General Appearance: alert and oriented to person, place and time and in no acute distress  Skin: warm and dry  Head: normocephalic and atraumatic  Eyes: pupils equal, round, and reactive to light, extraocular eye movements intact, conjunctivae normal  Neck: neck supple and non tender without mass   Pulmonary/Chest: clear to auscultation bilaterally- no wheezes, rales or rhonchi, normal air movement, no respiratory distress  Cardiovascular: normal rate, normal S1 and S2 and no carotid bruits  Abdomen: soft, non-tender, non-distended, normal bowel sounds, no masses or organomegaly  Extremities: no cyanosis, no clubbing and no edema  Neurologic: no cranial nerve deficit and speech normal    Recent Labs     11/27/22  0742 11/28/22  0400 11/29/22  0315    137 134   K 3.5 3.8 3.6    105 104   CO2 25 24 22   BUN 28* 23 23   CREATININE 1.6* 1.4* 1.5*   GLUCOSE 85 77 161*   CALCIUM 8.9 8.5* 7.9*       Recent Labs     11/27/22 0742 11/28/22 0400 11/29/22 0315   WBC 6.3 5.2 4.8   RBC 3.70* 3.41* 3.16*   HGB 11.6* 11.1* 10.1*   HCT 34.7* 32.1* 29.7*   MCV 93.8 94.1 94.0   MCH 31.4 32.6 32.0   MCHC 33.4 34.6* 34.0   RDW 17.3* 17.2* 17.0*   PLT 74* 55* 50*   MPV 12.4* 11.5 12.2*     Radiology:   CT ABDOMEN PELVIS WO CONTRAST Additional Contrast? None    Result Date: 11/26/2022  EXAMINATION: CT OF THE ABDOMEN AND PELVIS WITHOUT CONTRAST 11/26/2022 12:19 am TECHNIQUE: CT of the abdomen and pelvis was performed without the administration of intravenous contrast. Multiplanar reformatted images are provided for review. Automated exposure control, iterative reconstruction, and/or weight based adjustment of the mA/kV was utilized to reduce the radiation dose to as low as reasonably achievable. COMPARISON: MRI examination September 3, 2022.  HISTORY: ORDERING SYSTEM PROVIDED HISTORY: abdominal pain TECHNOLOGIST PROVIDED HISTORY: Reason for exam:->abdominal pain Additional Contrast?->None Decision Support Exception - unselect if not a suspected or confirmed emergency medical condition->Emergency Medical Condition (MA) FINDINGS: Lower Chest: Mild airspace disease posterior basilar segments left lower lobe may represent aspiration or other pneumonitis. No effusions. . Organs: Moderate splenomegaly, 14.6 cm, and shrunken 8.5 cm liver with nodular surface contour and recanalized paraumbilical vein compatible with hepatic cirrhosis/portal hypertension. Cholelithiasis. 3.2 cm cystic lesion of the pancreatic tail, unchanged from prior MRI examination of September 3, 2022. Liver, biliary tree, bilateral adrenal glands, bilateral kidneys, spleen and pancreas are otherwise normal. GI/Bowel: No ileus or obstruction. Normal appendix right lower quadrant. No inflammatory bowel process. Pelvis: No adenopathy or fluid. Bladder dome diverticulum noted. Peritoneum/Retroperitoneum: Negative. Bones/Soft Tissues: No acute osseous lesions. 1. No acute disease. 2. Normal appendix right lower quadrant. 3. Cholelithiasis and findings consistent with hepatic cirrhosis. 4. 3.2 cm cystic lesion of the pancreatic tail, unchanged from MRI examination of 3 months prior. RECOMMENDATIONS: Careful clinical correlation and follow up recommended. CT CHEST W CONTRAST    Result Date: 11/26/2022  EXAMINATION: CT OF THE CHEST WITH CONTRAST 11/26/2022 1:00 pm TECHNIQUE: CT of the chest was performed with the administration of intravenous contrast. Multiplanar reformatted images are provided for review. Automated exposure control, iterative reconstruction, and/or weight based adjustment of the mA/kV was utilized to reduce the radiation dose to as low as reasonably achievable. COMPARISON: 11/25/2022. HISTORY: ORDERING SYSTEM PROVIDED HISTORY: Hemoptysis TECHNOLOGIST PROVIDED HISTORY: Reason for exam:->Hemoptysis Decision Support Exception - unselect if not a suspected or confirmed emergency medical condition->Emergency Medical Condition (MA) FINDINGS: The heart is unremarkable in size, no evidence of pericardial effusion is seen.  Atherosclerotic calcifications visualized in the coronary vessels. No significant hilar or mediastinal lymphadenopathy is seen. Subtle scattered calcifications visualized in the aortic arch, no evidence for aneurysmal dilatation or arterial dissection is seen. Mild bronchial wall thickening is visualized. Opacification visualized in the left lower lobe associated with the marked thickening of the bronchial walls air and subtle layering of fluid suggestive of consolidation/left lower lobe infiltrate. 2 less extent subtle streaky opacities visualized in the right lower lung field with small right pleural effusion. No evidence of pneumothorax or parenchymal lung mass. No evidence of endobronchial or endoluminal lesions are seen. Limited evaluation of the upper abdomen demonstrates no acute pathology, distended gallbladder with no evidence of wall thickening or pericholecystic stranding. Prominence of the spleen. . Degenerative bone changes are seen. Left lower lobe infiltrates/consolidation, suggestion of early right lower lobe infiltrate. Splenomegaly and distension of the gallbladder visualized. XR CHEST PORTABLE    Result Date: 11/25/2022  EXAMINATION: ONE XRAY VIEW OF THE CHEST 11/25/2022 9:18 pm COMPARISON: 9 November 2022 HISTORY: ORDERING SYSTEM PROVIDED HISTORY: shortness of breath TECHNOLOGIST PROVIDED HISTORY: Reason for exam:->shortness of breath FINDINGS: Single AP erect portable chest demonstrates satisfactory expansion lungs with mild increased markings at lung bases. There are no focal alveolar infiltrates or effusions. The cardiac silhouette appears unremarkable. There is no evidence of a pneumothorax. No acute cardiopulmonary process.      XR CHEST PORTABLE    Result Date: 11/9/2022  EXAMINATION: ONE XRAY VIEW OF THE CHEST 11/9/2022 9:32 am COMPARISON: 20 August 2021 HISTORY: ORDERING SYSTEM PROVIDED HISTORY: cough TECHNOLOGIST PROVIDED HISTORY: Reason for exam:->cough FINDINGS: The lungs are without acute focal process. There is no effusion or pneumothorax. The cardiomediastinal silhouette is without acute process. The osseous structures are without acute process. No acute process. CT SINUS WO CONTRAST    Result Date: 11/28/2022  EXAMINATION: CT OF THE SINUS WITHOUT CONTRAST  11/28/2022 1:02 pm TECHNIQUE: CT of the sinuses was performed without the administration of intravenous contrast. Multiplanar reformatted images are provided for review. Automated exposure control, iterative reconstruction, and/or weight based adjustment of the mA/kV was utilized to reduce the radiation dose to as low as reasonably achievable. COMPARISON: None HISTORY: ORDERING SYSTEM PROVIDED HISTORY: epistaxis. hx of sinus infections TECHNOLOGIST PROVIDED HISTORY: Reason for exam:->epistaxis. hx of sinus infections FINDINGS: SINUSES/MASTOIDS:  Mild mucosal thickening is seen involving the right maxillary sinus, indicating chronic sinusitis. A small air-fluid level is seen within the right maxillary sinus, indicating acute sinusitis. An ovoid, tiny hypodensity is seen along the superior aspects of the left maxillary sinus, measuring 0.9 by 0.7 x 0.5 cm in size. This most likely represents a mucous retention cyst.  Mild mucosal thickening is noted involving the ethmoid sinus, indicating chronic sinusitis. There is practically complete opacification of the right sphenoid sinus and there is complete opacification of the left frontal sinus. There is mild surrounding erosion of the affected sinus walls, particularly involving the inferior aspect of the left frontal sinus, where there is communication with the left anterior ethmoid sinus. There is a small amount of hyperdense material noted centrally within both the right sphenoid and left frontal sinuses. The findings indicate allergic fungal sinusitis. Further evaluation needs to be based on clinical grounds, with ENT consultation.   Minimal mucosal thickening is seen involving the left sphenoid sinus, indicating chronic sinusitis. There is occlusion of the right ostiomeatal unit. The left ostiomeatal unit is patent. The nasal turbinates are unremarkable. Significant nasal septum deviation is noted towards the right. The mastoid air cells are well aerated. Within the lateral wall of the right orbit there is a 0.6 x 0.7 cm rounded, mildly expansile radiolucency with medial cortical erosion. Centrally within the lytic focus is an area of slightly increased density this could represent some residual bone. There is minimal extension into the extraconal space. Rule out eosinophilic granuloma. MRI of the orbits with and without intravenous gadolinium can be considered for further evaluation. SOFT TISSUES:  Visualized soft tissues demonstrate no acute abnormality. The visualized portion of the intracranial contents demonstrate no gross acute abnormality. Calcifications are noted involving the cavernous carotid and vertebral arteries. The patient is status post bilateral globe lens replacement surgery. 1.  Mild, chronic right maxillary sinusitis, as well as mild acute sinusitis. 2.  Tiny left maxillary sinus mucous retention cyst. 3.  Chronic, mild ethmoid and minimal left sphenoid sinusitis. 4.  Allergic fungal sinusitis involving the right sphenoid and left frontal sinuses, as described above. Further evaluation needs to be based on clinical grounds, with ENT consultation. 5.  Occluded right ostiomeatal unit. 6.  Significant nasal septum deviation towards the right. 7.  Rule out tiny lateral wall of right orbit eosinophilic granuloma. MRI of the orbits with and without intravenous gadolinium can be considered for further evaluation.      US RETROPERITONEAL COMPLETE    Result Date: 11/12/2022  EXAMINATION: RETROPERITONEAL ULTRASOUND OF THE KIDNEYS AND URINARY BLADDER 11/12/2022 COMPARISON: None HISTORY: ORDERING SYSTEM PROVIDED HISTORY: MELE on CKD TECHNOLOGIST PROVIDED HISTORY: Reason for exam:->MELE on CKD What reading provider will be dictating this exam?->CRC FINDINGS: Kidneys: There is limited visualization on exam.  Renal sizes are probably adequate. Echogenicity is difficult to assess. No hydronephrosis. Bladder: Not distended for evaluation, volume at 10 cc measured. 1. No renal obstruction 2. Otherwise limited exam     US DUP LOWER ART/BYPASS GRAFTS BILATERAL COMPLETE    Result Date: 11/10/2022  EXAMINATION: ARTERIAL DUPLEX ULTRASOUND OF THE BILATERAL LOWER EXTREMITIES  11/9/2022 9:27 pm TECHNIQUE: Duplex ultrasound using B-mode/gray scaled imaging, Doppler spectral analysis and color flow Doppler was obtained of the bilateral lower extremities. COMPARISON: None. HISTORY: ORDERING SYSTEM PROVIDED HISTORY: Eval for PAD TECHNOLOGIST PROVIDED HISTORY: Reason for exam:->Eval for PAD What reading provider will be dictating this exam?->CRC FINDINGS: Multiphasic waveforms above the knee bilaterally. Flow turbulence and minimally biphasic waveforms below the knee on the right without occlusion. Primarily monophasic flow in the small vessels below the knee on the left. No occlusion. Mild-to-moderate atheromatous and calcific plaque in both lower extremity arterial structures. Right: Flow velocities were measured as follows: Com. Fem. 101 cm/sec Prof.           73 cm/sec SFA Prox. 96 cm/sec SFA Mid.     110 cm/sec SFA Dist.     83 cm/sec Pop. 123 cm/sec PTA           142 cm/sec Peron. 67 cm/sec JOEL           43 cm/sec Left: Flow velocities were measured as follows: Com. Fem. 133 cm/sec Prof.           51 cm/sec SFA Prox. 137 cm/sec SFA Mid.     140 cm/sec SFA Dist.     162 cm/sec Pop.           95 cm/sec PTA           89 cm/sec Peron. 123 cm/sec JOEL           41 cm/sec     Moderate PAD. No evidence of vessel occlusion. Abnormal waveforms and flow turbulence in the small vessels below the knee bilaterally.      US DUP LOWER EXTREMITIES BILATERAL VENOUS    Result Date: 11/9/2022  EXAMINATION: DUPLEX VENOUS ULTRASOUND OF THE BILATERAL LOWER Bette Li 3:22 pm TECHNIQUE: Duplex ultrasound using B-mode/gray scaled imaging, Doppler spectral analysis and color flow Doppler was obtained of the deep venous structures of the lower bilateral extremities. COMPARISON: None. HISTORY: ORDERING SYSTEM PROVIDED HISTORY: discomfort TECHNOLOGIST PROVIDED HISTORY: Reason for exam:->discomfort What reading provider will be dictating this exam?->CRC FINDINGS: The visualized veins of the bilateral lower extremities are patent and free of echogenic thrombus. The veins demonstrate good compressibility with normal color flow study and spectral analysis. No evidence of DVT in either lower extremity. RECOMMENDATIONS: Unavailable        Assessment:    Principal Problem:    Lactic acidosis  Active Problems:    Hemoptysis    General weakness    Lightheadedness    NSTEMI (non-ST elevated myocardial infarction) (HCC)    Ischemic cardiomyopathy    Elevated troponin    Pure hypercholesterolemia    Preoperative cardiovascular examination    Type 2 diabetes mellitus with stage 3a chronic kidney disease, without long-term current use of insulin (HCC)    Chronic systolic congestive heart failure (HCC)    Coronary artery disease involving native coronary artery of native heart without angina pectoris  Resolved Problems:    * No resolved hospital problems. *      Plan:  1. Epistaxis/Hemetemesis - H/Hs have been stable. No plans for EGD by GI. Plts low at 55 K. CT of sinuses ordered - chronic and acute sinusitis. ENT consulted. Appreciate  recommendations. 2.    CKD, stage 3a - avoid renal toxins. Trend labs and treat accordingly. 3.    Positive blood cultures - GPC in chains and Enterococcus fecalis. Molecular ID negative. Repeat blood cultures negative thus far. Total care time:  20 minutes    NOTE: This report was transcribed using voice recognition software. Every effort was made to ensure accuracy; however, inadvertent computerized transcription errors may be present.     Electronically signed by Chio Melvin MD on 11/29/2022 at 9:43 AM

## 2022-11-29 NOTE — PROGRESS NOTES
Patient having multiple runs of PAT. Dr. Margo Clayton notified. New orders obtained.     Electronically signed by Stefany Fry RN on 11/29/2022 at 4:34 PM

## 2022-11-29 NOTE — PROGRESS NOTES
[employer] to ENT with the following message:  Dr. Maribel Morley plans to start recommended IV Decadron for 24 hours and would like to know what oral taper schedule you recommend he follow for discharge tomorrow? Electronically signed by Anjali Arizmendi RN on 11/29/2022 at 10:08 AM    40 prednisone x 2 days 20 prednisone x 2 days 10 prednisone x 2 days, Dr. Maribel Morley notified.     Electronically signed by Anjali Arizmendi RN on 11/29/2022 at 10:24 AM

## 2022-11-29 NOTE — CARE COORDINATION
Transition of Care-Anticipate discharge today. Cardiology , ENT and GI all stated patient can follow up outpatient. Patient to transition to PO antibiotics at discharge. Family to transport at discharge.     Ishmael KHANN, RN  [facility]

## 2022-11-29 NOTE — PROGRESS NOTES
Spoke to Dr. Romi Nam regarding patients IV which infiltrated. Ok to turn off fluids per possible DC.      Electronically signed by Lizzette Christie RN on 11/29/2022 at 8:48 AM

## 2022-11-29 NOTE — CONSULTS
CHF NURSE NAVIGATOR CONSULT NOTE:    Patient currently admitted with diagnosis of Systolic heart failure. Patient was awake and alert, sitting in bed during the consultation. He was engaged and asked appropriate questions throughout the education session. He is agreeable to heart failure education and self monitoring once discharged. He follows with Dr Lizz Marcelo in [facility]. Scheduling with the CHF clinic No: He will follow up with Dr Lizz Marcelo in [facility] on 12/20 . Future Appointments   Date Time Provider Edis Garces   12/6/2022  2:15 PM MD LEESA Wei [facility]   [facility]   12/30/2022 10:00 AM Lizzie Mendes DPM [facility]       Barriers to Care:  Contributing risk factors for Heart Failure are identified as lack of education. The patient is ordered:  Diet: ADULT DIET; Regular; Low Sodium (2 gm)   Sodium controlled diet Yes  Fluid restriction daily ordered (fluid restriction recommended if patient is hyponatremic and/or diuretic is initiated or increased) No  FR:   Daily Weights: Patient Vitals for the past 96 hrs (Last 3 readings):   Weight   11/25/22 2036 210 lb (95.3 kg)     I/O: No intake or output data in the 24 hours ending 11/29/22 1326           We reviewed the introduction to Heart Failure, the HF zones, signs and symptoms to report on day 1 of onset, medications, medication compliance, the importance of obtaining daily weights, following a low sodium diet, reading food labels for the sodium content, keeping physician appointments, and smoking cessation. We discussed writing / tracking daily weights on a calendar / log because a 5 pound gain in 1 week can sneak up if you are not tracking it. I advised patient they can reduce the risk for Heart Failure exacerbations by modifying / controlling the risk factors.  We discussed self-managed care which includes the following:  to take medications as prescribed, report any intolerable side effects of medications to the cardiologist / doctor, do not just stop taking the medication; follow a cardiac heart healthy / low sodium diet; weigh yourself daily, exercise regularly- per doctor recommendation and not to smoke or use an excess amount of alcohol. We discussed calling the cardiologist / doctor with a weight gain of 3 pounds in one day or a total of 5 pounds or more in one week. Also, if you should have a significant weight loss of 3# or more in one day to call the doctor, they may need to decrease or hold the diuretic dose. On days you feels nauseated and not eating / drinking, having emesis or diarrhea,  informed to call the cardiologist  / doctor, they may need to decrease or hold diuretic to avoid dehydration. I stressed the importance of informing their cardiologist the first day of onset of any of the signs and symptoms in the \"Yellow Zone\" of the HF Zones. Patient verbalizes understanding. Greater than 30 minutes was spent educating patient. The Heart Failure Booklet given to the patient with additional patient education addressing:  What is Heart Failure? Things You Can Do to Live Well with HF  How to Take Your Medications  How to Eat Less Salt  Trinity its Salt?   Exercising Well with Heart Failure  Signs and symptoms of HF to report  Weight Yourself Each Day  Home Self Management- activity, weight tracking, taking medications as prescribed, meals /diet planning (sodium and fluid restriction), how to read food labels, keeping physician follow ups, smoking cessation, follow the Heart Failure Zones  The Heart Failure zones  Every Dose Every Day      Instructed  to call 911 if you have any of the following symptoms:       Struggling to breathe unrelieved with rest,     Having chest pain     Have confusion or cant think clearly          Ana Sears, RN BSN, RN  Heart Failure [address] [facility] (CHF) AHA GUIDELINES  (Must be completed for Primary Diagnosis CHF or History of CHF)    Discharge Plan:  I placed the Heart Failure Home Instructions in patient's discharge instructions. Per Heart Failure GWTG, the patient should have a follow-up appointment made within 7 days of discharge.     New Diagnosis No    ECHO Results most recent:  Lab Results   Component Value Date    LVEF 40 2022    LVEFMODE Echo 2019                                        Social History     Tobacco Use   Smoking Status Former    Packs/day: 1.00    Years: 20.00    Pack years: 20.00    Types: Cigarettes    Quit date: 1993    Years since quittin.8   Smokeless Tobacco Former    Types: Chew    Quit date: 1970        Immunization History   Administered Date(s) Administered    COVID-19, PFIZER GRAY top, DO NOT Dilute, (age 15 y+), IM, 27 mcg/0.3 mL 2022, 2022    COVID-19, PFIZER PURPLE top, DILUTE for use, (age 15 y+), 30mcg/0.3mL 2021, 2021    Pneumococcal Conjugate 13-valent ([id number]) 2019        Angiotensin-Converting-Enzyme (ACE) inhibitor ordered:  [] Yes  [x] No (specify contraindication):    [] Contraindicated  [] Hypotensive patient who was at immediate risk of cardiogenic shock  [] Hospitalized patient who experienced marked azotemia  [] Other Contraindications  [] Not Eligible  [] Not Tolerant  [] Patient Reason  [] System Reason  [x] Other Reason    Angiotensin II receptor blockers (ARB) ordered:  [] Yes  [x] No (specify contraindication):    [] Contraindicated  [] Hypotensive patient who was at immediate risk of cardiogenic shock  [] Hospitalized patient who experienced marked azotemia  [x] Other Contraindications    ARNI - Angiotensin Receptor Neprilysin Inhibitor ordered:  [] Yes  [x] No (specify contraindication):    [] ACE inhibitor use within the prior 36 hours  [] Allergy  [] Hyperkalemia  [x] Hypotension  [] Renal dysfunction defined as creatinine > 2.5 mg/dL in men or > 2.0 mg/dL in women  [] Other Contraindications  [] Not Eligible  [] Not Tolerant  [] Patient Reason  []System Reason  []Other Reason      Beta Blocker ordered:    [] Yes  [x] No (specify contraindication):    [] Contraindicated  [] Asthma  [] Fluid Overload  [x] Low Blood Pressure  [] Patient recently treated with an intravenous positive inotropic agent  [] Other Contraindications  [] Not Eligible  [] Not Tolerant  [] Patient Reason  [] System Reason    SGLT2 Inhibitor ordered:  [] Yes  [x] No (specify contraindication):    [] Contraindicated  [] Patient currently on dialysis  [] Ketoacidosis  [] Known hypersensitivity to the medication  [] Type I diabetes (not approved for use in patients with Type I diabetes due to increased risk of ketoacidosis)  [] Other Contraindications  [] Not Eligible  [] Not Tolerant  [] Patient Reason  [] System Reason  [x] Other Reason    Aldosterone Antagonist ordered:  [] Yes  [x] No (specify contraindication):    [] Contraindicated  [] Allergy due to aldosterone receptor antagonist  [] Hyperkalemia  [] Renal dysfunction defined as creatinine >2.5 mg/dL in men or >2.0 mg/dL in women.   [] Other contraindications  [] Not Eligible  [] Not Tolerant  [] Patient Reason  [] System Reason  [x] Other Reason

## 2022-11-30 PROBLEM — J32.4 CHRONIC PANSINUSITIS: Status: ACTIVE | Noted: 2022-11-30

## 2022-11-30 PROBLEM — R78.81 BACTEREMIA: Status: ACTIVE | Noted: 2022-11-30

## 2022-11-30 PROBLEM — N18.31 ACUTE RENAL FAILURE SUPERIMPOSED ON STAGE 3A CHRONIC KIDNEY DISEASE (HCC): Status: ACTIVE | Noted: 2022-11-09

## 2022-11-30 LAB
ANION GAP SERPL CALCULATED.3IONS-SCNC: 11 MMOL/L (ref 7–16)
BASOPHILS ABSOLUTE: 0.01 E9/L (ref 0–0.2)
BASOPHILS RELATIVE PERCENT: 0.2 % (ref 0–2)
BUN BLDV-MCNC: 23 MG/DL (ref 6–23)
CALCIUM SERPL-MCNC: 7.6 MG/DL (ref 8.6–10.2)
CHLORIDE BLD-SCNC: 102 MMOL/L (ref 98–107)
CO2: 19 MMOL/L (ref 22–29)
CREAT SERPL-MCNC: 1.5 MG/DL (ref 0.7–1.2)
CULTURE, STOOL: NORMAL
EKG ATRIAL RATE: 116 BPM
EKG ATRIAL RATE: 141 BPM
EKG P-R INTERVAL: 200 MS
EKG Q-T INTERVAL: 378 MS
EKG Q-T INTERVAL: 382 MS
EKG QRS DURATION: 122 MS
EKG QRS DURATION: 126 MS
EKG QTC CALCULATION (BAZETT): 487 MS
EKG QTC CALCULATION (BAZETT): 530 MS
EKG R AXIS: -28 DEGREES
EKG R AXIS: -30 DEGREES
EKG T AXIS: 116 DEGREES
EKG T AXIS: 122 DEGREES
EKG VENTRICULAR RATE: 100 BPM
EKG VENTRICULAR RATE: 116 BPM
EOSINOPHILS ABSOLUTE: 0.01 E9/L (ref 0.05–0.5)
EOSINOPHILS RELATIVE PERCENT: 0.2 % (ref 0–6)
GFR SERPL CREATININE-BSD FRML MDRD: 49 ML/MIN/1.73
GLUCOSE BLD-MCNC: 300 MG/DL (ref 74–99)
HCT VFR BLD CALC: 31.6 % (ref 37–54)
HEMOGLOBIN: 10.7 G/DL (ref 12.5–16.5)
IMMATURE GRANULOCYTES #: 0.03 E9/L
IMMATURE GRANULOCYTES %: 0.5 % (ref 0–5)
LYMPHOCYTES ABSOLUTE: 1.02 E9/L (ref 1.5–4)
LYMPHOCYTES RELATIVE PERCENT: 17.3 % (ref 20–42)
MAGNESIUM: 2 MG/DL (ref 1.6–2.6)
MCH RBC QN AUTO: 31.6 PG (ref 26–35)
MCHC RBC AUTO-ENTMCNC: 33.9 % (ref 32–34.5)
MCV RBC AUTO: 93.2 FL (ref 80–99.9)
METER GLUCOSE: 311 MG/DL (ref 74–99)
METER GLUCOSE: 358 MG/DL (ref 74–99)
METER GLUCOSE: 448 MG/DL (ref 74–99)
METER GLUCOSE: 453 MG/DL (ref 74–99)
MONOCYTES ABSOLUTE: 0.12 E9/L (ref 0.1–0.95)
MONOCYTES RELATIVE PERCENT: 2 % (ref 2–12)
NEUTROPHILS ABSOLUTE: 4.69 E9/L (ref 1.8–7.3)
NEUTROPHILS RELATIVE PERCENT: 79.8 % (ref 43–80)
PDW BLD-RTO: 16.9 FL (ref 11.5–15)
PLATELET # BLD: 60 E9/L (ref 130–450)
PLATELET CONFIRMATION: NORMAL
PMV BLD AUTO: 12.3 FL (ref 7–12)
POTASSIUM REFLEX MAGNESIUM: 4.5 MMOL/L (ref 3.5–5)
RBC # BLD: 3.39 E12/L (ref 3.8–5.8)
SODIUM BLD-SCNC: 132 MMOL/L (ref 132–146)
WBC # BLD: 5.9 E9/L (ref 4.5–11.5)

## 2022-11-30 PROCEDURE — C9113 INJ PANTOPRAZOLE SODIUM, VIA: HCPCS | Performed by: STUDENT IN AN ORGANIZED HEALTH CARE EDUCATION/TRAINING PROGRAM

## 2022-11-30 PROCEDURE — 93010 ELECTROCARDIOGRAM REPORT: CPT | Performed by: INTERNAL MEDICINE

## 2022-11-30 PROCEDURE — 36415 COLL VENOUS BLD VENIPUNCTURE: CPT

## 2022-11-30 PROCEDURE — 6370000000 HC RX 637 (ALT 250 FOR IP)

## 2022-11-30 PROCEDURE — 97161 PT EVAL LOW COMPLEX 20 MIN: CPT

## 2022-11-30 PROCEDURE — 99233 SBSQ HOSP IP/OBS HIGH 50: CPT | Performed by: INTERNAL MEDICINE

## 2022-11-30 PROCEDURE — 80048 BASIC METABOLIC PNL TOTAL CA: CPT

## 2022-11-30 PROCEDURE — 6370000000 HC RX 637 (ALT 250 FOR IP): Performed by: FAMILY MEDICINE

## 2022-11-30 PROCEDURE — 85025 COMPLETE CBC W/AUTO DIFF WBC: CPT

## 2022-11-30 PROCEDURE — 6370000000 HC RX 637 (ALT 250 FOR IP): Performed by: STUDENT IN AN ORGANIZED HEALTH CARE EDUCATION/TRAINING PROGRAM

## 2022-11-30 PROCEDURE — 6360000002 HC RX W HCPCS: Performed by: STUDENT IN AN ORGANIZED HEALTH CARE EDUCATION/TRAINING PROGRAM

## 2022-11-30 PROCEDURE — 83735 ASSAY OF MAGNESIUM: CPT

## 2022-11-30 PROCEDURE — 99233 SBSQ HOSP IP/OBS HIGH 50: CPT | Performed by: FAMILY MEDICINE

## 2022-11-30 PROCEDURE — 97530 THERAPEUTIC ACTIVITIES: CPT

## 2022-11-30 PROCEDURE — 82962 GLUCOSE BLOOD TEST: CPT

## 2022-11-30 PROCEDURE — 2060000000 HC ICU INTERMEDIATE R&B

## 2022-11-30 PROCEDURE — 2580000003 HC RX 258: Performed by: FAMILY MEDICINE

## 2022-11-30 PROCEDURE — 2580000003 HC RX 258: Performed by: STUDENT IN AN ORGANIZED HEALTH CARE EDUCATION/TRAINING PROGRAM

## 2022-11-30 PROCEDURE — 6360000002 HC RX W HCPCS: Performed by: FAMILY MEDICINE

## 2022-11-30 PROCEDURE — 6370000000 HC RX 637 (ALT 250 FOR IP): Performed by: INTERNAL MEDICINE

## 2022-11-30 PROCEDURE — 99221 1ST HOSP IP/OBS SF/LOW 40: CPT | Performed by: OTOLARYNGOLOGY

## 2022-11-30 RX ORDER — INSULIN LISPRO 100 [IU]/ML
0-8 INJECTION, SOLUTION INTRAVENOUS; SUBCUTANEOUS
Status: DISCONTINUED | OUTPATIENT
Start: 2022-11-30 | End: 2022-12-01 | Stop reason: HOSPADM

## 2022-11-30 RX ORDER — INSULIN LISPRO 100 [IU]/ML
0-4 INJECTION, SOLUTION INTRAVENOUS; SUBCUTANEOUS NIGHTLY
Status: DISCONTINUED | OUTPATIENT
Start: 2022-11-30 | End: 2022-12-01 | Stop reason: HOSPADM

## 2022-11-30 RX ORDER — DIGOXIN 125 MCG
125 TABLET ORAL DAILY
Status: DISCONTINUED | OUTPATIENT
Start: 2022-11-30 | End: 2022-12-01 | Stop reason: HOSPADM

## 2022-11-30 RX ORDER — INSULIN GLARGINE 100 [IU]/ML
10 INJECTION, SOLUTION SUBCUTANEOUS 2 TIMES DAILY
Status: DISCONTINUED | OUTPATIENT
Start: 2022-11-30 | End: 2022-12-01 | Stop reason: HOSPADM

## 2022-11-30 RX ORDER — DEXTROSE MONOHYDRATE 100 MG/ML
INJECTION, SOLUTION INTRAVENOUS CONTINUOUS PRN
Status: DISCONTINUED | OUTPATIENT
Start: 2022-11-30 | End: 2022-12-01 | Stop reason: HOSPADM

## 2022-11-30 RX ORDER — INSULIN LISPRO 100 [IU]/ML
4 INJECTION, SOLUTION INTRAVENOUS; SUBCUTANEOUS
Status: DISCONTINUED | OUTPATIENT
Start: 2022-11-30 | End: 2022-12-01 | Stop reason: HOSPADM

## 2022-11-30 RX ADMIN — PANTOPRAZOLE SODIUM 40 MG: 40 INJECTION, POWDER, FOR SOLUTION INTRAVENOUS at 09:11

## 2022-11-30 RX ADMIN — INSULIN LISPRO 3 UNITS: 100 INJECTION, SOLUTION INTRAVENOUS; SUBCUTANEOUS at 06:23

## 2022-11-30 RX ADMIN — INSULIN GLARGINE 10 UNITS: 100 INJECTION, SOLUTION SUBCUTANEOUS at 20:29

## 2022-11-30 RX ADMIN — INSULIN GLARGINE 10 UNITS: 100 INJECTION, SOLUTION SUBCUTANEOUS at 11:36

## 2022-11-30 RX ADMIN — SODIUM CHLORIDE, POTASSIUM CHLORIDE, SODIUM LACTATE AND CALCIUM CHLORIDE: 600; 310; 30; 20 INJECTION, SOLUTION INTRAVENOUS at 16:21

## 2022-11-30 RX ADMIN — INSULIN LISPRO 8 UNITS: 100 INJECTION, SOLUTION INTRAVENOUS; SUBCUTANEOUS at 11:37

## 2022-11-30 RX ADMIN — INSULIN LISPRO 4 UNITS: 100 INJECTION, SOLUTION INTRAVENOUS; SUBCUTANEOUS at 17:29

## 2022-11-30 RX ADMIN — MIDODRINE HYDROCHLORIDE 10 MG: 5 TABLET ORAL at 16:19

## 2022-11-30 RX ADMIN — INSULIN LISPRO 8 UNITS: 100 INJECTION, SOLUTION INTRAVENOUS; SUBCUTANEOUS at 16:02

## 2022-11-30 RX ADMIN — PANTOPRAZOLE SODIUM 40 MG: 40 INJECTION, POWDER, FOR SOLUTION INTRAVENOUS at 20:28

## 2022-11-30 RX ADMIN — SIMETHICONE 80 MG: 80 TABLET, CHEWABLE ORAL at 09:09

## 2022-11-30 RX ADMIN — DIGOXIN 125 MCG: 0.12 TABLET ORAL at 11:42

## 2022-11-30 RX ADMIN — MONTELUKAST SODIUM 10 MG: 10 TABLET ORAL at 20:28

## 2022-11-30 RX ADMIN — MIDODRINE HYDROCHLORIDE 10 MG: 5 TABLET ORAL at 11:42

## 2022-11-30 RX ADMIN — WATER 1000 MG: 1 INJECTION INTRAMUSCULAR; INTRAVENOUS; SUBCUTANEOUS at 02:38

## 2022-11-30 RX ADMIN — CETIRIZINE HYDROCHLORIDE 10 MG: 10 TABLET, FILM COATED ORAL at 09:09

## 2022-11-30 RX ADMIN — ATORVASTATIN CALCIUM 40 MG: 40 TABLET, FILM COATED ORAL at 20:28

## 2022-11-30 RX ADMIN — Medication 1 CAPSULE: at 09:09

## 2022-11-30 RX ADMIN — MIDODRINE HYDROCHLORIDE 10 MG: 5 TABLET ORAL at 09:10

## 2022-11-30 RX ADMIN — SODIUM CHLORIDE, PRESERVATIVE FREE 10 ML: 5 INJECTION INTRAVENOUS at 20:29

## 2022-11-30 RX ADMIN — DEXAMETHASONE SODIUM PHOSPHATE 10 MG: 10 INJECTION INTRAMUSCULAR; INTRAVENOUS at 02:38

## 2022-11-30 RX ADMIN — INSULIN LISPRO 4 UNITS: 100 INJECTION, SOLUTION INTRAVENOUS; SUBCUTANEOUS at 20:29

## 2022-11-30 RX ADMIN — SODIUM CHLORIDE, PRESERVATIVE FREE 10 ML: 5 INJECTION INTRAVENOUS at 09:11

## 2022-11-30 ASSESSMENT — PAIN SCALES - GENERAL
PAINLEVEL_OUTOF10: 0
PAINLEVEL_OUTOF10: 0

## 2022-11-30 NOTE — PROGRESS NOTES
EKG obtained, showing A-fib, cardiology notified via perfect serve, Dr. Brandi Bustamante received message. Awaiting new orders at this time. It does not appear that patient has a history of afib.

## 2022-11-30 NOTE — PROGRESS NOTES
PROGRESS NOTE  By Armin Otoole M.D. [facility]  Dr. Vinita Arizmendi M.D.,  Dr. Connie Rosenberg M.D.,   Dr. Thea Black D.O.,  Dr. Shikha Bee M.D.,  Dr. Duane Conn D.O.,          [place] [employer]  66 y.o.  male    SUBJECTIVE:  Denies abdominal pain. Denies nausea or vomiting. Denies nosebleeds    OBJECTIVE:    BP (!) 83/52   Pulse 59   Temp 97.4 °F (36.3 °C) (Oral)   Resp 16   Ht 5' 11\" (1.803 m)   Wt 210 lb (95.3 kg)   SpO2 100%   BMI 29.29 kg/m²     General: NAD/ male. AAOx3  HEENT: Anicteric sclera/moist oral mucosa  Neck: Supple  Chest: Symmetric excursion/nonlabored respirations  Cor: Regular  Abd.: Soft and nontender  Extr.:  Decreased muscle tone and bulk throughout  Skin: Warm and dry      DATA:    Monitor data reviewed - noted.        Lab Results   Component Value Date/Time    WBC 5.9 11/30/2022 02:45 AM    RBC 3.39 11/30/2022 02:45 AM    HGB 10.7 11/30/2022 02:45 AM    HCT 31.6 11/30/2022 02:45 AM    MCV 93.2 11/30/2022 02:45 AM    MCH 31.6 11/30/2022 02:45 AM    MCHC 33.9 11/30/2022 02:45 AM    RDW 16.9 11/30/2022 02:45 AM    PLT 60 11/30/2022 02:45 AM    MPV 12.3 11/30/2022 02:45 AM     Lab Results   Component Value Date/Time     11/30/2022 02:45 AM    K 4.5 11/30/2022 02:45 AM     11/30/2022 02:45 AM    CO2 19 11/30/2022 02:45 AM    BUN 23 11/30/2022 02:45 AM    CREATININE 1.5 11/30/2022 02:45 AM    CALCIUM 7.6 11/30/2022 02:45 AM    PROT 6.7 11/25/2022 09:02 PM    LABALBU 2.7 11/25/2022 09:02 PM    LABALBU 4.7 04/27/2011 11:06 AM    BILITOT 2.5 11/25/2022 09:02 PM    ALKPHOS 125 11/25/2022 09:02 PM    AST 51 11/25/2022 09:02 PM    ALT 35 11/25/2022 09:02 PM     Lab Results   Component Value Date/Time    LIPASE 147 11/25/2022 09:02 PM     No results found for: AMYLASE      ASSESSMENT/PLAN:  Patient Active Problem List   Diagnosis    Dilated cardiomyopathy ([facility] [address]    Non-rheumatic mitral regurgitation    Absolute anemia    SOBOE (shortness of breath on exertion)    Pulmonary HTN (HCC)    Type 2 diabetes mellitus with stage 3a chronic kidney disease, without long-term current use of insulin ([address]    HTN (hypertension)    Pancytopenia ([address]    Coagulation defect ([address]    Pancreatic cyst    Community acquired bacterial pneumonia    Bronchopneumonia    Chronic systolic congestive heart failure ([address]    Coronary artery disease involving native coronary artery of native heart without angina pectoris    Left ventricular dysfunction    Hepatic cirrhosis (HCC)    HFrEF (heart failure with reduced ejection fraction) (HCC)    Mitral valve disease    Portal hypertension (HCC)    Allergic rhinitis    Hypoxia    Thrombocytopenia (HCC)    Chronic renal disease, stage III ([address] [[id number]]    Hypotension    Acute renal failure superimposed on stage 3a chronic kidney disease (HCC)    MELE (acute kidney injury) ([address]    Venous stasis dermatitis of both lower extremities    Rhinovirus    Lactic acidosis    Hemoptysis    General weakness    Lightheadedness    NSTEMI (non-ST elevated myocardial infarction) ([address]    Ischemic cardiomyopathy    Elevated troponin    Pure hypercholesterolemia    Preoperative cardiovascular examination    Bacteremia    Chronic pansinusitis     Cirrhosis  -Prior MELD = 20, MELD-Na = 24 (11/10/2022)  -Negative viral liver serology  -Negative autoimmune liver serology  -History of A1AT deficiency - pending phenotype - has seen hematology in the past  -History of elevated iron / ferritin - pending HFE genes  -Elevated IgG  -Normal AFP 7 (11/10/2022)  -MRI abdomen 9/4/2022 without evidence of hepatic mass although low signal area in the right hepatic lobe noted  -Consider patient for EGD for variceal screening     2.   Pancreatic lesion  -MRI abdomen 9/4/2022 showing pancreas and pancreatic duct with multiple sidebranch regularities increasing in size compared to prior exam 6/29/2021  -Elevated CA 19-9 =  53 (7/12/2021) => 63 (11/10/2022)  -Elevated CEA = 7.0 (7/12/2021) => 5.7 (11/10/2022)  -Normal AFP = 9 (5/16/2022) => 7 (11/10/2022)  -Endoscopic ultrasound 8/6/2021 by Dr. Nixon Felipe showing 22 mm complex cystic lesion in the pancreatic tail adjacent to the pancreatic duct, no dilation of the pancreatic duct  -Biopsy consistent with gastric duodenal contaminant, cellblock was acellular - nondiagnostic biopsy     3. Anemia  -Mild / normocytic  -No overt GI bleeding  -Reported hemoptysis  -Cannot completely exclude varices - octreotide per admitting  -No prior evidence of iron deficiency  -Monitor hgb defer transfusion to admitting  -Continue PPI twice daily  -Consider endoscopic evaluation - will require cardiac clearance prior to procedures -cardiology input appreciated -see discussion below     4. Diarrhea  -Negative stool studies  -Colonoscopy likely outpatient     5. CAD / HFrEF / elevated troponin  -Elevated / increased troponin since arrival  -Echo 8/20/2021 showing EF 25%  -Cardiac catheterization 8/23/2021 showing severe multivessel coronary artery disease  -Cardiology input appreciated with recommendation to proceed with procedures     6. Comorbidities  -Rhinovirus, hypertension, dyslipidemia, diabetes, cellulitis  -Per admitting/consultants     No evidence of overt bleed  Plan for EGD inpatient or outpatient depending on H&H dynamics  -if patient's H&H remained stable, he can be discharged  with plan for outpatient endoscopy. Above has been discussed with the patient and he verbalized understanding and agreement with the plan as delineated. Michelle Collado MD  11/30/2022  5:44 PM    NOTE:  This report was transcribed using voice recognition software. Every effort was made to ensure accuracy; however, inadvertent computerized transcription errors may be present.

## 2022-11-30 NOTE — PROGRESS NOTES
[facility] Progress Note    Admitting Date and Time: 11/25/2022  8:21 PM  Admit Dx: Lactic acidosis [E87.20]  Lightheadedness [R42]  General weakness [R53.1]  NSTEMI (non-ST elevated myocardial infarction) ([facility] [address] [I21.4]  Hemoptysis [R04.2]    Subjective:  Patient is being followed for Lactic acidosis [E87.20]  Lightheadedness [R42]  General weakness [R53.1]  NSTEMI (non-ST elevated myocardial infarction) ([facility] [address] [I21.4]  Hemoptysis [R04.2]   Pt feels better. Ambulating. Sugar high. Reports was using Glyburide at home; was experiencing low sugars. No further epistaxis or hemetemesis. Reports seasonal fall allergies and allergic to grass. Gets sick this time each year. Per RN: . BS prior to lunch 488. Insulin increased to MDSS. Lantus 10 units now and tonight. Diet modified to 5 carb/meal.    ROS: denies fever, chills, cp, sob, n/v, HA unless stated above.       digoxin  125 mcg Oral Daily    insulin lispro  0-8 Units SubCUTAneous TID WC    insulin lispro  0-4 Units SubCUTAneous Nightly    insulin glargine  10 Units SubCUTAneous BID    lactobacillus  1 capsule Oral Daily    atorvastatin  40 mg Oral Nightly    cetirizine  10 mg Oral Daily    midodrine  10 mg Oral TID WC    montelukast  10 mg Oral Nightly    sodium chloride flush  5-40 mL IntraVENous 2 times per day    pantoprazole  40 mg IntraVENous BID    cefTRIAXone (ROCEPHIN) IV  1,000 mg IntraVENous Q24H    midodrine  10 mg Oral Once     glucose, 4 tablet, PRN  dextrose bolus, 125 mL, PRN   Or  dextrose bolus, 250 mL, PRN  glucagon (rDNA), 1 mg, PRN  dextrose, , Continuous PRN  simethicone, 80 mg, 4x Daily PRN  ammonium lactate, , PRN  sodium chloride flush, 5-40 mL, PRN  sodium chloride, , PRN  polyethylene glycol, 17 g, Daily PRN  acetaminophen, 650 mg, Q6H PRN   Or  acetaminophen, 650 mg, Q6H PRN  iopamidol, 785 mL, ONCE PRN    Objective:    BP 89/60   Pulse 74   Temp 98.2 °F (36.8 °C) (Oral)   Resp 16   Ht 5' 11\" (1.803 m)   Wt 210 lb (95.3 kg)   SpO2 98%   BMI 29.29 kg/m²     General Appearance: alert and oriented to person, place and time and in no acute distress  Skin: warm and dry  Head: normocephalic and atraumatic  Eyes: pupils equal, round, and reactive to light, extraocular eye movements intact, conjunctivae normal  Neck: neck supple and non tender without mass   Pulmonary/Chest: clear to auscultation bilaterally- no wheezes, rales or rhonchi, normal air movement, no respiratory distress  Cardiovascular: normal rate, normal S1 and S2 and no carotid bruits  Abdomen: soft, non-tender, non-distended, normal bowel sounds, no masses or organomegaly  Extremities: no cyanosis, no clubbing and no edema  Neurologic: no cranial nerve deficit and speech normal    Recent Labs     11/28/22  0400 11/29/22  0315 11/30/22  0245    134 132   K 3.8 3.6 4.5    104 102   CO2 24 22 19*   BUN 23 23 23   CREATININE 1.4* 1.5* 1.5*   GLUCOSE 77 161* 300*   CALCIUM 8.5* 7.9* 7.6*       Recent Labs     11/28/22  0400 11/29/22  0315 11/29/22  1847 11/30/22  0245   WBC 5.2 4.8  --  5.9   RBC 3.41* 3.16*  --  3.39*   HGB 11.1* 10.1* 11.7* 10.7*   HCT 32.1* 29.7* 33.8* 31.6*   MCV 94.1 94.0  --  93.2   MCH 32.6 32.0  --  31.6   MCHC 34.6* 34.0  --  33.9   RDW 17.2* 17.0*  --  16.9*   PLT 55* 50*  --  60*   MPV 11.5 12.2*  --  12.3*     Radiology:   CT ABDOMEN PELVIS WO CONTRAST Additional Contrast? None    Result Date: 11/26/2022  EXAMINATION: CT OF THE ABDOMEN AND PELVIS WITHOUT CONTRAST 11/26/2022 12:19 am TECHNIQUE: CT of the abdomen and pelvis was performed without the administration of intravenous contrast. Multiplanar reformatted images are provided for review. Automated exposure control, iterative reconstruction, and/or weight based adjustment of the mA/kV was utilized to reduce the radiation dose to as low as reasonably achievable. COMPARISON: MRI examination September 3, 2022.  HISTORY: ORDERING SYSTEM PROVIDED HISTORY: abdominal pain TECHNOLOGIST PROVIDED HISTORY: Reason for exam:->abdominal pain Additional Contrast?->None Decision Support Exception - unselect if not a suspected or confirmed emergency medical condition->Emergency Medical Condition (MA) FINDINGS: Lower Chest: Mild airspace disease posterior basilar segments left lower lobe may represent aspiration or other pneumonitis. No effusions. . Organs: Moderate splenomegaly, 14.6 cm, and shrunken 8.5 cm liver with nodular surface contour and recanalized paraumbilical vein compatible with hepatic cirrhosis/portal hypertension. Cholelithiasis. 3.2 cm cystic lesion of the pancreatic tail, unchanged from prior MRI examination of September 3, 2022. Liver, biliary tree, bilateral adrenal glands, bilateral kidneys, spleen and pancreas are otherwise normal. GI/Bowel: No ileus or obstruction. Normal appendix right lower quadrant. No inflammatory bowel process. Pelvis: No adenopathy or fluid. Bladder dome diverticulum noted. Peritoneum/Retroperitoneum: Negative. Bones/Soft Tissues: No acute osseous lesions. 1. No acute disease. 2. Normal appendix right lower quadrant. 3. Cholelithiasis and findings consistent with hepatic cirrhosis. 4. 3.2 cm cystic lesion of the pancreatic tail, unchanged from MRI examination of 3 months prior. RECOMMENDATIONS: Careful clinical correlation and follow up recommended. CT CHEST W CONTRAST    Result Date: 11/26/2022  EXAMINATION: CT OF THE CHEST WITH CONTRAST 11/26/2022 1:00 pm TECHNIQUE: CT of the chest was performed with the administration of intravenous contrast. Multiplanar reformatted images are provided for review. Automated exposure control, iterative reconstruction, and/or weight based adjustment of the mA/kV was utilized to reduce the radiation dose to as low as reasonably achievable. COMPARISON: 11/25/2022.  HISTORY: ORDERING SYSTEM PROVIDED HISTORY: Hemoptysis TECHNOLOGIST PROVIDED HISTORY: Reason for exam:->Hemoptysis Decision Support 11/9/2022  EXAMINATION: ONE XRAY VIEW OF THE CHEST 11/9/2022 9:32 am COMPARISON: 20 August 2021 HISTORY: ORDERING SYSTEM PROVIDED HISTORY: cough TECHNOLOGIST PROVIDED HISTORY: Reason for exam:->cough FINDINGS: The lungs are without acute focal process. There is no effusion or pneumothorax. The cardiomediastinal silhouette is without acute process. The osseous structures are without acute process. No acute process. CT SINUS WO CONTRAST    Result Date: 11/28/2022  EXAMINATION: CT OF THE SINUS WITHOUT CONTRAST  11/28/2022 1:02 pm TECHNIQUE: CT of the sinuses was performed without the administration of intravenous contrast. Multiplanar reformatted images are provided for review. Automated exposure control, iterative reconstruction, and/or weight based adjustment of the mA/kV was utilized to reduce the radiation dose to as low as reasonably achievable. COMPARISON: None HISTORY: ORDERING SYSTEM PROVIDED HISTORY: epistaxis. hx of sinus infections TECHNOLOGIST PROVIDED HISTORY: Reason for exam:->epistaxis. hx of sinus infections FINDINGS: SINUSES/MASTOIDS:  Mild mucosal thickening is seen involving the right maxillary sinus, indicating chronic sinusitis. A small air-fluid level is seen within the right maxillary sinus, indicating acute sinusitis. An ovoid, tiny hypodensity is seen along the superior aspects of the left maxillary sinus, measuring 0.9 by 0.7 x 0.5 cm in size. This most likely represents a mucous retention cyst.  Mild mucosal thickening is noted involving the ethmoid sinus, indicating chronic sinusitis. There is practically complete opacification of the right sphenoid sinus and there is complete opacification of the left frontal sinus. There is mild surrounding erosion of the affected sinus walls, particularly involving the inferior aspect of the left frontal sinus, where there is communication with the left anterior ethmoid sinus.  There is a small amount of hyperdense material noted centrally within both the right sphenoid and left frontal sinuses. The findings indicate allergic fungal sinusitis. Further evaluation needs to be based on clinical grounds, with ENT consultation. Minimal mucosal thickening is seen involving the left sphenoid sinus, indicating chronic sinusitis. There is occlusion of the right ostiomeatal unit. The left ostiomeatal unit is patent. The nasal turbinates are unremarkable. Significant nasal septum deviation is noted towards the right. The mastoid air cells are well aerated. Within the lateral wall of the right orbit there is a 0.6 x 0.7 cm rounded, mildly expansile radiolucency with medial cortical erosion. Centrally within the lytic focus is an area of slightly increased density this could represent some residual bone. There is minimal extension into the extraconal space. Rule out eosinophilic granuloma. MRI of the orbits with and without intravenous gadolinium can be considered for further evaluation. SOFT TISSUES:  Visualized soft tissues demonstrate no acute abnormality. The visualized portion of the intracranial contents demonstrate no gross acute abnormality. Calcifications are noted involving the cavernous carotid and vertebral arteries. The patient is status post bilateral globe lens replacement surgery. 1.  Mild, chronic right maxillary sinusitis, as well as mild acute sinusitis. 2.  Tiny left maxillary sinus mucous retention cyst. 3.  Chronic, mild ethmoid and minimal left sphenoid sinusitis. 4.  Allergic fungal sinusitis involving the right sphenoid and left frontal sinuses, as described above. Further evaluation needs to be based on clinical grounds, with ENT consultation. 5.  Occluded right ostiomeatal unit. 6.  Significant nasal septum deviation towards the right. 7.  Rule out tiny lateral wall of right orbit eosinophilic granuloma. MRI of the orbits with and without intravenous gadolinium can be considered for further evaluation.      [address] RETROPERITONEAL COMPLETE    Result Date: 11/12/2022  EXAMINATION: RETROPERITONEAL ULTRASOUND OF THE KIDNEYS AND URINARY BLADDER 11/12/2022 COMPARISON: None HISTORY: ORDERING SYSTEM PROVIDED HISTORY: MELE on CKD TECHNOLOGIST PROVIDED HISTORY: Reason for exam:->MELE on CKD What reading provider will be dictating this exam?->CRC FINDINGS: Kidneys: There is limited visualization on exam.  Renal sizes are probably adequate. Echogenicity is difficult to assess. No hydronephrosis. Bladder: Not distended for evaluation, volume at 10 cc measured. 1. No renal obstruction 2. Otherwise limited exam     US DUP LOWER ART/BYPASS GRAFTS BILATERAL COMPLETE    Result Date: 11/10/2022  EXAMINATION: ARTERIAL DUPLEX ULTRASOUND OF THE BILATERAL LOWER EXTREMITIES  11/9/2022 9:27 pm TECHNIQUE: Duplex ultrasound using B-mode/gray scaled imaging, Doppler spectral analysis and color flow Doppler was obtained of the bilateral lower extremities. COMPARISON: None. HISTORY: ORDERING SYSTEM PROVIDED HISTORY: Eval for PAD TECHNOLOGIST PROVIDED HISTORY: Reason for exam:->Eval for PAD What reading provider will be dictating this exam?->CRC FINDINGS: Multiphasic waveforms above the knee bilaterally. Flow turbulence and minimally biphasic waveforms below the knee on the right without occlusion. Primarily monophasic flow in the small vessels below the knee on the left. No occlusion. Mild-to-moderate atheromatous and calcific plaque in both lower extremity arterial structures. Right: Flow velocities were measured as follows: Com. Fem. 101 cm/sec Prof.           73 cm/sec SFA Prox. 96 cm/sec SFA Mid.     110 cm/sec SFA Dist.     83 cm/sec Pop. 123 cm/sec PTA           142 cm/sec Peron. 67 cm/sec JOEL           43 cm/sec Left: Flow velocities were measured as follows: Com. Fem. 133 cm/sec Prof.           51 cm/sec SFA Prox.     137 cm/sec SFA Mid.     140 cm/sec SFA Dist.     162 cm/sec Pop.           95 cm/sec PTA           89 cm/sec Peron. 123 cm/sec JOEL           41 cm/sec     Moderate PAD. No evidence of vessel occlusion. Abnormal waveforms and flow turbulence in the small vessels below the knee bilaterally. US DUP LOWER EXTREMITIES BILATERAL VENOUS    Result Date: 11/9/2022  EXAMINATION: DUPLEX VENOUS ULTRASOUND OF THE BILATERAL LOWER Agatha Rhodes 3:22 pm TECHNIQUE: Duplex ultrasound using B-mode/gray scaled imaging, Doppler spectral analysis and color flow Doppler was obtained of the deep venous structures of the lower bilateral extremities. COMPARISON: None. HISTORY: ORDERING SYSTEM PROVIDED HISTORY: discomfort TECHNOLOGIST PROVIDED HISTORY: Reason for exam:->discomfort What reading provider will be dictating this exam?->CRC FINDINGS: The visualized veins of the bilateral lower extremities are patent and free of echogenic thrombus. The veins demonstrate good compressibility with normal color flow study and spectral analysis. No evidence of DVT in either lower extremity. RECOMMENDATIONS: Unavailable      Assessment:    Principal Problem:    Lactic acidosis  Active Problems:    Hemoptysis    General weakness    Lightheadedness    NSTEMI (non-ST elevated myocardial infarction) (HCC)    Ischemic cardiomyopathy    Elevated troponin    Pure hypercholesterolemia    Preoperative cardiovascular examination    Type 2 diabetes mellitus with stage 3a chronic kidney disease, without long-term current use of insulin (HCC)    Chronic systolic congestive heart failure (HCC)    Coronary artery disease involving native coronary artery of native heart without angina pectoris  Resolved Problems:    * No resolved hospital problems. *    Plan:  1. Epistaxis/Hemetemesis, resolved  - CT of sinuses ordered - chronic and acute sinusitis. ENT consulted. Received 3 doses of IV Decadron; last 0230. Oral Prednisone to start tomorrow. 2.    CKD, stage 3a - avoid renal toxins. Creatinine 1.5 today.   Trend labs and treat accordingly. 3.    Positive blood cultures - GPC in chains and Enterococcus fecalis. Molecular ID negative. Repeat blood cultures still negative thus far. Total care time:  20 minutes    NOTE: This report was transcribed using voice recognition software. Every effort was made to ensure accuracy; however, inadvertent computerized transcription errors may be present.     Electronically signed by Tammy Burrows MD on 11/30/2022 at 11:57 AM

## 2022-11-30 NOTE — PROGRESS NOTES
Physical Therapy  Facility/Department: [facility]  Physical Therapy Initial Assessment    Name: Bessie Chavarria  : 1950  MRN: [id number]  Date of Service: 2022               Patient Diagnosis(es): The primary encounter diagnosis was Lactic acidosis. Diagnoses of Lightheadedness, General weakness, Hemoptysis, and NSTEMI (non-ST elevated myocardial infarction) [facility]) were also pertinent to this visit. Past Medical History:  has a past medical history of Allergic rhinitis, Cardiomyopathy ([address] CHF (congestive heart failure) ([address] DM (diabetes mellitus) ([facility] [address] Enlarged prostate, Thrombocytopenia ([facility] [address] Type 2 diabetes mellitus without complication ([facility] [address] and VHD (valvular heart disease). Past Surgical History:  has a past surgical history that includes Refractive surgery (Bilateral); Hammer toe surgery (Bilateral, ); cyst removal; Cataract removal (Bilateral, ); and Upper gastrointestinal endoscopy (N/A, 2021). Requires PT Follow-Up: Yes     Evaluating Therapist: Aakash Hernandez PT     Referring Provider:  Avis Handley MD    PT order : PT eval and treat     Room #: 052   DIAGNOSIS: The primary encounter diagnosis was Lactic acidosis. Diagnoses of Lightheadedness, General weakness, Hemoptysis, and NSTEMI (non-ST elevated myocardial infarction) [facility]) were also pertinent to this visit. PRECAUTIONS: falls, risk of elopement     Social:  Pt lives alone  in a  1  floor plan  3  steps and  1  rails to enter. Prior to admission pt walked with  no AD     Initial Evaluation  Date: 2022 Treatment      Short Term/ Long Term   Goals   Was pt agreeable to Eval/treatment? Yes      Does pt have pain?   Denies      Bed Mobility  Rolling:  NT   Supine to sit:  S/I   Sit to supine:  NT   Scooting:  independent in sit    Independent    Transfers Sit to stand:  CGA   Stand to sit: CGA   Stand pivot:  NT    Independent    Ambulation     200  feet with  no AD, but use of HR in sims at times  with  SBA/CGA    200  feet with  no AD/AAD with  independent        Stair negotiation: ascended and descended NT    4  steps with  1  rail with  independent    LE ROM  WFL     LE strength  4/ 5      AM- PAC RAW score  17/ 24            Pt is alert and Oriented      Balance: SBA/CGA . Fall risk due to  decreased safety awareness and balance     Endurance: decreased, but [facility]   Bed/Chair alarm:  yes      ASSESSMENT  Pt displays functional ability as noted in the objective portion of this evaluation. Conditions Requiring Skilled Therapeutic Intervention:    [x]Decreased strength     []Decreased ROM  [x]Decreased functional mobility  [x]Decreased balance   [x]Decreased endurance   []Decreased posture  []Decreased sensation  []Decreased coordination   []Decreased vision  [x]Decreased safety awareness   []Increased pain         Treatment/Education:    Pt in bed  upon arrival ; agreeable to PT. Mobility as above. Pt did not want to use or trial AD during session. Unsteady with initial stand and gait, but improved as gait progressed. Pt used HR in sims. Required 3 standing rest breaks with gait due to SOB. Pt did not report ant dizziness    On RA, SpO 2 at rest 95%, decreased to 90% with mobility, recovered to 98%. Pt instructed in PLB. Pt educated on fall risk,  safety with mobility        Patient response to education:   Pt verbalized understanding Pt demonstrated skill Pt requires further education in this area   x Needs cues   x       Comments:  Pt left  in chair after session, with call light in reach. Rehab potential is Good for reaching above PT goals. Pts/ family goals   1. home     Patient and or family understand(s) diagnosis, prognosis, and plan of care. - yes    PLAN  PT care will be provided in accordance with the objectives noted above. Whenever appropriate, clear delegation orders will be provided for nursing staff.   Exercises and functional mobility practice will be used as well as appropriate assistive devices or modalities to obtain goals. Patient and family education will also be administered as needed. PLAN OF CARE:    Current Treatment Recommendations     [x] Strengthening to improve independence with functional mobility   [] ROM to improve independence with functional mobility   [x] Balance Training to improve static/dynamic balance and to reduce fall risk  [x] Endurance Training to improve activity tolerance during functional mobility   [x] Transfer Training to improve safety and independence with all functional transfers   [x] Gait Training to improve gait mechanics, endurance and assess need for appropriate assistive device  [x] Stair Training in preparation for safe discharge home and/or into the community   [x] Positioning to prevent skin breakdown and contractures  [x] Safety and Education Training   [x] Patient/Caregiver Education   [] HEP  [] Other     Frequency of treatments will be 2-5x/week x 7-10 days. Time in: 0911   Time out: 0934       Evaluation Time includes thorough review of current medical information, gathering information on past medical history/social history and prior level of function, completion of standardized testing/informal observation of tasks, assessment of data and education on plan of care and goals.     CPT codes:  [x] Low Complexity PT evaluation 67515  [] Moderate Complexity PT evaluation 66119  [] High Complexity PT evaluation 05616  [] PT Re-evaluation 27529  [] Gait training 97125  minutes  [x] Therapeutic activities 50346 8 minutes  [] Therapeutic exercises 46542  minutes  [] Neuromuscular reeducation 95738  minutes       George 18 number:  PT 7445

## 2022-11-30 NOTE — CARE COORDINATION
Transition of Care-Anticipate discharge in the next 24 hrs. Patient evaluated by Cardiology today, medication adjustments, Blood sugar elevated today, steroids have been discontinued. Discharge plan remains home, no needs, family to transport.     Trinity CASILLAS, RN  [facility]

## 2022-11-30 NOTE — PROGRESS NOTES
Notified Keri NP on call regarding blood glucose reading of 352. Also notified that patient is having some heaviness in chest, but thinks its related to gas, per patient. Orders obtained for simethicone and EKG to r/o cardiac event.

## 2022-11-30 NOTE — PROGRESS NOTES
Physician Progress Note      Hadley Ricardo  [facility] #:                  [id number]  :                       1950  ADMIT DATE:       2022 8:21 PM  [address] [place] DATE:  RESPONDING  PROVIDER #:        Romeo Omalley MD          QUERY TEXT:    Dear Attending Physician,    Patient admitted with dizziness found to be hypotensive from home  . Noted   documentation of \" NSTEMI \"only in PCP notes. In order to support the   diagnosis of NSTEMI, please include additional clinical indicators in your   documentation. Or please document if the diagnosis of NSTEMI has been ruled   out after further study. 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 ..dizziness found to be hypotensive from   home. .. Author Silvano he was at home this morning and forgot to take his morning medications   including his morning midodrine . .. Nonzero troponin-repeat troponin. Author Silvano Schaefer \"  Per   PCP ,\". Author Silvano Schaefer Troponin is uptrending 61>82>101. .. Unable to initiate GDMT   due to hypotension . Author Silvano Schaefer \" Per PCP ,\". .. Admit Dx: Lactic acidosis ,   Lightheadedness , General weakness ,NSTEMI ,Hemoptysis . Author Silvano Schaefer \"  Per Cardio   ,\" . ... mild elevation of high-sensitivity troponin levels in a pattern   not exclusive myocardial injury likely on the basis of hy  Treatment: Midodrine and given a 500 cc bolus of normal saline, gentle IVF    Thank you,  Radha Phelan RN [facility]  Clinical Documentation Improvement Specialist  Options provided:  -- NSTEMI present as evidenced by, Please document evidence. -- NSTEMI was ruled out  -- Other - I will add my own diagnosis  -- Disagree - Not applicable / Not valid  -- Disagree - Clinically unable to determine / Unknown  -- Refer to Clinical Documentation Reviewer    PROVIDER RESPONSE TEXT:    NSTEMI was ruled out after study.     Query created by: Eliud Bowers on 2022 4:11 PM      Electronically signed by:  Ashley Moreno MD 11/30/2022 7:21 AM

## 2022-11-30 NOTE — PROGRESS NOTES
[facility]) Physicians        CARDIOLOGY                 INPATIENT PROGRESS NOTE          PATIENT SEEN IN FOLLOW UP FOR: Pre-op clearance    Hospital Day: 6     Rosa Isela Mcarthur is a 66year old patient known to Dr. Tram Brewer: had \" chest heaviness\" yesterday\" EKG done; Denies any further CP or SOB, No orthopnea - had A fib yesterday    ROS: Review of rest of 10 systems negative except as mentioned above    OBJECTIVE: No acute distress. See Assessment     Diagnostics:       Telemetry: Reviewed; Few SVT, likely A Fib/Flutter with RVR, 3 beat WCT  EKG - Reviewed    Echo 11/12/2022   Summary   Technically suboptimal and limited study. Left ventricle was not well visualized. Regional wall motion abnormalities with akinesis of distal anteroseptal,   anterior, anterolateral, inferoapical and apical segments. Moderately reduced left ventricular systolic dysfunction. EF 40%   The left atrium is mildly dilated. Mild centrally directed mitral regurgitation. The aortic valve appears mildly sclerotic. [facility] - 8/2021 - noted      Intake/Output Summary (Last 24 hours) at 11/30/2022 1000  Last data filed at 11/30/2022 0149  Gross per 24 hour   Intake 440 ml   Output --   Net 440 ml       Labs:   CBC:   Recent Labs     11/29/22  0315 11/29/22  1847 11/30/22  0245   WBC 4.8  --  5.9   HGB 10.1* 11.7* 10.7*   HCT 29.7* 33.8* 31.6*   PLT 50*  --  60*     BMP:   Recent Labs     11/29/22  0315 11/30/22  0245    132   K 3.6 4.5   CO2 22 19*   BUN 23 23   CREATININE 1.5* 1.5*   LABGLOM 49 49   CALCIUM 7.9* 7.6*     Mag:   No results for input(s): MG in the last 72 hours. Phos: No results for input(s): PHOS in the last 72 hours. TSH: No results for input(s): TSH in the last 72 hours. HgA1c:     BNP: No results for input(s): BNP in the last 72 hours. PT/INR: No results for input(s): PROTIME, INR in the last 72 hours. APTT:No results for input(s): APTT in the last 72 hours.   CARDIAC ENZYMES:  No results for input(s): Mili Beam in the last 72 hours. FASTING LIPID PANEL:  Lab Results   Component Value Date/Time    CHOL 93 2022 01:36 PM    HDL 36 2022 01:36 PM    LDLCALC 38 2022 01:36 PM    TRIG 96 2022 01:36 PM     LIVER PROFILE:  No results for input(s): AST, ALT, LABALBU in the last 72 hours. Current Inpatient Medications:   lactobacillus  1 capsule Oral Daily    atorvastatin  40 mg Oral Nightly    cetirizine  10 mg Oral Daily    midodrine  10 mg Oral TID WC    montelukast  10 mg Oral Nightly    sodium chloride flush  5-40 mL IntraVENous 2 times per day    pantoprazole  40 mg IntraVENous BID    cefTRIAXone (ROCEPHIN) IV  1,000 mg IntraVENous Q24H    insulin lispro  0-4 Units SubCUTAneous TID WC    insulin lispro  0-4 Units SubCUTAneous Nightly    midodrine  10 mg Oral Once       IV Infusions (if any):   lactated ringers 50 mL/hr at 22 2200    sodium chloride      dextrose 100 mL/hr at 22 0230         PHYSICAL EXAM:     CONSTITUTIONAL:   BP 89/60   Pulse 74   Temp 98.2 °F (36.8 °C) (Oral)   Resp 16   Ht 5' 11\" (1.803 m)   Wt 210 lb (95.3 kg)   SpO2 98%   BMI 29.29 kg/m²   Pulse  Av.9  Min: 61  Max: 779  Systolic (15NFD), FWJ:40 , Min:68 , SALLIE:97    Diastolic (83IAD), QDI:96, Min:44, Max:66    In general, this is a well developed, well nourished who appears stated age. awake, alert, no apparent distress  HEENT: eyes -conjunctivae pink,  Throat - Oral mucosa moist.   Neck-  no stridor, no noted enlargement of the thyroid, no carotid bruit no jugular venous distention   RESPIRATORY: Chest symmetrical  No accessory muscle use. Lung auscultation - few rhonchi  CARDIOVASCULAR:     Heart Palpation - no palpable thrills  Heart Ausculation - Irregular rate, 2/6 systolic murmur, No s3 or rub.   trace lower extremity edema, Distal pulses palpable, no cyanosis   ABDOMEN: Soft, Bowel sounds present.   MS:n/a  : Deferred  Rectal Exam: Deferred  SKIN: warm and dry   NEURO / PSYCH: oriented to person, place        ASSESSMENT/PLAN:     Preoperative cardiovascular examination - No CP. Cleared for his surgical procedures by Dr Arely Redding. Avoid hypotension. PAF - Brief episodes with RVR. Cannot start [address] due to severe Thrombocytopenia; No BB or Cardizem due to low BP. Start Digoxin for rate control    3 beat WCT - Check mag levels, Potassium OK    Hemoptysis - ENT/GI following    Anemia - GI following - Monitor H/H    Elevated Troponin - No CP; No further cardiac testing; Low dose Nitrates/BB if his BP tolerates    CAD - Multivessel CAD per [facility] 8/2021- Not candidate for PCI or CABG -  Medical Rx limited due to low BP - Continue Statin, resume Aspirin when Plt counts OK. Ischemic CMP - EF 40%; GDMT limited due to low BP    Hypotension - On Midodrine    TCP - Monitor Plt counts, 50k --> 60k    Mild MR    DNR CCA            Tentative discharge 24 hours. F/u by Dr Pedro Pablo Randall 2-3 weeks after discharge    Above recommendations discussed with him and his son.       Electronically signed by Je Linares MD on 11/30/2022 at 10:00 AM  [facility]) Cardiology

## 2022-11-30 NOTE — PROGRESS NOTES
Spoke with Dr. Fly Winters regarding ekg results which he reviewed, no new orders at this time continue IV fluids and monitor BP's. Patient and son in room updated.

## 2022-12-01 VITALS
OXYGEN SATURATION: 98 % | BODY MASS INDEX: 29.4 KG/M2 | DIASTOLIC BLOOD PRESSURE: 72 MMHG | HEIGHT: 71 IN | TEMPERATURE: 97.9 F | RESPIRATION RATE: 18 BRPM | WEIGHT: 210 LBS | HEART RATE: 74 BPM | SYSTOLIC BLOOD PRESSURE: 99 MMHG

## 2022-12-01 LAB
ANION GAP SERPL CALCULATED.3IONS-SCNC: 10 MMOL/L (ref 7–16)
BASOPHILS ABSOLUTE: 0.01 E9/L (ref 0–0.2)
BASOPHILS RELATIVE PERCENT: 0.1 % (ref 0–2)
BLOOD CULTURE, ROUTINE: NORMAL
BUN BLDV-MCNC: 25 MG/DL (ref 6–23)
CALCIUM SERPL-MCNC: 7.8 MG/DL (ref 8.6–10.2)
CHLORIDE BLD-SCNC: 101 MMOL/L (ref 98–107)
CO2: 19 MMOL/L (ref 22–29)
CREAT SERPL-MCNC: 1.3 MG/DL (ref 0.7–1.2)
EOSINOPHILS ABSOLUTE: 0.01 E9/L (ref 0.05–0.5)
EOSINOPHILS RELATIVE PERCENT: 0.1 % (ref 0–6)
GFR SERPL CREATININE-BSD FRML MDRD: 58 ML/MIN/1.73
GLUCOSE BLD-MCNC: 297 MG/DL (ref 74–99)
HBA1C MFR BLD: 6.8 % (ref 4–5.6)
HCT VFR BLD CALC: 30.9 % (ref 37–54)
HEMOGLOBIN: 10.6 G/DL (ref 12.5–16.5)
IMMATURE GRANULOCYTES #: 0.05 E9/L
IMMATURE GRANULOCYTES %: 0.5 % (ref 0–5)
LYMPHOCYTES ABSOLUTE: 1.08 E9/L (ref 1.5–4)
LYMPHOCYTES RELATIVE PERCENT: 10.7 % (ref 20–42)
MCH RBC QN AUTO: 32 PG (ref 26–35)
MCHC RBC AUTO-ENTMCNC: 34.3 % (ref 32–34.5)
MCV RBC AUTO: 93.4 FL (ref 80–99.9)
METER GLUCOSE: 270 MG/DL (ref 74–99)
METER GLUCOSE: 282 MG/DL (ref 74–99)
METER GLUCOSE: 286 MG/DL (ref 74–99)
MONOCYTES ABSOLUTE: 0.47 E9/L (ref 0.1–0.95)
MONOCYTES RELATIVE PERCENT: 4.7 % (ref 2–12)
NEUTROPHILS ABSOLUTE: 8.45 E9/L (ref 1.8–7.3)
NEUTROPHILS RELATIVE PERCENT: 83.9 % (ref 43–80)
ORGANISM: ABNORMAL
PDW BLD-RTO: 16.6 FL (ref 11.5–15)
PLATELET # BLD: 55 E9/L (ref 130–450)
PLATELET CONFIRMATION: NORMAL
PMV BLD AUTO: 12.8 FL (ref 7–12)
POTASSIUM REFLEX MAGNESIUM: 4.3 MMOL/L (ref 3.5–5)
RBC # BLD: 3.31 E12/L (ref 3.8–5.8)
SODIUM BLD-SCNC: 130 MMOL/L (ref 132–146)
WBC # BLD: 10.1 E9/L (ref 4.5–11.5)

## 2022-12-01 PROCEDURE — 6370000000 HC RX 637 (ALT 250 FOR IP): Performed by: STUDENT IN AN ORGANIZED HEALTH CARE EDUCATION/TRAINING PROGRAM

## 2022-12-01 PROCEDURE — 82962 GLUCOSE BLOOD TEST: CPT

## 2022-12-01 PROCEDURE — 2580000003 HC RX 258: Performed by: STUDENT IN AN ORGANIZED HEALTH CARE EDUCATION/TRAINING PROGRAM

## 2022-12-01 PROCEDURE — 6360000002 HC RX W HCPCS: Performed by: STUDENT IN AN ORGANIZED HEALTH CARE EDUCATION/TRAINING PROGRAM

## 2022-12-01 PROCEDURE — 36415 COLL VENOUS BLD VENIPUNCTURE: CPT

## 2022-12-01 PROCEDURE — 80048 BASIC METABOLIC PNL TOTAL CA: CPT

## 2022-12-01 PROCEDURE — 6370000000 HC RX 637 (ALT 250 FOR IP): Performed by: FAMILY MEDICINE

## 2022-12-01 PROCEDURE — 97530 THERAPEUTIC ACTIVITIES: CPT

## 2022-12-01 PROCEDURE — 83036 HEMOGLOBIN GLYCOSYLATED A1C: CPT

## 2022-12-01 PROCEDURE — 6370000000 HC RX 637 (ALT 250 FOR IP): Performed by: INTERNAL MEDICINE

## 2022-12-01 PROCEDURE — 97165 OT EVAL LOW COMPLEX 30 MIN: CPT

## 2022-12-01 PROCEDURE — 85025 COMPLETE CBC W/AUTO DIFF WBC: CPT

## 2022-12-01 PROCEDURE — 99233 SBSQ HOSP IP/OBS HIGH 50: CPT | Performed by: INTERNAL MEDICINE

## 2022-12-01 RX ORDER — PREDNISONE 10 MG/1
TABLET ORAL
Qty: 15 TABLET | Refills: 0 | Status: SHIPPED | OUTPATIENT
Start: 2022-12-02 | End: 2022-12-10

## 2022-12-01 RX ORDER — SIMETHICONE 80 MG
80 TABLET,CHEWABLE ORAL 4 TIMES DAILY PRN
Qty: 180 TABLET | Refills: 0 | Status: SHIPPED | OUTPATIENT
Start: 2022-12-01

## 2022-12-01 RX ORDER — LACTOBACILLUS RHAMNOSUS GG 10B CELL
1 CAPSULE ORAL DAILY
Qty: 30 CAPSULE | Refills: 0 | Status: SHIPPED | OUTPATIENT
Start: 2022-12-02 | End: 2023-01-01

## 2022-12-01 RX ORDER — PREDNISONE 20 MG/1
40 TABLET ORAL ONCE
Status: COMPLETED | OUTPATIENT
Start: 2022-12-01 | End: 2022-12-01

## 2022-12-01 RX ORDER — GLYBURIDE 5 MG/1
2.5 TABLET ORAL 2 TIMES DAILY WITH MEALS
Qty: 90 TABLET | Refills: 1 | COMMUNITY
Start: 2022-12-01

## 2022-12-01 RX ORDER — DIGOXIN 125 MCG
125 TABLET ORAL DAILY
Qty: 30 TABLET | Refills: 0 | Status: SHIPPED | OUTPATIENT
Start: 2022-12-02

## 2022-12-01 RX ORDER — PANTOPRAZOLE SODIUM 40 MG/1
40 TABLET, DELAYED RELEASE ORAL
Status: DISCONTINUED | OUTPATIENT
Start: 2022-12-01 | End: 2022-12-01 | Stop reason: HOSPADM

## 2022-12-01 RX ADMIN — INSULIN LISPRO 4 UNITS: 100 INJECTION, SOLUTION INTRAVENOUS; SUBCUTANEOUS at 16:47

## 2022-12-01 RX ADMIN — INSULIN LISPRO 4 UNITS: 100 INJECTION, SOLUTION INTRAVENOUS; SUBCUTANEOUS at 16:46

## 2022-12-01 RX ADMIN — MIDODRINE HYDROCHLORIDE 10 MG: 5 TABLET ORAL at 11:33

## 2022-12-01 RX ADMIN — INSULIN GLARGINE 10 UNITS: 100 INJECTION, SOLUTION SUBCUTANEOUS at 09:01

## 2022-12-01 RX ADMIN — INSULIN LISPRO 4 UNITS: 100 INJECTION, SOLUTION INTRAVENOUS; SUBCUTANEOUS at 06:14

## 2022-12-01 RX ADMIN — CETIRIZINE HYDROCHLORIDE 10 MG: 10 TABLET, FILM COATED ORAL at 09:00

## 2022-12-01 RX ADMIN — PANTOPRAZOLE SODIUM 40 MG: 40 TABLET, DELAYED RELEASE ORAL at 09:00

## 2022-12-01 RX ADMIN — PREDNISONE 40 MG: 20 TABLET ORAL at 16:46

## 2022-12-01 RX ADMIN — WATER 1000 MG: 1 INJECTION INTRAMUSCULAR; INTRAVENOUS; SUBCUTANEOUS at 02:54

## 2022-12-01 RX ADMIN — MIDODRINE HYDROCHLORIDE 10 MG: 5 TABLET ORAL at 16:43

## 2022-12-01 RX ADMIN — MIDODRINE HYDROCHLORIDE 10 MG: 5 TABLET ORAL at 09:00

## 2022-12-01 RX ADMIN — Medication 1 CAPSULE: at 09:00

## 2022-12-01 RX ADMIN — INSULIN LISPRO 4 UNITS: 100 INJECTION, SOLUTION INTRAVENOUS; SUBCUTANEOUS at 11:34

## 2022-12-01 RX ADMIN — SODIUM CHLORIDE, PRESERVATIVE FREE 10 ML: 5 INJECTION INTRAVENOUS at 09:01

## 2022-12-01 RX ADMIN — DIGOXIN 125 MCG: 0.12 TABLET ORAL at 09:00

## 2022-12-01 ASSESSMENT — PAIN SCALES - GENERAL: PAINLEVEL_OUTOF10: 0

## 2022-12-01 NOTE — PROGRESS NOTES
[facility]) Physicians        CARDIOLOGY                 INPATIENT PROGRESS NOTE          PATIENT SEEN IN FOLLOW UP FOR: Pre-op clearance    Hospital Day: 7     Earle Nunes is a 66year old patient known to Dr. Van Dopp: No PC or SOB; No orthopnea     ROS: Review of rest of 10 systems negative except as mentioned above    OBJECTIVE: No acute distress. See Assessment     Diagnostics:       Telemetry: Reviewed; Few SVT, likely A Fib/Flutter with RVR, 3 beat WCT  EKG - Reviewed    Echo 11/12/2022   Summary   Technically suboptimal and limited study. Left ventricle was not well visualized. Regional wall motion abnormalities with akinesis of distal anteroseptal,   anterior, anterolateral, inferoapical and apical segments. Moderately reduced left ventricular systolic dysfunction. EF 40%   The left atrium is mildly dilated. Mild centrally directed mitral regurgitation. The aortic valve appears mildly sclerotic. [facility] - 8/2021 - noted      Intake/Output Summary (Last 24 hours) at 12/1/2022 1008  Last data filed at 11/30/2022 2130  Gross per 24 hour   Intake 2605.53 ml   Output --   Net 2605.53 ml       Labs:   CBC:   Recent Labs     11/30/22  0245 12/01/22  0440   WBC 5.9 10.1   HGB 10.7* 10.6*   HCT 31.6* 30.9*   PLT 60* 55*     BMP:   Recent Labs     11/30/22  0245 12/01/22  0440    130*   K 4.5 4.3   CO2 19* 19*   BUN 23 25*   CREATININE 1.5* 1.3*   LABGLOM 49 58   CALCIUM 7.6* 7.8*     Mag:   Recent Labs     11/30/22  0245   MG 2.0       Phos: No results for input(s): PHOS in the last 72 hours. TSH: No results for input(s): TSH in the last 72 hours. HgA1c:     BNP: No results for input(s): BNP in the last 72 hours. PT/INR: No results for input(s): PROTIME, INR in the last 72 hours. APTT:No results for input(s): APTT in the last 72 hours. CARDIAC ENZYMES:  No results for input(s): CKTOTAL, TROPHS in the last 72 hours.     FASTING LIPID PANEL:  Lab Results Component Value Date/Time    CHOL 93 2022 01:36 PM    HDL 36 2022 01:36 PM    LDLCALC 38 2022 01:36 PM    TRIG 96 2022 01:36 PM     LIVER PROFILE:  No results for input(s): AST, ALT, LABALBU in the last 72 hours. Current Inpatient Medications:   pantoprazole  40 mg Oral BID AC    digoxin  125 mcg Oral Daily    insulin lispro  0-8 Units SubCUTAneous TID WC    insulin lispro  0-4 Units SubCUTAneous Nightly    insulin glargine  10 Units SubCUTAneous BID    insulin lispro  4 Units SubCUTAneous TID WC    lactobacillus  1 capsule Oral Daily    atorvastatin  40 mg Oral Nightly    cetirizine  10 mg Oral Daily    midodrine  10 mg Oral TID WC    montelukast  10 mg Oral Nightly    sodium chloride flush  5-40 mL IntraVENous 2 times per day    cefTRIAXone (ROCEPHIN) IV  1,000 mg IntraVENous Q24H    midodrine  10 mg Oral Once       IV Infusions (if any):   dextrose      lactated ringers 50 mL/hr at 22 1847    sodium chloride           PHYSICAL EXAM:     CONSTITUTIONAL:   BP (!) 86/49   Pulse 88   Temp 97.9 °F (36.6 °C) (Oral)   Resp 18   Ht 5' 11\" (1.803 m)   Wt 210 lb (95.3 kg)   SpO2 98%   BMI 29.29 kg/m²   Pulse  Av.3  Min: 61  Max: 88  Systolic (14FRM), JDH:67 , Min:80 , LKF:26    Diastolic (71THQ), CHL:63, Min:43, Max:52    In general, this is a well developed, well nourished who appears stated age. awake, alert, no apparent distress  HEENT: eyes -conjunctivae pink,  Throat - Oral mucosa moist.   Neck-  no stridor, no noted enlargement of the thyroid, no carotid bruit no jugular venous distention   RESPIRATORY: Chest symmetrical  No accessory muscle use. Lung auscultation - few rhonchi  CARDIOVASCULAR:     Heart Palpation - no palpable thrills  Heart Ausculation - Regular rate, 2/6 systolic murmur, No s3 or rub.   trace lower extremity edema, Distal pulses palpable, no cyanosis   ABDOMEN: Soft, Bowel sounds present.   MS:n/a  : Deferred  Rectal Exam: Deferred  SKIN: warm and dry   NEURO / PSYCH: oriented to person, place        ASSESSMENT/PLAN:     Preoperative cardiovascular examination - No CP. Cleared for his surgical procedures by Dr Adeline Fregoso. Avoid hypotension. PAF - NEW, No Anticoagulation at this time due to severe Thrombocytopenia; No BB or Cardizem due to low BP. Continue low dose Digoxin. Monitor Dig levels as needed    Hypotension - On Midodrine, on Fluids-Monitor for CHF/Pulmonary edema    3 beat WCT - Monitor lytes. (Mag and Potassium OK)    Hemoptysis - ENT/GI following    Anemia - GI following - Monitor H/H - 10.6gms    Elevated Troponin - No CP; No further cardiac testing; Low dose Nitrates/BB if his BP tolerates    CAD - Multivessel CAD per [facility] 8/2021- Not candidate for PCI or CABG -  Medical Rx limited due to low BP. Continue Statin; resume Aspirin when Plt counts OK. Nitrates/BB if his BP tolerates.     Ischemic CMP - EF 40%; GDMT limited due to low BP    TCP - Monitor Plt counts, 50k --> 60k --> 55k    Mild MR    DNR CCA            D/w Dr Rhea Lozano    F/u by Dr Tiffanie Meza 2-3 weeks after discharge (had an Appt 12/20/22)    Above recommendations discussed with him       Electronically signed by Tiff Flores MD on 12/1/2022 at 10:08 AM  [facility]) Cardiology

## 2022-12-01 NOTE — PLAN OF CARE
Problem: Discharge Planning  Goal: Discharge to home or other facility with appropriate resources  Outcome: Completed     Problem: Skin/Tissue Integrity  Goal: Absence of new skin breakdown  Description: 1. Monitor for areas of redness and/or skin breakdown  2. Assess vascular access sites hourly  3. Every 4-6 hours minimum:  Change oxygen saturation probe site  4. Every 4-6 hours:  If on nasal continuous positive airway pressure, respiratory therapy assess nares and determine need for appliance change or resting period.   Outcome: Completed     Problem: Safety - Adult  Goal: Free from fall injury  Outcome: Completed     Problem: Pain  Goal: Verbalizes/displays adequate comfort level or baseline comfort level  Outcome: Completed  Flowsheets  Taken 12/1/2022 0859 by Deejay Weiss RN  Verbalizes/displays adequate comfort level or baseline comfort level:   Encourage patient to monitor pain and request assistance   Assess pain using appropriate pain scale   Administer analgesics based on type and severity of pain and evaluate response   Implement non-pharmacological measures as appropriate and evaluate response   Consider cultural and social influences on pain and pain management   Notify Licensed Independent Practitioner if interventions unsuccessful or patient reports new pain  Taken 12/1/2022 0245 by Linda Jin RN  Verbalizes/displays adequate comfort level or baseline comfort level: Assess pain using appropriate pain scale     Problem: Chronic Conditions and Co-morbidities  Goal: Patient's chronic conditions and co-morbidity symptoms are monitored and maintained or improved  Outcome: Completed

## 2022-12-01 NOTE — DISCHARGE SUMMARY
[facility] Physician Discharge Summary       Sivakumar Chong DO  [address] [facility] [address] [facility]  [phone]    Follow up  After discharge from [address] [address]  [phone]    Go on 12/6/2022  for hospital follow up @ [phone]      Activity level: As tolerated     Dispo:  Home      Condition on discharge: Stable     Patient ID:  Debbie Huerta  [id number]  66 y.o.  1950    Admit date: 11/25/2022    Discharge date and time:  12/1/2022  1:17 PM    Admission Diagnoses: Principal Problem:    Lactic acidosis  Active Problems:    Acute renal failure superimposed on stage 3a chronic kidney disease (HCC)    Hemoptysis    General weakness    Lightheadedness    NSTEMI (non-ST elevated myocardial infarction) ([address]    Ischemic cardiomyopathy    Elevated troponin    Pure hypercholesterolemia    Preoperative cardiovascular examination    Bacteremia    Chronic pansinusitis    Type 2 diabetes mellitus with stage 3a chronic kidney disease, without long-term current use of insulin (HCC)    Chronic systolic congestive heart failure (HCC)    Coronary artery disease involving native coronary artery of native heart without angina pectoris  Resolved Problems:    * No resolved hospital problems.  *      Discharge Diagnoses: Principal Problem:    Lactic acidosis  Active Problems:    Acute renal failure superimposed on stage 3a chronic kidney disease (HCC)    Hemoptysis    General weakness    Lightheadedness    NSTEMI (non-ST elevated myocardial infarction) (HCC)    Ischemic cardiomyopathy    Elevated troponin    Pure hypercholesterolemia    Preoperative cardiovascular examination    Bacteremia    Chronic pansinusitis    Type 2 diabetes mellitus with stage 3a chronic kidney disease, without long-term current use of insulin (HCC)    Chronic systolic congestive heart failure ([address]    Coronary artery disease involving native coronary artery of native heart without angina pectoris  Resolved Problems:    * No resolved hospital problems. *      Consults:  IP CONSULT TO GI  IP CONSULT TO CARDIOLOGY  IP CONSULT TO HEART FAILURE NURSE/COORDINATOR  IP CONSULT TO OTOLARYNGOLOGY  IP CONSULT TO IV TEAM    Procedures:   N/A    Hospital Course:   Patient Leonides Moore is a 66 y.o. presented with Lactic acidosis [E87.20]  Lightheadedness [R42]  General weakness [R53.1]  NSTEMI (non-ST elevated myocardial infarction) ([facility] [I21.4]  Hemoptysis [R04.2]. Admitted for further evaluation and treatment of hypotension and dizziness. Patient runs low BP. He was seen by Cardiology. He has low EF, low platelets and severe CAD. GDMT has not been possible due to his low BP. He developed SVT during the admission and was treated with Digoxin. He will continue same as an outpatient. He was placed on Steroids by ENT after evaluation for epistaxis. CT of the sinuses showed acute and chronic sinusitis. He required insulin, long and short acting. His blood sugar improved. Patient desires to stay with the Glyburide due to cost.  He was taking 5 mg po bid; he was experiencing frequent low sugar episodes. He was prescribed 2.5 mg po bid and has a home glucose monitor to check blood sugars. He is to notify PCP if BS > 300 or < 80. He was provided a Rx for a BP monitor. He is to continue to take Midodrine 10 mg po tid. He was stable for discharge to home. F/U with ENT in 10 days. F/U with PCP in 1 wk. Monitor BP and notify if SBP < 85.      Discharge Exam:    General Appearance: alert and oriented to person, place and time and in no acute distress  Skin: warm and dry  Head: normocephalic and atraumatic  Eyes: pupils equal, round, and reactive to light, extraocular eye movements intact, conjunctivae normal  Neck: neck supple and non tender without mass   Pulmonary/Chest: clear to auscultation bilaterally- no wheezes, rales or rhonchi, normal air movement, no respiratory distress  Cardiovascular: normal rate, normal S1 and S2 and no carotid bruits  Abdomen: soft, non-tender, non-distended, normal bowel sounds, no masses or organomegaly  Extremities: no cyanosis, no clubbing and no edema  Neurologic: no cranial nerve deficit and speech normal    LABS:  Recent Labs     11/29/22 0315 11/30/22 0245 12/01/22  0440    132 130*   K 3.6 4.5 4.3    102 101   CO2 22 19* 19*   BUN 23 23 25*   CREATININE 1.5* 1.5* 1.3*   GLUCOSE 161* 300* 297*   CALCIUM 7.9* 7.6* 7.8*       Recent Labs     11/29/22 0315 11/29/22  1847 11/30/22 0245 12/01/22  0440   WBC 4.8  --  5.9 10.1   RBC 3.16*  --  3.39* 3.31*   HGB 10.1* 11.7* 10.7* 10.6*   HCT 29.7* 33.8* 31.6* 30.9*   MCV 94.0  --  93.2 93.4   MCH 32.0  --  31.6 32.0   MCHC 34.0  --  33.9 34.3   RDW 17.0*  --  16.9* 16.6*   PLT 50*  --  60* 55*   MPV 12.2*  --  12.3* 12.8*     Imaging:  CT ABDOMEN PELVIS WO CONTRAST Additional Contrast? None    Result Date: 11/26/2022  EXAMINATION: CT OF THE ABDOMEN AND PELVIS WITHOUT CONTRAST 11/26/2022 12:19 am TECHNIQUE: CT of the abdomen and pelvis was performed without the administration of intravenous contrast. Multiplanar reformatted images are provided for review. Automated exposure control, iterative reconstruction, and/or weight based adjustment of the mA/kV was utilized to reduce the radiation dose to as low as reasonably achievable. COMPARISON: MRI examination September 3, 2022. HISTORY: ORDERING SYSTEM PROVIDED HISTORY: abdominal pain TECHNOLOGIST PROVIDED HISTORY: Reason for exam:->abdominal pain Additional Contrast?->None Decision Support Exception - unselect if not a suspected or confirmed emergency medical condition->Emergency Medical Condition (MA) FINDINGS: Lower Chest: Mild airspace disease posterior basilar segments left lower lobe may represent aspiration or other pneumonitis. No effusions. . Organs:  Moderate splenomegaly, 14.6 cm, and shrunken 8.5 cm liver with nodular surface contour and recanalized paraumbilical vein compatible with hepatic cirrhosis/portal hypertension. Cholelithiasis. 3.2 cm cystic lesion of the pancreatic tail, unchanged from prior MRI examination of September 3, 2022. Liver, biliary tree, bilateral adrenal glands, bilateral kidneys, spleen and pancreas are otherwise normal. GI/Bowel: No ileus or obstruction. Normal appendix right lower quadrant. No inflammatory bowel process. Pelvis: No adenopathy or fluid. Bladder dome diverticulum noted. Peritoneum/Retroperitoneum: Negative. Bones/Soft Tissues: No acute osseous lesions. 1. No acute disease. 2. Normal appendix right lower quadrant. 3. Cholelithiasis and findings consistent with hepatic cirrhosis. 4. 3.2 cm cystic lesion of the pancreatic tail, unchanged from MRI examination of 3 months prior. RECOMMENDATIONS: Careful clinical correlation and follow up recommended. CT CHEST W CONTRAST    Result Date: 11/26/2022  EXAMINATION: CT OF THE CHEST WITH CONTRAST 11/26/2022 1:00 pm TECHNIQUE: CT of the chest was performed with the administration of intravenous contrast. Multiplanar reformatted images are provided for review. Automated exposure control, iterative reconstruction, and/or weight based adjustment of the mA/kV was utilized to reduce the radiation dose to as low as reasonably achievable. COMPARISON: 11/25/2022. HISTORY: ORDERING SYSTEM PROVIDED HISTORY: Hemoptysis TECHNOLOGIST PROVIDED HISTORY: Reason for exam:->Hemoptysis Decision Support Exception - unselect if not a suspected or confirmed emergency medical condition->Emergency Medical Condition (MA) FINDINGS: The heart is unremarkable in size, no evidence of pericardial effusion is seen. Atherosclerotic calcifications visualized in the coronary vessels. No significant hilar or mediastinal lymphadenopathy is seen.  Subtle scattered calcifications visualized in the aortic arch, no evidence for aneurysmal dilatation or arterial dissection is seen. Mild bronchial wall thickening is visualized. Opacification visualized in the left lower lobe associated with the marked thickening of the bronchial walls air and subtle layering of fluid suggestive of consolidation/left lower lobe infiltrate. 2 less extent subtle streaky opacities visualized in the right lower lung field with small right pleural effusion. No evidence of pneumothorax or parenchymal lung mass. No evidence of endobronchial or endoluminal lesions are seen. Limited evaluation of the upper abdomen demonstrates no acute pathology, distended gallbladder with no evidence of wall thickening or pericholecystic stranding. Prominence of the spleen. . Degenerative bone changes are seen. Left lower lobe infiltrates/consolidation, suggestion of early right lower lobe infiltrate. Splenomegaly and distension of the gallbladder visualized. XR CHEST PORTABLE    Result Date: 11/25/2022  EXAMINATION: ONE XRAY VIEW OF THE CHEST 11/25/2022 9:18 pm COMPARISON: 9 November 2022 HISTORY: ORDERING SYSTEM PROVIDED HISTORY: shortness of breath TECHNOLOGIST PROVIDED HISTORY: Reason for exam:->shortness of breath FINDINGS: Single AP erect portable chest demonstrates satisfactory expansion lungs with mild increased markings at lung bases. There are no focal alveolar infiltrates or effusions. The cardiac silhouette appears unremarkable. There is no evidence of a pneumothorax. No acute cardiopulmonary process.        Patient Instructions:      Medication List        START taking these medications      digoxin 125 MCG tablet  Commonly known as: LANOXIN  Take 1 tablet by mouth daily  Start taking on: December 2, 2022     lactobacillus Caps capsule  Take 1 capsule by mouth daily  Start taking on: December 2, 2022     simethicone 80 MG chewable tablet  Commonly known as: MYLICON  Take 1 tablet by mouth 4 times daily as needed for Flatulence            CHANGE how you take these medications      glyBURIDE 5 MG tablet  Commonly known as: Christian Rouse  What changed:   how much to take  when to take this            CONTINUE taking these medications      ammonium lactate 12 % lotion  Commonly known as: LAC-HYDRIN  Apply topically twice daily     atorvastatin 40 MG tablet  Commonly known as: LIPITOR  Take 1 tablet by mouth nightly     cetirizine 10 MG tablet  Commonly known as: ZYRTEC     midodrine 10 MG tablet  Commonly known as: PROAMATINE  Take 1 tablet by mouth 3 times daily (with meals)     montelukast 10 MG tablet  Commonly known as: SINGULAIR  take 1 tablet by mouth nightly     OneTouch Ultra strip  Generic drug: blood glucose test strips  Inject 1 each into the skin daily As needed.      pantoprazole 40 MG tablet  Commonly known as: PROTONIX  Take 1 tablet by mouth every morning (before breakfast)     PreserVision AREDS 2+Multi Vit Caps     Vitamin D3 25 MCG Tabs  Take 1 tablet by mouth daily            STOP taking these medications      aspirin 81 MG EC tablet     fluticasone 50 MCG/ACT nasal spray  Commonly known as: FLONASE     furosemide 40 MG tablet  Commonly known as: LASIX               Where to Get Your Medications        These medications were sent to [address], [place] [address] [facility] [phone]  [address] [place] [id number]      Phone: [phone]   digoxin 125 MCG tablet  lactobacillus Caps capsule  simethicone 80 MG chewable tablet       Total discharge time:  33 minutes    Note that more than 30 minutes was spent in preparing discharge papers, discussing discharge with patient, medication review, etc.    Signed:  Electronically signed by Juan Luis Christianson MD on 12/1/2022 at 1:17 PM

## 2022-12-01 NOTE — CARE COORDINATION
Transition of Care-Anticipate discharge in the next 24 hrs. Discussed with patient his therapy score-he does not want home health care at discharge, also discussed readmission,  he still does not want home care. PT score 17/24. Son to transport home.     Vito KHANN, RN  [facility]

## 2022-12-01 NOTE — PROGRESS NOTES
Occupational Therapy    OCCUPATIONAL THERAPY INITIAL EVALUATION     Anaid Neff [place], [place]         MWMY:                                                  Patient Name: Raman Pollack    MRN: [id number]    : 1950    Room: [address] [place] [address]      Evaluating OT: Homero Mukherjee OTR/L   [id number]      Referring Areli Harris MD    Specific Provider Orders/Date:OT eval and treat 2022      Diagnosis:  Lactic acidosis [E87.20]  Lightheadedness [R42]  General weakness [R53.1]  NSTEMI (non-ST elevated myocardial infarction) ([facility] [I21.4]  Hemoptysis [R04.2]   Precautions:  Fall Risk, alarm      Assessment of current deficits    [x] Functional mobility  [x]ADLs  [x] Strength               []Cognition    [x] Functional transfers   [x] IADLs         [x] Safety Awareness   [x]Endurance    [] Fine Coordination              [x] Balance      [] Vision/perception   []Sensation     []Gross Motor Coordination  [] ROM  [] Delirium                   [] Motor Control     OT PLAN OF CARE   OT POC based on physician orders, patient diagnosis and results of clinical assessment    Frequency/Duration  2-4 days/wk for 2 weeks PRN   Specific OT Treatment Interventions to include:   ADL retraining/adapted techniques and AE recommendations to increase functional independence within precautions                    Energy conservation techniques to improve tolerance for selfcare routine   Functional transfer/mobility training/DME recommendations for increased independence, safety and fall prevention         Patient/family education to increase safety and functional independence             Environmental modifications for safe mobility and completion of ADLs                             Therapeutic activity to improve functional performance during ADLs.                                          Therapeutic exercise to improve tolerance and functional strength for ADLs    Balance retraining/tolerance tasks for facilitation of postural control with dynamic challenges during ADLs . Positioning to improve functional independence  []    Recommended Adaptive Equipment: wheeled walker     Home Living: Pt lives alone, 1 story with couple steps to enter. Laundry in basement   son lives close by   [place] setup: walk in shower      Prior Level of Function: Independent  with ADLs , Independent  with IADLs; ambulated with no devicen    Pain Level: no pain this session ;   Cognition: A&O: pleasant, following commands , conversing   Memory:  fair+   Sequencing: fair+   Problem solving:  fair+   Judgement/safety:  fair+     Functional Assessment:  AM-PAC Daily Activity Raw Score: 17/24   Initial Eval Status  Date: 12/1/22 Treatment Status  Date: STGs = LTGs  Time frame: 10-14 days   Feeding Independent      Grooming SBA/set-up   Standing at sink   Mod I    UB Dressing Set-up   Mod I    LB Dressing Min A   Mod I    Bathing SBA  Sponge bathing at sink   [employer] Supervision   Independent    Bed Mobility  SBA  Supine to sit   Independent    Functional Transfers SBA  Sit- stand from bed, commode (using grab bar)  Mod I    Functional Mobility SBA,w/walker  Ambulated to bathroom with walker   Ambulating out - patient pushed  the walker away- stating I dont need to use that     Mod I with good tolerance    Balance Sitting:     Static:  Independent     Dynamic:  SBA   Standing: SBA  Independent    Activity Tolerance No SOB  good with ADL activity    Visual/  Perceptual Glasses: reading                Hand Dominance right    AROM (PROM) Strength Additional Info:    RUE  WFL WFL good  and wfl FMC/dexterity noted during ADL tasks       LUE WF WFL good  and wfl FMC/dexterity noted during ADL tasks       Hearing: [facility]   Sensation:  No c/o numbness or tingling   Tone: WFL  Edema: none observed    Comments: Upon arrival patient lying inbed .   At end of session, patient sitting in chair  with call light and phone within reach, all lines and tubes intact. *ALARM ON     Overall patient demonstrated  decreased independence and safety during completion of ADL/functional transfer/mobility tasks. Pt would benefit from continued skilled OT to increase safety and independence with completion of ADL/IADL tasks for functional independence and quality of life. Comments/Treatment:     Patient practiced and was instructed on following during evaluation and OT session:          -Bed mobility - technique and safety         -Tranfers - hand placement, tech and safety         -Mobility - safety, and tech with or without  a device         -ADLs - tech, AE if appropriate/needed           -Education: , importance of OOB activity and participating in ADLs, safety awareness             Rehab Potential: good for established goals     Patient / Family Goal: return home      Patient and/or family were instructed on functional diagnosis, prognosis/goals and OT plan of care. Demonstrated fair + understanding. Eval Complexity: Low    Time In: 1220  Time Out: 1246  Total Treatment Time: x    Min Units   OT Eval Low 97165 x  1   OT Eval Medium 90683      OT Eval High 18190      OT Re-Eval [id number]       Therapeutic Ex 17903      Therapeutic Activities 16446  10  1   ADL/Self Care 82125       Orthotic Management 30731       Manual [id number]     Neuro Re-Ed 69883       Non-Billable Time         Evaluation Time additionally includes thorough review of current medical information, gathering information on past medical history/social history and prior level of function, interpretation of standardized testing/informal observation of tasks, assessment of data and development of plan of care and goals.             Thang   OTR/L  OT [id number]

## 2022-12-02 LAB — BLOOD CULTURE, ROUTINE: NORMAL

## 2022-12-06 ENCOUNTER — TELEPHONE (OUTPATIENT)
Dept: ADMINISTRATIVE | Age: 72
End: 2022-12-06

## 2022-12-06 RX ORDER — FUROSEMIDE 20 MG/1
20 TABLET ORAL DAILY
Qty: 30 TABLET | Refills: 5 | Status: SHIPPED | OUTPATIENT
Start: 2022-12-06

## 2022-12-06 NOTE — TELEPHONE ENCOUNTER
Pt's son Susana Gleason called, he is waiting to find out of Dr Ignacio Davey needs to see his dad prior to the 6 month appt 12/20.   He was in the hospital recently, discharged 12/1/22 -- please call him back to advise at [phone]

## 2022-12-06 NOTE — TELEPHONE ENCOUNTER
Pts son called to schedule a hospital follow up for his father. His admission date was 11/25 and discharged on 12/1.

## 2022-12-06 NOTE — TELEPHONE ENCOUNTER
Patient's son Vilma Mullins is notified on appt. date and time. Son states they stopped his lasix's in the hospital and now his legs and ankles are swollen. Please advise.

## 2022-12-06 NOTE — TELEPHONE ENCOUNTER
Restart lasix at 20 mg daily. Jeff Omalley D.O.   Cardiologist  Cardiology, [facility]

## 2022-12-09 ENCOUNTER — TELEPHONE (OUTPATIENT)
Dept: CARDIOLOGY CLINIC | Age: 72
End: 2022-12-09

## 2022-12-09 NOTE — TELEPHONE ENCOUNTER
Patient's son states that patient restarted lasix 20mg 1 tablet in am and 1/2 tablet in pm but he is still having edema in feet and ankles, please advise

## 2022-12-12 LAB — AMMONIA: 49 UMOL/L (ref 11–32)

## 2022-12-12 RX ORDER — ATORVASTATIN CALCIUM 40 MG/1
40 TABLET, FILM COATED ORAL NIGHTLY
Qty: 90 TABLET | Refills: 3 | Status: SHIPPED | OUTPATIENT
Start: 2022-12-12

## 2022-12-12 RX ORDER — ATORVASTATIN CALCIUM 40 MG/1
40 TABLET, FILM COATED ORAL NIGHTLY
Qty: 90 TABLET | Refills: 3 | OUTPATIENT
Start: 2022-12-12

## 2022-12-12 RX ORDER — ASPIRIN 81 MG/1
TABLET, COATED ORAL
Qty: 30 TABLET | Refills: 0 | OUTPATIENT
Start: 2022-12-12

## 2022-12-12 NOTE — TELEPHONE ENCOUNTER
I spoke with patient's son and he said he called after hours on Friday night and was instructed to increase lasix to 40mg BID which he did, patient lost 5lbs but still has edema and feels lethargic,family feels that he needs to be seen at the ER

## 2022-12-12 NOTE — TELEPHONE ENCOUNTER
Make it just lasix 40 mg daily. Reyna Chirinos D.O.   Cardiologist  Cardiology, [facility]

## 2022-12-12 NOTE — TELEPHONE ENCOUNTER
Lorene to send to ED. Ryan Jhaveri D.O.   Cardiologist  Cardiology, [address] [facility]

## 2022-12-13 LAB — B-TYPE NATRIURETIC PEPTIDE: 281.19 PG/ML (ref 0–100)

## 2022-12-14 ENCOUNTER — TELEPHONE (OUTPATIENT)
Dept: HEMATOLOGY | Age: 72
End: 2022-12-14

## 2022-12-14 NOTE — TELEPHONE ENCOUNTER
Spoke with Vianey Cohen who stated that his father is currently inpatient at the [facility]. He stated things were not well and he was not sure what do do at this point. I advised Vianey Cohen that our office will cancel the scheduled MRI scan for now and we will call Jan 2023 about rescheduling.

## 2022-12-20 RX ORDER — ASPIRIN 81 MG/1
TABLET, COATED ORAL
COMMUNITY
Start: 2022-12-16

## 2022-12-20 RX ORDER — LACTULOSE 10 G/15ML
SOLUTION ORAL
COMMUNITY
Start: 2022-12-16

## 2022-12-20 RX ORDER — AMIODARONE HYDROCHLORIDE 200 MG/1
TABLET ORAL
COMMUNITY
Start: 2022-12-16

## 2022-12-27 PROBLEM — R79.89 ELEVATED TROPONIN: Status: RESOLVED | Noted: 2022-11-27 | Resolved: 2022-12-27

## 2022-12-27 PROBLEM — R77.8 ELEVATED TROPONIN: Status: RESOLVED | Noted: 2022-11-27 | Resolved: 2022-12-27

## 2022-12-27 PROBLEM — Z01.810 PREOPERATIVE CARDIOVASCULAR EXAMINATION: Status: RESOLVED | Noted: 2022-11-27 | Resolved: 2022-12-27

## 2023-01-16 RX ORDER — MONTELUKAST SODIUM 10 MG/1
10 TABLET ORAL NIGHTLY
Qty: 30 TABLET | Refills: 3 | Status: SHIPPED | OUTPATIENT
Start: 2023-01-16

## 2023-01-20 ENCOUNTER — TELEPHONE (OUTPATIENT)
Dept: HEMATOLOGY | Age: 73
End: 2023-01-20

## 2023-01-20 DIAGNOSIS — J32.4 CHRONIC PANSINUSITIS: Primary | ICD-10-CM

## 2023-01-20 NOTE — TELEPHONE ENCOUNTER
No prior auth needed. Called and LVM with Any, Jet's son. Pt's 3 mo MRI scan has been rescheduled. SEB 2/3/23 arrive 1pm for labs first, scan 2, NPO 4. Labs ordered and in Epic. A follow up appt will need to be made with Dr Ravinder Carpenter.

## 2023-02-07 ENCOUNTER — TELEPHONE (OUTPATIENT)
Dept: HEMATOLOGY | Age: 73
End: 2023-02-07

## 2023-02-07 NOTE — TELEPHONE ENCOUNTER
Patient was a no show for his MRI that was scheuled on 02/03/2023. I tried to reach out to the patient but his phone is disconnected.  I did leave a message with the patients son Margaret Romero and asked him to call our office back, office number was left  Electronically signed by Mima Abbasi MA on 2/7/2023 at 11:22 AM

## 2023-05-19 NOTE — PLAN OF CARE
Problem: Discharge Planning  Goal: Discharge to home or other facility with appropriate resources  Outcome: Progressing     Problem: Pain  Goal: Verbalizes/displays adequate comfort level or baseline comfort level  Outcome: Progressing     Problem: Chronic Conditions and Co-morbidities  Goal: Patient's chronic conditions and co-morbidity symptoms are monitored and maintained or improved  Outcome: Progressing SCRIBE #1 NOTE: I, Marta Zazueta, am scribing for, and in the presence of, Edgardo Roasles MD. I have scribed the entire note.       History     Chief Complaint   Patient presents with    Emesis     Pt states for the past 24 hours he has been dealing with emesis that does not improve with OTC medications. Pt states emesis is clear. Pt also states his left lower abdominal area is hurting.      Review of patient's allergies indicates:  No Known Allergies      History of Present Illness     HPI    5/18/2023, 10:23 PM  History obtained from the patient      History of Present Illness: Russell Orellana is a 56 y.o. male patient with a PMHx of DM, high cholesterol, and HTN who presents to the Emergency Department for evaluation of emesis which onset 3 day PTA. Symptoms are episodic and moderate in severity. Pt thinks that he ate something he shouldn't have. No mitigating or exacerbating factors reported. Associated sxs include LUQ abdominal pain, SOB, and dizziness. Patient denies any diarrhea, fever, chills, CP, dysuria, and all other sxs at this time. Prior Tx includes OTC medications with no relief. Pt's prescribed medications include Metformin, Amlodipine, Lisinopril, Famotidine, and Atorvastatin. Pt reports non-compliance with these medications. No further complaints or concerns at this time.       Arrival mode: Personal vehicle    PCP: Primary Doctor No        Past Medical History:  Past Medical History:   Diagnosis Date    Diabetes mellitus     High cholesterol     Hypertension        Past Surgical History:  No past surgical history on file.      Family History:  Family History   Problem Relation Age of Onset    No Known Problems Mother     No Known Problems Father        Social History:  Social History     Tobacco Use    Smoking status: Never    Smokeless tobacco: Never   Substance and Sexual Activity    Alcohol use: Not Currently    Drug use: Never    Sexual activity: Not on file        Review of Systems     Review of  Systems   Constitutional:  Negative for chills and fever.   HENT:  Negative for sore throat.    Respiratory:  Positive for shortness of breath.    Cardiovascular:  Negative for chest pain.   Gastrointestinal:  Positive for abdominal pain (LUQ), nausea and vomiting. Negative for diarrhea.   Genitourinary:  Negative for dysuria.   Musculoskeletal:  Negative for back pain.   Skin:  Negative for rash.   Neurological:  Positive for dizziness. Negative for weakness.   Hematological:  Does not bruise/bleed easily.   All other systems reviewed and are negative.     Physical Exam     Initial Vitals   BP Pulse Resp Temp SpO2   05/18/23 1920 05/18/23 1920 05/18/23 2311 05/18/23 1920 05/18/23 1920   (!) 180/99 99 18 98 °F (36.7 °C) 99 %      MAP       --                 Physical Exam  Nursing Notes and Vital Signs Reviewed.  Constitutional: Patient is in mild distress. Well-developed and well-nourished.  Head: Atraumatic. Normocephalic.  Eyes: PERRL. EOM intact. Conjunctivae are not pale. No scleral icterus.  ENT: Mucous membranes are moist. Oropharynx is clear and symmetric.    Neck: Supple. Full ROM. No lymphadenopathy.  Cardiovascular: Regular rate. Regular rhythm. No murmurs, rubs, or gallops. Distal pulses are 2+ and symmetric.  Pulmonary/Chest: No respiratory distress. Clear to auscultation bilaterally. No wheezing or rales.  Abdominal: Mild LUQ tenderness. Soft and non-distended.  No rebound, guarding, or rigidity. Good bowel sounds.  Genitourinary: No CVA tenderness  Musculoskeletal: Moves all extremities. No obvious deformities. No edema.  Skin: Warm and dry.  Neurological:  Alert, awake, and appropriate.  Normal speech.  No acute focal neurological deficits are appreciated.  Psychiatric: Normal affect. Good eye contact. Appropriate in content.     ED Course   Procedures  ED Vital Signs:  Vitals:    05/18/23 1920 05/18/23 2311 05/18/23 2332 05/18/23 2357   BP: (!) 180/99  (!) 224/111 (!) 220/109   Pulse: 99  102 93    Resp:  18  16   Temp: 98 °F (36.7 °C)   98 °F (36.7 °C)   TempSrc: Oral   Oral   SpO2: 99%  97% 97%   Weight: 68 kg (150 lb)       05/18/23 2359 05/19/23 0004 05/19/23 0017 05/19/23 0028   BP: (!) 216/105 (!) 230/109 (!) 248/116 (!) 235/119   Pulse: (!) 111 109 105 106   Resp:       Temp:       TempSrc:       SpO2: 99% 98% 98% 98%   Weight:        05/19/23 0032 05/19/23 0047 05/19/23 0102 05/19/23 0117   BP: (!) 195/79 (!) 170/79 (!) 152/73 (!) 173/78   Pulse: 108 (!) 116 (!) 113 110   Resp:       Temp:       TempSrc:       SpO2: 99% 98% 98% 96%   Weight:        05/19/23 0132   BP: (!) 174/82   Pulse: 108   Resp:    Temp:    TempSrc:    SpO2: 98%   Weight:        Abnormal Lab Results:  Labs Reviewed   CBC W/ AUTO DIFFERENTIAL - Abnormal; Notable for the following components:       Result Value    Gran # (ANC) 8.8 (*)     Lymph # 0.9 (*)     Gran % 85.7 (*)     Lymph % 9.1 (*)     All other components within normal limits   COMPREHENSIVE METABOLIC PANEL - Abnormal; Notable for the following components:    Sodium 132 (*)     Chloride 92 (*)     CO2 21 (*)     Glucose 313 (*)     BUN 34 (*)     Total Bilirubin 1.3 (*)     Anion Gap 19 (*)     All other components within normal limits   URINALYSIS, REFLEX TO URINE CULTURE - Abnormal; Notable for the following components:    Protein, UA 1+ (*)     Glucose, UA 4+ (*)     Ketones, UA 3+ (*)     Occult Blood UA 1+ (*)     All other components within normal limits    Narrative:     Specimen Source->Urine   URINALYSIS MICROSCOPIC - Abnormal; Notable for the following components:    RBC, UA 5 (*)     All other components within normal limits    Narrative:     Specimen Source->Urine   DRUG SCREEN PANEL, URINE EMERGENCY - Abnormal; Notable for the following components:    Opiate Scrn, Ur Presumptive Positive (*)     Creatinine, Urine 16.1 (*)     All other components within normal limits    Narrative:     Specimen Source->Urine   ISTAT PROCEDURE - Abnormal; Notable for the  following components:    POC PCO2 52.9 (*)     POC PO2 26 (*)     POC HCO3 32.6 (*)     POC SATURATED O2 46 (*)     All other components within normal limits   POCT GLUCOSE - Abnormal; Notable for the following components:    POCT Glucose 236 (*)     All other components within normal limits   LIPASE   POCT GLUCOSE MONITORING CONTINUOUS        All Lab Results:  Results for orders placed or performed during the hospital encounter of 05/18/23   CBC Auto Differential   Result Value Ref Range    WBC 10.28 3.90 - 12.70 K/uL    RBC 5.60 4.60 - 6.20 M/uL    Hemoglobin 16.5 14.0 - 18.0 g/dL    Hematocrit 46.4 40.0 - 54.0 %    MCV 83 82 - 98 fL    MCH 29.5 27.0 - 31.0 pg    MCHC 35.6 32.0 - 36.0 g/dL    RDW 11.9 11.5 - 14.5 %    Platelets 206 150 - 450 K/uL    MPV 10.7 9.2 - 12.9 fL    Immature Granulocytes 0.4 0.0 - 0.5 %    Gran # (ANC) 8.8 (H) 1.8 - 7.7 K/uL    Immature Grans (Abs) 0.04 0.00 - 0.04 K/uL    Lymph # 0.9 (L) 1.0 - 4.8 K/uL    Mono # 0.5 0.3 - 1.0 K/uL    Eos # 0.0 0.0 - 0.5 K/uL    Baso # 0.03 0.00 - 0.20 K/uL    nRBC 0 0 /100 WBC    Gran % 85.7 (H) 38.0 - 73.0 %    Lymph % 9.1 (L) 18.0 - 48.0 %    Mono % 4.5 4.0 - 15.0 %    Eosinophil % 0.0 0.0 - 8.0 %    Basophil % 0.3 0.0 - 1.9 %    Differential Method Automated    Comprehensive Metabolic Panel   Result Value Ref Range    Sodium 132 (L) 136 - 145 mmol/L    Potassium 4.5 3.5 - 5.1 mmol/L    Chloride 92 (L) 95 - 110 mmol/L    CO2 21 (L) 23 - 29 mmol/L    Glucose 313 (H) 70 - 110 mg/dL    BUN 34 (H) 6 - 20 mg/dL    Creatinine 1.3 0.5 - 1.4 mg/dL    Calcium 9.4 8.7 - 10.5 mg/dL    Total Protein 8.3 6.0 - 8.4 g/dL    Albumin 4.6 3.5 - 5.2 g/dL    Total Bilirubin 1.3 (H) 0.1 - 1.0 mg/dL    Alkaline Phosphatase 71 55 - 135 U/L    AST 39 10 - 40 U/L    ALT 23 10 - 44 U/L    Anion Gap 19 (H) 8 - 16 mmol/L    eGFR >60 >60 mL/min/1.73 m^2   Lipase   Result Value Ref Range    Lipase 19 4 - 60 U/L   Urinalysis, Reflex to Urine Culture Urine, Clean Catch    Specimen:  Urine   Result Value Ref Range    Specimen UA Urine, Clean Catch     Color, UA Yellow Yellow, Straw, Mery    Appearance, UA Clear Clear    pH, UA 8.0 5.0 - 8.0    Specific Gravity, UA 1.020 1.005 - 1.030    Protein, UA 1+ (A) Negative    Glucose, UA 4+ (A) Negative    Ketones, UA 3+ (A) Negative    Bilirubin (UA) Negative Negative    Occult Blood UA 1+ (A) Negative    Nitrite, UA Negative Negative    Urobilinogen, UA Negative <2.0 EU/dL    Leukocytes, UA Negative Negative   Urinalysis Microscopic   Result Value Ref Range    RBC, UA 5 (H) 0 - 4 /hpf    WBC, UA 0 0 - 5 /hpf    Bacteria None None-Occ /hpf    Yeast, UA None None    Hyaline Casts, UA 0 0-1/lpf /lpf    Microscopic Comment SEE COMMENT    Drug screen panel, emergency   Result Value Ref Range    Benzodiazepines Negative Negative    Methadone metabolites Negative Negative    Cocaine (Metab.) Negative Negative    Opiate Scrn, Ur Presumptive Positive (A) Negative    Barbiturate Screen, Ur Negative Negative    Amphetamine Screen, Ur Negative Negative    THC Negative Negative    Phencyclidine Negative Negative    Creatinine, Urine 16.1 (L) 23.0 - 375.0 mg/dL    Toxicology Information SEE COMMENT    ISTAT PROCEDURE   Result Value Ref Range    POC PH 7.397 7.35 - 7.45    POC PCO2 52.9 (H) 35 - 45 mmHg    POC PO2 26 (L) 40 - 60 mmHg    POC HCO3 32.6 (H) 24 - 28 mmol/L    POC BE 8 -2 to 2 mmol/L    POC SATURATED O2 46 (L) 95 - 100 %    Sample VENOUS     Site Other     Allens Test N/A     DelSys Room Air     Mode SPONT     FiO2 21    POCT glucose   Result Value Ref Range    POCT Glucose 236 (H) 70 - 110 mg/dL         Imaging Results:  Imaging Results              CT Abdomen Pelvis With Contrast (Final result)  Result time 05/18/23 23:33:57      Final result by Wil Duckworth MD (05/18/23 23:33:57)                   Impression:      No definite acute abnormality identified.  Correlation and further evaluation as warranted.    All CT scans at this facility are  performed  using dose modulation techniques as appropriate to performed exam including the following:  automated exposure control; adjustment of mA and/or kV according to the patients size (this includes techniques or standardized protocols for targeted exams where dose is matched to indication/reason for exam: i.e. extremities or head);  iterative reconstruction technique.      Electronically signed by: Wil Duckworth  Date:    05/18/2023  Time:    23:33               Narrative:    EXAMINATION:  CT ABDOMEN PELVIS WITH CONTRAST    CLINICAL HISTORY:  Nausea/vomiting;    TECHNIQUE:  Low dose axial images, sagittal and coronal reformations were obtained from the lung bases to the pubic symphysis.  Contrast was administered.  Images acquired after the administration 100 mL Omnipaque 350 IV contrast.    COMPARISON:  Multiple priors.    FINDINGS:  Heart: Normal in size. No pericardial effusion.    Lung Bases: Well aerated, without consolidation or pleural fluid.    Liver: Normal in size and attenuation, with no focal hepatic lesions.    Gallbladder: No calcified gallstones.    Bile Ducts: No evidence of dilated ducts.    Pancreas: No mass or peripancreatic fat stranding.    Spleen: Unremarkable.    Adrenals: Unremarkable.    Kidneys/ Ureters: No hydronephrosis.  No obstructive uropathy.  No nonobstructive nephrolithiasis.    Bladder: No evidence of wall thickening.    Reproductive organs: Unremarkable.    GI Tract/Mesentery: No evidence of bowel obstruction or inflammation.  No evidence of appendicitis.    Peritoneal Space: No ascites. No free air.    Retroperitoneum: No significant adenopathy.    Abdominal wall: Unremarkable.    Vasculature: No significant atherosclerosis or aneurysm.    Bones: No acute fracture.  Multifocal degenerative changes.                                       The EKG was ordered, reviewed, and independently interpreted by the ED provider.  Interpretation time: 01:00  Rate: 113 BPM  Rhythm: sinus  tachycardia  Interpretation: No acute ST changes. No STEMI.           The Emergency Provider reviewed the vital signs and test results, which are outlined above.     ED Discussion     1:52 AM: Discussed case with Chantal Quiroga NP ([facility]). Dr. Pacheco agrees with current care and management of pt and accepts admission.   Admitting Service: [facility]  Admitting Physician: Dr. Pacheco  Admit to: Obs med/tele    1:52 AM: Re-evaluated pt. I have discussed test results, shared treatment plan, and the need for admission with patient and family at bedside. Pt and family express understanding at this time and agree with all information. All questions answered. Pt and family have no further questions or concerns at this time. Pt is ready for admit.           Medical Decision Making:   History:   Old Medical Records: I decided to obtain old medical records.  Old Records Summarized: other records.       <> Summary of Records: Medication list reviewed.  Medications patient brought with him reviewed.  Patient noncompliant with all these medications  Initial Assessment:   56-year-old male noncompliant with medications presenting with nausea/vomiting and hypertension  Differential Diagnosis:   Dehydration, electrolyte abnormalities, acute renal failure, DKA  Clinical Tests:   Lab Tests: Ordered and Reviewed  Radiological Study: Ordered and Reviewed  Medical Tests: Ordered and Reviewed  ED Management:  Intractable nausea/vomiting.  No relief with IV fluids and antiemetics here in the emergency department.  Blood pressure did improve with the IV antihypertensives.  Labs revealed dehydration with hyperglycemia and ketones but no acidosis.  Not quite DKA requiring IV insulin.  Glucose did improve with IV fluids.  Patient admitted to medicine for further management.         ED Medication(s):  Medications   lactated ringers bolus 1,000 mL (has no administration in time range)   meclizine tablet 50 mg (has no  administration in time range)   lactated ringers bolus 1,000 mL (0 mLs Intravenous Stopped 5/18/23 2146)   ondansetron injection 4 mg (4 mg Intravenous Given 5/18/23 2031)   lactated ringers bolus 1,000 mL (0 mLs Intravenous Stopped 5/19/23 0038)   promethazine (PHENERGAN) 12.5 mg in dextrose 5 % (D5W) 50 mL IVPB (0 mg Intravenous Stopped 5/18/23 2330)   morphine injection 2 mg (2 mg Intravenous Given 5/18/23 2311)   iohexoL (OMNIPAQUE 350) injection 100 mL (100 mLs Intravenous Given 5/18/23 2326)   hydrALAZINE injection 10 mg (10 mg Intravenous Given 5/19/23 0025)       New Prescriptions    No medications on file               Scribe Attestation:   Scribe #1: I performed the above scribed service and the documentation accurately describes the services I performed. I attest to the accuracy of the note.     Attending:   Physician Attestation Statement for Scribe #1: I, Edgardo Rosales MD, personally performed the services described in this documentation, as scribed by Marta Zazueta, in my presence, and it is both accurate and complete.           Clinical Impression       ICD-10-CM ICD-9-CM   1. Intractable nausea and vomiting  R11.2 536.2   2. Tachycardia  R00.0 785.0   3. Essential hypertension  I10 401.9   4. Hypertensive urgency  I16.0 401.9   5. Chest pain  R07.9 786.50   6. Type 2 diabetes mellitus with hyperglycemia, with long-term current use of insulin  E11.65 250.00    Z79.4 790.29     V58.67   7. Dehydration  E86.0 276.51       Disposition:   Disposition: Placed in Observation  Condition: Fair       Edgardo Rosales MD  05/24/23 0019

## (undated) DEVICE — GAUZE,SPONGE,POST-OP,4X3,STRL,LF: Brand: MEDLINE

## (undated) DEVICE — ENDOSCOPIC ULTRASOUND FINE NEEDLE BIOPSY (FNB) DEVICE: Brand: ACQUIRE

## (undated) DEVICE — BITEBLOCK 54FR W/ DENT RIM BLOX

## (undated) DEVICE — Device: Brand: BALLOON3

## (undated) DEVICE — CANNULA NSL ORAL AD FOR CAPNOFLEX CO2 O2 AIRLFE